# Patient Record
Sex: FEMALE | Race: WHITE | Employment: OTHER | URBAN - METROPOLITAN AREA
[De-identification: names, ages, dates, MRNs, and addresses within clinical notes are randomized per-mention and may not be internally consistent; named-entity substitution may affect disease eponyms.]

---

## 2017-01-03 ENCOUNTER — CARE COORDINATION (OUTPATIENT)
Dept: CARE COORDINATION | Age: 72
End: 2017-01-03

## 2017-01-16 ENCOUNTER — CARE COORDINATOR VISIT (OUTPATIENT)
Dept: CARE COORDINATION | Age: 72
End: 2017-01-16

## 2017-01-16 ENCOUNTER — OFFICE VISIT (OUTPATIENT)
Dept: PRIMARY CARE CLINIC | Age: 72
End: 2017-01-16

## 2017-01-16 VITALS
HEIGHT: 67 IN | SYSTOLIC BLOOD PRESSURE: 124 MMHG | TEMPERATURE: 98.6 F | WEIGHT: 217 LBS | OXYGEN SATURATION: 99 % | DIASTOLIC BLOOD PRESSURE: 58 MMHG | BODY MASS INDEX: 34.06 KG/M2 | HEART RATE: 71 BPM | RESPIRATION RATE: 16 BRPM

## 2017-01-16 DIAGNOSIS — R73.9 HYPERGLYCEMIA: Primary | ICD-10-CM

## 2017-01-16 DIAGNOSIS — I65.29 STENOSIS OF CAROTID ARTERY, UNSPECIFIED LATERALITY: ICD-10-CM

## 2017-01-16 DIAGNOSIS — R73.9 HYPERGLYCEMIA: ICD-10-CM

## 2017-01-16 LAB
ALBUMIN SERPL-MCNC: 3.7 G/DL (ref 3.9–4.9)
ALP BLD-CCNC: 82 U/L (ref 40–130)
ALT SERPL-CCNC: 17 U/L (ref 0–33)
ANION GAP SERPL CALCULATED.3IONS-SCNC: 14 MEQ/L (ref 7–13)
AST SERPL-CCNC: 23 U/L (ref 0–35)
BILIRUB SERPL-MCNC: 0.5 MG/DL (ref 0–1.2)
BUN BLDV-MCNC: 8 MG/DL (ref 8–23)
CALCIUM SERPL-MCNC: 8.7 MG/DL (ref 8.6–10.2)
CHLORIDE BLD-SCNC: 106 MEQ/L (ref 98–107)
CO2: 24 MEQ/L (ref 22–29)
CREAT SERPL-MCNC: 0.69 MG/DL (ref 0.5–0.9)
GFR AFRICAN AMERICAN: >60
GFR NON-AFRICAN AMERICAN: >60
GLOBULIN: 2.3 G/DL (ref 2.3–3.5)
GLUCOSE BLD-MCNC: 189 MG/DL (ref 74–109)
POTASSIUM SERPL-SCNC: 4.5 MEQ/L (ref 3.5–5.1)
SODIUM BLD-SCNC: 144 MEQ/L (ref 132–144)
TOTAL PROTEIN: 6 G/DL (ref 6.4–8.1)

## 2017-01-16 PROCEDURE — 4040F PNEUMOC VAC/ADMIN/RCVD: CPT | Performed by: INTERNAL MEDICINE

## 2017-01-16 PROCEDURE — G8484 FLU IMMUNIZE NO ADMIN: HCPCS | Performed by: INTERNAL MEDICINE

## 2017-01-16 PROCEDURE — G8399 PT W/DXA RESULTS DOCUMENT: HCPCS | Performed by: INTERNAL MEDICINE

## 2017-01-16 PROCEDURE — 1090F PRES/ABSN URINE INCON ASSESS: CPT | Performed by: INTERNAL MEDICINE

## 2017-01-16 PROCEDURE — G8419 CALC BMI OUT NRM PARAM NOF/U: HCPCS | Performed by: INTERNAL MEDICINE

## 2017-01-16 PROCEDURE — 1123F ACP DISCUSS/DSCN MKR DOCD: CPT | Performed by: INTERNAL MEDICINE

## 2017-01-16 PROCEDURE — 3014F SCREEN MAMMO DOC REV: CPT | Performed by: INTERNAL MEDICINE

## 2017-01-16 PROCEDURE — G8598 ASA/ANTIPLAT THER USED: HCPCS | Performed by: INTERNAL MEDICINE

## 2017-01-16 PROCEDURE — 3017F COLORECTAL CA SCREEN DOC REV: CPT | Performed by: INTERNAL MEDICINE

## 2017-01-16 PROCEDURE — 99212 OFFICE O/P EST SF 10 MIN: CPT | Performed by: INTERNAL MEDICINE

## 2017-01-16 PROCEDURE — G8427 DOCREV CUR MEDS BY ELIG CLIN: HCPCS | Performed by: INTERNAL MEDICINE

## 2017-01-16 PROCEDURE — 1036F TOBACCO NON-USER: CPT | Performed by: INTERNAL MEDICINE

## 2017-01-16 RX ORDER — OXYCODONE HYDROCHLORIDE 5 MG/1
5-10 TABLET ORAL
COMMUNITY
Start: 2017-01-07 | End: 2017-01-16

## 2017-01-16 RX ORDER — ATORVASTATIN CALCIUM 40 MG/1
TABLET, FILM COATED ORAL
Refills: 2 | COMMUNITY
Start: 2017-01-07 | End: 2017-01-16

## 2017-01-16 RX ORDER — CHOLECALCIFEROL (VITAMIN D3) 125 MCG
100 CAPSULE ORAL
COMMUNITY
End: 2017-01-16

## 2017-01-16 RX ORDER — ATORVASTATIN CALCIUM 40 MG/1
40 TABLET, FILM COATED ORAL
COMMUNITY
Start: 2017-01-06 | End: 2017-03-28 | Stop reason: SDUPTHER

## 2017-01-16 RX ORDER — METOPROLOL SUCCINATE 100 MG/1
100 TABLET, EXTENDED RELEASE ORAL DAILY
COMMUNITY
End: 2017-08-11 | Stop reason: SDUPTHER

## 2017-01-16 RX ORDER — OXYCODONE HYDROCHLORIDE 5 MG/1
TABLET ORAL
Refills: 0 | COMMUNITY
Start: 2017-01-07 | End: 2017-01-16 | Stop reason: ALTCHOICE

## 2017-01-16 ASSESSMENT — PATIENT HEALTH QUESTIONNAIRE - PHQ9
1. LITTLE INTEREST OR PLEASURE IN DOING THINGS: 0
SUM OF ALL RESPONSES TO PHQ QUESTIONS 1-9: 0
2. FEELING DOWN, DEPRESSED OR HOPELESS: 0
SUM OF ALL RESPONSES TO PHQ9 QUESTIONS 1 & 2: 0

## 2017-01-27 ASSESSMENT — ENCOUNTER SYMPTOMS
FACIAL SWELLING: 0
APNEA: 0
ABDOMINAL DISTENTION: 0
PHOTOPHOBIA: 0
CHOKING: 0
BLOOD IN STOOL: 0

## 2017-01-31 ENCOUNTER — CARE COORDINATION (OUTPATIENT)
Dept: PRIMARY CARE CLINIC | Age: 72
End: 2017-01-31

## 2017-02-28 ENCOUNTER — CARE COORDINATION (OUTPATIENT)
Dept: CARE COORDINATION | Age: 72
End: 2017-02-28

## 2017-03-28 ENCOUNTER — CARE COORDINATION (OUTPATIENT)
Dept: CARE COORDINATION | Age: 72
End: 2017-03-28

## 2017-03-28 RX ORDER — ATORVASTATIN CALCIUM 40 MG/1
40 TABLET, FILM COATED ORAL DAILY
Qty: 90 TABLET | Refills: 1 | Status: SHIPPED | OUTPATIENT
Start: 2017-03-28 | End: 2017-08-11 | Stop reason: SDUPTHER

## 2017-04-26 ENCOUNTER — CARE COORDINATION (OUTPATIENT)
Dept: CARE COORDINATION | Age: 72
End: 2017-04-26

## 2017-05-12 DIAGNOSIS — E11.9 DIABETES TYPE 2, CONTROLLED (HCC): ICD-10-CM

## 2017-05-12 RX ORDER — GLIMEPIRIDE 2 MG/1
TABLET ORAL
Qty: 90 TABLET | Refills: 0 | Status: SHIPPED | OUTPATIENT
Start: 2017-05-12 | End: 2017-08-11 | Stop reason: SDUPTHER

## 2017-05-20 DIAGNOSIS — I42.9 CARDIOMYOPATHY (HCC): ICD-10-CM

## 2017-05-22 RX ORDER — LISINOPRIL 5 MG/1
TABLET ORAL
Qty: 90 TABLET | Refills: 1 | Status: SHIPPED | OUTPATIENT
Start: 2017-05-22 | End: 2017-08-11 | Stop reason: SDUPTHER

## 2017-05-25 RX ORDER — RALOXIFENE HYDROCHLORIDE 60 MG/1
TABLET, FILM COATED ORAL
Qty: 90 TABLET | Refills: 0 | Status: SHIPPED | OUTPATIENT
Start: 2017-05-25 | End: 2017-08-11 | Stop reason: SDUPTHER

## 2017-06-07 ENCOUNTER — CARE COORDINATION (OUTPATIENT)
Dept: PRIMARY CARE CLINIC | Age: 72
End: 2017-06-07

## 2017-06-14 ENCOUNTER — CARE COORDINATION (OUTPATIENT)
Dept: CARE COORDINATION | Age: 72
End: 2017-06-14

## 2017-07-21 ENCOUNTER — TELEPHONE (OUTPATIENT)
Dept: PRIMARY CARE CLINIC | Age: 72
End: 2017-07-21

## 2017-08-05 DIAGNOSIS — E11.9 DIABETES TYPE 2, CONTROLLED (HCC): ICD-10-CM

## 2017-08-06 RX ORDER — GLIMEPIRIDE 2 MG/1
TABLET ORAL
Qty: 90 TABLET | OUTPATIENT
Start: 2017-08-06

## 2017-08-11 ENCOUNTER — OFFICE VISIT (OUTPATIENT)
Dept: PRIMARY CARE CLINIC | Age: 72
End: 2017-08-11

## 2017-08-11 VITALS
HEART RATE: 75 BPM | SYSTOLIC BLOOD PRESSURE: 134 MMHG | DIASTOLIC BLOOD PRESSURE: 86 MMHG | OXYGEN SATURATION: 97 % | HEIGHT: 67 IN | RESPIRATION RATE: 16 BRPM | BODY MASS INDEX: 34.61 KG/M2 | TEMPERATURE: 97 F | WEIGHT: 220.5 LBS

## 2017-08-11 DIAGNOSIS — E78.00 PURE HYPERCHOLESTEROLEMIA: ICD-10-CM

## 2017-08-11 DIAGNOSIS — E11.9 TYPE 2 DIABETES MELLITUS WITHOUT COMPLICATION, WITHOUT LONG-TERM CURRENT USE OF INSULIN (HCC): ICD-10-CM

## 2017-08-11 DIAGNOSIS — M81.8 OTHER OSTEOPOROSIS: ICD-10-CM

## 2017-08-11 DIAGNOSIS — E11.9 TYPE 2 DIABETES MELLITUS WITHOUT COMPLICATION, WITHOUT LONG-TERM CURRENT USE OF INSULIN (HCC): Primary | ICD-10-CM

## 2017-08-11 DIAGNOSIS — I10 ESSENTIAL HYPERTENSION: ICD-10-CM

## 2017-08-11 DIAGNOSIS — F34.1 DYSTHYMIA: ICD-10-CM

## 2017-08-11 LAB
CREATININE URINE: 85 MG/DL
MICROALBUMIN UR-MCNC: <1.2 MG/DL
MICROALBUMIN/CREAT UR-RTO: NORMAL MG/G (ref 0–30)

## 2017-08-11 PROCEDURE — 3014F SCREEN MAMMO DOC REV: CPT | Performed by: INTERNAL MEDICINE

## 2017-08-11 PROCEDURE — 3017F COLORECTAL CA SCREEN DOC REV: CPT | Performed by: INTERNAL MEDICINE

## 2017-08-11 PROCEDURE — 1123F ACP DISCUSS/DSCN MKR DOCD: CPT | Performed by: INTERNAL MEDICINE

## 2017-08-11 PROCEDURE — G8427 DOCREV CUR MEDS BY ELIG CLIN: HCPCS | Performed by: INTERNAL MEDICINE

## 2017-08-11 PROCEDURE — 4005F PHARM THX FOR OP RXD: CPT | Performed by: INTERNAL MEDICINE

## 2017-08-11 PROCEDURE — 1036F TOBACCO NON-USER: CPT | Performed by: INTERNAL MEDICINE

## 2017-08-11 PROCEDURE — 4040F PNEUMOC VAC/ADMIN/RCVD: CPT | Performed by: INTERNAL MEDICINE

## 2017-08-11 PROCEDURE — 99214 OFFICE O/P EST MOD 30 MIN: CPT | Performed by: INTERNAL MEDICINE

## 2017-08-11 PROCEDURE — 3046F HEMOGLOBIN A1C LEVEL >9.0%: CPT | Performed by: INTERNAL MEDICINE

## 2017-08-11 PROCEDURE — G8399 PT W/DXA RESULTS DOCUMENT: HCPCS | Performed by: INTERNAL MEDICINE

## 2017-08-11 PROCEDURE — 1090F PRES/ABSN URINE INCON ASSESS: CPT | Performed by: INTERNAL MEDICINE

## 2017-08-11 PROCEDURE — G8598 ASA/ANTIPLAT THER USED: HCPCS | Performed by: INTERNAL MEDICINE

## 2017-08-11 PROCEDURE — G8417 CALC BMI ABV UP PARAM F/U: HCPCS | Performed by: INTERNAL MEDICINE

## 2017-08-11 RX ORDER — RALOXIFENE HYDROCHLORIDE 60 MG/1
60 TABLET, FILM COATED ORAL DAILY
Qty: 90 TABLET | Refills: 3 | Status: SHIPPED | OUTPATIENT
Start: 2017-08-11 | End: 2018-09-05 | Stop reason: SDUPTHER

## 2017-08-11 RX ORDER — LISINOPRIL 5 MG/1
5 TABLET ORAL DAILY
Qty: 90 TABLET | Refills: 3 | Status: SHIPPED | OUTPATIENT
Start: 2017-08-11 | End: 2018-10-22 | Stop reason: SDUPTHER

## 2017-08-11 RX ORDER — GLIMEPIRIDE 2 MG/1
2 TABLET ORAL
Qty: 90 TABLET | Refills: 3 | Status: SHIPPED | OUTPATIENT
Start: 2017-08-11 | End: 2018-08-05 | Stop reason: SDUPTHER

## 2017-08-11 RX ORDER — RALOXIFENE HYDROCHLORIDE 60 MG/1
TABLET, FILM COATED ORAL
Qty: 90 TABLET | Refills: 0 | Status: CANCELLED | OUTPATIENT
Start: 2017-08-11

## 2017-08-11 RX ORDER — ATORVASTATIN CALCIUM 40 MG/1
40 TABLET, FILM COATED ORAL DAILY
Qty: 90 TABLET | Refills: 3 | Status: SHIPPED | OUTPATIENT
Start: 2017-08-11 | End: 2018-11-16 | Stop reason: SDUPTHER

## 2017-08-11 RX ORDER — METOPROLOL SUCCINATE 100 MG/1
100 TABLET, EXTENDED RELEASE ORAL DAILY
Qty: 90 TABLET | Refills: 3 | Status: SHIPPED | OUTPATIENT
Start: 2017-08-11 | End: 2020-01-01 | Stop reason: SDUPTHER

## 2017-08-11 RX ORDER — SERTRALINE HYDROCHLORIDE 25 MG/1
25 TABLET, FILM COATED ORAL DAILY
Qty: 90 TABLET | Refills: 3 | Status: SHIPPED | OUTPATIENT
Start: 2017-08-11 | End: 2018-07-23 | Stop reason: SDUPTHER

## 2017-08-13 ASSESSMENT — ENCOUNTER SYMPTOMS
PHOTOPHOBIA: 0
BLURRED VISION: 0
FACIAL SWELLING: 0
CHOKING: 0
BLOOD IN STOOL: 0
ABDOMINAL DISTENTION: 0
APNEA: 0

## 2018-02-20 ENCOUNTER — OFFICE VISIT (OUTPATIENT)
Dept: PRIMARY CARE CLINIC | Age: 73
End: 2018-02-20
Payer: MEDICARE

## 2018-02-20 VITALS
WEIGHT: 210.2 LBS | DIASTOLIC BLOOD PRESSURE: 80 MMHG | BODY MASS INDEX: 32.99 KG/M2 | RESPIRATION RATE: 16 BRPM | OXYGEN SATURATION: 96 % | HEART RATE: 68 BPM | SYSTOLIC BLOOD PRESSURE: 138 MMHG | HEIGHT: 67 IN | TEMPERATURE: 97.4 F

## 2018-02-20 DIAGNOSIS — Z11.59 ENCOUNTER FOR HEPATITIS C SCREENING TEST FOR LOW RISK PATIENT: ICD-10-CM

## 2018-02-20 DIAGNOSIS — E78.00 PURE HYPERCHOLESTEROLEMIA: ICD-10-CM

## 2018-02-20 DIAGNOSIS — G47.33 OBSTRUCTIVE SLEEP APNEA SYNDROME: Primary | ICD-10-CM

## 2018-02-20 DIAGNOSIS — E11.9 CONTROLLED TYPE 2 DIABETES MELLITUS WITHOUT COMPLICATION, WITHOUT LONG-TERM CURRENT USE OF INSULIN (HCC): ICD-10-CM

## 2018-02-20 DIAGNOSIS — R73.9 HYPERGLYCEMIA: ICD-10-CM

## 2018-02-20 DIAGNOSIS — E03.8 OTHER SPECIFIED HYPOTHYROIDISM: ICD-10-CM

## 2018-02-20 LAB
CHOLESTEROL, TOTAL: 113 MG/DL (ref 0–199)
HBA1C MFR BLD: 6.9 %
HDLC SERPL-MCNC: 43 MG/DL (ref 40–59)
HEPATITIS C ANTIBODY INTERPRETATION: NORMAL
LDL CHOLESTEROL CALCULATED: 48 MG/DL (ref 0–129)
TRIGL SERPL-MCNC: 111 MG/DL (ref 0–200)
TSH SERPL DL<=0.05 MIU/L-ACNC: 1.21 UIU/ML (ref 0.27–4.2)

## 2018-02-20 PROCEDURE — 83036 HEMOGLOBIN GLYCOSYLATED A1C: CPT | Performed by: INTERNAL MEDICINE

## 2018-02-20 PROCEDURE — 99214 OFFICE O/P EST MOD 30 MIN: CPT | Performed by: INTERNAL MEDICINE

## 2018-02-20 RX ORDER — INFLUENZA A VIRUS A/MICHIGAN/45/2015 X-275 (H1N1) ANTIGEN (FORMALDEHYDE INACTIVATED), INFLUENZA A VIRUS A/SINGAPORE/INFIMH-16-0019/2016 IVR-186 (H3N2) ANTIGEN (FORMALDEHYDE INACTIVATED), AND INFLUENZA B VIRUS B/MARYLAND/15/2016 BX-69A (A B/COLORADO/6/2017-LIKE VIRUS) ANTIGEN (FORMALDEHYDE INACTIVATED) 60; 60; 60 UG/.5ML; UG/.5ML; UG/.5ML
INJECTION, SUSPENSION INTRAMUSCULAR
Refills: 0 | COMMUNITY
Start: 2017-11-30 | End: 2018-02-20

## 2018-02-20 ASSESSMENT — ENCOUNTER SYMPTOMS
SHORTNESS OF BREATH: 0
ABDOMINAL DISTENTION: 0
BLURRED VISION: 0
WHEEZING: 0
FACIAL SWELLING: 0
BLOOD IN STOOL: 0
CHOKING: 0
PHOTOPHOBIA: 0
APNEA: 0

## 2018-02-20 ASSESSMENT — PATIENT HEALTH QUESTIONNAIRE - PHQ9
SUM OF ALL RESPONSES TO PHQ9 QUESTIONS 1 & 2: 0
SUM OF ALL RESPONSES TO PHQ QUESTIONS 1-9: 0
2. FEELING DOWN, DEPRESSED OR HOPELESS: 0
1. LITTLE INTEREST OR PLEASURE IN DOING THINGS: 0

## 2018-02-20 NOTE — PROGRESS NOTES
Alyssa Magaña 81 y.o. female presents today with   Chief Complaint   Patient presents with    Sleep Apnea     patient is requesting a new order for cpap. patient states she intially received cpap from Dr. Lauren Rubio in 2006.  Discuss Labs     patient would like thyroid and A1C labwork done today    Health Maintenance     patient refuses pneumonia vaccine        Diabetes   She presents for her follow-up diabetic visit. She has type 2 diabetes mellitus. Her disease course has been stable. Pertinent negatives for hypoglycemia include no confusion or speech difficulty. Associated symptoms include fatigue. Pertinent negatives for diabetes include no blurred vision and no chest pain. Pertinent negatives for hypoglycemia complications include no blackouts. Symptoms are stable. sleep apnea broke 1 month ago. Sleepy, low energy is back since off the c pap machine. The Yusra Ventura did the c pap test 12 years ago. She would like DM and thyroid check. Past Medical History:   Diagnosis Date    Cardiomyopathy [facility])     mother history.  Hypertension     Type II or unspecified type diabetes mellitus without mention of complication, not stated as uncontrolled      Patient Active Problem List    Diagnosis Date Noted    Bell-rectal abscess 04/17/2013    Hypertension     Type 2 diabetes mellitus without complication, without long-term current use of insulin ([facility] [address] 03/15/2013     Past Surgical History:   Procedure Laterality Date    CHOLECYSTECTOMY      COLONOSCOPY  4/23/2013    HYSTERECTOMY      OTHER SURGICAL HISTORY      removal of anal fistulotomy     History reviewed. No pertinent family history.   Social History     Social History    Marital status:      Spouse name: N/A    Number of children: N/A    Years of education: N/A     Social History Main Topics    Smoking status: Never Smoker    Smokeless tobacco: Former User     Quit date: 1/1/1984    Alcohol use No    Drug use: No    Sexual activity: Yes

## 2018-07-23 DIAGNOSIS — F34.1 DYSTHYMIA: ICD-10-CM

## 2018-07-23 RX ORDER — SERTRALINE HYDROCHLORIDE 25 MG/1
TABLET, FILM COATED ORAL
Qty: 90 TABLET | Refills: 3 | Status: SHIPPED | OUTPATIENT
Start: 2018-07-23 | End: 2018-10-02 | Stop reason: SDUPTHER

## 2018-08-05 DIAGNOSIS — E11.9 TYPE 2 DIABETES MELLITUS WITHOUT COMPLICATION, WITHOUT LONG-TERM CURRENT USE OF INSULIN (HCC): ICD-10-CM

## 2018-08-05 RX ORDER — GLIMEPIRIDE 2 MG/1
TABLET ORAL
Qty: 90 TABLET | Refills: 3 | Status: SHIPPED | OUTPATIENT
Start: 2018-08-05 | End: 2019-07-31 | Stop reason: SDUPTHER

## 2018-09-05 RX ORDER — RALOXIFENE HYDROCHLORIDE 60 MG/1
TABLET, FILM COATED ORAL
Qty: 90 TABLET | Refills: 3 | Status: SHIPPED | OUTPATIENT
Start: 2018-09-05 | End: 2019-09-03 | Stop reason: SDUPTHER

## 2018-09-18 DIAGNOSIS — E11.9 TYPE 2 DIABETES MELLITUS WITHOUT COMPLICATION, WITHOUT LONG-TERM CURRENT USE OF INSULIN (HCC): ICD-10-CM

## 2018-10-02 ENCOUNTER — OFFICE VISIT (OUTPATIENT)
Dept: PRIMARY CARE CLINIC | Age: 73
End: 2018-10-02
Payer: MEDICARE

## 2018-10-02 VITALS
WEIGHT: 208 LBS | DIASTOLIC BLOOD PRESSURE: 70 MMHG | TEMPERATURE: 97.1 F | HEIGHT: 67 IN | HEART RATE: 60 BPM | BODY MASS INDEX: 32.65 KG/M2 | SYSTOLIC BLOOD PRESSURE: 118 MMHG | RESPIRATION RATE: 14 BRPM | OXYGEN SATURATION: 96 %

## 2018-10-02 DIAGNOSIS — E11.9 TYPE 2 DIABETES MELLITUS WITHOUT COMPLICATION, WITHOUT LONG-TERM CURRENT USE OF INSULIN (HCC): ICD-10-CM

## 2018-10-02 DIAGNOSIS — L08.9 FINGER INFECTION: ICD-10-CM

## 2018-10-02 DIAGNOSIS — F34.1 DYSTHYMIA: ICD-10-CM

## 2018-10-02 DIAGNOSIS — E78.00 PURE HYPERCHOLESTEROLEMIA: ICD-10-CM

## 2018-10-02 DIAGNOSIS — E03.9 HYPOTHYROIDISM, UNSPECIFIED TYPE: ICD-10-CM

## 2018-10-02 DIAGNOSIS — M25.571 ACUTE RIGHT ANKLE PAIN: Primary | ICD-10-CM

## 2018-10-02 DIAGNOSIS — S82.64XA CLOSED NONDISPLACED FRACTURE OF LATERAL MALLEOLUS OF RIGHT FIBULA, INITIAL ENCOUNTER: Primary | ICD-10-CM

## 2018-10-02 LAB
ALBUMIN SERPL-MCNC: 3.6 G/DL (ref 3.9–4.9)
ALP BLD-CCNC: 95 U/L (ref 40–130)
ALT SERPL-CCNC: 18 U/L (ref 0–33)
ANION GAP SERPL CALCULATED.3IONS-SCNC: 10 MEQ/L (ref 7–13)
AST SERPL-CCNC: 36 U/L (ref 0–35)
BASOPHILS ABSOLUTE: 0 K/UL (ref 0–0.2)
BASOPHILS RELATIVE PERCENT: 0.5 %
BILIRUB SERPL-MCNC: 1 MG/DL (ref 0–1.2)
BUN BLDV-MCNC: 6 MG/DL (ref 8–23)
CALCIUM SERPL-MCNC: 9 MG/DL (ref 8.6–10.2)
CHLORIDE BLD-SCNC: 105 MEQ/L (ref 98–107)
CHOLESTEROL, TOTAL: 87 MG/DL (ref 0–199)
CO2: 28 MEQ/L (ref 22–29)
CREAT SERPL-MCNC: 0.41 MG/DL (ref 0.5–0.9)
EOSINOPHILS ABSOLUTE: 0.1 K/UL (ref 0–0.7)
EOSINOPHILS RELATIVE PERCENT: 1.5 %
GFR AFRICAN AMERICAN: >60
GFR NON-AFRICAN AMERICAN: >60
GLOBULIN: 2.8 G/DL (ref 2.3–3.5)
GLUCOSE BLD-MCNC: 139 MG/DL (ref 74–109)
HBA1C MFR BLD: 8.2 % (ref 4.8–5.9)
HCT VFR BLD CALC: 37.4 % (ref 37–47)
HDLC SERPL-MCNC: 35 MG/DL (ref 40–59)
HEMOGLOBIN: 12.9 G/DL (ref 12–16)
LDL CHOLESTEROL CALCULATED: 34 MG/DL (ref 0–129)
LYMPHOCYTES ABSOLUTE: 2.1 K/UL (ref 1–4.8)
LYMPHOCYTES RELATIVE PERCENT: 34.4 %
MCH RBC QN AUTO: 33.7 PG (ref 27–31.3)
MCHC RBC AUTO-ENTMCNC: 34.5 % (ref 33–37)
MCV RBC AUTO: 97.9 FL (ref 82–100)
MONOCYTES ABSOLUTE: 0.5 K/UL (ref 0.2–0.8)
MONOCYTES RELATIVE PERCENT: 8 %
NEUTROPHILS ABSOLUTE: 3.3 K/UL (ref 1.4–6.5)
NEUTROPHILS RELATIVE PERCENT: 55.6 %
PDW BLD-RTO: 14.5 % (ref 11.5–14.5)
PLATELET # BLD: 127 K/UL (ref 130–400)
POTASSIUM SERPL-SCNC: 4.4 MEQ/L (ref 3.5–5.1)
RBC # BLD: 3.82 M/UL (ref 4.2–5.4)
SODIUM BLD-SCNC: 143 MEQ/L (ref 132–144)
TOTAL PROTEIN: 6.4 G/DL (ref 6.4–8.1)
TRIGL SERPL-MCNC: 91 MG/DL (ref 0–200)
TSH SERPL DL<=0.05 MIU/L-ACNC: 2.39 UIU/ML (ref 0.27–4.2)
WBC # BLD: 6 K/UL (ref 4.8–10.8)

## 2018-10-02 PROCEDURE — 99214 OFFICE O/P EST MOD 30 MIN: CPT | Performed by: INTERNAL MEDICINE

## 2018-10-02 RX ORDER — SULFAMETHOXAZOLE AND TRIMETHOPRIM 800; 160 MG/1; MG/1
1 TABLET ORAL 2 TIMES DAILY
Qty: 20 TABLET | Refills: 0 | Status: SHIPPED | OUTPATIENT
Start: 2018-10-02 | End: 2018-10-12

## 2018-10-04 DIAGNOSIS — S82.891S CLOSED FRACTURE OF RIGHT ANKLE, SEQUELA: Primary | ICD-10-CM

## 2018-10-07 ASSESSMENT — ENCOUNTER SYMPTOMS
ABDOMINAL DISTENTION: 0
BLOOD IN STOOL: 0
CHOKING: 0
PHOTOPHOBIA: 0
APNEA: 0
FACIAL SWELLING: 0
BLURRED VISION: 0

## 2018-10-22 ENCOUNTER — OFFICE VISIT (OUTPATIENT)
Dept: OBGYN CLINIC | Age: 73
End: 2018-10-22
Payer: MEDICARE

## 2018-10-22 VITALS
BODY MASS INDEX: 33.12 KG/M2 | WEIGHT: 211 LBS | DIASTOLIC BLOOD PRESSURE: 82 MMHG | SYSTOLIC BLOOD PRESSURE: 118 MMHG | HEIGHT: 67 IN

## 2018-10-22 DIAGNOSIS — E11.9 TYPE 2 DIABETES MELLITUS WITHOUT COMPLICATION, WITHOUT LONG-TERM CURRENT USE OF INSULIN (HCC): ICD-10-CM

## 2018-10-22 DIAGNOSIS — Z12.31 SCREENING MAMMOGRAM, ENCOUNTER FOR: ICD-10-CM

## 2018-10-22 DIAGNOSIS — I10 ESSENTIAL HYPERTENSION: ICD-10-CM

## 2018-10-22 DIAGNOSIS — Z00.00 WELL WOMAN EXAM WITHOUT GYNECOLOGICAL EXAM: Primary | ICD-10-CM

## 2018-10-22 PROCEDURE — 99397 PER PM REEVAL EST PAT 65+ YR: CPT | Performed by: OBSTETRICS & GYNECOLOGY

## 2018-10-22 RX ORDER — INFLUENZA A VIRUS A/MICHIGAN/45/2015 X-275 (H1N1) ANTIGEN (FORMALDEHYDE INACTIVATED), INFLUENZA A VIRUS A/SINGAPORE/INFIMH-16-0019/2016 IVR-186 (H3N2) ANTIGEN (FORMALDEHYDE INACTIVATED), AND INFLUENZA B VIRUS B/MARYLAND/15/2016 BX-69A (A B/COLORADO/6/2017-LIKE VIRUS) ANTIGEN (FORMALDEHYDE INACTIVATED) 60; 60; 60 UG/.5ML; UG/.5ML; UG/.5ML
INJECTION, SUSPENSION INTRAMUSCULAR
Refills: 0 | COMMUNITY
Start: 2018-10-19 | End: 2018-11-16

## 2018-10-22 RX ORDER — LISINOPRIL 5 MG/1
TABLET ORAL
Qty: 90 TABLET | Refills: 3 | Status: SHIPPED | OUTPATIENT
Start: 2018-10-22 | End: 2019-12-02 | Stop reason: SDUPTHER

## 2018-10-22 RX ORDER — NYSTATIN 100000 [USP'U]/G
POWDER TOPICAL
Qty: 60 G | Refills: 3 | Status: ON HOLD | OUTPATIENT
Start: 2018-10-22 | End: 2020-01-01

## 2018-10-22 NOTE — PROGRESS NOTES
SUBJECTIVE:   72 y.o. female here for annual exam. Pt  and no complaints today. LPS 2016 NILM HPV Neg. Review of Systems:  General ROS: negative  Psychological ROS: negative  ENT ROS: negative  Endocrine ROS: negative  Respiratory ROS: no cough, shortness of breath, or wheezing  Cardiovascular ROS: no chest pain or dyspnea on exertion  Gastrointestinal ROS: no abdominal pain, change in bowel habits, or black or bloody stools  Genito-Urinary ROS: no dysuria, trouble voiding, or hematuria  Musculoskeletal ROS: negative  Neurological ROS: no TIA or stroke symptoms  Dermatological ROS: negative    OBJECTIVE:   /82   Ht 5' 7\" (1.702 m)   Wt 211 lb (95.7 kg)   BMI 33.05 kg/m²     Physical Exam:  CONSTITUTIONAL: She appears well nourished and developed   NEUROLOGIC: Alert and oriented to time, place and person  NECK: no thyroidmegaly  BREASTS: Bilateral breasts without masses, lesions or nipple discharge  LUNGS: Clear to ascultation bilaterally  CVS: regular rate and rhythm  LYMPHATIC: No palpable lymph nodes  ABDOMEN: benign, soft, nontender, no masses. No liver or splenic organomegaly. No evidence of abdominal or inguinal hernia. No indication for occult blood testing  SKIN: yeast infection under pannus  NEURO: normal tone, no hyperreflexia, 1+DTRs throughout    Pelvic Exam:   EFG: normal external genitalia  URETHRAL MEATUS: normal size, no diverticula   URETHRA: normal appearing without diverticula or lesions  BLADDER:  No masses or tenderness  VAGINA: normal rugae, no discharge, atrophic changes   CERVIX: parous, no lesions, atrophic changes  UTERUS: uterus is normal size, shape, consistency and nontender   ADNEXA: normal adnexa in size, nontender and no masses. PERINEUM: normal appearing without lesions or masses  RECTUM: no hemorrhoids or rectal masses.      ASSESSMENT:   Routine gynecologic exam  Breast cancer screening    PLAN:   Pap with hpv pending  MMG referral sent in   Dexa scan 2014 normal Pt with colonoscopy 2013 normal   Rx nystatin powder to pharmacy   Past medical, social and family history reviewed and updated in pt's chart. Routine health screenings and precautions discussed.

## 2018-10-30 DIAGNOSIS — Z12.31 SCREENING MAMMOGRAM, ENCOUNTER FOR: ICD-10-CM

## 2018-11-16 DIAGNOSIS — E78.00 PURE HYPERCHOLESTEROLEMIA: ICD-10-CM

## 2018-11-17 RX ORDER — ATORVASTATIN CALCIUM 40 MG/1
40 TABLET, FILM COATED ORAL DAILY
Qty: 90 TABLET | Refills: 1 | Status: SHIPPED | OUTPATIENT
Start: 2018-11-17 | End: 2019-05-16 | Stop reason: SDUPTHER

## 2018-12-07 PROBLEM — I65.23 BILATERAL CAROTID ARTERY STENOSIS: Status: ACTIVE | Noted: 2017-01-06

## 2018-12-07 PROBLEM — G47.33 OSA (OBSTRUCTIVE SLEEP APNEA): Status: ACTIVE | Noted: 2018-12-07

## 2019-05-16 DIAGNOSIS — E78.00 PURE HYPERCHOLESTEROLEMIA: ICD-10-CM

## 2019-05-16 RX ORDER — ATORVASTATIN CALCIUM 40 MG/1
TABLET, FILM COATED ORAL
Qty: 90 TABLET | Refills: 1 | Status: SHIPPED | OUTPATIENT
Start: 2019-05-16 | End: 2019-08-30 | Stop reason: SDUPTHER

## 2019-08-30 DIAGNOSIS — E78.00 PURE HYPERCHOLESTEROLEMIA: ICD-10-CM

## 2019-08-30 RX ORDER — ATORVASTATIN CALCIUM 40 MG/1
TABLET, FILM COATED ORAL
Qty: 30 TABLET | Refills: 0 | Status: SHIPPED | OUTPATIENT
Start: 2019-08-30 | End: 2019-10-01 | Stop reason: SDUPTHER

## 2019-09-06 LAB — DIABETIC RETINOPATHY: NEGATIVE

## 2019-10-01 ENCOUNTER — OFFICE VISIT (OUTPATIENT)
Dept: FAMILY MEDICINE CLINIC | Age: 74
End: 2019-10-01
Payer: MEDICARE

## 2019-10-01 VITALS
BODY MASS INDEX: 30.29 KG/M2 | TEMPERATURE: 97.2 F | HEART RATE: 70 BPM | HEIGHT: 67 IN | OXYGEN SATURATION: 99 % | WEIGHT: 193 LBS | SYSTOLIC BLOOD PRESSURE: 128 MMHG | RESPIRATION RATE: 14 BRPM | DIASTOLIC BLOOD PRESSURE: 60 MMHG

## 2019-10-01 DIAGNOSIS — F32.A DEPRESSION, UNSPECIFIED DEPRESSION TYPE: Primary | ICD-10-CM

## 2019-10-01 DIAGNOSIS — E11.9 TYPE 2 DIABETES MELLITUS WITHOUT COMPLICATION, WITHOUT LONG-TERM CURRENT USE OF INSULIN (HCC): ICD-10-CM

## 2019-10-01 DIAGNOSIS — Z13.31 POSITIVE DEPRESSION SCREENING: ICD-10-CM

## 2019-10-01 DIAGNOSIS — E53.8 FOLATE DEFICIENCY: ICD-10-CM

## 2019-10-01 DIAGNOSIS — E53.8 VITAMIN B 12 DEFICIENCY: ICD-10-CM

## 2019-10-01 DIAGNOSIS — Z00.00 HEALTHCARE MAINTENANCE: ICD-10-CM

## 2019-10-01 DIAGNOSIS — I10 ESSENTIAL HYPERTENSION: ICD-10-CM

## 2019-10-01 DIAGNOSIS — E55.9 VITAMIN D DEFICIENCY: ICD-10-CM

## 2019-10-01 DIAGNOSIS — E78.00 PURE HYPERCHOLESTEROLEMIA: ICD-10-CM

## 2019-10-01 DIAGNOSIS — E03.9 HYPOTHYROIDISM, UNSPECIFIED TYPE: ICD-10-CM

## 2019-10-01 DIAGNOSIS — Z23 NEED FOR INFLUENZA VACCINATION: ICD-10-CM

## 2019-10-01 PROCEDURE — G0444 DEPRESSION SCREEN ANNUAL: HCPCS | Performed by: INTERNAL MEDICINE

## 2019-10-01 PROCEDURE — 99214 OFFICE O/P EST MOD 30 MIN: CPT | Performed by: INTERNAL MEDICINE

## 2019-10-01 PROCEDURE — G8431 POS CLIN DEPRES SCRN F/U DOC: HCPCS | Performed by: INTERNAL MEDICINE

## 2019-10-01 PROCEDURE — 90653 IIV ADJUVANT VACCINE IM: CPT | Performed by: INTERNAL MEDICINE

## 2019-10-01 PROCEDURE — G0008 ADMIN INFLUENZA VIRUS VAC: HCPCS | Performed by: INTERNAL MEDICINE

## 2019-10-01 RX ORDER — CITALOPRAM 10 MG/1
10 TABLET ORAL DAILY
Qty: 90 TABLET | Refills: 1 | Status: SHIPPED | OUTPATIENT
Start: 2019-10-01 | End: 2020-03-11

## 2019-10-01 RX ORDER — ATORVASTATIN CALCIUM 40 MG/1
TABLET, FILM COATED ORAL
Qty: 90 TABLET | Refills: 3 | Status: SHIPPED | OUTPATIENT
Start: 2019-10-01 | End: 2020-01-01

## 2019-10-01 ASSESSMENT — PATIENT HEALTH QUESTIONNAIRE - PHQ9
SUM OF ALL RESPONSES TO PHQ QUESTIONS 1-9: 16
3. TROUBLE FALLING OR STAYING ASLEEP: 3
9. THOUGHTS THAT YOU WOULD BE BETTER OFF DEAD, OR OF HURTING YOURSELF: 0
SUM OF ALL RESPONSES TO PHQ9 QUESTIONS 1 & 2: 3
8. MOVING OR SPEAKING SO SLOWLY THAT OTHER PEOPLE COULD HAVE NOTICED. OR THE OPPOSITE, BEING SO FIGETY OR RESTLESS THAT YOU HAVE BEEN MOVING AROUND A LOT MORE THAN USUAL: 2
2. FEELING DOWN, DEPRESSED OR HOPELESS: 2
SUM OF ALL RESPONSES TO PHQ QUESTIONS 1-9: 16
5. POOR APPETITE OR OVEREATING: 3
7. TROUBLE CONCENTRATING ON THINGS, SUCH AS READING THE NEWSPAPER OR WATCHING TELEVISION: 2
1. LITTLE INTEREST OR PLEASURE IN DOING THINGS: 1
4. FEELING TIRED OR HAVING LITTLE ENERGY: 2
6. FEELING BAD ABOUT YOURSELF - OR THAT YOU ARE A FAILURE OR HAVE LET YOURSELF OR YOUR FAMILY DOWN: 1
10. IF YOU CHECKED OFF ANY PROBLEMS, HOW DIFFICULT HAVE THESE PROBLEMS MADE IT FOR YOU TO DO YOUR WORK, TAKE CARE OF THINGS AT HOME, OR GET ALONG WITH OTHER PEOPLE: 1

## 2019-10-02 DIAGNOSIS — Z00.00 HEALTHCARE MAINTENANCE: ICD-10-CM

## 2019-10-02 LAB
CREATININE URINE: 83.7 MG/DL
MICROALBUMIN UR-MCNC: 1.3 MG/DL
MICROALBUMIN/CREAT UR-RTO: 15.5 MG/G (ref 0–30)

## 2019-10-13 ASSESSMENT — ENCOUNTER SYMPTOMS
BLOOD IN STOOL: 0
FACIAL SWELLING: 0
PHOTOPHOBIA: 0
APNEA: 0
BACK PAIN: 1
ABDOMINAL DISTENTION: 0
CHOKING: 0

## 2019-10-21 DIAGNOSIS — F32.A DEPRESSION, UNSPECIFIED DEPRESSION TYPE: ICD-10-CM

## 2019-10-21 DIAGNOSIS — E78.00 PURE HYPERCHOLESTEROLEMIA: ICD-10-CM

## 2019-10-21 DIAGNOSIS — E03.9 HYPOTHYROIDISM, UNSPECIFIED TYPE: ICD-10-CM

## 2019-10-21 DIAGNOSIS — E11.9 TYPE 2 DIABETES MELLITUS WITHOUT COMPLICATION, WITHOUT LONG-TERM CURRENT USE OF INSULIN (HCC): ICD-10-CM

## 2019-10-21 DIAGNOSIS — E55.9 VITAMIN D DEFICIENCY: ICD-10-CM

## 2019-10-21 DIAGNOSIS — E53.8 FOLATE DEFICIENCY: ICD-10-CM

## 2019-10-21 DIAGNOSIS — E53.8 VITAMIN B 12 DEFICIENCY: ICD-10-CM

## 2019-10-21 LAB
ALBUMIN SERPL-MCNC: 3.3 G/DL (ref 3.5–4.6)
ALP BLD-CCNC: 105 U/L (ref 40–130)
ALT SERPL-CCNC: 15 U/L (ref 0–33)
ANION GAP SERPL CALCULATED.3IONS-SCNC: 12 MEQ/L (ref 9–15)
AST SERPL-CCNC: 27 U/L (ref 0–35)
BASOPHILS ABSOLUTE: 0 K/UL (ref 0–0.2)
BASOPHILS RELATIVE PERCENT: 0.5 %
BILIRUB SERPL-MCNC: 1.1 MG/DL (ref 0.2–0.7)
BUN BLDV-MCNC: 7 MG/DL (ref 8–23)
CALCIUM SERPL-MCNC: 8.2 MG/DL (ref 8.5–9.9)
CHLORIDE BLD-SCNC: 106 MEQ/L (ref 95–107)
CHOLESTEROL, TOTAL: 81 MG/DL (ref 0–199)
CO2: 24 MEQ/L (ref 20–31)
CREAT SERPL-MCNC: 0.41 MG/DL (ref 0.5–0.9)
EOSINOPHILS ABSOLUTE: 0.1 K/UL (ref 0–0.7)
EOSINOPHILS RELATIVE PERCENT: 2.1 %
FOLATE: 9.6 NG/ML (ref 7.3–26.1)
GFR AFRICAN AMERICAN: >60
GFR NON-AFRICAN AMERICAN: >60
GLOBULIN: 3.1 G/DL (ref 2.3–3.5)
GLUCOSE BLD-MCNC: 132 MG/DL (ref 70–99)
HBA1C MFR BLD: 7.5 % (ref 4.8–5.9)
HCT VFR BLD CALC: 32.4 % (ref 37–47)
HDLC SERPL-MCNC: 40 MG/DL (ref 40–59)
HEMOGLOBIN: 10.5 G/DL (ref 12–16)
LDL CHOLESTEROL CALCULATED: 25 MG/DL (ref 0–129)
LYMPHOCYTES ABSOLUTE: 1.4 K/UL (ref 1–4.8)
LYMPHOCYTES RELATIVE PERCENT: 30.4 %
MCH RBC QN AUTO: 29.9 PG (ref 27–31.3)
MCHC RBC AUTO-ENTMCNC: 32.4 % (ref 33–37)
MCV RBC AUTO: 92.3 FL (ref 82–100)
MONOCYTES ABSOLUTE: 0.4 K/UL (ref 0.2–0.8)
MONOCYTES RELATIVE PERCENT: 8.1 %
NEUTROPHILS ABSOLUTE: 2.7 K/UL (ref 1.4–6.5)
NEUTROPHILS RELATIVE PERCENT: 58.9 %
PDW BLD-RTO: 15.4 % (ref 11.5–14.5)
PLATELET # BLD: 114 K/UL (ref 130–400)
POTASSIUM SERPL-SCNC: 3.9 MEQ/L (ref 3.4–4.9)
RBC # BLD: 3.51 M/UL (ref 4.2–5.4)
SODIUM BLD-SCNC: 142 MEQ/L (ref 135–144)
TOTAL PROTEIN: 6.4 G/DL (ref 6.3–8)
TRIGL SERPL-MCNC: 80 MG/DL (ref 0–150)
TSH SERPL DL<=0.05 MIU/L-ACNC: 2.58 UIU/ML (ref 0.44–3.86)
VITAMIN B-12: 400 PG/ML (ref 232–1245)
VITAMIN D 25-HYDROXY: 13.3 NG/ML (ref 30–100)
WBC # BLD: 4.6 K/UL (ref 4.8–10.8)

## 2019-11-04 ENCOUNTER — PATIENT MESSAGE (OUTPATIENT)
Dept: FAMILY MEDICINE CLINIC | Age: 74
End: 2019-11-04

## 2019-12-02 ENCOUNTER — TELEPHONE (OUTPATIENT)
Dept: PRIMARY CARE CLINIC | Age: 74
End: 2019-12-02

## 2020-01-01 ENCOUNTER — VIRTUAL VISIT (OUTPATIENT)
Dept: BEHAVIORAL/MENTAL HEALTH CLINIC | Age: 75
End: 2020-01-01
Payer: MEDICARE

## 2020-01-01 ENCOUNTER — TELEPHONE (OUTPATIENT)
Dept: INTERVENTIONAL RADIOLOGY/VASCULAR | Age: 75
End: 2020-01-01

## 2020-01-01 ENCOUNTER — HOSPITAL ENCOUNTER (OUTPATIENT)
Dept: CT IMAGING | Age: 75
Discharge: HOME | End: 2020-09-06
Payer: MEDICARE

## 2020-01-01 ENCOUNTER — OFFICE VISIT (OUTPATIENT)
Dept: CARDIOLOGY CLINIC | Age: 75
End: 2020-01-01
Payer: MEDICARE

## 2020-01-01 ENCOUNTER — TELEPHONE (OUTPATIENT)
Dept: PRIMARY CARE CLINIC | Age: 75
End: 2020-01-01

## 2020-01-01 ENCOUNTER — HOSPITAL ENCOUNTER (INPATIENT)
Age: 75
LOS: 3 days | Discharge: HOME | DRG: 920 | End: 2020-09-19
Attending: EMERGENCY MEDICINE | Admitting: INTERNAL MEDICINE
Payer: MEDICARE

## 2020-01-01 ENCOUNTER — HOSPITAL ENCOUNTER (OUTPATIENT)
Dept: CT IMAGING | Age: 75
Discharge: HOME | End: 2020-08-06
Payer: MEDICARE

## 2020-01-01 ENCOUNTER — TELEPHONE (OUTPATIENT)
Dept: CARDIOLOGY CLINIC | Age: 75
End: 2020-01-01

## 2020-01-01 ENCOUNTER — HOSPITAL ENCOUNTER (OUTPATIENT)
Dept: ULTRASOUND IMAGING | Age: 75
Discharge: HOME | End: 2020-12-19
Payer: MEDICARE

## 2020-01-01 ENCOUNTER — TELEPHONE (OUTPATIENT)
Dept: GASTROENTEROLOGY | Age: 75
End: 2020-01-01

## 2020-01-01 ENCOUNTER — OFFICE VISIT (OUTPATIENT)
Dept: FAMILY MEDICINE CLINIC | Age: 75
End: 2020-01-01
Payer: MEDICARE

## 2020-01-01 ENCOUNTER — CARE COORDINATION (OUTPATIENT)
Dept: CASE MANAGEMENT | Age: 75
End: 2020-01-01

## 2020-01-01 ENCOUNTER — HOSPITAL ENCOUNTER (OUTPATIENT)
Dept: ULTRASOUND IMAGING | Age: 75
Discharge: HOME | End: 2020-12-17
Payer: MEDICARE

## 2020-01-01 ENCOUNTER — CLINICAL DOCUMENTATION (OUTPATIENT)
Dept: CARDIOLOGY CLINIC | Age: 75
End: 2020-01-01

## 2020-01-01 ENCOUNTER — HOSPITAL ENCOUNTER (OUTPATIENT)
Dept: ULTRASOUND IMAGING | Age: 75
Discharge: HOME | End: 2020-07-11
Payer: MEDICARE

## 2020-01-01 ENCOUNTER — APPOINTMENT (OUTPATIENT)
Dept: CT IMAGING | Age: 75
End: 2020-01-01
Payer: MEDICARE

## 2020-01-01 ENCOUNTER — TELEPHONE (OUTPATIENT)
Dept: FAMILY MEDICINE CLINIC | Age: 75
End: 2020-01-01

## 2020-01-01 ENCOUNTER — APPOINTMENT (OUTPATIENT)
Dept: INTERVENTIONAL RADIOLOGY/VASCULAR | Age: 75
DRG: 432 | End: 2020-01-01
Payer: MEDICARE

## 2020-01-01 ENCOUNTER — TELEPHONE (OUTPATIENT)
Dept: BEHAVIORAL/MENTAL HEALTH CLINIC | Age: 75
End: 2020-01-01

## 2020-01-01 ENCOUNTER — OFFICE VISIT (OUTPATIENT)
Dept: PRIMARY CARE CLINIC | Age: 75
End: 2020-01-01
Payer: MEDICARE

## 2020-01-01 ENCOUNTER — OFFICE VISIT (OUTPATIENT)
Dept: SURGERY | Age: 75
End: 2020-01-01
Payer: MEDICARE

## 2020-01-01 ENCOUNTER — HOSPITAL ENCOUNTER (OUTPATIENT)
Dept: ULTRASOUND IMAGING | Age: 75
Discharge: HOME | End: 2020-12-31
Payer: MEDICARE

## 2020-01-01 ENCOUNTER — APPOINTMENT (OUTPATIENT)
Dept: CT IMAGING | Age: 75
DRG: 432 | End: 2020-01-01
Payer: MEDICARE

## 2020-01-01 ENCOUNTER — HOSPITAL ENCOUNTER (OUTPATIENT)
Dept: INTERVENTIONAL RADIOLOGY/VASCULAR | Age: 75
Discharge: HOME | End: 2020-09-27
Payer: MEDICARE

## 2020-01-01 ENCOUNTER — POST-OP TELEPHONE (OUTPATIENT)
Dept: CT IMAGING | Age: 75
End: 2020-01-01

## 2020-01-01 ENCOUNTER — HOSPITAL ENCOUNTER (OUTPATIENT)
Dept: ULTRASOUND IMAGING | Age: 75
Discharge: HOME | End: 2020-11-08
Payer: MEDICARE

## 2020-01-01 ENCOUNTER — HOSPITAL ENCOUNTER (OUTPATIENT)
Dept: ULTRASOUND IMAGING | Age: 75
Discharge: HOME | End: 2020-09-06
Payer: MEDICARE

## 2020-01-01 ENCOUNTER — HOSPITAL ENCOUNTER (OUTPATIENT)
Dept: INTERVENTIONAL RADIOLOGY/VASCULAR | Age: 75
Discharge: HOME | End: 2020-09-06
Payer: MEDICARE

## 2020-01-01 ENCOUNTER — HOSPITAL ENCOUNTER (OUTPATIENT)
Dept: ULTRASOUND IMAGING | Age: 75
Discharge: HOME | DRG: 920 | End: 2020-09-17
Payer: MEDICARE

## 2020-01-01 ENCOUNTER — HOSPITAL ENCOUNTER (OUTPATIENT)
Dept: INTERVENTIONAL RADIOLOGY/VASCULAR | Age: 75
Discharge: HOME | End: 2020-10-28
Payer: MEDICARE

## 2020-01-01 ENCOUNTER — APPOINTMENT (OUTPATIENT)
Dept: ULTRASOUND IMAGING | Age: 75
DRG: 920 | End: 2020-01-01
Payer: MEDICARE

## 2020-01-01 ENCOUNTER — PREP FOR PROCEDURE (OUTPATIENT)
Dept: GENERAL RADIOLOGY | Age: 75
End: 2020-01-01

## 2020-01-01 ENCOUNTER — HOSPITAL ENCOUNTER (OUTPATIENT)
Dept: ULTRASOUND IMAGING | Age: 75
Discharge: HOME | End: 2020-11-21
Payer: MEDICARE

## 2020-01-01 ENCOUNTER — HOSPITAL ENCOUNTER (OUTPATIENT)
Dept: ULTRASOUND IMAGING | Age: 75
Discharge: HOME | End: 2020-12-02
Payer: MEDICARE

## 2020-01-01 ENCOUNTER — HOSPITAL ENCOUNTER (INPATIENT)
Age: 75
LOS: 5 days | Discharge: HOME HEALTH | DRG: 432 | End: 2020-07-31
Attending: INTERNAL MEDICINE | Admitting: INTERNAL MEDICINE
Payer: MEDICARE

## 2020-01-01 ENCOUNTER — OFFICE VISIT (OUTPATIENT)
Dept: GASTROENTEROLOGY | Age: 75
End: 2020-01-01
Payer: MEDICARE

## 2020-01-01 ENCOUNTER — HOSPITAL ENCOUNTER (OUTPATIENT)
Dept: ULTRASOUND IMAGING | Age: 75
Discharge: HOME | End: 2020-10-11
Payer: MEDICARE

## 2020-01-01 ENCOUNTER — NURSE TRIAGE (OUTPATIENT)
Dept: OTHER | Facility: CLINIC | Age: 75
End: 2020-01-01

## 2020-01-01 ENCOUNTER — HOSPITAL ENCOUNTER (OUTPATIENT)
Dept: NON INVASIVE DIAGNOSTICS | Age: 75
Discharge: HOME | End: 2020-06-23
Payer: MEDICARE

## 2020-01-01 ENCOUNTER — HOSPITAL ENCOUNTER (OUTPATIENT)
Dept: CT IMAGING | Age: 75
Discharge: HOME | End: 2020-12-31
Payer: MEDICARE

## 2020-01-01 ENCOUNTER — PREP FOR PROCEDURE (OUTPATIENT)
Dept: INTERVENTIONAL RADIOLOGY/VASCULAR | Age: 75
End: 2020-01-01

## 2020-01-01 ENCOUNTER — APPOINTMENT (OUTPATIENT)
Dept: ULTRASOUND IMAGING | Age: 75
DRG: 432 | End: 2020-01-01
Payer: MEDICARE

## 2020-01-01 ENCOUNTER — TELEPHONE (OUTPATIENT)
Dept: ADMINISTRATIVE | Age: 75
End: 2020-01-01

## 2020-01-01 ENCOUNTER — HOSPITAL ENCOUNTER (OUTPATIENT)
Dept: ULTRASOUND IMAGING | Age: 75
Discharge: HOME | End: 2020-12-10
Payer: MEDICARE

## 2020-01-01 ENCOUNTER — VIRTUAL VISIT (OUTPATIENT)
Dept: GASTROENTEROLOGY | Age: 75
End: 2020-01-01
Payer: MEDICARE

## 2020-01-01 ENCOUNTER — HOSPITAL ENCOUNTER (OUTPATIENT)
Dept: ULTRASOUND IMAGING | Age: 75
Discharge: HOME | End: 2020-10-15
Payer: MEDICARE

## 2020-01-01 ENCOUNTER — HOSPITAL ENCOUNTER (EMERGENCY)
Age: 75
Discharge: HOME | End: 2020-05-19
Payer: MEDICARE

## 2020-01-01 VITALS
RESPIRATION RATE: 16 BRPM | HEART RATE: 92 BPM | DIASTOLIC BLOOD PRESSURE: 60 MMHG | OXYGEN SATURATION: 100 % | SYSTOLIC BLOOD PRESSURE: 123 MMHG

## 2020-01-01 VITALS
DIASTOLIC BLOOD PRESSURE: 67 MMHG | SYSTOLIC BLOOD PRESSURE: 109 MMHG | HEART RATE: 83 BPM | OXYGEN SATURATION: 100 % | RESPIRATION RATE: 16 BRPM

## 2020-01-01 VITALS
HEIGHT: 67 IN | TEMPERATURE: 98.2 F | DIASTOLIC BLOOD PRESSURE: 39 MMHG | WEIGHT: 209.8 LBS | BODY MASS INDEX: 32.93 KG/M2 | OXYGEN SATURATION: 99 % | HEART RATE: 76 BPM | RESPIRATION RATE: 18 BRPM | SYSTOLIC BLOOD PRESSURE: 123 MMHG

## 2020-01-01 VITALS
RESPIRATION RATE: 18 BRPM | SYSTOLIC BLOOD PRESSURE: 114 MMHG | HEART RATE: 87 BPM | DIASTOLIC BLOOD PRESSURE: 56 MMHG | OXYGEN SATURATION: 100 %

## 2020-01-01 VITALS
BODY MASS INDEX: 35.44 KG/M2 | WEIGHT: 200 LBS | TEMPERATURE: 98.4 F | RESPIRATION RATE: 20 BRPM | DIASTOLIC BLOOD PRESSURE: 53 MMHG | HEART RATE: 95 BPM | HEIGHT: 63 IN | SYSTOLIC BLOOD PRESSURE: 121 MMHG | OXYGEN SATURATION: 100 %

## 2020-01-01 VITALS
OXYGEN SATURATION: 94 % | HEART RATE: 80 BPM | SYSTOLIC BLOOD PRESSURE: 133 MMHG | DIASTOLIC BLOOD PRESSURE: 57 MMHG | RESPIRATION RATE: 18 BRPM

## 2020-01-01 VITALS
SYSTOLIC BLOOD PRESSURE: 118 MMHG | DIASTOLIC BLOOD PRESSURE: 57 MMHG | SYSTOLIC BLOOD PRESSURE: 118 MMHG | BODY MASS INDEX: 32.02 KG/M2 | HEIGHT: 67 IN | HEART RATE: 83 BPM | TEMPERATURE: 97.8 F | RESPIRATION RATE: 16 BRPM | DIASTOLIC BLOOD PRESSURE: 60 MMHG | OXYGEN SATURATION: 99 % | WEIGHT: 204 LBS

## 2020-01-01 VITALS
HEART RATE: 89 BPM | SYSTOLIC BLOOD PRESSURE: 128 MMHG | OXYGEN SATURATION: 98 % | DIASTOLIC BLOOD PRESSURE: 64 MMHG | WEIGHT: 206 LBS | RESPIRATION RATE: 18 BRPM | BODY MASS INDEX: 32.26 KG/M2

## 2020-01-01 VITALS
SYSTOLIC BLOOD PRESSURE: 136 MMHG | HEIGHT: 67 IN | OXYGEN SATURATION: 98 % | WEIGHT: 208 LBS | HEART RATE: 88 BPM | BODY MASS INDEX: 32.65 KG/M2 | TEMPERATURE: 98.4 F | RESPIRATION RATE: 14 BRPM | DIASTOLIC BLOOD PRESSURE: 77 MMHG

## 2020-01-01 VITALS
DIASTOLIC BLOOD PRESSURE: 55 MMHG | RESPIRATION RATE: 18 BRPM | OXYGEN SATURATION: 99 % | SYSTOLIC BLOOD PRESSURE: 105 MMHG | HEART RATE: 82 BPM

## 2020-01-01 VITALS
HEART RATE: 78 BPM | OXYGEN SATURATION: 100 % | DIASTOLIC BLOOD PRESSURE: 60 MMHG | SYSTOLIC BLOOD PRESSURE: 127 MMHG | RESPIRATION RATE: 18 BRPM

## 2020-01-01 VITALS
DIASTOLIC BLOOD PRESSURE: 62 MMHG | RESPIRATION RATE: 17 BRPM | SYSTOLIC BLOOD PRESSURE: 160 MMHG | HEART RATE: 92 BPM | HEIGHT: 67 IN | WEIGHT: 195 LBS | TEMPERATURE: 99 F | OXYGEN SATURATION: 97 % | BODY MASS INDEX: 30.61 KG/M2

## 2020-01-01 VITALS
HEART RATE: 83 BPM | BODY MASS INDEX: 32.73 KG/M2 | RESPIRATION RATE: 16 BRPM | SYSTOLIC BLOOD PRESSURE: 118 MMHG | OXYGEN SATURATION: 96 % | DIASTOLIC BLOOD PRESSURE: 68 MMHG | WEIGHT: 209 LBS

## 2020-01-01 VITALS
RESPIRATION RATE: 18 BRPM | HEART RATE: 76 BPM | TEMPERATURE: 97.9 F | OXYGEN SATURATION: 100 % | DIASTOLIC BLOOD PRESSURE: 49 MMHG | SYSTOLIC BLOOD PRESSURE: 109 MMHG

## 2020-01-01 VITALS
TEMPERATURE: 98.3 F | DIASTOLIC BLOOD PRESSURE: 68 MMHG | BODY MASS INDEX: 31.39 KG/M2 | HEIGHT: 67 IN | SYSTOLIC BLOOD PRESSURE: 110 MMHG | WEIGHT: 200 LBS

## 2020-01-01 VITALS
SYSTOLIC BLOOD PRESSURE: 122 MMHG | HEIGHT: 67 IN | WEIGHT: 209 LBS | BODY MASS INDEX: 32.8 KG/M2 | DIASTOLIC BLOOD PRESSURE: 76 MMHG | TEMPERATURE: 98.2 F

## 2020-01-01 VITALS
DIASTOLIC BLOOD PRESSURE: 68 MMHG | HEART RATE: 85 BPM | SYSTOLIC BLOOD PRESSURE: 120 MMHG | WEIGHT: 190 LBS | RESPIRATION RATE: 12 BRPM | OXYGEN SATURATION: 98 % | HEIGHT: 67 IN | TEMPERATURE: 98 F | BODY MASS INDEX: 29.82 KG/M2

## 2020-01-01 VITALS
TEMPERATURE: 97.8 F | HEART RATE: 80 BPM | RESPIRATION RATE: 16 BRPM | SYSTOLIC BLOOD PRESSURE: 104 MMHG | DIASTOLIC BLOOD PRESSURE: 53 MMHG | OXYGEN SATURATION: 100 %

## 2020-01-01 VITALS
OXYGEN SATURATION: 98 % | DIASTOLIC BLOOD PRESSURE: 44 MMHG | WEIGHT: 200 LBS | HEART RATE: 84 BPM | BODY MASS INDEX: 31.39 KG/M2 | RESPIRATION RATE: 18 BRPM | HEIGHT: 67 IN | SYSTOLIC BLOOD PRESSURE: 100 MMHG

## 2020-01-01 VITALS
OXYGEN SATURATION: 97 % | WEIGHT: 204.2 LBS | HEART RATE: 82 BPM | BODY MASS INDEX: 32.05 KG/M2 | RESPIRATION RATE: 14 BRPM | TEMPERATURE: 97.8 F | SYSTOLIC BLOOD PRESSURE: 109 MMHG | DIASTOLIC BLOOD PRESSURE: 58 MMHG | HEIGHT: 67 IN

## 2020-01-01 VITALS
SYSTOLIC BLOOD PRESSURE: 132 MMHG | RESPIRATION RATE: 15 BRPM | HEART RATE: 84 BPM | OXYGEN SATURATION: 96 % | DIASTOLIC BLOOD PRESSURE: 61 MMHG

## 2020-01-01 VITALS
OXYGEN SATURATION: 100 % | RESPIRATION RATE: 18 BRPM | TEMPERATURE: 97.9 F | DIASTOLIC BLOOD PRESSURE: 56 MMHG | HEART RATE: 90 BPM | SYSTOLIC BLOOD PRESSURE: 115 MMHG

## 2020-01-01 VITALS
OXYGEN SATURATION: 100 % | SYSTOLIC BLOOD PRESSURE: 131 MMHG | DIASTOLIC BLOOD PRESSURE: 58 MMHG | RESPIRATION RATE: 16 BRPM | HEART RATE: 89 BPM

## 2020-01-01 VITALS
HEIGHT: 67 IN | RESPIRATION RATE: 18 BRPM | BODY MASS INDEX: 31.23 KG/M2 | OXYGEN SATURATION: 98 % | HEART RATE: 82 BPM | WEIGHT: 199 LBS

## 2020-01-01 VITALS
RESPIRATION RATE: 17 BRPM | HEART RATE: 92 BPM | OXYGEN SATURATION: 100 % | SYSTOLIC BLOOD PRESSURE: 106 MMHG | DIASTOLIC BLOOD PRESSURE: 50 MMHG

## 2020-01-01 VITALS
DIASTOLIC BLOOD PRESSURE: 76 MMHG | BODY MASS INDEX: 32.33 KG/M2 | TEMPERATURE: 97.3 F | HEIGHT: 67 IN | SYSTOLIC BLOOD PRESSURE: 138 MMHG | WEIGHT: 206 LBS

## 2020-01-01 VITALS
OXYGEN SATURATION: 100 % | SYSTOLIC BLOOD PRESSURE: 96 MMHG | HEART RATE: 78 BPM | DIASTOLIC BLOOD PRESSURE: 60 MMHG | RESPIRATION RATE: 16 BRPM

## 2020-01-01 VITALS
WEIGHT: 218 LBS | SYSTOLIC BLOOD PRESSURE: 102 MMHG | DIASTOLIC BLOOD PRESSURE: 64 MMHG | BODY MASS INDEX: 34.21 KG/M2 | HEIGHT: 67 IN | TEMPERATURE: 97.6 F

## 2020-01-01 VITALS
DIASTOLIC BLOOD PRESSURE: 56 MMHG | HEART RATE: 74 BPM | SYSTOLIC BLOOD PRESSURE: 100 MMHG | RESPIRATION RATE: 15 BRPM | HEIGHT: 67 IN | BODY MASS INDEX: 32.96 KG/M2 | WEIGHT: 210 LBS

## 2020-01-01 VITALS
WEIGHT: 210.8 LBS | BODY MASS INDEX: 33.02 KG/M2 | RESPIRATION RATE: 16 BRPM | DIASTOLIC BLOOD PRESSURE: 62 MMHG | SYSTOLIC BLOOD PRESSURE: 128 MMHG | HEART RATE: 84 BPM | OXYGEN SATURATION: 97 %

## 2020-01-01 VITALS
TEMPERATURE: 98.3 F | HEART RATE: 84 BPM | SYSTOLIC BLOOD PRESSURE: 119 MMHG | RESPIRATION RATE: 16 BRPM | OXYGEN SATURATION: 100 % | DIASTOLIC BLOOD PRESSURE: 59 MMHG

## 2020-01-01 DIAGNOSIS — R14.0 ABDOMINAL DISTENSION: ICD-10-CM

## 2020-01-01 DIAGNOSIS — D63.8 ANEMIA IN OTHER CHRONIC DISEASES CLASSIFIED ELSEWHERE: ICD-10-CM

## 2020-01-01 DIAGNOSIS — E87.5 HYPERKALEMIA: ICD-10-CM

## 2020-01-01 DIAGNOSIS — K62.89 RECTAL PAIN: ICD-10-CM

## 2020-01-01 DIAGNOSIS — K74.69 OTHER CIRRHOSIS OF LIVER (HCC): ICD-10-CM

## 2020-01-01 DIAGNOSIS — R18.8 CIRRHOSIS OF LIVER WITH ASCITES, UNSPECIFIED HEPATIC CIRRHOSIS TYPE (HCC): Primary | ICD-10-CM

## 2020-01-01 DIAGNOSIS — N28.89 LEFT RENAL MASS: ICD-10-CM

## 2020-01-01 DIAGNOSIS — K74.60 CIRRHOSIS OF LIVER WITH ASCITES, UNSPECIFIED HEPATIC CIRRHOSIS TYPE (HCC): Primary | ICD-10-CM

## 2020-01-01 DIAGNOSIS — N28.89 KIDNEY MASS: ICD-10-CM

## 2020-01-01 DIAGNOSIS — N17.9 AKI (ACUTE KIDNEY INJURY) (HCC): ICD-10-CM

## 2020-01-01 DIAGNOSIS — R18.8 OTHER ASCITES: ICD-10-CM

## 2020-01-01 DIAGNOSIS — K74.60 CIRRHOSIS OF LIVER WITH ASCITES, UNSPECIFIED HEPATIC CIRRHOSIS TYPE (HCC): ICD-10-CM

## 2020-01-01 DIAGNOSIS — R18.8 CIRRHOSIS OF LIVER WITH ASCITES, UNSPECIFIED HEPATIC CIRRHOSIS TYPE (HCC): ICD-10-CM

## 2020-01-01 LAB
ABO/RH: NORMAL
ABO/RH: NORMAL
ALBUMIN FLUID: 1.1 G/DL
ALBUMIN SERPL-MCNC: 2.7 G/DL (ref 3.5–4.6)
ALBUMIN SERPL-MCNC: 2.9 G/DL (ref 3.5–4.6)
ALBUMIN SERPL-MCNC: 3 G/DL (ref 3.5–4.6)
ALBUMIN SERPL-MCNC: 3.2 G/DL (ref 3.5–4.6)
ALP BLD-CCNC: 70 U/L (ref 40–130)
ALP BLD-CCNC: 72 U/L (ref 40–130)
ALP BLD-CCNC: 73 U/L (ref 40–130)
ALP BLD-CCNC: 75 U/L (ref 40–130)
ALP BLD-CCNC: 78 U/L (ref 40–130)
ALP BLD-CCNC: 87 U/L (ref 40–130)
ALPHA-1 ANTITRYPSIN: 186 MG/DL (ref 90–200)
ALT SERPL-CCNC: 10 U/L (ref 0–33)
ALT SERPL-CCNC: 12 U/L (ref 0–33)
ALT SERPL-CCNC: 15 U/L (ref 0–33)
ALT SERPL-CCNC: 9 U/L (ref 0–33)
AMYLASE FLUID: 23 U/L
ANA IGG, ELISA: NORMAL
ANION GAP SERPL CALCULATED.3IONS-SCNC: 10 MEQ/L (ref 9–15)
ANION GAP SERPL CALCULATED.3IONS-SCNC: 10 MEQ/L (ref 9–15)
ANION GAP SERPL CALCULATED.3IONS-SCNC: 11 MEQ/L (ref 9–15)
ANION GAP SERPL CALCULATED.3IONS-SCNC: 12 MEQ/L (ref 9–15)
ANION GAP SERPL CALCULATED.3IONS-SCNC: 13 MEQ/L (ref 9–15)
ANION GAP SERPL CALCULATED.3IONS-SCNC: 14 MEQ/L (ref 9–15)
ANION GAP SERPL CALCULATED.3IONS-SCNC: 15 MEQ/L (ref 9–15)
ANION GAP SERPL CALCULATED.3IONS-SCNC: 16 MEQ/L (ref 9–15)
ANION GAP SERPL CALCULATED.3IONS-SCNC: 17 MEQ/L (ref 9–15)
ANION GAP SERPL CALCULATED.3IONS-SCNC: 8 MEQ/L (ref 9–15)
ANION GAP SERPL CALCULATED.3IONS-SCNC: 9 MEQ/L (ref 9–15)
ANTIBODY SCREEN: NORMAL
ANTIBODY SCREEN: NORMAL
APPEARANCE FLUID: CLEAR
APTT: 39.6 SEC (ref 24.4–36.8)
AST SERPL-CCNC: 17 U/L (ref 0–35)
AST SERPL-CCNC: 20 U/L (ref 0–35)
AST SERPL-CCNC: 21 U/L (ref 0–35)
AST SERPL-CCNC: 23 U/L (ref 0–35)
AST SERPL-CCNC: 23 U/L (ref 0–35)
AST SERPL-CCNC: 25 U/L (ref 0–35)
BACTERIA: NEGATIVE /HPF
BACTERIA: NEGATIVE /HPF
BASOPHILS ABSOLUTE: 0 K/UL (ref 0–0.2)
BASOPHILS RELATIVE PERCENT: 0.3 %
BASOPHILS RELATIVE PERCENT: 0.4 %
BASOPHILS RELATIVE PERCENT: 0.5 %
BASOPHILS RELATIVE PERCENT: 0.6 %
BILIRUB SERPL-MCNC: 0.7 MG/DL (ref 0.2–0.7)
BILIRUB SERPL-MCNC: 1 MG/DL (ref 0.2–0.7)
BILIRUBIN DIRECT: 0.3 MG/DL (ref 0–0.4)
BILIRUBIN URINE: NEGATIVE
BILIRUBIN URINE: NEGATIVE
BILIRUBIN, INDIRECT: 0.4 MG/DL (ref 0–0.6)
BLOOD BANK DISPENSE STATUS: NORMAL
BLOOD BANK PRODUCT CODE: NORMAL
BLOOD, URINE: NEGATIVE
BLOOD, URINE: NEGATIVE
BODY FLUID CULTURE, STERILE: NORMAL
BPU ID: NORMAL
BUN BLDV-MCNC: 14 MG/DL (ref 8–23)
BUN BLDV-MCNC: 16 MG/DL (ref 8–23)
BUN BLDV-MCNC: 16 MG/DL (ref 8–23)
BUN BLDV-MCNC: 17 MG/DL (ref 8–23)
BUN BLDV-MCNC: 17 MG/DL (ref 8–23)
BUN BLDV-MCNC: 19 MG/DL (ref 8–23)
BUN BLDV-MCNC: 21 MG/DL (ref 8–23)
BUN BLDV-MCNC: 22 MG/DL (ref 8–23)
BUN BLDV-MCNC: 25 MG/DL (ref 8–23)
BUN BLDV-MCNC: 29 MG/DL (ref 8–23)
BUN BLDV-MCNC: 31 MG/DL (ref 8–23)
BUN BLDV-MCNC: 32 MG/DL (ref 8–23)
BUN BLDV-MCNC: 32 MG/DL (ref 8–23)
BUN BLDV-MCNC: 33 MG/DL (ref 8–23)
BUN BLDV-MCNC: 34 MG/DL (ref 8–23)
CALCIUM SERPL-MCNC: 7.6 MG/DL (ref 8.5–9.9)
CALCIUM SERPL-MCNC: 7.7 MG/DL (ref 8.5–9.9)
CALCIUM SERPL-MCNC: 7.9 MG/DL (ref 8.5–9.9)
CALCIUM SERPL-MCNC: 8 MG/DL (ref 8.5–9.9)
CALCIUM SERPL-MCNC: 8 MG/DL (ref 8.5–9.9)
CALCIUM SERPL-MCNC: 8.1 MG/DL (ref 8.5–9.9)
CALCIUM SERPL-MCNC: 8.2 MG/DL (ref 8.5–9.9)
CALCIUM SERPL-MCNC: 8.2 MG/DL (ref 8.5–9.9)
CALCIUM SERPL-MCNC: 8.3 MG/DL (ref 8.5–9.9)
CALCIUM SERPL-MCNC: 8.4 MG/DL (ref 8.5–9.9)
CALCIUM SERPL-MCNC: 8.5 MG/DL (ref 8.5–9.9)
CALCIUM SERPL-MCNC: 8.6 MG/DL (ref 8.5–9.9)
CALCIUM SERPL-MCNC: 8.6 MG/DL (ref 8.5–9.9)
CEA: 2.2 NG/ML (ref 0–5.5)
CELL COUNT FLUID TYPE: NORMAL
CHLORIDE BLD-SCNC: 100 MEQ/L (ref 95–107)
CHLORIDE BLD-SCNC: 101 MEQ/L (ref 95–107)
CHLORIDE BLD-SCNC: 102 MEQ/L (ref 95–107)
CHLORIDE BLD-SCNC: 105 MEQ/L (ref 95–107)
CHLORIDE BLD-SCNC: 106 MEQ/L (ref 95–107)
CHLORIDE BLD-SCNC: 107 MEQ/L (ref 95–107)
CHLORIDE BLD-SCNC: 109 MEQ/L (ref 95–107)
CHLORIDE BLD-SCNC: 109 MEQ/L (ref 95–107)
CHLORIDE BLD-SCNC: 112 MEQ/L (ref 95–107)
CHLORIDE BLD-SCNC: 113 MEQ/L (ref 95–107)
CHLORIDE BLD-SCNC: 98 MEQ/L (ref 95–107)
CHLORIDE BLD-SCNC: 99 MEQ/L (ref 95–107)
CLARITY: ABNORMAL
CLARITY: CLEAR
CLOT EVALUATION: NORMAL
CO2: 15 MEQ/L (ref 20–31)
CO2: 16 MEQ/L (ref 20–31)
CO2: 16 MEQ/L (ref 20–31)
CO2: 17 MEQ/L (ref 20–31)
CO2: 18 MEQ/L (ref 20–31)
CO2: 18 MEQ/L (ref 20–31)
CO2: 19 MEQ/L (ref 20–31)
CO2: 20 MEQ/L (ref 20–31)
CO2: 22 MEQ/L (ref 20–31)
CO2: 24 MEQ/L (ref 20–31)
CO2: 24 MEQ/L (ref 20–31)
COLOR FLUID: NORMAL
COLOR: ABNORMAL
COLOR: YELLOW
CREAT SERPL-MCNC: 0.76 MG/DL (ref 0.5–0.9)
CREAT SERPL-MCNC: 0.84 MG/DL (ref 0.5–0.9)
CREAT SERPL-MCNC: 0.86 MG/DL (ref 0.5–0.9)
CREAT SERPL-MCNC: 0.91 MG/DL (ref 0.5–0.9)
CREAT SERPL-MCNC: 0.95 MG/DL (ref 0.5–0.9)
CREAT SERPL-MCNC: 0.99 MG/DL (ref 0.5–0.9)
CREAT SERPL-MCNC: 1.02 MG/DL (ref 0.5–0.9)
CREAT SERPL-MCNC: 1.1 MG/DL (ref 0.5–0.9)
CREAT SERPL-MCNC: 1.33 MG/DL (ref 0.5–0.9)
CREAT SERPL-MCNC: 1.61 MG/DL (ref 0.5–0.9)
CREAT SERPL-MCNC: 1.65 MG/DL (ref 0.5–0.9)
CREAT SERPL-MCNC: 2.49 MG/DL (ref 0.5–0.9)
CREAT SERPL-MCNC: 2.8 MG/DL (ref 0.5–0.9)
CREAT SERPL-MCNC: 3.28 MG/DL (ref 0.5–0.9)
CREAT SERPL-MCNC: 3.49 MG/DL (ref 0.5–0.9)
CREAT SERPL-MCNC: 3.61 MG/DL (ref 0.5–0.9)
CREAT SERPL-MCNC: 3.64 MG/DL (ref 0.5–0.9)
CREATININE URINE: 162.3 MG/DL
DESCRIPTION BLOOD BANK: NORMAL
EKG ATRIAL RATE: 75 BPM
EKG P AXIS: 26 DEGREES
EKG P-R INTERVAL: 134 MS
EKG Q-T INTERVAL: 414 MS
EKG QRS DURATION: 82 MS
EKG QTC CALCULATION (BAZETT): 462 MS
EKG R AXIS: 52 DEGREES
EKG T AXIS: 86 DEGREES
EKG VENTRICULAR RATE: 75 BPM
EOSINOPHILS ABSOLUTE: 0 K/UL (ref 0–0.7)
EOSINOPHILS ABSOLUTE: 0 K/UL (ref 0–0.7)
EOSINOPHILS ABSOLUTE: 0.1 K/UL (ref 0–0.7)
EOSINOPHILS RELATIVE PERCENT: 0.6 %
EOSINOPHILS RELATIVE PERCENT: 0.6 %
EOSINOPHILS RELATIVE PERCENT: 0.7 %
EOSINOPHILS RELATIVE PERCENT: 0.9 %
EOSINOPHILS RELATIVE PERCENT: 1 %
EOSINOPHILS RELATIVE PERCENT: 1.1 %
EOSINOPHILS RELATIVE PERCENT: 1.1 %
EOSINOPHILS RELATIVE PERCENT: 1.2 %
EPITHELIAL CELLS, UA: ABNORMAL /HPF (ref 0–5)
EPITHELIAL CELLS, UA: ABNORMAL /HPF (ref 0–5)
F-ACTIN AB IGA: 10.9 UNITS (ref 0–24.9)
FERRITIN: 27.5 NG/ML (ref 13–150)
FERRITIN: 43.8 NG/ML (ref 13–150)
FLUID TYPE: NORMAL
GFR AFRICAN AMERICAN: 14.8
GFR AFRICAN AMERICAN: 15
GFR AFRICAN AMERICAN: 15.6
GFR AFRICAN AMERICAN: 16.7
GFR AFRICAN AMERICAN: 20.1
GFR AFRICAN AMERICAN: 23
GFR AFRICAN AMERICAN: 36.9
GFR AFRICAN AMERICAN: 38
GFR AFRICAN AMERICAN: 47.3
GFR AFRICAN AMERICAN: 58.9
GFR AFRICAN AMERICAN: >60
GFR NON-AFRICAN AMERICAN: 12.2
GFR NON-AFRICAN AMERICAN: 12.4
GFR NON-AFRICAN AMERICAN: 12.9
GFR NON-AFRICAN AMERICAN: 13.8
GFR NON-AFRICAN AMERICAN: 16.6
GFR NON-AFRICAN AMERICAN: 19
GFR NON-AFRICAN AMERICAN: 30.5
GFR NON-AFRICAN AMERICAN: 31.4
GFR NON-AFRICAN AMERICAN: 39.1
GFR NON-AFRICAN AMERICAN: 48.7
GFR NON-AFRICAN AMERICAN: 53.2
GFR NON-AFRICAN AMERICAN: 55
GFR NON-AFRICAN AMERICAN: 57.7
GFR NON-AFRICAN AMERICAN: >60
GLOBULIN: 2.1 G/DL (ref 2.3–3.5)
GLOBULIN: 2.7 G/DL (ref 2.3–3.5)
GLOBULIN: 3 G/DL (ref 2.3–3.5)
GLOBULIN: 3 G/DL (ref 2.3–3.5)
GLOBULIN: 3.1 G/DL (ref 2.3–3.5)
GLUCOSE BLD-MCNC: 102 MG/DL (ref 60–115)
GLUCOSE BLD-MCNC: 105 MG/DL (ref 70–99)
GLUCOSE BLD-MCNC: 109 MG/DL (ref 70–99)
GLUCOSE BLD-MCNC: 111 MG/DL (ref 60–115)
GLUCOSE BLD-MCNC: 112 MG/DL (ref 70–99)
GLUCOSE BLD-MCNC: 114 MG/DL (ref 60–115)
GLUCOSE BLD-MCNC: 118 MG/DL (ref 60–115)
GLUCOSE BLD-MCNC: 118 MG/DL (ref 70–99)
GLUCOSE BLD-MCNC: 126 MG/DL (ref 70–99)
GLUCOSE BLD-MCNC: 127 MG/DL (ref 60–115)
GLUCOSE BLD-MCNC: 132 MG/DL (ref 60–115)
GLUCOSE BLD-MCNC: 132 MG/DL (ref 70–99)
GLUCOSE BLD-MCNC: 133 MG/DL (ref 60–115)
GLUCOSE BLD-MCNC: 134 MG/DL (ref 60–115)
GLUCOSE BLD-MCNC: 138 MG/DL (ref 60–115)
GLUCOSE BLD-MCNC: 138 MG/DL (ref 70–99)
GLUCOSE BLD-MCNC: 139 MG/DL (ref 70–99)
GLUCOSE BLD-MCNC: 145 MG/DL (ref 60–115)
GLUCOSE BLD-MCNC: 152 MG/DL (ref 60–115)
GLUCOSE BLD-MCNC: 154 MG/DL (ref 60–115)
GLUCOSE BLD-MCNC: 155 MG/DL (ref 70–99)
GLUCOSE BLD-MCNC: 156 MG/DL (ref 60–115)
GLUCOSE BLD-MCNC: 158 MG/DL (ref 60–115)
GLUCOSE BLD-MCNC: 161 MG/DL (ref 60–115)
GLUCOSE BLD-MCNC: 168 MG/DL (ref 60–115)
GLUCOSE BLD-MCNC: 170 MG/DL (ref 60–115)
GLUCOSE BLD-MCNC: 172 MG/DL (ref 60–115)
GLUCOSE BLD-MCNC: 186 MG/DL (ref 60–115)
GLUCOSE BLD-MCNC: 188 MG/DL (ref 60–115)
GLUCOSE BLD-MCNC: 189 MG/DL (ref 60–115)
GLUCOSE BLD-MCNC: 191 MG/DL (ref 60–115)
GLUCOSE BLD-MCNC: 194 MG/DL (ref 70–99)
GLUCOSE BLD-MCNC: 222 MG/DL (ref 70–99)
GLUCOSE BLD-MCNC: 227 MG/DL (ref 60–115)
GLUCOSE BLD-MCNC: 45 MG/DL (ref 70–99)
GLUCOSE BLD-MCNC: 48 MG/DL (ref 70–99)
GLUCOSE BLD-MCNC: 50 MG/DL (ref 70–99)
GLUCOSE BLD-MCNC: 54 MG/DL (ref 70–99)
GLUCOSE BLD-MCNC: 62 MG/DL (ref 60–115)
GLUCOSE BLD-MCNC: 73 MG/DL (ref 60–115)
GLUCOSE BLD-MCNC: 74 MG/DL (ref 70–99)
GLUCOSE BLD-MCNC: 84 MG/DL (ref 60–115)
GLUCOSE BLD-MCNC: 88 MG/DL (ref 60–115)
GLUCOSE BLD-MCNC: 90 MG/DL (ref 70–99)
GLUCOSE BLD-MCNC: 93 MG/DL (ref 60–115)
GLUCOSE BLD-MCNC: 99 MG/DL (ref 60–115)
GLUCOSE URINE: NEGATIVE MG/DL
GLUCOSE URINE: NEGATIVE MG/DL
GLUCOSE, FLUID: 94 MG/DL
GRAM STAIN RESULT: NORMAL
HAV IGM SER IA-ACNC: NORMAL
HBA1C MFR BLD: 5.2 % (ref 4.8–5.9)
HCT VFR BLD CALC: 22.2 % (ref 37–47)
HCT VFR BLD CALC: 22.5 % (ref 37–47)
HCT VFR BLD CALC: 23.3 % (ref 37–47)
HCT VFR BLD CALC: 25.9 % (ref 37–47)
HCT VFR BLD CALC: 26.2 % (ref 37–47)
HCT VFR BLD CALC: 26.3 % (ref 37–47)
HCT VFR BLD CALC: 26.3 % (ref 37–47)
HCT VFR BLD CALC: 27.1 % (ref 37–47)
HCT VFR BLD CALC: 30.7 % (ref 37–47)
HCT VFR BLD CALC: 31.1 % (ref 37–47)
HEMOGLOBIN: 10 G/DL (ref 12–16)
HEMOGLOBIN: 6.8 G/DL (ref 12–16)
HEMOGLOBIN: 6.9 G/DL (ref 12–16)
HEMOGLOBIN: 7.3 G/DL (ref 12–16)
HEMOGLOBIN: 8 G/DL (ref 12–16)
HEMOGLOBIN: 8.1 G/DL (ref 12–16)
HEMOGLOBIN: 8.3 G/DL (ref 12–16)
HEMOGLOBIN: 8.4 G/DL (ref 12–16)
HEMOGLOBIN: 8.6 G/DL (ref 12–16)
HEMOGLOBIN: 9.7 G/DL (ref 12–16)
HEPATITIS B CORE IGM ANTIBODY: NORMAL
HEPATITIS B SURFACE ANTIGEN INTERPRETATION: NORMAL
HEPATITIS C ANTIBODY INTERPRETATION: NORMAL
HEPATITIS INTERPRETATION:: NORMAL
HIV 1,2 COMBO ANTIGEN/ANTIBODY: NEGATIVE
HYALINE CASTS: ABNORMAL /HPF (ref 0–5)
IGA: 210 MG/DL (ref 68–408)
IGG: 995 MG/DL (ref 768–1632)
IGM: 43 MG/DL (ref 35–263)
INR BLD: 1.3
INR BLD: 1.4
INR BLD: 1.5
INR BLD: 1.5
INR BLD: 1.6
IRON SATURATION: 27 % (ref 11–46)
IRON: 71 UG/DL (ref 37–145)
KETONES, URINE: ABNORMAL MG/DL
KETONES, URINE: NEGATIVE MG/DL
LACTIC ACID: 2.1 MMOL/L (ref 0.5–2.2)
LD, FLUID: 62 U/L
LEUKOCYTE ESTERASE, URINE: ABNORMAL
LEUKOCYTE ESTERASE, URINE: ABNORMAL
LIPASE: 96 U/L (ref 12–95)
LIVER-KIDNEY MICROSOME-1 AB IGG: 2.7 U (ref 0–24.9)
LV EF: 85 %
LVEF MODALITY: NORMAL
LYMPHOCYTES ABSOLUTE: 1 K/UL (ref 1–4.8)
LYMPHOCYTES ABSOLUTE: 1 K/UL (ref 1–4.8)
LYMPHOCYTES ABSOLUTE: 1.1 K/UL (ref 1–4.8)
LYMPHOCYTES ABSOLUTE: 1.1 K/UL (ref 1–4.8)
LYMPHOCYTES ABSOLUTE: 1.2 K/UL (ref 1–4.8)
LYMPHOCYTES ABSOLUTE: 1.3 K/UL (ref 1–4.8)
LYMPHOCYTES ABSOLUTE: 1.4 K/UL (ref 1–4.8)
LYMPHOCYTES ABSOLUTE: 1.9 K/UL (ref 1–4.8)
LYMPHOCYTES RELATIVE PERCENT: 15 %
LYMPHOCYTES RELATIVE PERCENT: 16.8 %
LYMPHOCYTES RELATIVE PERCENT: 17.5 %
LYMPHOCYTES RELATIVE PERCENT: 18.1 %
LYMPHOCYTES RELATIVE PERCENT: 19.5 %
LYMPHOCYTES RELATIVE PERCENT: 20.3 %
LYMPHOCYTES RELATIVE PERCENT: 21.3 %
LYMPHOCYTES RELATIVE PERCENT: 23.3 %
LYMPHOCYTES, BODY FLUID: 70 %
MAGNESIUM: 1.8 MG/DL (ref 1.7–2.4)
MCH RBC QN AUTO: 27.5 PG (ref 27–31.3)
MCH RBC QN AUTO: 27.7 PG (ref 27–31.3)
MCH RBC QN AUTO: 27.8 PG (ref 27–31.3)
MCH RBC QN AUTO: 27.9 PG (ref 27–31.3)
MCH RBC QN AUTO: 28.2 PG (ref 27–31.3)
MCH RBC QN AUTO: 28.3 PG (ref 27–31.3)
MCH RBC QN AUTO: 28.4 PG (ref 27–31.3)
MCH RBC QN AUTO: 28.4 PG (ref 27–31.3)
MCH RBC QN AUTO: 28.5 PG (ref 27–31.3)
MCHC RBC AUTO-ENTMCNC: 30.7 % (ref 33–37)
MCHC RBC AUTO-ENTMCNC: 30.8 % (ref 33–37)
MCHC RBC AUTO-ENTMCNC: 30.8 % (ref 33–37)
MCHC RBC AUTO-ENTMCNC: 31.5 % (ref 33–37)
MCHC RBC AUTO-ENTMCNC: 31.5 % (ref 33–37)
MCHC RBC AUTO-ENTMCNC: 31.7 % (ref 33–37)
MCHC RBC AUTO-ENTMCNC: 31.7 % (ref 33–37)
MCHC RBC AUTO-ENTMCNC: 32 % (ref 33–37)
MCHC RBC AUTO-ENTMCNC: 32.2 % (ref 33–37)
MCV RBC AUTO: 88 FL (ref 82–100)
MCV RBC AUTO: 88.4 FL (ref 82–100)
MCV RBC AUTO: 88.6 FL (ref 82–100)
MCV RBC AUTO: 89 FL (ref 82–100)
MCV RBC AUTO: 89.3 FL (ref 82–100)
MCV RBC AUTO: 89.5 FL (ref 82–100)
MCV RBC AUTO: 89.9 FL (ref 82–100)
MCV RBC AUTO: 90.3 FL (ref 82–100)
MCV RBC AUTO: 90.7 FL (ref 82–100)
MITOCHONDRIAL M2 AB, IGG: 6.3 UNITS (ref 0–24.9)
MONOCYTE, FLUID: 22 %
MONOCYTES ABSOLUTE: 0.6 K/UL (ref 0.2–0.8)
MONOCYTES ABSOLUTE: 0.7 K/UL (ref 0.2–0.8)
MONOCYTES ABSOLUTE: 0.7 K/UL (ref 0.2–0.8)
MONOCYTES ABSOLUTE: 0.8 K/UL (ref 0.2–0.8)
MONOCYTES ABSOLUTE: 0.9 K/UL (ref 0.2–0.8)
MONOCYTES RELATIVE PERCENT: 11.2 %
MONOCYTES RELATIVE PERCENT: 11.2 %
MONOCYTES RELATIVE PERCENT: 12.1 %
MONOCYTES RELATIVE PERCENT: 12.6 %
MONOCYTES RELATIVE PERCENT: 13.1 %
MONOCYTES RELATIVE PERCENT: 16 %
MONOCYTES RELATIVE PERCENT: 7.8 %
MONOCYTES RELATIVE PERCENT: 9.2 %
NEUTROPHIL, FLUID: 8 %
NEUTROPHILS ABSOLUTE: 2.8 K/UL (ref 1.4–6.5)
NEUTROPHILS ABSOLUTE: 3.6 K/UL (ref 1.4–6.5)
NEUTROPHILS ABSOLUTE: 3.7 K/UL (ref 1.4–6.5)
NEUTROPHILS ABSOLUTE: 4.1 K/UL (ref 1.4–6.5)
NEUTROPHILS ABSOLUTE: 4.8 K/UL (ref 1.4–6.5)
NEUTROPHILS ABSOLUTE: 5 K/UL (ref 1.4–6.5)
NEUTROPHILS ABSOLUTE: 5.4 K/UL (ref 1.4–6.5)
NEUTROPHILS ABSOLUTE: 6.7 K/UL (ref 1.4–6.5)
NEUTROPHILS RELATIVE PERCENT: 61.3 %
NEUTROPHILS RELATIVE PERCENT: 62.4 %
NEUTROPHILS RELATIVE PERCENT: 65.8 %
NEUTROPHILS RELATIVE PERCENT: 69.3 %
NEUTROPHILS RELATIVE PERCENT: 69.3 %
NEUTROPHILS RELATIVE PERCENT: 69.7 %
NEUTROPHILS RELATIVE PERCENT: 70.3 %
NEUTROPHILS RELATIVE PERCENT: 75.9 %
NITRITE, URINE: NEGATIVE
NITRITE, URINE: NEGATIVE
NUCLEATED CELLS FLUID: 73 /CUMM
NUMBER OF CELLS COUNTED FLUID: 100
PDW BLD-RTO: 17.3 % (ref 11.5–14.5)
PDW BLD-RTO: 17.7 % (ref 11.5–14.5)
PDW BLD-RTO: 17.8 % (ref 11.5–14.5)
PDW BLD-RTO: 18.3 % (ref 11.5–14.5)
PDW BLD-RTO: 18.4 % (ref 11.5–14.5)
PDW BLD-RTO: 18.6 % (ref 11.5–14.5)
PDW BLD-RTO: 18.7 % (ref 11.5–14.5)
PERFORMED ON: ABNORMAL
PERFORMED ON: NORMAL
PH UA: 5 (ref 5–9)
PH UA: 5 (ref 5–9)
PLATELET # BLD: 109 K/UL (ref 130–400)
PLATELET # BLD: 124 K/UL (ref 130–400)
PLATELET # BLD: 141 K/UL (ref 130–400)
PLATELET # BLD: 151 K/UL (ref 130–400)
PLATELET # BLD: 151 K/UL (ref 130–400)
PLATELET # BLD: 155 K/UL (ref 130–400)
PLATELET # BLD: 164 K/UL (ref 130–400)
PLATELET # BLD: 172 K/UL (ref 130–400)
PLATELET # BLD: 214 K/UL (ref 130–400)
POTASSIUM REFLEX MAGNESIUM: 4.4 MEQ/L (ref 3.4–4.9)
POTASSIUM REFLEX MAGNESIUM: 4.8 MEQ/L (ref 3.4–4.9)
POTASSIUM REFLEX MAGNESIUM: 5 MEQ/L (ref 3.4–4.9)
POTASSIUM REFLEX MAGNESIUM: 5.1 MEQ/L (ref 3.4–4.9)
POTASSIUM SERPL-SCNC: 3.8 MEQ/L (ref 3.4–4.9)
POTASSIUM SERPL-SCNC: 4.2 MEQ/L (ref 3.4–4.9)
POTASSIUM SERPL-SCNC: 4.7 MEQ/L (ref 3.4–4.9)
POTASSIUM SERPL-SCNC: 4.7 MEQ/L (ref 3.4–4.9)
POTASSIUM SERPL-SCNC: 4.8 MEQ/L (ref 3.4–4.9)
POTASSIUM SERPL-SCNC: 4.9 MEQ/L (ref 3.4–4.9)
POTASSIUM SERPL-SCNC: 5.1 MEQ/L (ref 3.4–4.9)
POTASSIUM SERPL-SCNC: 5.2 MEQ/L (ref 3.4–4.9)
POTASSIUM SERPL-SCNC: 5.7 MEQ/L (ref 3.4–4.9)
POTASSIUM SERPL-SCNC: 5.7 MEQ/L (ref 3.4–4.9)
POTASSIUM SERPL-SCNC: 6.2 MEQ/L (ref 3.4–4.9)
PROTEIN FLUID: 1.8 G/DL
PROTEIN UA: ABNORMAL MG/DL
PROTEIN UA: NEGATIVE MG/DL
PROTHROMBIN TIME: 16.3 SEC (ref 12.3–14.9)
PROTHROMBIN TIME: 17.1 SEC (ref 12.3–14.9)
PROTHROMBIN TIME: 17.8 SEC (ref 12.3–14.9)
PROTHROMBIN TIME: 17.8 SEC (ref 12.3–14.9)
PROTHROMBIN TIME: 18.7 SEC (ref 12.3–14.9)
RBC # BLD: 2.48 M/UL (ref 4.2–5.4)
RBC # BLD: 2.48 M/UL (ref 4.2–5.4)
RBC # BLD: 2.65 M/UL (ref 4.2–5.4)
RBC # BLD: 2.88 M/UL (ref 4.2–5.4)
RBC # BLD: 2.95 M/UL (ref 4.2–5.4)
RBC # BLD: 2.98 M/UL (ref 4.2–5.4)
RBC # BLD: 3.03 M/UL (ref 4.2–5.4)
RBC # BLD: 3.4 M/UL (ref 4.2–5.4)
RBC # BLD: 3.51 M/UL (ref 4.2–5.4)
RBC FLUID: 342 /CUMM
RBC UA: ABNORMAL /HPF (ref 0–2)
RBC UA: ABNORMAL /HPF (ref 0–2)
SODIUM BLD-SCNC: 130 MEQ/L (ref 135–144)
SODIUM BLD-SCNC: 130 MEQ/L (ref 135–144)
SODIUM BLD-SCNC: 131 MEQ/L (ref 135–144)
SODIUM BLD-SCNC: 131 MEQ/L (ref 135–144)
SODIUM BLD-SCNC: 132 MEQ/L (ref 135–144)
SODIUM BLD-SCNC: 132 MEQ/L (ref 135–144)
SODIUM BLD-SCNC: 134 MEQ/L (ref 135–144)
SODIUM BLD-SCNC: 135 MEQ/L (ref 135–144)
SODIUM BLD-SCNC: 138 MEQ/L (ref 135–144)
SODIUM BLD-SCNC: 139 MEQ/L (ref 135–144)
SODIUM BLD-SCNC: 142 MEQ/L (ref 135–144)
SODIUM URINE: 27 MEQ/L
SPECIFIC GRAVITY UA: 1.01 (ref 1–1.03)
SPECIFIC GRAVITY UA: 1.02 (ref 1–1.03)
TOTAL IRON BINDING CAPACITY: 261 UG/DL (ref 178–450)
TOTAL PROTEIN: 4.7 G/DL (ref 6.3–8)
TOTAL PROTEIN: 5.1 G/DL (ref 6.3–8)
TOTAL PROTEIN: 5.7 G/DL (ref 6.3–8)
TOTAL PROTEIN: 5.9 G/DL (ref 6.3–8)
TOTAL PROTEIN: 6 G/DL (ref 6.3–8)
TOTAL PROTEIN: 6.3 G/DL (ref 6.3–8)
TROPONIN: <0.01 NG/ML (ref 0–0.01)
TROPONIN: <0.01 NG/ML (ref 0–0.01)
TSH REFLEX: 2.64 UIU/ML (ref 0.44–3.86)
URINE CULTURE, ROUTINE: NORMAL
URINE REFLEX TO CULTURE: YES
UROBILINOGEN, URINE: 0.2 E.U./DL
UROBILINOGEN, URINE: 0.2 E.U./DL
WBC # BLD: 4.6 K/UL (ref 4.8–10.8)
WBC # BLD: 5.4 K/UL (ref 4.8–10.8)
WBC # BLD: 5.9 K/UL (ref 4.8–10.8)
WBC # BLD: 6 K/UL (ref 4.8–10.8)
WBC # BLD: 6.5 K/UL (ref 4.8–10.8)
WBC # BLD: 6.9 K/UL (ref 4.8–10.8)
WBC # BLD: 7.1 K/UL (ref 4.8–10.8)
WBC # BLD: 7.2 K/UL (ref 4.8–10.8)
WBC # BLD: 9.5 K/UL (ref 4.8–10.8)
WBC UA: ABNORMAL /HPF (ref 0–5)
WBC UA: ABNORMAL /HPF (ref 0–5)

## 2020-01-01 PROCEDURE — 6360000002 HC RX W HCPCS: Performed by: RADIOLOGY

## 2020-01-01 PROCEDURE — 2580000003 HC RX 258: Performed by: INTERNAL MEDICINE

## 2020-01-01 PROCEDURE — 99285 EMERGENCY DEPT VISIT HI MDM: CPT

## 2020-01-01 PROCEDURE — 49083 ABD PARACENTESIS W/IMAGING: CPT | Performed by: RADIOLOGY

## 2020-01-01 PROCEDURE — 2709999900 US GUIDED PARACENTESIS

## 2020-01-01 PROCEDURE — 86850 RBC ANTIBODY SCREEN: CPT

## 2020-01-01 PROCEDURE — 6370000000 HC RX 637 (ALT 250 FOR IP): Performed by: INTERNAL MEDICINE

## 2020-01-01 PROCEDURE — 85025 COMPLETE CBC W/AUTO DIFF WBC: CPT

## 2020-01-01 PROCEDURE — 2500000003 HC RX 250 WO HCPCS: Performed by: RADIOLOGY

## 2020-01-01 PROCEDURE — P9047 ALBUMIN (HUMAN), 25%, 50ML: HCPCS | Performed by: RADIOLOGY

## 2020-01-01 PROCEDURE — 49083 ABD PARACENTESIS W/IMAGING: CPT

## 2020-01-01 PROCEDURE — 80048 BASIC METABOLIC PNL TOTAL CA: CPT

## 2020-01-01 PROCEDURE — 36415 COLL VENOUS BLD VENIPUNCTURE: CPT

## 2020-01-01 PROCEDURE — 85610 PROTHROMBIN TIME: CPT

## 2020-01-01 PROCEDURE — 3051F HG A1C>EQUAL 7.0%<8.0%: CPT | Performed by: INTERNAL MEDICINE

## 2020-01-01 PROCEDURE — 93000 ELECTROCARDIOGRAM COMPLETE: CPT | Performed by: INTERNAL MEDICINE

## 2020-01-01 PROCEDURE — APPNB60 APP NON BILLABLE TIME 46-60 MINS: Performed by: NURSE PRACTITIONER

## 2020-01-01 PROCEDURE — 76775 US EXAM ABDO BACK WALL LIM: CPT

## 2020-01-01 PROCEDURE — 93976 VASCULAR STUDY: CPT

## 2020-01-01 PROCEDURE — 99282 EMERGENCY DEPT VISIT SF MDM: CPT

## 2020-01-01 PROCEDURE — 6370000000 HC RX 637 (ALT 250 FOR IP): Performed by: NURSE PRACTITIONER

## 2020-01-01 PROCEDURE — 81001 URINALYSIS AUTO W/SCOPE: CPT

## 2020-01-01 PROCEDURE — 2709999900 IR US GUIDED PARACENTESIS

## 2020-01-01 PROCEDURE — 90832 PSYTX W PT 30 MINUTES: CPT | Performed by: PSYCHOLOGIST

## 2020-01-01 PROCEDURE — 82945 GLUCOSE OTHER FLUID: CPT

## 2020-01-01 PROCEDURE — 89051 BODY FLUID CELL COUNT: CPT

## 2020-01-01 PROCEDURE — 1111F DSCHRG MED/CURRENT MED MERGE: CPT | Performed by: INTERNAL MEDICINE

## 2020-01-01 PROCEDURE — 99214 OFFICE O/P EST MOD 30 MIN: CPT | Performed by: INTERNAL MEDICINE

## 2020-01-01 PROCEDURE — 77012 CT SCAN FOR NEEDLE BIOPSY: CPT | Performed by: RADIOLOGY

## 2020-01-01 PROCEDURE — 85014 HEMATOCRIT: CPT

## 2020-01-01 PROCEDURE — 6360000002 HC RX W HCPCS: Performed by: STUDENT IN AN ORGANIZED HEALTH CARE EDUCATION/TRAINING PROGRAM

## 2020-01-01 PROCEDURE — 2000000000 HC ICU R&B

## 2020-01-01 PROCEDURE — 80053 COMPREHEN METABOLIC PANEL: CPT

## 2020-01-01 PROCEDURE — 99222 1ST HOSP IP/OBS MODERATE 55: CPT | Performed by: RADIOLOGY

## 2020-01-01 PROCEDURE — 80074 ACUTE HEPATITIS PANEL: CPT

## 2020-01-01 PROCEDURE — 99213 OFFICE O/P EST LOW 20 MIN: CPT | Performed by: INTERNAL MEDICINE

## 2020-01-01 PROCEDURE — 99223 1ST HOSP IP/OBS HIGH 75: CPT | Performed by: INTERNAL MEDICINE

## 2020-01-01 PROCEDURE — C1729 CATH, DRAINAGE: HCPCS

## 2020-01-01 PROCEDURE — 83540 ASSAY OF IRON: CPT

## 2020-01-01 PROCEDURE — 87389 HIV-1 AG W/HIV-1&-2 AB AG IA: CPT

## 2020-01-01 PROCEDURE — 50200 RENAL BIOPSY PERQ: CPT | Performed by: RADIOLOGY

## 2020-01-01 PROCEDURE — 1210000000 HC MED SURG R&B

## 2020-01-01 PROCEDURE — 87205 SMEAR GRAM STAIN: CPT

## 2020-01-01 PROCEDURE — 99213 OFFICE O/P EST LOW 20 MIN: CPT | Performed by: SURGERY

## 2020-01-01 PROCEDURE — 83735 ASSAY OF MAGNESIUM: CPT

## 2020-01-01 PROCEDURE — 82784 ASSAY IGA/IGD/IGG/IGM EACH: CPT

## 2020-01-01 PROCEDURE — 99232 SBSQ HOSP IP/OBS MODERATE 35: CPT | Performed by: RADIOLOGY

## 2020-01-01 PROCEDURE — 99222 1ST HOSP IP/OBS MODERATE 55: CPT | Performed by: SURGERY

## 2020-01-01 PROCEDURE — G8510 SCR DEP NEG, NO PLAN REQD: HCPCS | Performed by: NURSE PRACTITIONER

## 2020-01-01 PROCEDURE — 77012 CT SCAN FOR NEEDLE BIOPSY: CPT

## 2020-01-01 PROCEDURE — 6360000002 HC RX W HCPCS: Performed by: INTERNAL MEDICINE

## 2020-01-01 PROCEDURE — 83605 ASSAY OF LACTIC ACID: CPT

## 2020-01-01 PROCEDURE — 2060000000 HC ICU INTERMEDIATE R&B

## 2020-01-01 PROCEDURE — 88341 IMHCHEM/IMCYTCHM EA ADD ANTB: CPT

## 2020-01-01 PROCEDURE — 0W9G3ZZ DRAINAGE OF PERITONEAL CAVITY, PERCUTANEOUS APPROACH: ICD-10-PCS | Performed by: RADIOLOGY

## 2020-01-01 PROCEDURE — 84484 ASSAY OF TROPONIN QUANT: CPT

## 2020-01-01 PROCEDURE — 2500000003 HC RX 250 WO HCPCS: Performed by: UROLOGY

## 2020-01-01 PROCEDURE — 99231 SBSQ HOSP IP/OBS SF/LOW 25: CPT | Performed by: SURGERY

## 2020-01-01 PROCEDURE — 88305 TISSUE EXAM BY PATHOLOGIST: CPT

## 2020-01-01 PROCEDURE — 90791 PSYCH DIAGNOSTIC EVALUATION: CPT | Performed by: PSYCHOLOGIST

## 2020-01-01 PROCEDURE — P9016 RBC LEUKOCYTES REDUCED: HCPCS

## 2020-01-01 PROCEDURE — 76775 US EXAM ABDO BACK WALL LIM: CPT | Performed by: RADIOLOGY

## 2020-01-01 PROCEDURE — 99232 SBSQ HOSP IP/OBS MODERATE 35: CPT | Performed by: NURSE PRACTITIONER

## 2020-01-01 PROCEDURE — 99222 1ST HOSP IP/OBS MODERATE 55: CPT | Performed by: INTERNAL MEDICINE

## 2020-01-01 PROCEDURE — 2500000003 HC RX 250 WO HCPCS: Performed by: PERSONAL EMERGENCY RESPONSE ATTENDANT

## 2020-01-01 PROCEDURE — 0W9G3ZZ DRAINAGE OF PERITONEAL CAVITY, PERCUTANEOUS APPROACH: ICD-10-PCS | Performed by: INTERNAL MEDICINE

## 2020-01-01 PROCEDURE — 86038 ANTINUCLEAR ANTIBODIES: CPT

## 2020-01-01 PROCEDURE — 84443 ASSAY THYROID STIM HORMONE: CPT

## 2020-01-01 PROCEDURE — 2500000003 HC RX 250 WO HCPCS: Performed by: INTERNAL MEDICINE

## 2020-01-01 PROCEDURE — 99495 TRANSJ CARE MGMT MOD F2F 14D: CPT | Performed by: INTERNAL MEDICINE

## 2020-01-01 PROCEDURE — 86923 COMPATIBILITY TEST ELECTRIC: CPT

## 2020-01-01 PROCEDURE — 2580000003 HC RX 258: Performed by: NURSE PRACTITIONER

## 2020-01-01 PROCEDURE — 85730 THROMBOPLASTIN TIME PARTIAL: CPT

## 2020-01-01 PROCEDURE — 86901 BLOOD TYPING SEROLOGIC RH(D): CPT

## 2020-01-01 PROCEDURE — 93971 EXTREMITY STUDY: CPT

## 2020-01-01 PROCEDURE — 99214 OFFICE O/P EST MOD 30 MIN: CPT | Performed by: SURGERY

## 2020-01-01 PROCEDURE — 2500000003 HC RX 250 WO HCPCS: Performed by: STUDENT IN AN ORGANIZED HEALTH CARE EDUCATION/TRAINING PROGRAM

## 2020-01-01 PROCEDURE — 36430 TRANSFUSION BLD/BLD COMPNT: CPT

## 2020-01-01 PROCEDURE — 2580000003 HC RX 258: Performed by: EMERGENCY MEDICINE

## 2020-01-01 PROCEDURE — 99233 SBSQ HOSP IP/OBS HIGH 50: CPT | Performed by: INTERNAL MEDICINE

## 2020-01-01 PROCEDURE — 93010 ELECTROCARDIOGRAM REPORT: CPT | Performed by: INTERNAL MEDICINE

## 2020-01-01 PROCEDURE — 86900 BLOOD TYPING SEROLOGIC ABO: CPT

## 2020-01-01 PROCEDURE — 74176 CT ABD & PELVIS W/O CONTRAST: CPT

## 2020-01-01 PROCEDURE — 83615 LACTATE (LD) (LDH) ENZYME: CPT

## 2020-01-01 PROCEDURE — 82378 CARCINOEMBRYONIC ANTIGEN: CPT

## 2020-01-01 PROCEDURE — P9047 ALBUMIN (HUMAN), 25%, 50ML: HCPCS | Performed by: INTERNAL MEDICINE

## 2020-01-01 PROCEDURE — 82103 ALPHA-1-ANTITRYPSIN TOTAL: CPT

## 2020-01-01 PROCEDURE — 6360000002 HC RX W HCPCS: Performed by: EMERGENCY MEDICINE

## 2020-01-01 PROCEDURE — 6360000002 HC RX W HCPCS: Performed by: NURSE PRACTITIONER

## 2020-01-01 PROCEDURE — 2580000003 HC RX 258: Performed by: STUDENT IN AN ORGANIZED HEALTH CARE EDUCATION/TRAINING PROGRAM

## 2020-01-01 PROCEDURE — 88112 CYTOPATH CELL ENHANCE TECH: CPT

## 2020-01-01 PROCEDURE — 82728 ASSAY OF FERRITIN: CPT

## 2020-01-01 PROCEDURE — 99203 OFFICE O/P NEW LOW 30 MIN: CPT | Performed by: SPECIALIST

## 2020-01-01 PROCEDURE — 96374 THER/PROPH/DIAG INJ IV PUSH: CPT

## 2020-01-01 PROCEDURE — 93306 TTE W/DOPPLER COMPLETE: CPT

## 2020-01-01 PROCEDURE — 83036 HEMOGLOBIN GLYCOSYLATED A1C: CPT

## 2020-01-01 PROCEDURE — 93005 ELECTROCARDIOGRAM TRACING: CPT | Performed by: EMERGENCY MEDICINE

## 2020-01-01 PROCEDURE — 76700 US EXAM ABDOM COMPLETE: CPT

## 2020-01-01 PROCEDURE — 71250 CT THORAX DX C-: CPT

## 2020-01-01 PROCEDURE — 99204 OFFICE O/P NEW MOD 45 MIN: CPT | Performed by: INTERNAL MEDICINE

## 2020-01-01 PROCEDURE — 99214 OFFICE O/P EST MOD 30 MIN: CPT | Performed by: NURSE PRACTITIONER

## 2020-01-01 PROCEDURE — 50200 RENAL BIOPSY PERQ: CPT

## 2020-01-01 PROCEDURE — 96361 HYDRATE IV INFUSION ADD-ON: CPT

## 2020-01-01 PROCEDURE — 85018 HEMOGLOBIN: CPT

## 2020-01-01 PROCEDURE — 82042 OTHER SOURCE ALBUMIN QUAN EA: CPT

## 2020-01-01 PROCEDURE — 2500000003 HC RX 250 WO HCPCS: Performed by: NURSE PRACTITIONER

## 2020-01-01 PROCEDURE — 87070 CULTURE OTHR SPECIMN AEROBIC: CPT

## 2020-01-01 PROCEDURE — 87086 URINE CULTURE/COLONY COUNT: CPT

## 2020-01-01 PROCEDURE — 83516 IMMUNOASSAY NONANTIBODY: CPT

## 2020-01-01 PROCEDURE — 82570 ASSAY OF URINE CREATININE: CPT

## 2020-01-01 PROCEDURE — 99213 OFFICE O/P EST LOW 20 MIN: CPT | Performed by: NURSE PRACTITIONER

## 2020-01-01 PROCEDURE — 83690 ASSAY OF LIPASE: CPT

## 2020-01-01 PROCEDURE — 86376 MICROSOMAL ANTIBODY EACH: CPT

## 2020-01-01 PROCEDURE — 83550 IRON BINDING TEST: CPT

## 2020-01-01 PROCEDURE — 88342 IMHCHEM/IMCYTCHM 1ST ANTB: CPT

## 2020-01-01 PROCEDURE — 82150 ASSAY OF AMYLASE: CPT

## 2020-01-01 PROCEDURE — 99221 1ST HOSP IP/OBS SF/LOW 40: CPT | Performed by: INTERNAL MEDICINE

## 2020-01-01 PROCEDURE — 84157 ASSAY OF PROTEIN OTHER: CPT

## 2020-01-01 PROCEDURE — 84300 ASSAY OF URINE SODIUM: CPT

## 2020-01-01 PROCEDURE — 80076 HEPATIC FUNCTION PANEL: CPT

## 2020-01-01 RX ORDER — METOPROLOL TARTRATE 50 MG/1
50 TABLET, FILM COATED ORAL 2 TIMES DAILY
Status: DISCONTINUED | OUTPATIENT
Start: 2020-01-01 | End: 2020-01-01 | Stop reason: HOSPADM

## 2020-01-01 RX ORDER — ALBUMIN (HUMAN) 12.5 G/50ML
25 SOLUTION INTRAVENOUS
Status: ACTIVE | OUTPATIENT
Start: 2020-01-01 | End: 2020-01-01

## 2020-01-01 RX ORDER — ALBUMIN (HUMAN) 12.5 G/50ML
50 SOLUTION INTRAVENOUS ONCE
Status: COMPLETED | OUTPATIENT
Start: 2020-01-01 | End: 2020-01-01

## 2020-01-01 RX ORDER — SODIUM CHLORIDE 0.9 % (FLUSH) 0.9 %
10 SYRINGE (ML) INJECTION EVERY 12 HOURS SCHEDULED
Status: DISCONTINUED | OUTPATIENT
Start: 2020-01-01 | End: 2020-01-01 | Stop reason: HOSPADM

## 2020-01-01 RX ORDER — SODIUM POLYSTYRENE SULFONATE 15 G/60ML
15 SUSPENSION ORAL; RECTAL ONCE
Status: COMPLETED | OUTPATIENT
Start: 2020-01-01 | End: 2020-01-01

## 2020-01-01 RX ORDER — ALBUMIN (HUMAN) 12.5 G/50ML
50 SOLUTION INTRAVENOUS ONCE
Status: DISCONTINUED | OUTPATIENT
Start: 2020-01-01 | End: 2020-01-01 | Stop reason: HOSPADM

## 2020-01-01 RX ORDER — ATORVASTATIN CALCIUM 40 MG/1
40 TABLET, FILM COATED ORAL DAILY
Status: DISCONTINUED | OUTPATIENT
Start: 2020-01-01 | End: 2020-01-01 | Stop reason: HOSPADM

## 2020-01-01 RX ORDER — 0.9 % SODIUM CHLORIDE 0.9 %
250 INTRAVENOUS SOLUTION INTRAVENOUS
Status: CANCELLED | OUTPATIENT
Start: 2020-01-01

## 2020-01-01 RX ORDER — LIDOCAINE HYDROCHLORIDE 20 MG/ML
INJECTION, SOLUTION INFILTRATION; PERINEURAL
Status: COMPLETED | OUTPATIENT
Start: 2020-01-01 | End: 2020-01-01

## 2020-01-01 RX ORDER — METOPROLOL TARTRATE 50 MG/1
50 TABLET, FILM COATED ORAL 2 TIMES DAILY
Qty: 180 TABLET | Refills: 3 | Status: ON HOLD | OUTPATIENT
Start: 2020-01-01 | End: 2020-01-01 | Stop reason: HOSPADM

## 2020-01-01 RX ORDER — ALBUMIN (HUMAN) 12.5 G/50ML
SOLUTION INTRAVENOUS CONTINUOUS PRN
Status: COMPLETED | OUTPATIENT
Start: 2020-01-01 | End: 2020-01-01

## 2020-01-01 RX ORDER — 0.9 % SODIUM CHLORIDE 0.9 %
500 INTRAVENOUS SOLUTION INTRAVENOUS ONCE
Status: COMPLETED | OUTPATIENT
Start: 2020-01-01 | End: 2020-01-01

## 2020-01-01 RX ORDER — METOPROLOL TARTRATE 50 MG/1
50 TABLET, FILM COATED ORAL 2 TIMES DAILY
Qty: 60 TABLET | Refills: 5 | Status: SHIPPED | OUTPATIENT
Start: 2020-01-01 | End: 2021-01-01 | Stop reason: SDUPTHER

## 2020-01-01 RX ORDER — SODIUM CHLORIDE 9 MG/ML
INJECTION, SOLUTION INTRAVENOUS
Status: DISPENSED
Start: 2020-01-01 | End: 2020-01-01

## 2020-01-01 RX ORDER — DEXTROSE MONOHYDRATE 25 G/50ML
12.5 INJECTION, SOLUTION INTRAVENOUS PRN
Status: DISCONTINUED | OUTPATIENT
Start: 2020-01-01 | End: 2020-01-01 | Stop reason: HOSPADM

## 2020-01-01 RX ORDER — SPIRONOLACTONE 50 MG/1
50 TABLET, FILM COATED ORAL DAILY
Qty: 30 TABLET | Refills: 3 | Status: ON HOLD | OUTPATIENT
Start: 2020-01-01 | End: 2020-01-01 | Stop reason: HOSPADM

## 2020-01-01 RX ORDER — MIDAZOLAM HYDROCHLORIDE 1 MG/ML
0.5 INJECTION INTRAMUSCULAR; INTRAVENOUS
Status: CANCELLED | OUTPATIENT
Start: 2020-01-01

## 2020-01-01 RX ORDER — ACETAMINOPHEN 650 MG/1
650 SUPPOSITORY RECTAL EVERY 6 HOURS PRN
Status: DISCONTINUED | OUTPATIENT
Start: 2020-01-01 | End: 2020-01-01 | Stop reason: HOSPADM

## 2020-01-01 RX ORDER — ACETAMINOPHEN 325 MG/1
650 TABLET ORAL
Status: COMPLETED | OUTPATIENT
Start: 2020-01-01 | End: 2020-01-01

## 2020-01-01 RX ORDER — SODIUM CHLORIDE 9 MG/ML
INJECTION, SOLUTION INTRAVENOUS CONTINUOUS
Status: DISPENSED | OUTPATIENT
Start: 2020-01-01 | End: 2020-01-01

## 2020-01-01 RX ORDER — ERGOCALCIFEROL 1.25 MG/1
50000 CAPSULE ORAL WEEKLY
Qty: 12 CAPSULE | Refills: 2 | Status: SHIPPED | OUTPATIENT
Start: 2020-01-01 | End: 2020-01-01

## 2020-01-01 RX ORDER — CITALOPRAM 10 MG/1
TABLET ORAL
COMMUNITY
Start: 2020-01-01 | End: 2020-01-01

## 2020-01-01 RX ORDER — DIAZEPAM 5 MG/1
5 TABLET ORAL ONCE
Qty: 1 TABLET | Refills: 0 | Status: SHIPPED | OUTPATIENT
Start: 2020-01-01 | End: 2020-01-01

## 2020-01-01 RX ORDER — IBUPROFEN 200 MG
TABLET ORAL ONCE
Status: CANCELLED | OUTPATIENT
Start: 2020-01-01 | End: 2020-01-01

## 2020-01-01 RX ORDER — 0.9 % SODIUM CHLORIDE 0.9 %
250 INTRAVENOUS SOLUTION INTRAVENOUS
Status: DISCONTINUED | OUTPATIENT
Start: 2020-01-01 | End: 2020-01-01 | Stop reason: HOSPADM

## 2020-01-01 RX ORDER — RALOXIFENE HYDROCHLORIDE 60 MG/1
60 TABLET, FILM COATED ORAL DAILY
Status: DISCONTINUED | OUTPATIENT
Start: 2020-01-01 | End: 2020-01-01 | Stop reason: RX

## 2020-01-01 RX ORDER — 0.9 % SODIUM CHLORIDE 0.9 %
1000 INTRAVENOUS SOLUTION INTRAVENOUS ONCE
Status: COMPLETED | OUTPATIENT
Start: 2020-01-01 | End: 2020-01-01

## 2020-01-01 RX ORDER — DEXTROSE MONOHYDRATE 50 MG/ML
100 INJECTION, SOLUTION INTRAVENOUS PRN
Status: DISCONTINUED | OUTPATIENT
Start: 2020-01-01 | End: 2020-01-01 | Stop reason: HOSPADM

## 2020-01-01 RX ORDER — ACETAMINOPHEN 325 MG/1
650 TABLET ORAL ONCE
Status: COMPLETED | OUTPATIENT
Start: 2020-01-01 | End: 2020-01-01

## 2020-01-01 RX ORDER — ATORVASTATIN CALCIUM 40 MG/1
TABLET, FILM COATED ORAL
Qty: 90 TABLET | Refills: 3 | Status: ON HOLD | OUTPATIENT
Start: 2020-01-01 | End: 2021-01-01 | Stop reason: HOSPADM

## 2020-01-01 RX ORDER — FUROSEMIDE 10 MG/ML
40 INJECTION INTRAMUSCULAR; INTRAVENOUS ONCE
Status: COMPLETED | OUTPATIENT
Start: 2020-01-01 | End: 2020-01-01

## 2020-01-01 RX ORDER — CITALOPRAM 10 MG/1
10 TABLET ORAL DAILY
Status: DISCONTINUED | OUTPATIENT
Start: 2020-01-01 | End: 2020-01-01 | Stop reason: HOSPADM

## 2020-01-01 RX ORDER — NICOTINE POLACRILEX 4 MG
15 LOZENGE BUCCAL PRN
Status: DISCONTINUED | OUTPATIENT
Start: 2020-01-01 | End: 2020-01-01 | Stop reason: HOSPADM

## 2020-01-01 RX ORDER — METOPROLOL TARTRATE 50 MG/1
50 TABLET, FILM COATED ORAL 2 TIMES DAILY
Status: DISCONTINUED | OUTPATIENT
Start: 2020-01-01 | End: 2020-01-01

## 2020-01-01 RX ORDER — FENTANYL CITRATE 50 UG/ML
50 INJECTION, SOLUTION INTRAMUSCULAR; INTRAVENOUS
Status: CANCELLED | OUTPATIENT
Start: 2020-01-01

## 2020-01-01 RX ORDER — POLYETHYLENE GLYCOL 3350 17 G/17G
17 POWDER, FOR SOLUTION ORAL DAILY PRN
Status: DISCONTINUED | OUTPATIENT
Start: 2020-01-01 | End: 2020-01-01 | Stop reason: HOSPADM

## 2020-01-01 RX ORDER — BUPROPION HYDROCHLORIDE 150 MG/1
150 TABLET, EXTENDED RELEASE ORAL DAILY
COMMUNITY
Start: 2020-01-01 | End: 2021-01-01

## 2020-01-01 RX ORDER — BUPROPION HYDROCHLORIDE 150 MG/1
150 TABLET, EXTENDED RELEASE ORAL 2 TIMES DAILY
Status: DISCONTINUED | OUTPATIENT
Start: 2020-01-01 | End: 2020-01-01

## 2020-01-01 RX ORDER — FENTANYL CITRATE 50 UG/ML
50 INJECTION, SOLUTION INTRAMUSCULAR; INTRAVENOUS
Status: DISCONTINUED | OUTPATIENT
Start: 2020-01-01 | End: 2020-01-01 | Stop reason: HOSPADM

## 2020-01-01 RX ORDER — FUROSEMIDE 40 MG/1
40 TABLET ORAL DAILY
Qty: 60 TABLET | Refills: 3 | Status: SHIPPED | OUTPATIENT
Start: 2020-01-01 | End: 2020-01-01

## 2020-01-01 RX ORDER — ASPIRIN 81 MG/1
81 TABLET ORAL 2 TIMES DAILY
Status: DISCONTINUED | OUTPATIENT
Start: 2020-01-01 | End: 2020-01-01 | Stop reason: HOSPADM

## 2020-01-01 RX ORDER — ASPIRIN 81 MG/1
81 TABLET ORAL 2 TIMES DAILY
Qty: 30 TABLET | Refills: 3 | Status: SHIPPED | OUTPATIENT
Start: 2020-01-01 | End: 2020-01-01

## 2020-01-01 RX ORDER — LISINOPRIL 5 MG/1
5 TABLET ORAL NIGHTLY
Status: DISCONTINUED | OUTPATIENT
Start: 2020-01-01 | End: 2020-01-01 | Stop reason: HOSPADM

## 2020-01-01 RX ORDER — METOPROLOL TARTRATE 5 MG/5ML
5 INJECTION INTRAVENOUS
Status: DISCONTINUED | OUTPATIENT
Start: 2020-01-01 | End: 2020-01-01 | Stop reason: HOSPADM

## 2020-01-01 RX ORDER — ACETAMINOPHEN 325 MG/1
650 TABLET ORAL EVERY 6 HOURS PRN
Status: DISCONTINUED | OUTPATIENT
Start: 2020-01-01 | End: 2020-01-01 | Stop reason: HOSPADM

## 2020-01-01 RX ORDER — ONDANSETRON 2 MG/ML
4 INJECTION INTRAMUSCULAR; INTRAVENOUS ONCE
Status: COMPLETED | OUTPATIENT
Start: 2020-01-01 | End: 2020-01-01

## 2020-01-01 RX ORDER — FUROSEMIDE 20 MG/1
20 TABLET ORAL DAILY
Status: DISCONTINUED | OUTPATIENT
Start: 2020-01-01 | End: 2020-01-01

## 2020-01-01 RX ORDER — 0.9 % SODIUM CHLORIDE 0.9 %
20 INTRAVENOUS SOLUTION INTRAVENOUS ONCE
Status: COMPLETED | OUTPATIENT
Start: 2020-01-01 | End: 2020-01-01

## 2020-01-01 RX ORDER — 0.9 % SODIUM CHLORIDE 0.9 %
500 INTRAVENOUS SOLUTION INTRAVENOUS ONCE
Status: DISCONTINUED | OUTPATIENT
Start: 2020-01-01 | End: 2020-01-01

## 2020-01-01 RX ORDER — SPIRONOLACTONE 25 MG/1
50 TABLET ORAL DAILY
Status: DISCONTINUED | OUTPATIENT
Start: 2020-01-01 | End: 2020-01-01 | Stop reason: HOSPADM

## 2020-01-01 RX ORDER — AMOXICILLIN AND CLAVULANATE POTASSIUM 875; 125 MG/1; MG/1
TABLET, FILM COATED ORAL
COMMUNITY
Start: 2020-01-01 | End: 2020-01-01 | Stop reason: ALTCHOICE

## 2020-01-01 RX ORDER — FUROSEMIDE 10 MG/ML
40 INJECTION INTRAMUSCULAR; INTRAVENOUS DAILY
Status: DISCONTINUED | OUTPATIENT
Start: 2020-01-01 | End: 2020-01-01

## 2020-01-01 RX ORDER — LIDOCAINE HYDROCHLORIDE 20 MG/ML
10 INJECTION, SOLUTION INFILTRATION; PERINEURAL
Status: CANCELLED | OUTPATIENT
Start: 2020-01-01

## 2020-01-01 RX ORDER — BUPROPION HYDROCHLORIDE 150 MG/1
150 TABLET, EXTENDED RELEASE ORAL 2 TIMES DAILY
Qty: 180 TABLET | Refills: 1 | Status: ON HOLD | OUTPATIENT
Start: 2020-01-01 | End: 2020-01-01

## 2020-01-01 RX ORDER — METOPROLOL TARTRATE 75 MG/1
75 TABLET, FILM COATED ORAL 2 TIMES DAILY
Qty: 60 TABLET | Refills: 3 | Status: SHIPPED | OUTPATIENT
Start: 2020-01-01 | End: 2020-01-01

## 2020-01-01 RX ORDER — AMOXICILLIN AND CLAVULANATE POTASSIUM 875; 125 MG/1; MG/1
1 TABLET, FILM COATED ORAL 2 TIMES DAILY
Qty: 28 TABLET | Refills: 1 | Status: SHIPPED | OUTPATIENT
Start: 2020-01-01 | End: 2020-01-01

## 2020-01-01 RX ORDER — SODIUM CHLORIDE 9 MG/ML
INJECTION, SOLUTION INTRAVENOUS CONTINUOUS
Status: DISCONTINUED | OUTPATIENT
Start: 2020-01-01 | End: 2020-01-01 | Stop reason: HOSPADM

## 2020-01-01 RX ORDER — SPIRONOLACTONE 25 MG/1
25 TABLET ORAL DAILY
Status: DISCONTINUED | OUTPATIENT
Start: 2020-01-01 | End: 2020-01-01

## 2020-01-01 RX ORDER — PROMETHAZINE HYDROCHLORIDE 12.5 MG/1
12.5 TABLET ORAL EVERY 6 HOURS PRN
Status: DISCONTINUED | OUTPATIENT
Start: 2020-01-01 | End: 2020-01-01 | Stop reason: HOSPADM

## 2020-01-01 RX ORDER — LISINOPRIL 5 MG/1
5 TABLET ORAL NIGHTLY
Qty: 90 TABLET | Refills: 3 | Status: ON HOLD
Start: 2020-01-01 | End: 2020-01-01 | Stop reason: HOSPADM

## 2020-01-01 RX ORDER — RALOXIFENE HYDROCHLORIDE 60 MG/1
60 TABLET, FILM COATED ORAL DAILY
Status: DISCONTINUED | OUTPATIENT
Start: 2020-01-01 | End: 2020-01-01

## 2020-01-01 RX ORDER — FUROSEMIDE 40 MG/1
40 TABLET ORAL DAILY
Qty: 60 TABLET | Refills: 3 | Status: ON HOLD | OUTPATIENT
Start: 2020-01-01 | End: 2020-01-01 | Stop reason: HOSPADM

## 2020-01-01 RX ORDER — SODIUM CHLORIDE 0.9 % (FLUSH) 0.9 %
10 SYRINGE (ML) INJECTION PRN
Status: DISCONTINUED | OUTPATIENT
Start: 2020-01-01 | End: 2020-01-01 | Stop reason: HOSPADM

## 2020-01-01 RX ORDER — LISINOPRIL 5 MG/1
TABLET ORAL
COMMUNITY
Start: 2020-01-01 | End: 2020-01-01

## 2020-01-01 RX ORDER — METOPROLOL SUCCINATE 100 MG/1
100 TABLET, EXTENDED RELEASE ORAL DAILY
Status: DISCONTINUED | OUTPATIENT
Start: 2020-01-01 | End: 2020-01-01

## 2020-01-01 RX ORDER — MIDAZOLAM HYDROCHLORIDE 1 MG/ML
0.5 INJECTION INTRAMUSCULAR; INTRAVENOUS
Status: DISCONTINUED | OUTPATIENT
Start: 2020-01-01 | End: 2020-01-01 | Stop reason: HOSPADM

## 2020-01-01 RX ORDER — BLOOD PRESSURE TEST KIT
KIT MISCELLANEOUS
Qty: 1 KIT | Refills: 0 | Status: SHIPPED | OUTPATIENT
Start: 2020-01-01

## 2020-01-01 RX ORDER — METOPROLOL TARTRATE 50 MG/1
50 TABLET, FILM COATED ORAL 2 TIMES DAILY
Qty: 60 TABLET | Refills: 3 | Status: SHIPPED | OUTPATIENT
Start: 2020-01-01 | End: 2020-01-01 | Stop reason: SDUPTHER

## 2020-01-01 RX ORDER — RALOXIFENE HYDROCHLORIDE 60 MG/1
TABLET, FILM COATED ORAL
Qty: 90 TABLET | Refills: 3 | Status: SHIPPED | OUTPATIENT
Start: 2020-01-01

## 2020-01-01 RX ORDER — BACITRACIN, NEOMYCIN, POLYMYXIN B 400; 3.5; 5 [USP'U]/G; MG/G; [USP'U]/G
OINTMENT TOPICAL ONCE
Status: COMPLETED | OUTPATIENT
Start: 2020-01-01 | End: 2020-01-01

## 2020-01-01 RX ORDER — ALBUMIN (HUMAN) 12.5 G/50ML
25 SOLUTION INTRAVENOUS ONCE
Status: COMPLETED | OUTPATIENT
Start: 2020-01-01 | End: 2020-01-01

## 2020-01-01 RX ORDER — ONDANSETRON 2 MG/ML
4 INJECTION INTRAMUSCULAR; INTRAVENOUS EVERY 6 HOURS PRN
Status: DISCONTINUED | OUTPATIENT
Start: 2020-01-01 | End: 2020-01-01 | Stop reason: HOSPADM

## 2020-01-01 RX ORDER — LIDOCAINE HYDROCHLORIDE 20 MG/ML
10 INJECTION, SOLUTION INFILTRATION; PERINEURAL
Status: DISCONTINUED | OUTPATIENT
Start: 2020-01-01 | End: 2020-01-01 | Stop reason: HOSPADM

## 2020-01-01 RX ORDER — METOPROLOL SUCCINATE 100 MG/1
100 TABLET, EXTENDED RELEASE ORAL DAILY
Qty: 90 TABLET | Refills: 1 | Status: ON HOLD | OUTPATIENT
Start: 2020-01-01 | End: 2020-01-01 | Stop reason: HOSPADM

## 2020-01-01 RX ADMIN — SODIUM CHLORIDE: 9 INJECTION, SOLUTION INTRAVENOUS at 20:38

## 2020-01-01 RX ADMIN — ALBUMIN (HUMAN) 50 G: 0.25 INJECTION, SOLUTION INTRAVENOUS at 13:05

## 2020-01-01 RX ADMIN — FUROSEMIDE 40 MG: 10 INJECTION, SOLUTION INTRAMUSCULAR; INTRAVENOUS at 09:17

## 2020-01-01 RX ADMIN — Medication 10 ML: at 10:34

## 2020-01-01 RX ADMIN — ALBUMIN (HUMAN) 50 G: 0.25 INJECTION, SOLUTION INTRAVENOUS at 13:47

## 2020-01-01 RX ADMIN — LIDOCAINE HYDROCHLORIDE 5 ML: 20 INJECTION, SOLUTION INFILTRATION; PERINEURAL at 13:34

## 2020-01-01 RX ADMIN — ALBUMIN (HUMAN) 50 G: 5 SOLUTION INTRAVENOUS at 12:41

## 2020-01-01 RX ADMIN — LIDOCAINE HYDROCHLORIDE 8 ML: 20 INJECTION, SOLUTION INFILTRATION; PERINEURAL at 12:20

## 2020-01-01 RX ADMIN — ONDANSETRON 4 MG: 2 INJECTION INTRAMUSCULAR; INTRAVENOUS at 16:58

## 2020-01-01 RX ADMIN — CITALOPRAM HYDROBROMIDE 10 MG: 10 TABLET ORAL at 09:17

## 2020-01-01 RX ADMIN — ALBUMIN (HUMAN) 50 G: 0.25 INJECTION, SOLUTION INTRAVENOUS at 15:22

## 2020-01-01 RX ADMIN — SODIUM BICARBONATE: 84 INJECTION, SOLUTION INTRAVENOUS at 13:47

## 2020-01-01 RX ADMIN — FUROSEMIDE 20 MG: 10 INJECTION, SOLUTION INTRAMUSCULAR; INTRAVENOUS at 15:52

## 2020-01-01 RX ADMIN — Medication 10 ML: at 22:12

## 2020-01-01 RX ADMIN — SODIUM CHLORIDE 20 ML: 9 INJECTION, SOLUTION INTRAVENOUS at 14:40

## 2020-01-01 RX ADMIN — ALBUMIN (HUMAN) 50 G: 0.25 INJECTION, SOLUTION INTRAVENOUS at 11:10

## 2020-01-01 RX ADMIN — MIDAZOLAM HYDROCHLORIDE 0.5 MG: 1 INJECTION, SOLUTION INTRAMUSCULAR; INTRAVENOUS at 09:33

## 2020-01-01 RX ADMIN — LIDOCAINE HYDROCHLORIDE 7 ML: 20 INJECTION, SOLUTION INFILTRATION; PERINEURAL at 13:49

## 2020-01-01 RX ADMIN — ENOXAPARIN SODIUM 30 MG: 30 INJECTION SUBCUTANEOUS at 09:28

## 2020-01-01 RX ADMIN — Medication 10 ML: at 09:17

## 2020-01-01 RX ADMIN — FENTANYL CITRATE 50 MCG: 50 INJECTION, SOLUTION INTRAMUSCULAR; INTRAVENOUS at 09:33

## 2020-01-01 RX ADMIN — FUROSEMIDE 20 MG: 20 TABLET ORAL at 10:32

## 2020-01-01 RX ADMIN — METOPROLOL TARTRATE 50 MG: 50 TABLET, FILM COATED ORAL at 09:21

## 2020-01-01 RX ADMIN — ATORVASTATIN CALCIUM 40 MG: 40 TABLET, FILM COATED ORAL at 09:17

## 2020-01-01 RX ADMIN — ATORVASTATIN CALCIUM 40 MG: 40 TABLET, FILM COATED ORAL at 21:18

## 2020-01-01 RX ADMIN — ALBUMIN (HUMAN) 25 G: 0.25 INJECTION, SOLUTION INTRAVENOUS at 19:48

## 2020-01-01 RX ADMIN — Medication 10 ML: at 09:29

## 2020-01-01 RX ADMIN — SPIRONOLACTONE 50 MG: 25 TABLET ORAL at 12:40

## 2020-01-01 RX ADMIN — ATORVASTATIN CALCIUM 40 MG: 40 TABLET, FILM COATED ORAL at 10:15

## 2020-01-01 RX ADMIN — NOREPINEPHRINE BITARTRATE 2 MCG/MIN: 1 INJECTION INTRAVENOUS at 21:32

## 2020-01-01 RX ADMIN — LIDOCAINE HYDROCHLORIDE 5 ML: 20 INJECTION, SOLUTION INFILTRATION; PERINEURAL at 12:28

## 2020-01-01 RX ADMIN — FUROSEMIDE 40 MG: 10 INJECTION, SOLUTION INTRAMUSCULAR; INTRAVENOUS at 12:40

## 2020-01-01 RX ADMIN — ALBUMIN (HUMAN) 50 G: 12.5 SOLUTION INTRAVENOUS at 14:26

## 2020-01-01 RX ADMIN — LIDOCAINE HYDROCHLORIDE 8 ML: 20 INJECTION, SOLUTION INFILTRATION; PERINEURAL at 12:12

## 2020-01-01 RX ADMIN — BARIUM SULFATE 450 ML: 20 SUSPENSION ORAL at 11:31

## 2020-01-01 RX ADMIN — ASPIRIN 81 MG: 81 TABLET, COATED ORAL at 10:36

## 2020-01-01 RX ADMIN — ASPIRIN 81 MG: 81 TABLET, COATED ORAL at 09:21

## 2020-01-01 RX ADMIN — METOPROLOL TARTRATE 5 MG: 5 INJECTION, SOLUTION INTRAVENOUS at 22:02

## 2020-01-01 RX ADMIN — ALBUMIN (HUMAN) 25 G: 0.25 INJECTION, SOLUTION INTRAVENOUS at 13:45

## 2020-01-01 RX ADMIN — ALBUMIN (HUMAN) 50 G: 0.25 INJECTION, SOLUTION INTRAVENOUS at 14:45

## 2020-01-01 RX ADMIN — ATORVASTATIN CALCIUM 40 MG: 40 TABLET, FILM COATED ORAL at 10:33

## 2020-01-01 RX ADMIN — LIDOCAINE HYDROCHLORIDE 6 ML: 20 INJECTION, SOLUTION INFILTRATION; PERINEURAL at 12:04

## 2020-01-01 RX ADMIN — ALBUMIN (HUMAN) 50 G: 12.5 SOLUTION INTRAVENOUS at 14:28

## 2020-01-01 RX ADMIN — SODIUM CHLORIDE 1000 ML: 9 INJECTION, SOLUTION INTRAVENOUS at 18:07

## 2020-01-01 RX ADMIN — ALBUMIN (HUMAN) 50 G: 5 SOLUTION INTRAVENOUS at 12:19

## 2020-01-01 RX ADMIN — SODIUM CHLORIDE: 9 INJECTION, SOLUTION INTRAVENOUS at 03:54

## 2020-01-01 RX ADMIN — SODIUM CHLORIDE 1000 ML: 9 INJECTION, SOLUTION INTRAVENOUS at 16:58

## 2020-01-01 RX ADMIN — ATORVASTATIN CALCIUM 40 MG: 40 TABLET, FILM COATED ORAL at 09:21

## 2020-01-01 RX ADMIN — ENOXAPARIN SODIUM 40 MG: 40 INJECTION SUBCUTANEOUS at 08:10

## 2020-01-01 RX ADMIN — INSULIN LISPRO 1 UNITS: 100 INJECTION, SOLUTION INTRAVENOUS; SUBCUTANEOUS at 16:52

## 2020-01-01 RX ADMIN — ALBUMIN (HUMAN) 50 G: 0.25 INJECTION, SOLUTION INTRAVENOUS at 13:46

## 2020-01-01 RX ADMIN — METOPROLOL TARTRATE 50 MG: 50 TABLET, FILM COATED ORAL at 23:04

## 2020-01-01 RX ADMIN — BACITRACIN, NEOMYCIN, POLYMYXIN B 1 G: 400; 3.5; 5 OINTMENT TOPICAL at 09:44

## 2020-01-01 RX ADMIN — LIDOCAINE HYDROCHLORIDE 4 ML: 20 INJECTION, SOLUTION INFILTRATION; PERINEURAL at 14:07

## 2020-01-01 RX ADMIN — SODIUM CHLORIDE: 9 INJECTION, SOLUTION INTRAVENOUS at 22:50

## 2020-01-01 RX ADMIN — Medication 10 ML: at 13:24

## 2020-01-01 RX ADMIN — Medication 10 ML: at 20:40

## 2020-01-01 RX ADMIN — LIDOCAINE HYDROCHLORIDE 8 ML: 20 INJECTION, SOLUTION INFILTRATION; PERINEURAL at 11:40

## 2020-01-01 RX ADMIN — SODIUM CHLORIDE: 9 INJECTION, SOLUTION INTRAVENOUS at 12:49

## 2020-01-01 RX ADMIN — DEXTROSE MONOHYDRATE 12.5 G: 25 INJECTION, SOLUTION INTRAVENOUS at 06:19

## 2020-01-01 RX ADMIN — ACETAMINOPHEN 650 MG: 325 TABLET ORAL at 16:15

## 2020-01-01 RX ADMIN — SODIUM CHLORIDE 250 ML: 9 INJECTION, SOLUTION INTRAVENOUS at 09:30

## 2020-01-01 RX ADMIN — CITALOPRAM HYDROBROMIDE 10 MG: 10 TABLET ORAL at 10:14

## 2020-01-01 RX ADMIN — LIDOCAINE HYDROCHLORIDE 3 ML: 20 INJECTION, SOLUTION INFILTRATION; PERINEURAL at 09:38

## 2020-01-01 RX ADMIN — ALBUMIN (HUMAN) 50 G: 0.25 INJECTION, SOLUTION INTRAVENOUS at 12:17

## 2020-01-01 RX ADMIN — Medication 10 ML: at 09:22

## 2020-01-01 RX ADMIN — SODIUM CHLORIDE: 9 INJECTION, SOLUTION INTRAVENOUS at 04:43

## 2020-01-01 RX ADMIN — METOPROLOL TARTRATE 75 MG: 50 TABLET, FILM COATED ORAL at 08:08

## 2020-01-01 RX ADMIN — ENOXAPARIN SODIUM 40 MG: 40 INJECTION SUBCUTANEOUS at 09:31

## 2020-01-01 RX ADMIN — METOPROLOL TARTRATE 50 MG: 50 TABLET, FILM COATED ORAL at 20:54

## 2020-01-01 RX ADMIN — CITALOPRAM HYDROBROMIDE 10 MG: 10 TABLET ORAL at 09:31

## 2020-01-01 RX ADMIN — ALBUMIN (HUMAN) 25 G: 5 SOLUTION INTRAVENOUS at 12:50

## 2020-01-01 RX ADMIN — Medication 10 ML: at 20:59

## 2020-01-01 RX ADMIN — ACETAMINOPHEN 650 MG: 325 TABLET, FILM COATED ORAL at 01:36

## 2020-01-01 RX ADMIN — FUROSEMIDE 40 MG: 10 INJECTION, SOLUTION INTRAMUSCULAR; INTRAVENOUS at 22:12

## 2020-01-01 RX ADMIN — ATORVASTATIN CALCIUM 40 MG: 40 TABLET, FILM COATED ORAL at 20:54

## 2020-01-01 RX ADMIN — ASPIRIN 81 MG: 81 TABLET, COATED ORAL at 21:36

## 2020-01-01 RX ADMIN — ALBUMIN (HUMAN) 50 G: 0.25 INJECTION, SOLUTION INTRAVENOUS at 15:03

## 2020-01-01 RX ADMIN — ALBUMIN (HUMAN) 50 G: 0.25 INJECTION, SOLUTION INTRAVENOUS at 12:51

## 2020-01-01 RX ADMIN — ENOXAPARIN SODIUM 40 MG: 40 INJECTION SUBCUTANEOUS at 08:33

## 2020-01-01 RX ADMIN — ASPIRIN 81 MG: 81 TABLET, COATED ORAL at 20:08

## 2020-01-01 RX ADMIN — LIDOCAINE HYDROCHLORIDE 5 ML: 20 INJECTION, SOLUTION INFILTRATION; PERINEURAL at 11:52

## 2020-01-01 RX ADMIN — CITALOPRAM HYDROBROMIDE 10 MG: 10 TABLET ORAL at 09:21

## 2020-01-01 RX ADMIN — LIDOCAINE HYDROCHLORIDE 5 ML: 20 INJECTION, SOLUTION INFILTRATION; PERINEURAL at 14:24

## 2020-01-01 RX ADMIN — ENOXAPARIN SODIUM 30 MG: 30 INJECTION SUBCUTANEOUS at 09:17

## 2020-01-01 RX ADMIN — LIDOCAINE HYDROCHLORIDE 5 ML: 20 INJECTION, SOLUTION INFILTRATION; PERINEURAL at 09:59

## 2020-01-01 RX ADMIN — CITALOPRAM HYDROBROMIDE 10 MG: 10 TABLET ORAL at 08:09

## 2020-01-01 RX ADMIN — METOPROLOL TARTRATE 50 MG: 50 TABLET, FILM COATED ORAL at 20:08

## 2020-01-01 RX ADMIN — METOPROLOL TARTRATE 75 MG: 50 TABLET, FILM COATED ORAL at 21:19

## 2020-01-01 RX ADMIN — DEXTROSE MONOHYDRATE 100 ML/HR: 50 INJECTION, SOLUTION INTRAVENOUS at 06:19

## 2020-01-01 RX ADMIN — ENOXAPARIN SODIUM 30 MG: 30 INJECTION SUBCUTANEOUS at 09:22

## 2020-01-01 RX ADMIN — SODIUM CHLORIDE 500 ML: 9 INJECTION, SOLUTION INTRAVENOUS at 13:24

## 2020-01-01 RX ADMIN — CITALOPRAM HYDROBROMIDE 10 MG: 10 TABLET ORAL at 08:34

## 2020-01-01 RX ADMIN — LIDOCAINE HYDROCHLORIDE 6 ML: 20 INJECTION, SOLUTION INFILTRATION; PERINEURAL at 12:37

## 2020-01-01 RX ADMIN — PROMETHAZINE HYDROCHLORIDE 12.5 MG: 12.5 TABLET ORAL at 17:24

## 2020-01-01 RX ADMIN — FAMOTIDINE 20 MG: 10 INJECTION INTRAVENOUS at 19:21

## 2020-01-01 RX ADMIN — SODIUM CHLORIDE 1000 ML: 9 INJECTION, SOLUTION INTRAVENOUS at 19:48

## 2020-01-01 RX ADMIN — SPIRONOLACTONE 50 MG: 25 TABLET ORAL at 09:18

## 2020-01-01 RX ADMIN — SODIUM POLYSTYRENE SULFONATE 15 G: 15 SUSPENSION ORAL; RECTAL at 15:10

## 2020-01-01 SDOH — ECONOMIC STABILITY: FOOD INSECURITY: WITHIN THE PAST 12 MONTHS, YOU WORRIED THAT YOUR FOOD WOULD RUN OUT BEFORE YOU GOT MONEY TO BUY MORE.: NEVER TRUE

## 2020-01-01 SDOH — ECONOMIC STABILITY: FOOD INSECURITY: WITHIN THE PAST 12 MONTHS, THE FOOD YOU BOUGHT JUST DIDN'T LAST AND YOU DIDN'T HAVE MONEY TO GET MORE.: NEVER TRUE

## 2020-01-01 SDOH — ECONOMIC STABILITY: INCOME INSECURITY: HOW HARD IS IT FOR YOU TO PAY FOR THE VERY BASICS LIKE FOOD, HOUSING, MEDICAL CARE, AND HEATING?: NOT HARD AT ALL

## 2020-01-01 ASSESSMENT — ENCOUNTER SYMPTOMS
BLOOD IN STOOL: 0
NAUSEA: 0
ANAL BLEEDING: 0
VOMITING: 0
ABDOMINAL DISTENTION: 0
EYES NEGATIVE: 1
ABDOMINAL PAIN: 1
CHOKING: 0
EYE DISCHARGE: 0
VOMITING: 0
RESPIRATORY NEGATIVE: 1
VOMITING: 0
EYES NEGATIVE: 1
GASTROINTESTINAL NEGATIVE: 1
EYES NEGATIVE: 1
EYES NEGATIVE: 1
ANAL BLEEDING: 0
CHOKING: 0
RECTAL PAIN: 1
GASTROINTESTINAL NEGATIVE: 1
EYES NEGATIVE: 1
NAUSEA: 0
NAUSEA: 0
RHINORRHEA: 0
CONSTIPATION: 0
COUGH: 0
CHEST TIGHTNESS: 0
STRIDOR: 0
COUGH: 0
ABDOMINAL PAIN: 0
RESPIRATORY NEGATIVE: 1
WHEEZING: 0
COLOR CHANGE: 0
BACK PAIN: 1
STRIDOR: 0
GASTROINTESTINAL NEGATIVE: 1
BLOOD IN STOOL: 0
COUGH: 0
SHORTNESS OF BREATH: 0
COLOR CHANGE: 0
CHEST TIGHTNESS: 0
PHOTOPHOBIA: 0
ALLERGIC/IMMUNOLOGIC NEGATIVE: 1
RECTAL PAIN: 1
ABDOMINAL DISTENTION: 0
EYE PAIN: 0
RESPIRATORY NEGATIVE: 1
CONSTIPATION: 0
NAUSEA: 0
CHEST TIGHTNESS: 0
APNEA: 0
APNEA: 0
EYE REDNESS: 0
APNEA: 0
WHEEZING: 0
ABDOMINAL PAIN: 1
SORE THROAT: 0
VOMITING: 0
BLOOD IN STOOL: 0
EYES NEGATIVE: 1
ALLERGIC/IMMUNOLOGIC NEGATIVE: 1
RHINORRHEA: 0
ABDOMINAL PAIN: 0
PHOTOPHOBIA: 0
PHOTOPHOBIA: 0
BLOOD IN STOOL: 0
ABDOMINAL DISTENTION: 0
DIARRHEA: 0
EYE DISCHARGE: 0
BLOOD IN STOOL: 0
BLOOD IN STOOL: 0
EYE PAIN: 0
SHORTNESS OF BREATH: 0
NAUSEA: 0
VOICE CHANGE: 0
DIARRHEA: 0
CHEST TIGHTNESS: 0
ABDOMINAL PAIN: 0
COLOR CHANGE: 0
BLOOD IN STOOL: 0
RESPIRATORY NEGATIVE: 1
ABDOMINAL DISTENTION: 1
NAUSEA: 0
CHEST TIGHTNESS: 0
SHORTNESS OF BREATH: 0
RESPIRATORY NEGATIVE: 1
CHEST TIGHTNESS: 0
CHEST TIGHTNESS: 0
BLOOD IN STOOL: 0
SHORTNESS OF BREATH: 0
ABDOMINAL PAIN: 1
ABDOMINAL DISTENTION: 1
COLOR CHANGE: 0
ABDOMINAL DISTENTION: 1
NAUSEA: 0
SHORTNESS OF BREATH: 0
CHEST TIGHTNESS: 0
VOMITING: 0
RHINORRHEA: 0
DIARRHEA: 1
ABDOMINAL DISTENTION: 1
ABDOMINAL DISTENTION: 0
WHEEZING: 0
NAUSEA: 0
ABDOMINAL PAIN: 0
COUGH: 0
CHEST TIGHTNESS: 0
WHEEZING: 0
GASTROINTESTINAL NEGATIVE: 1
ABDOMINAL PAIN: 0
WHEEZING: 0
ALLERGIC/IMMUNOLOGIC NEGATIVE: 1
NAUSEA: 0
STRIDOR: 0
BLOOD IN STOOL: 0
COUGH: 0
ALLERGIC/IMMUNOLOGIC NEGATIVE: 1
ALLERGIC/IMMUNOLOGIC NEGATIVE: 1
NAUSEA: 0
PHOTOPHOBIA: 0
GASTROINTESTINAL NEGATIVE: 1
ABDOMINAL DISTENTION: 0
WHEEZING: 0
EYES NEGATIVE: 1
EYE PAIN: 0
BACK PAIN: 0
SHORTNESS OF BREATH: 0
STRIDOR: 0
BLOOD IN STOOL: 0
ABDOMINAL PAIN: 0
CHEST TIGHTNESS: 0
VOMITING: 0
RECTAL PAIN: 0
SHORTNESS OF BREATH: 0
RHINORRHEA: 0
STRIDOR: 0
NAUSEA: 0
SORE THROAT: 0
DIARRHEA: 0
NAUSEA: 0
RHINORRHEA: 0
FACIAL SWELLING: 0
ABDOMINAL PAIN: 0
VOMITING: 0
BLOOD IN STOOL: 0
ALLERGIC/IMMUNOLOGIC NEGATIVE: 1
CHEST TIGHTNESS: 0
BACK PAIN: 0
CHEST TIGHTNESS: 0
SHORTNESS OF BREATH: 0
EYE REDNESS: 1
DIARRHEA: 0
FACIAL SWELLING: 0
NAUSEA: 0
SHORTNESS OF BREATH: 0
RECTAL PAIN: 1
PHOTOPHOBIA: 0
ABDOMINAL DISTENTION: 0
WHEEZING: 0
BLOOD IN STOOL: 0
SHORTNESS OF BREATH: 0
BLOOD IN STOOL: 0
WHEEZING: 0
COLOR CHANGE: 0
BLOOD IN STOOL: 0
SHORTNESS OF BREATH: 0
WHEEZING: 0
ABDOMINAL DISTENTION: 1
NAUSEA: 1
RHINORRHEA: 0
TROUBLE SWALLOWING: 0
COLOR CHANGE: 0
SHORTNESS OF BREATH: 0
NAUSEA: 0
EYES NEGATIVE: 1
NAUSEA: 0
SHORTNESS OF BREATH: 0
RECTAL PAIN: 1
SHORTNESS OF BREATH: 0
STRIDOR: 0
COUGH: 0
GASTROINTESTINAL NEGATIVE: 1
SORE THROAT: 0
SHORTNESS OF BREATH: 0
COLOR CHANGE: 0
TROUBLE SWALLOWING: 0
SHORTNESS OF BREATH: 0
ABDOMINAL PAIN: 0
DIARRHEA: 0
COLOR CHANGE: 0
CONSTIPATION: 0
RESPIRATORY NEGATIVE: 1
RESPIRATORY NEGATIVE: 1
COUGH: 0
BLOOD IN STOOL: 0
SHORTNESS OF BREATH: 1
COUGH: 0
BLOOD IN STOOL: 0
FACIAL SWELLING: 0
NAUSEA: 0
DIARRHEA: 0
BLOOD IN STOOL: 0
CHOKING: 0
ABDOMINAL DISTENTION: 0
RECTAL PAIN: 0
CONSTIPATION: 0
RESPIRATORY NEGATIVE: 1
ABDOMINAL DISTENTION: 1
RECTAL PAIN: 0
CONSTIPATION: 0
NAUSEA: 0
RHINORRHEA: 0

## 2020-01-01 ASSESSMENT — PATIENT HEALTH QUESTIONNAIRE - PHQ9
1. LITTLE INTEREST OR PLEASURE IN DOING THINGS: 2
1. LITTLE INTEREST OR PLEASURE IN DOING THINGS: 0
4. FEELING TIRED OR HAVING LITTLE ENERGY: 3
SUM OF ALL RESPONSES TO PHQ QUESTIONS 1-9: 0
2. FEELING DOWN, DEPRESSED OR HOPELESS: 0
7. TROUBLE CONCENTRATING ON THINGS, SUCH AS READING THE NEWSPAPER OR WATCHING TELEVISION: 1
3. TROUBLE FALLING OR STAYING ASLEEP: 3
2. FEELING DOWN, DEPRESSED OR HOPELESS: 0
8. MOVING OR SPEAKING SO SLOWLY THAT OTHER PEOPLE COULD HAVE NOTICED. OR THE OPPOSITE, BEING SO FIGETY OR RESTLESS THAT YOU HAVE BEEN MOVING AROUND A LOT MORE THAN USUAL: 0
SUM OF ALL RESPONSES TO PHQ9 QUESTIONS 1 & 2: 2
9. THOUGHTS THAT YOU WOULD BE BETTER OFF DEAD, OR OF HURTING YOURSELF: 0
SUM OF ALL RESPONSES TO PHQ9 QUESTIONS 1 & 2: 0
SUM OF ALL RESPONSES TO PHQ QUESTIONS 1-9: 0
10. IF YOU CHECKED OFF ANY PROBLEMS, HOW DIFFICULT HAVE THESE PROBLEMS MADE IT FOR YOU TO DO YOUR WORK, TAKE CARE OF THINGS AT HOME, OR GET ALONG WITH OTHER PEOPLE: 1
5. POOR APPETITE OR OVEREATING: 3
SUM OF ALL RESPONSES TO PHQ QUESTIONS 1-9: 12
SUM OF ALL RESPONSES TO PHQ QUESTIONS 1-9: 12
6. FEELING BAD ABOUT YOURSELF - OR THAT YOU ARE A FAILURE OR HAVE LET YOURSELF OR YOUR FAMILY DOWN: 0

## 2020-01-01 ASSESSMENT — PAIN SCALES - GENERAL
PAINLEVEL_OUTOF10: 0
PAINLEVEL_OUTOF10: 5
PAINLEVEL_OUTOF10: 0
PAINLEVEL_OUTOF10: 5
PAINLEVEL_OUTOF10: 0
PAINLEVEL_OUTOF10: 4
PAINLEVEL_OUTOF10: 0

## 2020-01-01 ASSESSMENT — PAIN - FUNCTIONAL ASSESSMENT
PAIN_FUNCTIONAL_ASSESSMENT: 0-10

## 2020-01-01 ASSESSMENT — PAIN DESCRIPTION - DESCRIPTORS
DESCRIPTORS: ACHING
DESCRIPTORS: PRESSURE

## 2020-01-01 ASSESSMENT — PAIN DESCRIPTION - LOCATION
LOCATION: ABDOMEN
LOCATION: HEAD

## 2020-01-01 ASSESSMENT — PAIN DESCRIPTION - PAIN TYPE: TYPE: ACUTE PAIN

## 2020-01-03 DIAGNOSIS — D50.9 IRON DEFICIENCY ANEMIA, UNSPECIFIED IRON DEFICIENCY ANEMIA TYPE: ICD-10-CM

## 2020-01-03 DIAGNOSIS — E11.9 TYPE 2 DIABETES MELLITUS WITHOUT COMPLICATION, WITHOUT LONG-TERM CURRENT USE OF INSULIN (HCC): ICD-10-CM

## 2020-01-03 DIAGNOSIS — E53.8 VITAMIN B 12 DEFICIENCY: ICD-10-CM

## 2020-01-03 DIAGNOSIS — E53.8 FOLATE DEFICIENCY: ICD-10-CM

## 2020-01-03 DIAGNOSIS — E78.00 PURE HYPERCHOLESTEROLEMIA: ICD-10-CM

## 2020-01-03 LAB
BASOPHILS ABSOLUTE: 0 K/UL (ref 0–0.2)
BASOPHILS RELATIVE PERCENT: 0.5 %
CHOLESTEROL, TOTAL: 89 MG/DL (ref 0–199)
EOSINOPHILS ABSOLUTE: 0.1 K/UL (ref 0–0.7)
EOSINOPHILS RELATIVE PERCENT: 2.2 %
FOLATE: 9 NG/ML (ref 7.3–26.1)
HBA1C MFR BLD: 7.1 % (ref 4.8–5.9)
HCT VFR BLD CALC: 31.8 % (ref 37–47)
HDLC SERPL-MCNC: 42 MG/DL (ref 40–59)
HEMOGLOBIN: 10.3 G/DL (ref 12–16)
IRON SATURATION: 18 % (ref 11–46)
IRON: 74 UG/DL (ref 37–145)
LDL CHOLESTEROL CALCULATED: 30 MG/DL (ref 0–129)
LYMPHOCYTES ABSOLUTE: 1.3 K/UL (ref 1–4.8)
LYMPHOCYTES RELATIVE PERCENT: 27.9 %
MCH RBC QN AUTO: 29.7 PG (ref 27–31.3)
MCHC RBC AUTO-ENTMCNC: 32.3 % (ref 33–37)
MCV RBC AUTO: 91.9 FL (ref 82–100)
MONOCYTES ABSOLUTE: 0.3 K/UL (ref 0.2–0.8)
MONOCYTES RELATIVE PERCENT: 7.2 %
NEUTROPHILS ABSOLUTE: 2.9 K/UL (ref 1.4–6.5)
NEUTROPHILS RELATIVE PERCENT: 62.2 %
PDW BLD-RTO: 17.2 % (ref 11.5–14.5)
PLATELET # BLD: 114 K/UL (ref 130–400)
RBC # BLD: 3.46 M/UL (ref 4.2–5.4)
RETICULOCYTE ABSOLUTE COUNT: 0.05 M/CUMM (ref 0.02–0.11)
RETICULOCYTE COUNT PCT: 1.5 % (ref 0.6–2.2)
TOTAL IRON BINDING CAPACITY: 409 UG/DL (ref 178–450)
TRIGL SERPL-MCNC: 86 MG/DL (ref 0–150)
VITAMIN B-12: 423 PG/ML (ref 232–1245)
WBC # BLD: 4.6 K/UL (ref 4.8–10.8)

## 2020-01-03 RX ORDER — ERGOCALCIFEROL 1.25 MG/1
50000 CAPSULE ORAL WEEKLY
Qty: 4 CAPSULE | Refills: 2 | Status: SHIPPED | OUTPATIENT
Start: 2020-01-03 | End: 2020-01-01

## 2020-05-20 NOTE — ED PROVIDER NOTES
Negative for arthralgias, back pain and myalgias. Skin: Negative for color change, rash and wound. Neurological: Negative for dizziness, syncope, weakness, light-headedness and headaches. Psychiatric/Behavioral: Negative for behavioral problems. All other systems reviewed and are negative. Except as noted above the remainder of the review of systems was reviewed and negative. PAST MEDICAL HISTORY     Past Medical History:   Diagnosis Date    Cardiomyopathy [facility])     mother history.     Hypertension     Sleep apnea, obstructive     Type II or unspecified type diabetes mellitus without mention of complication, not stated as uncontrolled      Past Surgical History:   Procedure Laterality Date    CHOLECYSTECTOMY      COLONOSCOPY  4/23/2013    HYSTERECTOMY      OTHER SURGICAL HISTORY      removal of anal fistulotomy     Social History     Socioeconomic History    Marital status:      Spouse name: None    Number of children: None    Years of education: None    Highest education level: None   Occupational History    None   Social Needs    Financial resource strain: None    Food insecurity     Worry: None     Inability: None    Transportation needs     Medical: None     Non-medical: None   Tobacco Use    Smoking status: Never Smoker    Smokeless tobacco: Former User   Substance and Sexual Activity    Alcohol use: No    Drug use: No    Sexual activity: Yes   Lifestyle    Physical activity     Days per week: None     Minutes per session: None    Stress: None   Relationships    Social connections     Talks on phone: None     Gets together: None     Attends Pentecostalism service: None     Active member of club or organization: None     Attends meetings of clubs or organizations: None     Relationship status: None    Intimate partner violence     Fear of current or ex partner: None     Emotionally abused: None     Physically abused: None     Forced sexual activity: None   Other Topics Concern    None   Social History Narrative    None       SCREENINGS             PHYSICAL EXAM    (up to 7 for level 4, 8 or more for level 5)     ED Triage Vitals   BP Temp Temp Source Pulse Resp SpO2 Height Weight   05/19/20 2042 05/19/20 2040 05/19/20 2040 05/19/20 2040 05/19/20 2040 05/19/20 2040 05/19/20 2040 05/19/20 2040   (!) 160/62 99 °F (37.2 °C) Temporal 92 17 97 % 5' 7\" (1.702 m) 195 lb (88.5 kg)       Physical Exam  Vitals signs and nursing note reviewed. Constitutional:       General: She is not in acute distress. Appearance: She is well-developed. She is not diaphoretic. HENT:      Head: Normocephalic and atraumatic. Nose: Nose normal.   Eyes:      Conjunctiva/sclera: Conjunctivae normal.      Pupils: Pupils are equal, round, and reactive to light. Comments: 1) subconjunctival hemorrhage noted   Neck:      Musculoskeletal: Normal range of motion and neck supple. Cardiovascular:      Rate and Rhythm: Normal rate and regular rhythm. Heart sounds: Normal heart sounds. Pulmonary:      Effort: Pulmonary effort is normal. No respiratory distress. Breath sounds: Normal breath sounds. No wheezing. Abdominal:      General: Bowel sounds are normal.      Palpations: Abdomen is soft. Tenderness: There is no abdominal tenderness. Skin:     General: Skin is warm and dry. Capillary Refill: Capillary refill takes less than 2 seconds. Findings: No rash. Neurological:      Mental Status: She is alert and oriented to person, place, and time. Cranial Nerves: No cranial nerve deficit.    Psychiatric:         Behavior: Behavior normal.         RESULTS     EKG: All EKG's are interpreted by the Emergency Department Physician who either signs or Co-signsthis chart in the absence of a cardiologist.        RADIOLOGY:   Non-plain filmimages such as CT, Ultrasound and MRI are read by the radiologist. Plain radiographic images are visualized and preliminarily interpreted by the emergency physician with the below findings:        Interpretation per the Radiologist below, if available at the time ofthis note:    No orders to display         ED BEDSIDE ULTRASOUND:   Performed by ED Physician - none    LABS:  Labs Reviewed - No data to display    All other labs were within normal range or not returned as of this dictation. EMERGENCY DEPARTMENT COURSE and DIFFERENTIAL DIAGNOSIS/MDM:   Vitals:    Vitals:    05/19/20 2040 05/19/20 2042   BP:  (!) 160/62   Pulse: 92    Resp: 17    Temp: 99 °F (37.2 °C)    TempSrc: Temporal    SpO2: 97%    Weight: 195 lb (88.5 kg)    Height: 5' 7\" (1.702 m)             MDM   Patient is a 69year old female presenting for a chief complaint of left eye redness with no other associated complains. She is hemodynamically stable and non toxic appearing. Her redness is consistent with subconjunctival hemorrhage. I also had the patient evaluated by attending physician Dr. Juan Valdez who agrees with the diagnosis. Patient instructed to see her optometrist tomorrow. Instructed to return back if she worsens or any vision changes occurs. She is discharged in a stable condition with stable vital signs. CRITICAL CARE TIME       CONSULTS:  None    PROCEDURES:  Unless otherwise noted below, none     Procedures    FINAL IMPRESSION      1.  Subconjunctival hemorrhage of left eye          DISPOSITION/PLAN   DISPOSITION Decision To Discharge 05/19/2020 08:58:31 PM      PATIENT REFERRED TO:  Gerson Briggs MD  [address] [place] [address]  [phone]    Schedule an appointment as soon as possible for a visit in 1 day      [facility]  [address] [facility] [address]  Schedule an appointment as soon as possible for a visit in 1 day        DISCHARGE MEDICATIONS:  New Prescriptions    No medications on file          (Please notethat portions of this note were completed with a voice recognition program.  Efforts were made to edit the dictations but occasionally words are mis-transcribed.)    Leyda Scott, TANNER - CNP (electronically signed)  Attending Emergency Physician          [facility], TANNER - CNP  05/19/20 5210

## 2020-05-22 NOTE — TELEPHONE ENCOUNTER
Reason for Disposition   Patient wants to be seen    Answer Assessment - Initial Assessment Questions  1. MECHANISM: \"How did the injury happen? \"      fall  2. ONSET: \"When did the injury happen? \" (Minutes or hours ago)       5/21/20 @ 1700  3. APPEARANCE of INJURY: \"What does the injury look like? \"       Swollen  4. SEVERITY: \"Can you use the hand normally? \" \"Can you bend your fingers into a ball and then fully open them? \"      Not able to use hand normally  5. SIZE: For cuts, bruises, or swelling, ask: \"How large is it? \" (e.g., inches or centimeters;  entire hand or wrist)       Slight puffiness  6. PAIN: \"Is there pain? \" If so, ask: \"How bad is the pain? \"  (Scale 1-10; or mild, moderate, severe)      2-3/10  7. TETANUS: For any breaks in the skin, ask: \"When was the last tetanus booster? \"      no  8. OTHER SYMPTOMS: \"Do you have any other symptoms? \"       no  9. PREGNANCY: \"Is there any chance you are pregnant? \" \"When was your last menstrual period? \"      no    Protocols used: HAND AND WRIST INJURY-ADULT-[place]    Patient called [facility]) to schedule appointment, with red flag complaint, transferred to RN access for triage. Pt calls to report symptoms of left wrist injury after a fall yesterday. Pt states the wrist is \"slighly puffy\" and rates the pain with movement as a 2-3/10. Pt reports it hurts to hold the phone with the left hand. Denies bruising or any open areas. Patient informed of disposition. Care advice as documented. Instructed patient to call back with worsening symptoms. Soft transfer to pre-service [place] to schedule appointment as recommended. Please do not respond to the triage nurse through this encounter. Any subsequent communication should be directly with the patient.

## 2020-06-08 NOTE — PROGRESS NOTES
Behavioral Health Consultation  Elsie Maria PsyD. Psychologist  6/8/20  11:32 AM EDT      Time spent with Patient: 30 minutes  This is patient's first  [facility] appointment. Reason for Consult:  Depression and insomnia  Referring Provider: Gal Greer MD  [address] [place] [address]  [address] [facility], [address] [facility]    Pt provided informed consent for the behavioral health program. Discussed with patient model of service to include the limits of confidentiality (i.e. abuse reporting, suicide intervention, etc.) and short-term intervention focused approach. Pt indicated understanding. Feedback given to PCP. TELEHEALTH EVALUATION -- Audio and/or Visual (During WWNBB-53 public health emergency)    Due to COVID 19 outbreak, patient's office visit was converted to a virtual visit. Patient was contacted and agreed to proceed with a virtual visit via nubelo. Patient reports that they are located at home. The risks and benefits of converting to a virtual visit were discussed in light of the current infectious disease epidemic. Patient also understood that insurance coverage and co-pays are up to their individual insurance plans. Patient provides verbal consent for this visit to be billed to insurance. Pursuant to the emergency declaration under the [facility][note 3][facility], 1135 waiver authority and the [employer] General Act, this Virtual  Visit was conducted, with patient's consent, to reduce the patient's risk of exposure to COVID-19 and provide continuity of care for an established patient. Services were provided through a audio and video synchronous discussion virtually to substitute for in-person clinic visit.     Provider Location: Apryl Bhatt:  Pt reports that for the past year she has had no appetite, has had trouble sleeping, and has no motivation/interest.  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 Pt reports that she has a hard time doing some of the things she used to like to do. For example, she is having trouble doing needlepoint due to vision problems. Pt reports that she likes to garden but had a cast on her arm today and stated that she had fallen last week and broke her wrist.  Pt does still attend [employer] orth[employer] services online. Pt reprots that she doesn't have many friends, she has an aunt who is 80 that she is close to    Home/FH: Pt reports that lives with her . She described her relationship with her  as Laurann Rakers" but states that he tends to keep his problems from her and instead tells their children. 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 She states that it was an adjustment to him being in the home more over the past ~12 years ago when he retired. She reports that they argue \"sometimes\" but does not feel like this is that often. Pt reports that they have 3 children who she has good a relationship with. Pt denies any history of MH treatment. She reports that Dr. Opal Valladares prescribed her zoloft for the past year, which she felt was helping at first but seems to be less effective. She reports that he switched her to Wellbutrin last week but states she hasn't gotten this yet from the pharmacy. Symptoms:    Symptoms of depression include: anhedonia, insomnia, hypersomnia, psychomotor retardation, fatigue, feelings of worthlessness, and difficulty concentrating. Pt does not report problems with depressed mood. She does describe mild frustration/irritatibility with her  and states that she feels isolated and alone. She reports that her symptoms are more related to her energy level and lack of caring. She states that she has stopped taking care of her hygiene needs and just does not seem to care anymore about anything. Pt reports that she naps everyday ~2pm for ~2-3 hours.   She estimates  metoprolol succinate (TOPROL XL) 100 MG extended release tablet Take 1 tablet by mouth daily 90 tablet 1    metFORMIN (GLUCOPHAGE) 1000 MG tablet TAKE 1 TABLET TWICE A DAY WITH MEALS 180 tablet 3    vitamin D (ERGOCALCIFEROL) 1.25 MG (42978 UT) CAPS capsule Take 1 capsule by mouth once a week 4 capsule 2    lisinopril (PRINIVIL;ZESTRIL) 5 MG tablet TAKE 1 TABLET DAILY 90 tablet 3    atorvastatin (LIPITOR) 40 MG tablet TAKE 1 TABLET DAILY 90 tablet 3    raloxifene (EVISTA) 60 MG tablet TAKE 1 TABLET DAILY 90 tablet 3    sertraline (ZOLOFT) 50 MG tablet TAKE 1 TABLET DAILY 90 tablet 0    nystatin (MYCOSTATIN) 375733 UNIT/GM powder Apply 3 times daily. 60 g 3    glucose blood VI test strips (ACCU-CHEK MELANIE) strip Test 1-2 times a day. Dx: 250.00. Accu-check melanie strips 180 strip 3    Lancet Device MISC Test 1-2 times a day. Dx: 250.00. Lancets 180 each 3    aspirin 81 MG EC tablet Take 81 mg by mouth 2 times daily. No current facility-administered medications for this visit.         Social History:   Social History     Socioeconomic History    Marital status:      Spouse name: Not on file    Number of children: Not on file    Years of education: Not on file    Highest education level: Not on file   Occupational History    Not on file   Social Needs    Financial resource strain: Not hard at all   [place] insecurity     Worry: Never true     Inability: Never true   [employer] needs     Medical: Not on file     Non-medical: Not on file   Tobacco Use    Smoking status: Never Smoker    Smokeless tobacco: Former User   Substance and Sexual Activity    Alcohol use: No    Drug use: No    Sexual activity: Yes   Lifestyle    Physical activity     Days per week: Not on file     Minutes per session: Not on file    Stress: Not on file   Relationships    Social connections     Talks on phone: Not on file     Gets together: Not on file     Attends Pentecostal service: Not on file

## 2020-06-09 PROBLEM — I42.9 CARDIOMYOPATHY (HCC): Status: ACTIVE | Noted: 2020-01-01

## 2020-06-09 NOTE — PROGRESS NOTES
placed in this encounter. Assessment/Plan:    1. Essential hypertension   stable   - EKG 12 Lead    2. HOCM (hypertrophic obstructive cardiomyopathy) (HCC)   LVOT Gradient was 4 m/s prior Echo   - ECHO Complete 2D W Doppler W Color; Future    3. Bilateral carotid artery stenosis  Stable. Will follow       Counseling:  Heart Healthy Lifestyle, Low Salt Diet, Take Precautions to Prevent Falls and Walk Daily    Return for AFTER TESTS.     Electronically signed by Nette Mcnair MD on 6/9/2020 at 1:25 PM

## 2020-07-10 NOTE — PROGRESS NOTES
Subjective:      Patient ID: Phil Tubbs is a 69 y.o. female. HPI   Phil Tubbs is a 69 y.o. female who is referred to me by herself for a perirectal abscess. It has been noticed for 1 month. It has not been getting larger. She has pain rated as a 5. It has not been draining. Culture has not been done. She has had a previous abscess in this location. She is on no antibiotics. Review of Systems   Constitutional: Negative for activity change, appetite change and unexpected weight change. HENT: Negative for congestion, nosebleeds, rhinorrhea and sneezing. Eyes: Negative for visual disturbance. Respiratory: Negative for chest tightness and shortness of breath. Cardiovascular: Negative for chest pain and leg swelling. Gastrointestinal: Positive for rectal pain. Negative for abdominal distention, abdominal pain, blood in stool and nausea. Endocrine: Negative. Genitourinary: Negative for difficulty urinating. Musculoskeletal: Negative. Skin: Negative for color change. Allergic/Immunologic: Negative. Neurological: Negative for seizures, light-headedness, numbness and headaches. Hematological: Does not bruise/bleed easily. Psychiatric/Behavioral: Negative for sleep disturbance. Objective:   Physical Exam  Constitutional:       General: She is not in acute distress. Appearance: Normal appearance. HENT:      Mouth/Throat:      Mouth: Mucous membranes are moist.      Pharynx: Oropharynx is clear. Eyes:      Pupils: Pupils are equal, round, and reactive to light. Neck:      Comments: Neck is supple with out masses, no thyromegaly, trachea midline  Abdominal:      Palpations: There is no hepatomegaly or splenomegaly. Tenderness: There is no abdominal tenderness. Hernia: No hernia is present. Genitourinary:     Rectum: No mass, tenderness or external hemorrhoid. Normal anal tone.    Musculoskeletal:      Comments: Normal gait   Skin:     Findings: No bruising,

## 2020-07-10 NOTE — PROGRESS NOTES
OFFICE VISIT        Patient: Nick Owens  YOB: 1945  MRN: [id number]    Chief Complaint: Carotid SOB   Chief Complaint   Patient presents with    Results     ECHO, US    Hypertension    Cardiomyopathy       CV Data:  1/2018 [facility] PRISCILA   5/2020 DUANE 60-79% ccf  1/25/2006 Cath normal ccf  6/2020 ECHO EF 85% Severe LVOT Gr    Subjective/HPI has been told she needs cardiac transplant years ago. Stamina is decreased. 7/10/2020 feels tired low energy no cp no sob no dizzy. Nonsmoker  No ETOH  Retired-     EKG: SR    Past Medical History:   Diagnosis Date    Cardiomyopathy [facility])     mother history.  Hypertension     Sleep apnea, obstructive     Type II or unspecified type diabetes mellitus without mention of complication, not stated as uncontrolled        Past Surgical History:   Procedure Laterality Date    APPENDECTOMY      CHOLECYSTECTOMY      COLONOSCOPY  4/23/2013    HYSTERECTOMY      OTHER SURGICAL HISTORY      removal of anal fistulotomy       History reviewed. No pertinent family history.     Social History     Socioeconomic History    Marital status:      Spouse name: None    Number of children: None    Years of education: None    Highest education level: None   Occupational History    None   Social Needs    Financial resource strain: Not hard at all   [employer] insecurity     Worry: Never true     Inability: Never true   [employer] needs     Medical: None     Non-medical: None   Tobacco Use    Smoking status: Never Smoker    Smokeless tobacco: Former User   Substance and Sexual Activity    Alcohol use: No    Drug use: No    Sexual activity: Yes   Lifestyle    Physical activity     Days per week: None     Minutes per session: None    Stress: None   Relationships    Social connections     Talks on phone: None     Gets together: None     Attends Moravian service: None     Active member of club or organization: None     Attends meetings of clubs or organizations: None     Relationship status: None    Intimate partner violence     Fear of current or ex partner: None     Emotionally abused: None     Physically abused: None     Forced sexual activity: None   Other Topics Concern    None   Social History Narrative    None       Allergies   Allergen Reactions    Codeine        Current Outpatient Medications   Medication Sig Dispense Refill    citalopram (CELEXA) 10 MG tablet       amoxicillin-clavulanate (AUGMENTIN) 875-125 MG per tablet Take 1 tablet by mouth 2 times daily for 14 days 28 tablet 1    buPROPion (WELLBUTRIN SR) 150 MG extended release tablet Take 1 tablet by mouth 2 times daily 180 tablet 1    glimepiride (AMARYL) 2 MG tablet TAKE 1 TABLET EVERY MORNING BEFORE BREAKFAST 90 tablet 1    metoprolol succinate (TOPROL XL) 100 MG extended release tablet Take 1 tablet by mouth daily 90 tablet 1    metFORMIN (GLUCOPHAGE) 1000 MG tablet TAKE 1 TABLET TWICE A DAY WITH MEALS 180 tablet 3    lisinopril (PRINIVIL;ZESTRIL) 5 MG tablet TAKE 1 TABLET DAILY 90 tablet 3    atorvastatin (LIPITOR) 40 MG tablet TAKE 1 TABLET DAILY 90 tablet 3    raloxifene (EVISTA) 60 MG tablet TAKE 1 TABLET DAILY 90 tablet 3    sertraline (ZOLOFT) 50 MG tablet TAKE 1 TABLET DAILY 90 tablet 0    nystatin (MYCOSTATIN) 340441 UNIT/GM powder Apply 3 times daily. 60 g 3    glucose blood VI test strips (ACCU-CHEK MELANIE) strip Test 1-2 times a day. Dx: 250.00. Accu-check melanie strips 180 strip 3    Lancet Device MISC Test 1-2 times a day. Dx: 250.00. Lancets 180 each 3    aspirin 81 MG EC tablet Take 81 mg by mouth 2 times daily. No current facility-administered medications for this visit. Review of Systems:   Review of Systems   Constitutional: Positive for fatigue. Negative for diaphoresis. HENT: Negative. Eyes: Negative. Respiratory: Negative. Negative for cough, chest tightness, shortness of breath, wheezing and stridor.     Cardiovascular: Lab Results   Component Value Date    TRIG 86 01/03/2020    TRIG 80 10/21/2019    TRIG 91 10/02/2018     Lab Results   Component Value Date    HDL 42 01/03/2020    HDL 40 10/21/2019    HDL 35 (L) 10/02/2018     Lab Results   Component Value Date    LDLCALC 30 01/03/2020    LDLCALC 25 10/21/2019    LDLCALC 34 10/02/2018     No results found for: LABVLDL, VLDL  Lab Results   Component Value Date    CHOLHDLRATIO 4.1 07/20/2011     CMP:    Lab Results   Component Value Date     10/21/2019    K 3.9 10/21/2019     10/21/2019    CO2 24 10/21/2019    BUN 7 10/21/2019    CREATININE 0.41 10/21/2019    GFRAA >60.0 10/21/2019    LABGLOM >60.0 10/21/2019    GLUCOSE 132 10/21/2019    GLUCOSE 150 10/21/2011    PROT 6.4 10/21/2019    LABALBU 3.3 10/21/2019    LABALBU 4.1 10/21/2011    CALCIUM 8.2 10/21/2019    BILITOT 1.1 10/21/2019    ALKPHOS 105 10/21/2019    AST 27 10/21/2019    ALT 15 10/21/2019     BMP:    Lab Results   Component Value Date     10/21/2019    K 3.9 10/21/2019     10/21/2019    CO2 24 10/21/2019    BUN 7 10/21/2019    LABALBU 3.3 10/21/2019    LABALBU 4.1 10/21/2011    CREATININE 0.41 10/21/2019    CALCIUM 8.2 10/21/2019    GFRAA >60.0 10/21/2019    LABGLOM >60.0 10/21/2019    GLUCOSE 132 10/21/2019    GLUCOSE 150 10/21/2011     Magnesium:  No results found for: MG  TSH:  Lab Results   Component Value Date    TSH 2.580 10/21/2019             Patient Active Problem List   Diagnosis    Type 2 diabetes mellitus without complication, without long-term current use of insulin ([facility]    Hypertension    Bell-rectal abscess    Bilateral carotid artery stenosis    HOCM (hypertrophic obstructive cardiomyopathy) ([facility] [address] [facility]    HARDEEP (obstructive sleep apnea)    Pseudophakia of both eyes    Regular astigmatism    Cardiomyopathy ([facility] [address] [facility]       There are no discontinued medications.     Modified Medications    No medications on file       No orders of the defined types were placed in this encounter. Assessment/Plan:    1. Essential hypertension   stable   - EKG 12 Lead    2. HOCM (hypertrophic obstructive cardiomyopathy) (HCC)   LVOT Gradient was 4 m/s prior Echo   Still with Gr but pt is doing well. No palp no cp no sob. - will refer to [facility]. 3. Bilateral carotid artery stenosis  Stable. Will follow       Counseling:  Heart Healthy Lifestyle, Low Salt Diet, Take Precautions to Prevent Falls and Walk Daily    Return in about 1 month (around 8/10/2020).     Electronically signed by Easton Veras MD on 7/10/2020 at 2:27 PM

## 2020-07-12 PROBLEM — K62.89 RECTAL PAIN: Status: ACTIVE | Noted: 2020-01-01

## 2020-07-21 NOTE — PROGRESS NOTES
Behavioral Health Consultation  Saurabh Alvarado PsyD. Psychologist  7/21/20  12:58 PM EDT      Time spent with Patient: 30 minutes  This is patient's third  [facility] appointment. Reason for Consult:  depression  Referring Provider: Domitila Oliver MD  [address] [place] [address]  [address] [facility], [address] [facility]    Feedback given to PCP. TELEHEALTH EVALUATION -- Audio and/or Visual (During EDKBS-85 public health emergency)    Due to COVID 19 outbreak, patient's office visit was converted to a virtual visit. Patient was contacted and agreed to proceed with a virtual visit via [employer]. Patient reports that they are located at home. The risks and benefits of converting to a virtual visit were discussed in light of the current infectious disease epidemic. Patient also understood that insurance coverage and co-pays are up to their individual insurance plans. Patient provides verbal consent for this visit to be billed to insurance. Pursuant to the emergency declaration under the [facility] waiver authority and the [employer] Act, this Virtual  Visit was conducted, with patient's consent, to reduce the patient's risk of exposure to COVID-19 and provide continuity of care for an established patient. Services were provided through a audio and video synchronous discussion virtually to substitute for in-person clinic visit. Provider Location: Tanika Guaman:  Pt reports that she has been feeling good but is still feeling fatigued. She feels she has made good progress in activity scheduling. She reports that she has caught up on paying bills and has been making sure she does one thing every day. Pt discussed the anxiety she has been experiencing. She reports that she has been feeling anxious and worried at night and this is leading her to have trouble sleeping.   She states that it will often take her hours to fall asleep and lisinopril (PRINIVIL;ZESTRIL) 5 MG tablet TAKE 1 TABLET DAILY 90 tablet 3    atorvastatin (LIPITOR) 40 MG tablet TAKE 1 TABLET DAILY 90 tablet 3    raloxifene (EVISTA) 60 MG tablet TAKE 1 TABLET DAILY 90 tablet 3    sertraline (ZOLOFT) 50 MG tablet TAKE 1 TABLET DAILY 90 tablet 0    nystatin (MYCOSTATIN) 022592 UNIT/GM powder Apply 3 times daily. 60 g 3    glucose blood VI test strips (ACCU-CHEK MELANIE) strip Test 1-2 times a day. Dx: 250.00. Accu-check melanie strips 180 strip 3    Lancet Device MISC Test 1-2 times a day. Dx: 250.00. Lancets 180 each 3    aspirin 81 MG EC tablet Take 81 mg by mouth 2 times daily. No current facility-administered medications for this visit.         Social History:   Social History     Socioeconomic History    Marital status:      Spouse name: Not on file    Number of children: Not on file    Years of education: Not on file    Highest education level: Not on file   Occupational History    Not on file   Social Needs    Financial resource strain: Not hard at all   [employer] insecurity     Worry: Never true     Inability: Never true   [employer] needs     Medical: Not on file     Non-medical: Not on file   Tobacco Use    Smoking status: Never Smoker    Smokeless tobacco: Former User   Substance and Sexual Activity    Alcohol use: No    Drug use: No    Sexual activity: Yes   Lifestyle    Physical activity     Days per week: Not on file     Minutes per session: Not on file    Stress: Not on file   Relationships    Social connections     Talks on phone: Not on file     Gets together: Not on file     Attends Nondenominational service: Not on file     Active member of club or organization: Not on file     Attends meetings of clubs or organizations: Not on file     Relationship status: Not on file    Intimate partner violence     Fear of current or ex partner: Not on file     Emotionally abused: Not on file     Physically abused: Not on file     Forced sexual activity: Not on file   Other Topics Concern    Not on file   Social History Narrative    Not on file       Family History:   No family history on file. TOBACCO:   reports that she has never smoked. She quit smokeless tobacco use about 36 years ago. ETOH:   reports no history of alcohol use. A:  Pt reports improvement in mood and motivation level although is still reporting low energy. She discussed problems with anxiety today that indicate a change in diagnosis to unspecified anxiety and depression. Updated goals today to start targeting anxiety symptoms. Pt would likely benefit from [facility] services to increase coping skills and provide symptom control and relief. PHQ Scores 6/8/2020 5/22/2020 10/1/2019 2/20/2018 1/16/2017 9/8/2015 6/24/2014   PHQ2 Score 2 0 3 0 0 0 2   PHQ9 Score 12 0 16 0 0 0 2     Interpretation of Total Score Depression Severity: 1-4 = Minimal depression, 5-9 = Mild depression, 10-14 = Moderate depression, 15-19 = Moderately severe depression, 20-27 = Severe depression      Diagnosis:  Unspecified anxiety and depression    Plan:  Pt interventions:  Aspen-setting to identify pt's primary goals for [facility] visit / overall health, Supportive techniques, Emphasized self-care as important for managing overall health, Provided Psychoeducation re: anxiety and the fight/flight response and Reviewed Sleep Hygiene tips including: stimulus control. Pt Behavioral Change Plan:  1. Pt to continue utilizing activity scheduling. 2. Recommended that if she is not able to sleep after ~20 minutes to get out of bed and do something relaxing or boring until she feels tired and then get back into bed. 3. F/U in 2 weeks. Please note this report has been partially produced using speech recognition software and may cause contain errors related to that system including grammar, punctuation and spelling as well as words and phrases that may seem inappropriate.  If there are questions or concerns please feel free to contact me to clarify.

## 2020-07-24 NOTE — PROGRESS NOTES
Subjective:      Patient ID: Marjorie Horta is a 69 y.o. female. HPI   Marjorie Horta is a 69 y.o. female who is referred to me by herself for a perirectal abscess. It has been noticed for 1 month. It has not been getting larger. She has pain rated as a 5. It has not been draining. Culture has not been done. She has had a previous abscess in this location 7 years ago. She she was placed on Augmentin several weeks ago and she feels like she is getting better in the perirectal area. She denies any drainage. She is also complaining of abdominal distention and abdominal pain. She denies rectal bleeding. She is gained 10 pounds since her last visit      Review of Systems   Constitutional: Positive for unexpected weight change (weight gain). Negative for activity change and appetite change. HENT: Negative for congestion, nosebleeds, rhinorrhea and sneezing. Eyes: Negative for visual disturbance. Respiratory: Negative for chest tightness and shortness of breath. Cardiovascular: Negative for chest pain and leg swelling. Gastrointestinal: Positive for abdominal distention, abdominal pain and rectal pain. Negative for blood in stool and nausea. Endocrine: Negative. Genitourinary: Negative for difficulty urinating. Musculoskeletal: Negative. Skin: Negative for color change. Allergic/Immunologic: Negative. Neurological: Negative for seizures, light-headedness, numbness and headaches. Hematological: Does not bruise/bleed easily. Psychiatric/Behavioral: Negative for sleep disturbance. Objective:   Physical Exam  Constitutional:       General: She is not in acute distress. Appearance: Normal appearance. HENT:      Mouth/Throat:      Mouth: Mucous membranes are moist.      Pharynx: Oropharynx is clear. Eyes:      Pupils: Pupils are equal, round, and reactive to light.    Neck:      Comments: Neck is supple with out masses, no thyromegaly, trachea midline  Abdominal:      General: There is distension. Palpations: There is no hepatomegaly or splenomegaly. Tenderness: There is generalized abdominal tenderness. Hernia: No hernia is present. Genitourinary:     Rectum: No mass, tenderness or external hemorrhoid. Normal anal tone. Musculoskeletal:      Comments: Normal gait   Skin:     Findings: No bruising, lesion or rash. Neurological:      Mental Status: She is alert and oriented to person, place, and time.    Psychiatric:         Mood and Affect: Mood normal.         Judgment: Judgment normal.       /64   Temp 97.6 °F (36.4 °C) (Temporal)   Ht 5' 7\" (1.702 m)   Wt 218 lb (98.9 kg)   BMI 34.14 kg/m²   Assessment:      Perirectal pain improving  Abdominal distention and pain      Plan:      Return to see me after the CAT scan  CT scan   CMP  Valium 5 mg 1 tablet prior to CAT scan        Antoinette Joe MD

## 2020-07-26 PROBLEM — R14.0 ABDOMINAL DISTENSION: Status: ACTIVE | Noted: 2020-01-01

## 2020-07-26 PROBLEM — N17.9 AKI (ACUTE KIDNEY INJURY) (HCC): Status: ACTIVE | Noted: 2020-01-01

## 2020-07-26 NOTE — H&P
Hospital Medicine  History and Physical    Patient:  Cait Vegas  MRN: [id number]    CHIEF COMPLAINT:    Chief Complaint   Patient presents with    Abdominal Pain      and swelling. decreasedurination       History Obtained From:  Patient, EMR  Primary Care Physician: Doroteo Choudhary MD    HISTORY OF PRESENT ILLNESS:   The patient is a 69 y.o. female who presents with weight gain, shortness of breath, nausea. Duration of symptoms: Days. Timing: Gradually worsening. Aggravated with exertion, decreased with rest.  Not associated with fever. Associated with decreased urine output over the last couple days. Past Medical History:      Diagnosis Date    Cardiomyopathy [facility])     mother history.  Hypertension     Sleep apnea, obstructive     Type II or unspecified type diabetes mellitus without mention of complication, not stated as uncontrolled        Past Surgical History:      Procedure Laterality Date    APPENDECTOMY      CHOLECYSTECTOMY      COLONOSCOPY  4/23/2013    HYSTERECTOMY      OTHER SURGICAL HISTORY      removal of anal fistulotomy       Medications Prior to Admission:    Prior to Admission medications    Medication Sig Start Date End Date Taking?  Authorizing Provider   citalopram (CELEXA) 10 MG tablet  6/9/20   Historical Provider, MD   buPROPion [facility] SR) 150 MG extended release tablet Take 1 tablet by mouth 2 times daily 6/4/20   Doroteo Choudhary MD   glimepiride (AMARYL) 2 MG tablet TAKE 1 TABLET EVERY MORNING BEFORE BREAKFAST 4/27/20   Doroteo Choudhary MD   metoprolol succinate (TOPROL XL) 100 MG extended release tablet Take 1 tablet by mouth daily 4/10/20   Doroteo Choudhary MD   metFORMIN (GLUCOPHAGE) 1000 MG tablet TAKE 1 TABLET TWICE A DAY WITH MEALS 3/27/20   TANNER Ramirez - CNP   lisinopril (PRINIVIL;ZESTRIL) 5 MG tablet TAKE 1 TABLET DAILY 12/2/19   Doroteo Choudhary MD   atorvastatin (LIPITOR) 40 MG tablet TAKE 1 TABLET DAILY 10/1/19   Doroteo Choudhary MD   raloxifene (EVISTA) 60 MG tablet TAKE 1 TABLET DAILY 9/3/19   Brissa Milaln MD   sertraline (ZOLOFT) 50 MG tablet TAKE 1 TABLET DAILY 8/22/19   Brissa Millan MD   nystatin (MYCOSTATIN) 179365 UNIT/GM powder Apply 3 times daily. 10/22/18   Luis Bryan MD   glucose blood VI test strips (ACCU-CHEK MELANIE) strip Test 1-2 times a day. Dx: 250.00. Accu-check melanie strips 11/28/12   Brissa Millan MD   Lancet Device MISC Test 1-2 times a day. Dx: 250.00. Lancets 10/10/12   Brissa Millan MD   aspirin 81 MG EC tablet Take 81 mg by mouth 2 times daily. Historical Provider, MD       Allergies:  Codeine    Social History:   TOBACCO:   reports that she has never smoked. She quit smokeless tobacco use about 36 years ago. ETOH:   reports no history of alcohol use. Family History:   History reviewed. No pertinent family history. REVIEW OF SYSTEMS:  Ten systems reviewed and negative except as stated in the HPI    Physical Exam:    Vitals: BP (!) 119/57   Pulse 78   Temp 98.1 °F (36.7 °C) (Oral)   Resp 18   Ht 5' 3\" (1.6 m)   Wt 200 lb (90.7 kg)   SpO2 98%   BMI 35.43 kg/m²   General appearance: alert, appears stated age and cooperative  Skin: Skin color, texture, turgor normal. No rashes or lesions  HEENT: Head: Normocephalic, no lesions, without obvious abnormality. . No dental issues   Neck: no jugular venous distention,   Lungs: few scattered crackles  Heart: regular rate and rhythm, S1, S2 normal, no murmur, click, rub or gallop  Abdomen: soft, non-tender; bowel sounds normal; no masses,  no organomegaly  Extremities: extremities normal, atraumatic, no cyanosis or edema  Neurologic: Mental status: Alert, oriented, thought content appropriate.        Recent Labs     07/26/20  1715   WBC 7.2   HGB 10.0*        Recent Labs     07/24/20  1525 07/26/20  1715   * 135   K 5.1* 4.8   CL 99 101   CO2 19* 17*   BUN 25* 31*   CREATININE 2.80* 3.61*   GLUCOSE 109* 105*   AST 21 23   ALT 9 10   BILITOT 1.0* 0.7 ALKPHOS 73 87     Troponin T: No results for input(s): TROPONINI in the last 72 hours. ABGs: No results found for: PHART, PO2ART, AER9YJN  INR:   Recent Labs     07/26/20  1715   INR 1.3     URINALYSIS:No results for input(s): NITRITE, COLORU, PHUR, LABCAST, WBCUA, RBCUA, MUCUS, TRICHOMONAS, YEAST, BACTERIA, CLARITYU, SPECGRAV, LEUKOCYTESUR, UROBILINOGEN, BILIRUBINUR, BLOODU, GLUCOSEU, AMORPHOUS in the last 72 hours. Invalid input(s): Ezra Galloway  -----------------------------------------------------------------   No results found. Assessment and Plan   1. Acute kidney injury: Exact etiology unknown. Renal ultrasound, urine lites. No evidence by history to suggest prerenal etiology. Patient states she has had decreased oral intake but still manages to have at least 4 to 5 glasses of water a day. Given symptoms of dyspnea on exertion, concerning for fluid retention. Challenge kidneys with Lasix x1. Night doctor updated and signed out to monitor urine output. If no urine output, may need to reassess for prerenal status and consider IV fluids. Patient's daughter states she has had increased leg edema, but there was no peripheral edema during my examination. 2. Diabetes: Given FALLON with creatinine of 3.6, will need to hold metformin. Insulin sliding scale. Monitor glucose accordion. 3. Hypertension: , monitor blood pressure. Hold acei given fallon. 4. DVT ppx  5. Disposition: Dependent on hospital course. Will discharge once medically stable. SW on board for discharge planning.      Susie Lopes MD

## 2020-07-26 NOTE — ED NOTES
Patient not ready for ct scan.  After iv access has been established the patient can then go to the after mention test.       Carolina Sosa  07/26/20 [id number]

## 2020-07-26 NOTE — ED PROVIDER NOTES
Devan Phelan  eMERGENCY dEPARTMENT eNCOUnter      Pt Name: Rachel Loza  MRN: [id number]  [place] 1945  Date of evaluation: 7/26/2020  Provider: Radha Mascorro Dr       Chief Complaint   Patient presents with    Abdominal Pain      and swelling. decreasedurination         HISTORY OF PRESENT ILLNESS   (Location/Symptom, Timing/Onset,Context/Setting, Quality, Duration, Modifying Factors, Severity)  Note limiting factors. Rachel Loza is a 69 y.o. female who presents to the emergency department with a complaint of progressive abdominal swelling and decreased urinary output which patient states been ongoing for approximate last 6 months. She states she was seen by general surgery  and Yamel Saxena within the last couple of days, and a CT scan of the abdomen pelvis was ordered, but not completed. She states she is due to have this tomorrow. She states the distention is gotten worse, and she is having increasing difficulty with breathing due to the pressure on her abdomen and lungs. Patient denies any pain, 0 out of 10. No nausea vomiting. She states decreased urine output, and diarrhea stools. Past medical history significant for cardiomyopathy, hypertension, sleep apnea, type 2 diabetes. HPI    NursingNotes were reviewed. REVIEW OF SYSTEMS    (2-9 systems for level 4, 10 or more for level 5)     Review of Systems   Constitutional: Negative for activity change, appetite change, chills, diaphoresis and fever. HENT: Negative for congestion, ear discharge, ear pain, nosebleeds, rhinorrhea and sore throat. Eyes: Negative for discharge. Respiratory: Negative for shortness of breath. Cardiovascular: Negative for chest pain, palpitations and leg swelling. Gastrointestinal: Positive for abdominal distention and diarrhea. Negative for abdominal pain, constipation, nausea and vomiting. Genitourinary: Negative for difficulty urinating, dysuria and flank pain. Musculoskeletal: Negative for arthralgias. Skin: Negative for color change, pallor, rash and wound. Neurological: Negative for dizziness, tremors, syncope, weakness, numbness and headaches. Psychiatric/Behavioral: Negative for agitation and confusion. Except as noted above the remainder of the review of systems was reviewed and negative. PAST MEDICAL HISTORY     Past Medical History:   Diagnosis Date    Arthritis     Cardiomyopathy [facility])     mother history.     Chronic kidney disease     Hypertension     Liver disease     Other disorders of kidney and ureter in diseases classified elsewhere     Pneumonia     Psychiatric problem     Sleep apnea, obstructive     Type II or unspecified type diabetes mellitus without mention of complication, not stated as uncontrolled          SURGICALHISTORY       Past Surgical History:   Procedure Laterality Date    APPENDECTOMY      CHOLECYSTECTOMY      COLONOSCOPY  4/23/2013    FOOT FRACTURE SURGERY Bilateral     FRACTURE SURGERY      HYSTERECTOMY      OTHER SURGICAL HISTORY      removal of anal fistulotomy         CURRENT MEDICATIONS       Current Discharge Medication List      CONTINUE these medications which have NOT CHANGED    Details   citalopram (CELEXA) 10 MG tablet       glimepiride (AMARYL) 2 MG tablet TAKE 1 TABLET EVERY MORNING BEFORE BREAKFAST  Qty: 90 tablet, Refills: 1    Associated Diagnoses: Type 2 diabetes mellitus without complication, without long-term current use of insulin ([facility])      metoprolol succinate (TOPROL XL) 100 MG extended release tablet Take 1 tablet by mouth daily  Qty: 90 tablet, Refills: 1    Associated Diagnoses: Essential hypertension      metFORMIN (GLUCOPHAGE) 1000 MG tablet TAKE 1 TABLET TWICE A DAY WITH MEALS  Qty: 180 tablet, Refills: 3    Associated Diagnoses: Type 2 diabetes mellitus without complication, without long-term current use of insulin ([facility])      lisinopril (PRINIVIL;ZESTRIL) 5 MG tablet TAKE 1 TABLET DAILY  Qty: 90 tablet, Refills: 3    Associated Diagnoses: Type 2 diabetes mellitus without complication, without long-term current use of insulin ([facility] [address] Essential hypertension      atorvastatin (LIPITOR) 40 MG tablet TAKE 1 TABLET DAILY  Qty: 90 tablet, Refills: 3    Associated Diagnoses: Pure hypercholesterolemia      raloxifene (EVISTA) 60 MG tablet TAKE 1 TABLET DAILY  Qty: 90 tablet, Refills: 3      glucose blood VI test strips (ACCU-CHEK MELANIE) strip Test 1-2 times a day. Dx: 250.00. Accu-check melanie strips  Qty: 180 strip, Refills: 3      Lancet Device MISC Test 1-2 times a day. Dx: 250.00. Lancets  Qty: 180 each, Refills: 3      aspirin 81 MG EC tablet Take 81 mg by mouth 2 times daily.                ALLERGIES     Codeine    FAMILY HISTORY       Family History   Problem Relation Age of Onset    Arrhythmia Mother     Diabetes Mother     High Blood Pressure Mother     Arrhythmia Father     Breast Cancer Sister     Diabetes Brother     High Blood Pressure Brother           SOCIAL HISTORY       Social History     Socioeconomic History    Marital status:      Spouse name: None    Number of children: None    Years of education: None    Highest education level: None   Occupational History    None   Social Needs    Financial resource strain: Not hard at all   [email or web address] insecurity     Worry: Never true     Inability: Never true   [employer] needs     Medical: None     Non-medical: None   Tobacco Use    Smoking status: Never Smoker    Smokeless tobacco: Former User   Substance and Sexual Activity    Alcohol use: No    Drug use: No    Sexual activity: Yes   Lifestyle    Physical activity     Days per week: None     Minutes per session: None    Stress: None   Relationships    Social connections     Talks on phone: None     Gets together: None     Attends Buddhist service: None     Active member of club or organization: None     Attends meetings of clubs or organizations: None Relationship status: None    Intimate partner violence     Fear of current or ex partner: None     Emotionally abused: None     Physically abused: None     Forced sexual activity: None   Other Topics Concern    None   Social History Narrative    None       SCREENINGS    [place] Coma Scale  Eye Opening: Spontaneous  Best Verbal Response: Oriented  Best Motor Response: Obeys commands  [place] Coma Scale Score: 15 @FLOW([id number])@      PHYSICAL EXAM    (up to 7 for level 4, 8 or more for level 5)     ED Triage Vitals [07/26/20 1648]   BP Temp Temp Source Pulse Resp SpO2 Height Weight   (!) 130/48 98.1 °F (36.7 °C) Oral 80 18 97 % 5' 3\" (1.6 m) 200 lb (90.7 kg)       Physical Exam  Vitals signs and nursing note reviewed. Constitutional:       General: She is not in acute distress. Appearance: She is well-developed. She is not ill-appearing, toxic-appearing or diaphoretic. HENT:      Head: Normocephalic. Right Ear: Tympanic membrane normal.      Left Ear: Tympanic membrane normal.      Nose: No congestion. Mouth/Throat:      Mouth: Mucous membranes are moist.      Pharynx: No oropharyngeal exudate or posterior oropharyngeal erythema. Eyes:      Extraocular Movements: Extraocular movements intact. Conjunctiva/sclera: Conjunctivae normal.      Pupils: Pupils are equal, round, and reactive to light. Neck:      Musculoskeletal: Normal range of motion and neck supple. No neck rigidity. Vascular: No JVD. Trachea: No tracheal deviation. Cardiovascular:      Rate and Rhythm: Normal rate. Pulses: Normal pulses. Heart sounds: Normal heart sounds. No murmur. No friction rub. No gallop. Pulmonary:      Effort: Pulmonary effort is normal. No tachypnea, accessory muscle usage, respiratory distress or retractions. Breath sounds: Normal breath sounds. No stridor. No wheezing, rhonchi or rales.       Comments: Lung sounds are clear in all fields, no wheezes rales or rhonchi RETROPERITONEAL LIMITED    (Results Pending)         ED BEDSIDE ULTRASOUND:   Performed by ED Physician - none    LABS:  Labs Reviewed   COMPREHENSIVE METABOLIC PANEL - Abnormal; Notable for the following components:       Result Value    CO2 17 (*)     Anion Gap 17 (*)     Glucose 105 (*)     BUN 31 (*)     CREATININE 3.61 (*)     GFR Non- 12.4 (*)     GFR  15.0 (*)     Alb 3.2 (*)     All other components within normal limits   CBC WITH AUTO DIFFERENTIAL - Abnormal; Notable for the following components:    RBC 3.51 (*)     Hemoglobin 10.0 (*)     Hematocrit 31.1 (*)     MCHC 32.2 (*)     RDW 17.8 (*)     All other components within normal limits   LIPASE - Abnormal; Notable for the following components:    Lipase 96 (*)     All other components within normal limits   PROTIME-INR - Abnormal; Notable for the following components:    Protime 16.3 (*)     All other components within normal limits   APTT - Abnormal; Notable for the following components:    aPTT 39.6 (*)     All other components within normal limits   SODIUM, URINE, RANDOM   CREATININE, RANDOM URINE   URINE RT REFLEX TO CULTURE   BASIC METABOLIC PANEL W/ REFLEX TO MG FOR LOW K   CBC WITH AUTO DIFFERENTIAL   POCT CREATININE - URINE   TYPE AND SCREEN       All other labs were within normal range or not returned as of this dictation. EMERGENCY DEPARTMENT COURSE and DIFFERENTIAL DIAGNOSIS/MDM:   Vitals:    Vitals:    07/26/20 1833 07/26/20 2125 07/27/20 0205 07/27/20 0418   BP: (!) 119/57 (!) 133/52 (!) 117/37 (!) 118/49   Pulse: 78 107 79 78   Resp: 18 18 18 18   Temp:  98.2 °F (36.8 °C) 97.7 °F (36.5 °C) 98.6 °F (37 °C)   TempSrc:  Oral Oral Oral   SpO2: 98% 100% 96% 100%   Weight:       Height:                MDM     CT of abdomen reveals large volume of ascites, cirrhotic liver. Heterogeneous 4.4 cm mass in the inferior pole of the left kidney. Renal cell carcinoma cannot be excluded.   Lab reveals CO2 17, BUN 31, creatinine 3.61, anion gap 17, lipase 96. CRITICAL CARE TIME   Total Critical Care time was 0 minutes, excluding separately reportableprocedures. There was a high probability of clinicallysignificant/life threatening deterioration in the patient's condition which required my urgent intervention. CONSULTS:  IP CONSULT TO NEPHROLOGY    PROCEDURES:  Unless otherwise noted below, none     Procedures    FINAL IMPRESSION      1. Renal mass, left    2. Acute renal failure, unspecified acute renal failure type ([facility] [address]    3. Cirrhosis of liver with ascites, unspecified hepatic cirrhosis type [facility])          DISPOSITION/PLAN   DISPOSITION        PATIENT REFERRED TO:  Brissa Millan MD  [address]  [address] [place] [address], [address] [place] [id number] [phone]            DISCHARGE MEDICATIONS:  Current Discharge Medication List             (Please note that portions of this note were completed with a voice recognition program.  Efforts were made to edit the dictations but occasionally words are mis-transcribed. )    VIDYA Antoine (electronically signed)  Attending Emergency Physician         Honey Estebanma  41/57/26 [id number]

## 2020-07-26 NOTE — ED TRIAGE NOTES
Pt states that her abdomen has been  swelling and is feeling a lot of pressure. She has  Also had a decrease in urination.   She does have a ct schedule for tomorrow but her family wanted her to be seen tonight

## 2020-07-27 NOTE — FLOWSHEET NOTE
4227: Handoff complete. This RN assumed care of the pt. Pt is currently off the floor to ultrasound. 0740: Pt returned from ultrasound. No stated needs. 0165: Pt took her pills whole with water. Pt is A&Ox4. PERRLA. Lungs are clear bilaterally. S1, S2 noted. Pulses palpable. Abdomen is distended and taut. Bowel sounds are active X4. Pt states she does not have pain but she does feel pressure throughout her abdomen. Pt reports no SOB, no loss of appetite, or nausea. Pt is passing gas and stool with no difficulty. Pt states that she saw Dr. Matilde Thomas last week and had a follow up scheduled for today, wants to see him while she is here. Perfect serve sent to Dr. Landy Turcios to request a consult. Also asked about home medications as pt's metformin has not been reordered.    1206: IR called and stated to hold lovenox in the AM for paracentesis  1330: urine sample sent to the lab

## 2020-07-27 NOTE — PROGRESS NOTES
Department of Internal Medicine  Progress Note      SUBJECTIVE: Patient is afebrile and hemodynamically stable. Complains of abdominal pain for past 6 weeks. Has decreased appetite. And weight loss.     ROS:  All 12 systems reviewed and negative except mentioned in HPI and Assessment and plan    MEDICATIONS:  Current Facility-Administered Medications   Medication Dose Route Frequency Provider Last Rate Last Dose    aspirin EC tablet 81 mg  81 mg Oral BID Kadie Ochoa MD   81 mg at 07/27/20 5501    atorvastatin (LIPITOR) tablet 40 mg  40 mg Oral Daily Kadie Ochoa MD   40 mg at 07/27/20 2648    sodium chloride flush 0.9 % injection 10 mL  10 mL Intravenous 2 times per day Kadie Ochoa MD   10 mL at 07/27/20 3443    sodium chloride flush 0.9 % injection 10 mL  10 mL Intravenous PRN Kadie Ochoa MD        acetaminophen (TYLENOL) tablet 650 mg  650 mg Oral Q6H PRN Kadie Ochoa MD        Or    acetaminophen (TYLENOL) suppository 650 mg  650 mg Rectal Q6H PRN Kadie Ochoa MD        polyethylene glycol (GLYCOLAX) packet 17 g  17 g Oral Daily PRN Kadie Ochoa MD        promethazine (PHENERGAN) tablet 12.5 mg  12.5 mg Oral Q6H PRN Kadie Ochoa MD        Or    ondansetron (ZOFRAN) injection 4 mg  4 mg Intravenous Q6H PRN Kadie Ochoa MD        enoxaparin (LOVENOX) injection 30 mg  30 mg Subcutaneous Daily Kadie Ochoa MD   30 mg at 07/27/20 2348    metoprolol tartrate (LOPRESSOR) tablet 50 mg  50 mg Oral BID TANNER Mena CNP   50 mg at 07/27/20 7139    citalopram (CELEXA) tablet 10 mg  10 mg Oral Daily TANNER Mena CNP   10 mg at 07/27/20 0921         OBJECTIVE:  Vital Signs:  Vitals:    07/27/20 0738   BP: (!) 152/49   Pulse: 86   Resp: 16   Temp: 97.9 °F (36.6 °C)   SpO2: 100%       General Exam (except as mentioned above):  CONSTITUTIONAL: Awake, alert, no apparent distress  EYES:  PERRL, conjunctiva normal  ENT:  Normocephalic, atraumatic  NECK:  Supple  BACK:  Symmetric  LUNGS:  CTAB except bilateral basilar crackles. CARDIOVASCULAR:  S1S2 present  ABDOMEN:  soft, distended, non-tender  MUSCULOSKELETAL:  There is no redness, warmth, or swelling of the joints. NEUROLOGIC:  Alert, awake, oriented x 3. No FND  EXTREMITIES: Warm and well perfused. LABS  Recent Labs     07/26/20  1715 07/27/20  0517   WBC 7.2 6.0   RBC 3.51* 2.95*   HGB 10.0* 8.3*   HCT 31.1* 26.3*   MCV 88.6 89.0   MCH 28.5 28.2   MCHC 32.2* 31.7*   RDW 17.8* 17.7*    124*       Recent Labs     07/24/20  1525 07/26/20  1715 07/27/20  0517   * 135 130*   K 5.1* 4.8 4.4   CL 99 101 100   CO2 19* 17* 17*   BUN 25* 31* 32*   CREATININE 2.80* 3.61* 3.64*   GLUCOSE 109* 105* 50*   CALCIUM 8.6 8.5 7.9*       No results for input(s): MG in the last 72 hours. ASSESSMENT AND PLAN    Active Hospital Problems    Diagnosis Date Noted    FALLON (acute kidney injury) ([facility] [address] [N17.9] 07/26/2020     Liver cirrhosis: In absence of alcohol use. Probably from hepatic steatosis. Presenting with ascites. IR consulted. Give albumin for more than 5 L of fluid drained. Acute kidney injury: Mostly from hepatorenal syndrome. Start on diuretics after paracentesis. Left renal mass: Will need to see surgery as outpatient at tertiary center for discussed plan of treatment. Will consult oncology to establish outpatient care. Diabetes mellitus: On metformin prior to admission. Continue sliding scale insulin for now. DVT prophylaxis: SCD.     35 minutes total care time, >1/2 in unit/floor time and care coordination   Phillip Bernal MD  Pager : [phone]

## 2020-07-27 NOTE — PROGRESS NOTES
PHARMACY NOTE  Henrique Jc was ordered Evista. Per the [address], this medication is non-formulary and not stocked by pharmacy. The medication can be reordered at discharge.

## 2020-07-27 NOTE — CONSULTS
Hematology/Oncology Consult  Encounter Date: 2020 11:55 AM    Ms. Laura Fang is a 69 y.o. female  : 1945  MRN: [id number]  Acct Number: [de-identified]  Requesting Provider: Dr Jennifer Robb   Reason for request: kidney mass     CONSULTANT: Eloisa Burgess    HPI: Lindsay Painter was admitted for abdominal distention. She noticed abdominal distention in the last 6 weeks. Ct scan showed ascites and left kidney mass measuring 4.6 x 5.4 x 4 cm. No retroperitoneal lymphadenopathy. Denies drinking alcohol. Former smoker. Patient Active Problem List   Diagnosis    Type 2 diabetes mellitus without complication, without long-term current use of insulin ([address]    Hypertension    Bell-rectal abscess    Bilateral carotid artery stenosis    HOCM (hypertrophic obstructive cardiomyopathy) ([address]    HARDEEP (obstructive sleep apnea)    Pseudophakia of both eyes    Regular astigmatism    Cardiomyopathy ([address]    Rectal pain    Abdominal distension    FALLON (acute kidney injury) ([address]     Past Medical History:   Diagnosis Date    Arthritis     Cardiomyopathy [facility])     mother history.     Chronic kidney disease     Hypertension     Liver disease     Other disorders of kidney and ureter in diseases classified elsewhere     Pneumonia     Psychiatric problem     Sleep apnea, obstructive     Type II or unspecified type diabetes mellitus without mention of complication, not stated as uncontrolled      @PSH@  Family History   Problem Relation Age of Onset    Arrhythmia Mother     Diabetes Mother     High Blood Pressure Mother     Arrhythmia Father     Breast Cancer Sister     Diabetes Brother     High Blood Pressure Brother      Social History     Socioeconomic History    Marital status:      Spouse name: Not on file    Number of children: Not on file    Years of education: Not on file    Highest education level: Not on file   Occupational History    Not on file   Social Needs    Financial resource strain: Not hard at all    Food insecurity     Worry: Never true     Inability: Never true    Transportation needs     Medical: Not on file     Non-medical: Not on file   Tobacco Use    Smoking status: Never Smoker    Smokeless tobacco: Former User   Substance and Sexual Activity    Alcohol use: No    Drug use: No    Sexual activity: Yes   Lifestyle    Physical activity     Days per week: Not on file     Minutes per session: Not on file    Stress: Not on file   Relationships    Social connections     Talks on phone: Not on file     Gets together: Not on file     Attends Pentecostalism service: Not on file     Active member of club or organization: Not on file     Attends meetings of clubs or organizations: Not on file     Relationship status: Not on file    Intimate partner violence     Fear of current or ex partner: Not on file     Emotionally abused: Not on file     Physically abused: Not on file     Forced sexual activity: Not on file   Other Topics Concern    Not on file   Social History Narrative    Not on file            Current Facility-Administered Medications   Medication Dose Route Frequency Provider Last Rate Last Dose    insulin lispro (HUMALOG) injection vial 0-6 Units  0-6 Units Subcutaneous TID WC Ana M Rojas MD        insulin lispro (HUMALOG) injection vial 0-3 Units  0-3 Units Subcutaneous Nightly Ana M Rojas MD        glucose (GLUTOSE) 40 % oral gel 15 g  15 g Oral PRN Ana M Rojas MD        dextrose 50 % IV solution  12.5 g Intravenous PRN Ana M Rojas MD        glucagon (rDNA) injection 1 mg  1 mg Intramuscular PRN Ana M Rojas MD        dextrose 5 % solution  100 mL/hr Intravenous PRN Ana M Rojas MD        [START ON 7/28/2020] spironolactone (ALDACTONE) tablet 25 mg  25 mg Oral Daily Ana M Rojas MD        [START ON 7/28/2020] furosemide (LASIX) tablet 20 mg  20 mg Oral Daily Ana M Parker MD        aspirin EC tablet 81 mg  81 mg Oral BID Leah Dangelo MD   81 mg at 07/27/20 4449    atorvastatin (LIPITOR) tablet 40 mg  40 mg Oral Daily Bernice Vicente MD   40 mg at 07/27/20 6116    sodium chloride flush 0.9 % injection 10 mL  10 mL Intravenous 2 times per day Bernice Vicente MD   10 mL at 07/27/20 0919    sodium chloride flush 0.9 % injection 10 mL  10 mL Intravenous PRN Bernice Vicente MD        acetaminophen (TYLENOL) tablet 650 mg  650 mg Oral Q6H PRN Bernice Vicente MD        Or    acetaminophen (TYLENOL) suppository 650 mg  650 mg Rectal Q6H PRN Bernice Vicente MD        polyethylene glycol (GLYCOLAX) packet 17 g  17 g Oral Daily PRN Bernice Vicente MD        promethazine (PHENERGAN) tablet 12.5 mg  12.5 mg Oral Q6H PRN Bernice Vicente MD        Or    ondansetron (ZOFRAN) injection 4 mg  4 mg Intravenous Q6H PRN Bernice Vicente MD        enoxaparin (LOVENOX) injection 30 mg  30 mg Subcutaneous Daily Bernice Vicente MD   30 mg at 07/27/20 8769    metoprolol tartrate (LOPRESSOR) tablet 50 mg  50 mg Oral BID TANNER Gonzalez - CNP   50 mg at 07/27/20 7747    citalopram (CELEXA) tablet 10 mg  10 mg Oral Daily TANNER Gonzalez CNP   10 mg at 07/27/20 6412     [unfilled]  Allergies   Allergen Reactions    Codeine         Review of Systems  All other systems are normal other than ones mentioned in HPI. PHYSICAL EXAMINATION:   VITAL SIGNS: BP (!) 152/49   Pulse 86   Temp 97.9 °F (36.6 °C) (Oral)   Resp 16   Ht 5' 3\" (1.6 m)   Wt 200 lb (90.7 kg)   SpO2 100%   BMI 35.43 kg/m²         GENERAL: In no acute distress, well- nourished, well- developed,alert and oriented to person place and time. SKIN: Warm and dry, withoutjaundice, ecchymoses, or petechiae. HEENT: Normocephalic, sclera anicteric, oral mucosa moist without lesion or exudate in the visible oral cavity or oropharynx, tongue mid-line with good mobility and no deviation with extension.   NODES: No palpable adenopathy in the neck Levels I-V, bilateral   Supraclavicular fossae, axillary chains, or inguinal regions. LUNGS: Good inspiratory effort, no accessory muscle use, clear bilaterally, no focal wheeze, rales or rhonchi. CARDIAC: Regular rate and rhythm, without murmurs, rubs or gallops. ABDOMINAL: Normal bowel soundspresent, soft, non-tender, no mass or  organomegaly.   [facility] [place]    LAB RESULTS:  Recent Results (from the past 24 hour(s))   Comprehensive Metabolic Panel    Collection Time: 07/26/20  5:15 PM   Result Value Ref Range    Sodium 135 135 - 144 mEq/L    Potassium 4.8 3.4 - 4.9 mEq/L    Chloride 101 95 - 107 mEq/L    CO2 17 (L) 20 - 31 mEq/L    Anion Gap 17 (H) 9 - 15 mEq/L    Glucose 105 (H) 70 - 99 mg/dL    BUN 31 (H) 8 - 23 mg/dL    CREATININE 3.61 (H) 0.50 - 0.90 mg/dL    GFR Non-African American 12.4 (L) >60    GFR  15.0 (L) >60    Calcium 8.5 8.5 - 9.9 mg/dL    Total Protein 6.3 6.3 - 8.0 g/dL    Alb 3.2 (L) 3.5 - 4.6 g/dL    Total Bilirubin 0.7 0.2 - 0.7 mg/dL    Alkaline Phosphatase 87 40 - 130 U/L    ALT 10 0 - 33 U/L    AST 23 0 - 35 U/L    Globulin 3.1 2.3 - 3.5 g/dL   CBC Auto Differential    Collection Time: 07/26/20  5:15 PM   Result Value Ref Range    WBC 7.2 4.8 - 10.8 K/uL    RBC 3.51 (L) 4.20 - 5.40 M/uL    Hemoglobin 10.0 (L) 12.0 - 16.0 g/dL    Hematocrit 31.1 (L) 37.0 - 47.0 %    MCV 88.6 82.0 - 100.0 fL    MCH 28.5 27.0 - 31.3 pg    MCHC 32.2 (L) 33.0 - 37.0 %    RDW 17.8 (H) 11.5 - 14.5 %    Platelets 171 225 - 372 K/uL    Neutrophils % 69.7 %    Lymphocytes % 17.5 %    Monocytes % 11.2 %    Eosinophils % 1.1 %    Basophils % 0.5 %    Neutrophils Absolute 5.0 1.4 - 6.5 K/uL    Lymphocytes Absolute 1.3 1.0 - 4.8 K/uL    Monocytes Absolute 0.8 0.2 - 0.8 K/uL    Eosinophils Absolute 0.1 0.0 - 0.7 K/uL    Basophils Absolute 0.0 0.0 - 0.2 K/uL   Lipase    Collection Time: 07/26/20  5:15 PM   Result Value Ref Range    Lipase 96 (H) 12 - 95 U/L   Protime-INR    Collection Time: 07/26/20  5:15 PM   Result Value Ref Range    Protime 16.3 (H) 12.3 - 14.9 sec INR 1.3    APTT    Collection Time: 07/26/20  5:15 PM   Result Value Ref Range    aPTT 39.6 (H) 24.4 - 36.8 sec   TYPE AND SCREEN    Collection Time: 07/26/20  5:15 PM   Result Value Ref Range    ABO/Rh B POS     Antibody Screen NEG    SODIUM, URINE, RANDOM    Collection Time: 07/26/20  7:30 PM   Result Value Ref Range    Sodium, Ur 27 Not Established mEq/L   Creatinine, Random Urine    Collection Time: 07/26/20  7:30 PM   Result Value Ref Range    Creatinine, Ur 162.3 Not Established mg/dL   Basic Metabolic Panel w/ Reflex to MG    Collection Time: 07/27/20  5:17 AM   Result Value Ref Range    Sodium 130 (L) 135 - 144 mEq/L    Potassium reflex Magnesium 4.4 3.4 - 4.9 mEq/L    Chloride 100 95 - 107 mEq/L    CO2 17 (L) 20 - 31 mEq/L    Anion Gap 13 9 - 15 mEq/L    Glucose 50 (L) 70 - 99 mg/dL    BUN 32 (H) 8 - 23 mg/dL    CREATININE 3.64 (H) 0.50 - 0.90 mg/dL    GFR Non-African American 12.2 (L) >60    GFR  14.8 (L) >60    Calcium 7.9 (L) 8.5 - 9.9 mg/dL   CBC auto differential    Collection Time: 07/27/20  5:17 AM   Result Value Ref Range    WBC 6.0 4.8 - 10.8 K/uL    RBC 2.95 (L) 4.20 - 5.40 M/uL    Hemoglobin 8.3 (L) 12.0 - 16.0 g/dL    Hematocrit 26.3 (L) 37.0 - 47.0 %    MCV 89.0 82.0 - 100.0 fL    MCH 28.2 27.0 - 31.3 pg    MCHC 31.7 (L) 33.0 - 37.0 %    RDW 17.7 (H) 11.5 - 14.5 %    Platelets 531 (L) 769 - 400 K/uL    Neutrophils % 69.3 %    Lymphocytes % 16.8 %    Monocytes % 12.6 %    Eosinophils % 0.9 %    Basophils % 0.4 %    Neutrophils Absolute 4.1 1.4 - 6.5 K/uL    Lymphocytes Absolute 1.0 1.0 - 4.8 K/uL    Monocytes Absolute 0.8 0.2 - 0.8 K/uL    Eosinophils Absolute 0.1 0.0 - 0.7 K/uL    Basophils Absolute 0.0 0.0 - 0.2 K/uL   POCT Glucose    Collection Time: 07/27/20 11:10 AM   Result Value Ref Range    POC Glucose 73 60 - 115 mg/dl    Performed on ACCU-CHEK      Recent Labs     07/27/20  0517   GLUCOSE 50*        Pathology:     RADIOLOGY RESULTS:  Ct Abdomen Pelvis Wo Contrast Cirrhosis. Large volume ascites. Cholecystectomy. Appendectomy. Hysterectomy. Other findings discussed. All CT scans at this facility use dose modulation, iterative reconstruction, and/or weight based dosing when appropriate to reduce radiation dose to as low as reasonably achievable. Us Dup Lower Extremity Right Elizabeth    Result Date: 7/9/2020  US DUP LOWER EXTREMITY RIGHT ELIZABETH CLINICAL HISTORY: I82.4Y1 Deep vein thrombosis (DVT) of proximal vein of right lower extremity, unspecified chronicity ([address] ICD10 COMPARISONS: FINDINGS: Grayscale as well as Duplex color and Spectra; Doppler ultrasound of the deep venous system of the right lower extremity from the inguinal ligament to the popliteal fossa was performed. There are no findings of deep venous thrombus in the visualized vessels of the right lower extremity. NO FINDINGS OF DEEP VENOUS THROMBUS IN  THE VISUALIZED VESSELS OF THE RIGHT LOWER EXTREMITY. Us Retroperitoneal Limited    Result Date: 7/27/2020  EXAMINATION: Ultrasound the kidneys CLINICAL HISTORY: Abnormal CT FINDINGS: Multiple scans the kidneys performed. There is a hypoechoic relatively homogeneous solid mass projecting off the inferior left kidney measuring 4.5 x 5.0 x 4.8 cm in diameter. Renal cell tumor would have to be a primary consideration. No other renal mass. No obstructive uropathy. No definite renal calculi. A large amount of ascites is present. The contour of the liver is somewhat irregular and this is likely related to chronic hepatic disease. No definite peritoneal mass or retroperitoneal mass or adenopathy. Right kidney is 11.2 x 6.5 x 5.3 cm. Left kidney is 12.7 x 6.3 x 5.2 cm. 5 CM SOLID MASS PROJECTING OFF THE INFERIOR LEFT KIDNEY. RENAL CELL CARCINOMA IS THE LEADING DIAGNOSIS. ASCITES AND HEPATOCELLULAR DISEASE FELT TO BE INCIDENTAL FINDINGS. Hussein Alvarado ASSESSMENT AND PLAN  1. Left kidney mass, consider malignancy. Checking CEA.      Electronically signed by Letitia Anderson Renan Babb MD on 7/27/2020 at 11:55 AM

## 2020-07-27 NOTE — FLOWSHEET NOTE
Admission complete. Patient denies cp, sob, n/v. Patient A&Ox4, VSS. LS clear, even and unlabored. BS active in all 4 quadrants. Patient stomach is distended but soft. Patient states she feels sensation of pressure in abdomen. Patient has no further needs at this time . Bed alarm on. Call light within reach. Will continue to monitor.

## 2020-07-27 NOTE — CONSULTS
[facility]  Consult [facility]           Reason for Consult: FALLON  Requesting Physician:  Dr. Baylee Wiggins    Chief Complaint:  SOB, weight gain, nausea  History Obtained From:  patient, electronic medical record    History of Present Ilness:    69y.o. year old female with history s/f T2DM, HTN, HARDEEP, HOCM who presented for SOB, weight gain, nausea and abdominal fullness. Also noted to have decreased UOP over the past couple of days. Nephrology consulted for FALLON. Has nml renal function at baseline w/ Scr 0.4-0.5. However on 07/22 Scr already 1.22. Now up to 3.61. Not hypotensive, no contrast given. On lisinopril as outpatient. Not on diuretics or NSAIDs. CT does show cirrhosis w/ ascites and LT renal mass for which hem-onc was consulted. In addition has anemia/thrombocytopenia. No obstruction on renal U/S. Rachel 27, UCr 162. Got lasix 40 mg yesterday. Past Medical History:        Diagnosis Date    Arthritis     Cardiomyopathy [facility])     mother history.  Chronic kidney disease     Hypertension     Liver disease     Other disorders of kidney and ureter in diseases classified elsewhere     Pneumonia     Psychiatric problem     Sleep apnea, obstructive     Type II or unspecified type diabetes mellitus without mention of complication, not stated as uncontrolled        Past Surgical History:        Procedure Laterality Date    APPENDECTOMY      CHOLECYSTECTOMY      COLONOSCOPY  4/23/2013    FOOT FRACTURE SURGERY Bilateral     FRACTURE SURGERY      HYSTERECTOMY      OTHER SURGICAL HISTORY      removal of anal fistulotomy       Home Medications:    No current facility-administered medications on file prior to encounter.       Current Outpatient Medications on File Prior to Encounter   Medication Sig Dispense Refill    citalopram (CELEXA) 10 MG tablet       glimepiride (AMARYL) 2 MG tablet TAKE 1 TABLET EVERY MORNING BEFORE BREAKFAST 90 tablet 1    metoprolol succinate (TOPROL XL) 100 MG extended release tablet Take 1 tablet by mouth daily (Patient taking differently: Take 100 mg by mouth daily Patient takes BID) 90 tablet 1    metFORMIN (GLUCOPHAGE) 1000 MG tablet TAKE 1 TABLET TWICE A DAY WITH MEALS 180 tablet 3    lisinopril (PRINIVIL;ZESTRIL) 5 MG tablet TAKE 1 TABLET DAILY 90 tablet 3    atorvastatin (LIPITOR) 40 MG tablet TAKE 1 TABLET DAILY 90 tablet 3    raloxifene (EVISTA) 60 MG tablet TAKE 1 TABLET DAILY 90 tablet 3    glucose blood VI test strips (ACCU-CHEK MELANIE) strip Test 1-2 times a day. Dx: 250.00. Accu-check melanie strips 180 strip 3    Lancet Device MISC Test 1-2 times a day. Dx: 250.00. Lancets 180 each 3    aspirin 81 MG EC tablet Take 81 mg by mouth 2 times daily.            Allergies:  Codeine    Social History:    Social History     Socioeconomic History    Marital status:      Spouse name: Not on file    Number of children: Not on file    Years of education: Not on file    Highest education level: Not on file   Occupational History    Not on file   Social Needs    Financial resource strain: Not hard at all   [employer] insecurity     Worry: Never true     Inability: Never true   [employer] needs     Medical: Not on file     Non-medical: Not on file   Tobacco Use    Smoking status: Never Smoker    Smokeless tobacco: Former User   Substance and Sexual Activity    Alcohol use: No    Drug use: No    Sexual activity: Yes   Lifestyle    Physical activity     Days per week: Not on file     Minutes per session: Not on file    Stress: Not on file   Relationships    Social connections     Talks on phone: Not on file     Gets together: Not on file     Attends Yazidism service: Not on file     Active member of club or organization: Not on file     Attends meetings of clubs or organizations: Not on file     Relationship status: Not on file    Intimate partner violence     Fear of current or ex partner: Not on file     Emotionally abused: Not on file     Physically abused: Not 07/27/2020    HCT 26.3 07/27/2020    MCV 89.0 07/27/2020    MCH 28.2 07/27/2020    MCHC 31.7 07/27/2020    RDW 17.7 07/27/2020     07/27/2020    MPV 10.0 09/08/2015     BMP:    Lab Results   Component Value Date     07/27/2020    K 4.4 07/27/2020     07/27/2020    CO2 17 07/27/2020    BUN 32 07/27/2020    LABALBU 3.2 07/26/2020    LABALBU 4.1 10/21/2011    CREATININE 3.64 07/27/2020    CALCIUM 7.9 07/27/2020    GFRAA 14.8 07/27/2020    LABGLOM 12.2 07/27/2020    GLUCOSE 50 07/27/2020    GLUCOSE 97 07/22/2020     Ct Abdomen Pelvis Wo Contrast Additional Contrast? None    Result Date: 7/27/2020  CT of the Abdomen and Pelvis without intravenous contrast medium History:  Abdominal distention for 6 weeks. Technical Factors: CT imaging of the abdomen and pelvis were obtained and formatted as 5 mm contiguous axial images from the domes of the diaphragm to the symphysis pubis. Sagittal and coronal reconstructions were also obtained. Oral contrast medium:  None. Intravenous contrast medium:  None. Comparison:  None. Findings: Lungs:  Lung bases are clear. Liver:  Small in size. Mildly nodular contour. Bile Ducts:  Normal in caliber. Gallbladder:  Gallbladder surgically absent. Pancreas:  Normal without masses, cysts, ductal dilatation or calcification. Spleen:  Normal in size without masses or calcifications. No splenules. Kidneys:  4.6 x 5.4 x 4.9 cm heterogenous exophytic mass, lower pole left kidney (series 2, image 49, series 601, image 27). No calculi. No hydronephrosis. Kidneys normal in size bilaterally. Adrenals:  Normal. Small bowel:  Normal in caliber. Appendix:  Appendix surgically absent. Colon:  Normal in caliber. Peritoneum:  No free air. Large amount of free fluid identified perihepatic, extending caudally within mesentery, paracolic gutters bilaterally, and into the pelvis. Vessels: Aorta normal in course and caliber.  Lymph nodes:  Retroperitoneal:  No enlarged retroperitoneal lymph nodes. Mesenteric:  No enlarged mesenteric lymph nodes. Pelvic: No enlarged pelvic lymph nodes. Ureters: Normal in course and caliber. No calcifications. Bladder: No wall thickening. Reproductive organs: Uterus surgically absent. Abdominal Wall: Diffuse edematous change, anterior abdominal wall, as well as dependent portion, pelvic wall. 14 mm fat-containing periumbilical anterior abdominal wall defect. Bones:  No bone lesions. This space narrowing L4-5. No post operative changes. 4.6 x 5.4 x 4.9 cm lower pole left renal mass. Malignancy diagnosis of exclusion. Cirrhosis. Large volume ascites. Cholecystectomy. Appendectomy. Hysterectomy. Other findings discussed. All CT scans at this facility use dose modulation, iterative reconstruction, and/or weight based dosing when appropriate to reduce radiation dose to as low as reasonably achievable. Us Dup Lower Extremity Right Elizabeth    Result Date: 7/9/2020  US DUP LOWER EXTREMITY RIGHT ELIZABETH CLINICAL HISTORY: I82.4Y1 Deep vein thrombosis (DVT) of proximal vein of right lower extremity, unspecified chronicity ([facility] [address] ICD10 COMPARISONS: FINDINGS: Grayscale as well as Duplex color and Spectra; Doppler ultrasound of the deep venous system of the right lower extremity from the inguinal ligament to the popliteal fossa was performed. There are no findings of deep venous thrombus in the visualized vessels of the right lower extremity. NO FINDINGS OF DEEP VENOUS THROMBUS IN  THE VISUALIZED VESSELS OF THE RIGHT LOWER EXTREMITY. Us Retroperitoneal Limited    Result Date: 7/27/2020  EXAMINATION: Ultrasound the kidneys CLINICAL HISTORY: Abnormal CT FINDINGS: Multiple scans the kidneys performed. There is a hypoechoic relatively homogeneous solid mass projecting off the inferior left kidney measuring 4.5 x 5.0 x 4.8 cm in diameter. Renal cell tumor would have to be a primary consideration. No other renal mass. No obstructive uropathy. No definite renal calculi.  A large amount of ascites is present. The contour of the liver is somewhat irregular and this is likely related to chronic hepatic disease. No definite peritoneal mass or retroperitoneal mass or adenopathy. Right kidney is 11.2 x 6.5 x 5.3 cm. Left kidney is 12.7 x 6.3 x 5.2 cm. 5 CM SOLID MASS PROJECTING OFF THE INFERIOR LEFT KIDNEY. RENAL CELL CARCINOMA IS THE LEADING DIAGNOSIS. ASCITES AND HEPATOCELLULAR DISEASE FELT TO BE INCIDENTAL FINDINGS. Assessment:  69y.o. year old female with history s/f T2DM, HTN, HARDEEP, HOCM who presented for SOB, weight gain, nausea and abdominal fullness. Found to have FALLON    1. FALLON: etiology unclear, ? Prerenal in setting of lisinopril use vs. HRS vs. Increased abdominal pressure vs. GN related to solid tumor mass, nml renal function at baseline, Scr 0.4-0.5  2. Metabolic acidosis: 2/2 renal failure  3. Cirrhosis c/b ascites  4. LT renal mass: hem/onc onboard  5. Hypoalbuminemia  6. Anemia/thrombocytopenia    Plan:  - will give bicarb gtt 500 ml over 5 hours today and recheck BMP later today  - getting paracentesis tomorrow  - obtaining UA to assess for proteinuria/hematuria (if + will obtain serologies)  - checking CK     Thank you for the consultation. Will continue to follow  Please do not hesitate to call with questions.     Maryellen Ahn MD

## 2020-07-27 NOTE — PLAN OF CARE
Problem: Falls - Risk of:  Goal: Will remain free from falls  Description: Will remain free from falls  7/27/2020 0724 by Taisha Vann RN  Outcome: Ongoing  7/27/2020 0231 by Leroy Hall RN  Outcome: Met This Shift  Goal: Absence of physical injury  Description: Absence of physical injury  7/27/2020 0724 by Taisha Vann RN  Outcome: Ongoing  7/27/2020 0231 by Leroy Hall RN  Outcome: Met This Shift

## 2020-07-28 NOTE — PROGRESS NOTES
2008  Pt states no pain currently, but still has pressure in abdomen. Nervous for paracentesis in AM, but ready to feel better. POC glucose 62, Insulin withheld. 0010 Sodium Bicarb infusion completed. Pt disconnected from IV pump. 0630 Lab called with critical result - blood glucose of 45. Dextrose 50% given and D5W was started. Pt states she is feeling ok, alert and oriented. Skin cool and a bit clammy. Dr Moris Talamantes notified via [employer]    0645 Repeat POC glucose 111.  notified, no further action needed. Consent for u/s guided paracentesis signed and placed in chart.

## 2020-07-28 NOTE — CONSULTS
Pt Name: Chasidy Paul  MRN: [id number]  YOB: 1945  Date of evaluation: 7/28/2020  Primary Care Physician: Brissa Millan MD  Patient evaluated at the request of  Dr. Camille Brunson  Reason for evaluation: Rectal pain and abdominal distention    IMPRESSIONS:  1. Liver small and nodular consistent with cirrhosis  2. Ascites  3. Left renal mass  4. Rectal pain, no definite abscess seen  5. Pancytopenia  6. Acute kidney injury    PLANS  1. Augmentin twice daily for perirectal pain,? Abscess  2. Paracentesis  3. Biopsy liver mass  4. I will follow with you  5. I have updated Dr. Tristen Cervantes about the admission and her condition. SUBJECTIVE:  History of Chief Complaint:    Shine Cavanaugh is a 69 y. o.female who presents with a chief complaint of abdominal distention and difficulty breathing to the emergency room 2 days ago. I had seen her in the office last week for rectal pain and noted the abdominal distention and ordered a CAT scan. The CAT scan was done Leonard night and the emergency room and it showed cirrhosis of the liver with ascites and a left renal mass. She is scheduled to have a paracentesis done today. She denies any fevers chills or sweats. She denies any abdominal pain, nausea or vomiting. Her white blood cell count is 4600, hemoglobin 8.4 and platelet count 353,599. She has no history of alcoholism, Tylenol abuse or hepatitis. She does state that there is a history of multiple liver issues in her family and one family member needed a liver transplant. Past Medical History   has a past medical history of Arthritis, Cardiomyopathy ([address] Chronic kidney disease, Hypertension, Liver disease, Other disorders of kidney and ureter in diseases classified elsewhere, Pneumonia, Psychiatric problem, Sleep apnea, obstructive, and Type II or unspecified type diabetes mellitus without mention of complication, not stated as uncontrolled.   Past Surgical History   has a past surgical history that includes Hysterectomy; Cholecystectomy; other surgical history; Colonoscopy (4/23/2013); Appendectomy; fracture surgery; and Foot fracture surgery (Bilateral). Medications  Prior to Admission medications    Medication Sig Start Date End Date Taking? Authorizing Provider   citalopram (CELEXA) 10 MG tablet  6/9/20  Yes Historical Provider, MD   glimepiride (AMARYL) 2 MG tablet TAKE 1 TABLET EVERY MORNING BEFORE BREAKFAST 4/27/20  Yes Festus Vargas MD   metoprolol succinate (TOPROL XL) 100 MG extended release tablet Take 1 tablet by mouth daily  Patient taking differently: Take 100 mg by mouth daily Patient takes BID 4/10/20  Yes Festus Vargas MD   metFORMIN (GLUCOPHAGE) 1000 MG tablet TAKE 1 TABLET TWICE A DAY WITH MEALS 3/27/20  Yes TANNER Porras CNP   lisinopril (PRINIVIL;ZESTRIL) 5 MG tablet TAKE 1 TABLET DAILY 12/2/19  Yes Festus Vargas MD   atorvastatin (LIPITOR) 40 MG tablet TAKE 1 TABLET DAILY 10/1/19  Yes Festus Vargas MD   raloxifene (EVISTA) 60 MG tablet TAKE 1 TABLET DAILY 9/3/19  Yes Festus Vargas MD   glucose blood VI test strips (ACCU-CHEK SASHA) strip Test 1-2 times a day. Dx: 250.00. Accu-check sasha strips 11/28/12  Yes Festus Vargas MD   Lancet Device MISC Test 1-2 times a day. Dx: 250.00. Lancets 10/10/12  Yes Festus Vargas MD   aspirin 81 MG EC tablet Take 81 mg by mouth 2 times daily.      Yes Historical Provider, MD    Scheduled Meds:   insulin lispro  0-6 Units Subcutaneous TID WC    insulin lispro  0-3 Units Subcutaneous Nightly    spironolactone  25 mg Oral Daily    furosemide  20 mg Oral Daily    aspirin  81 mg Oral BID    atorvastatin  40 mg Oral Daily    sodium chloride flush  10 mL Intravenous 2 times per day    enoxaparin  30 mg Subcutaneous Daily    metoprolol tartrate  50 mg Oral BID    citalopram  10 mg Oral Daily     Continuous Infusions:   dextrose 100 mL/hr (07/28/20 0619)     PRN Meds:.glucose, dextrose, glucagon (rDNA), dextrose, sodium chloride flush, acetaminophen **OR** acetaminophen, polyethylene glycol, promethazine **OR** ondansetron  Allergies  is allergic to codeine. Family History  family history includes Arrhythmia in her father and mother; Breast Cancer in her sister; Diabetes in her brother and mother; High Blood Pressure in her brother and mother. Social History   reports that she has never smoked. She quit smokeless tobacco use about 36 years ago. She reports that she does not drink alcohol or use drugs. Review of Systems:  14 systems were reviewed and negative other than [place]    OBJECTIVE  CURRENT VITALS:  height is 5' 3\" (1.6 m) and weight is 200 lb (90.7 kg). Her oral temperature is 97.2 °F (36.2 °C). Her blood pressure is 130/50 (abnormal) and her pulse is 73. Her respiration is 16 and oxygen saturation is 100%. Temperature Range (24h):Temp: 97.2 °F (36.2 °C) Temp  Av.7 °F (36.5 °C)  Min: 97.2 °F (36.2 °C)  Max: 98.1 °F (36.7 °C)  BP Range (01I): Systolic (15CCR), FQN:501 , Min:127 , PKV:461     Diastolic (14EMC), JHE:21, Min:50, Max:59    Pulse Range (24h): Pulse  Av.2  Min: 73  Max: 156  Respiration Range (24h): Resp  Av  Min: 16  Max: 16  Current Pulse Ox (24h):  SpO2: 100 %  Pulse Ox Range (24h):  SpO2  Av %  Min: 94 %  Max: 100 %  Oxygen Amount and Delivery: O2 Flow Rate (L/min): 0 L/min  CONSTITUTIONAL: Alert and oriented times 3, no acute distress and cooperative to examination with proper mood and affect. SKIN: Skin color, texture, turgor normal. No rashes or lesions. , no bruising  LYMPH: no cervical nodes, no inguinal nodes  HEENT: Head is normocephalic, atraumatic. PERRLA, Mucous membranes are moist.   NECK: Supple, symmetrical, trachea midline, no adenopathy, thyroid symmetric, not enlarged and no tenderness,. CHEST/LUNGS: chest symmetric with normal A/P diameter, normal respiratory rate and rhythm, lungs clear to auscultation without wheezes, rales or rhonchi. Decreased breath sounds in both bases.    CARDIOVASCULAR: Heart sounds are normal.  Regular rate and rhythm without murmur  ABDOMEN: distended. Normal bowel sounds. No bruits. Firm, distended, no masses or organomegaly. no evidence of hernia. Tenderness: absent. RECTAL: Several tender external hemorrhoids and right perirectal tenderness but no induration or abscess seen, no drainage seen  NEUROLOGIC: There are no focalizing motor or sensory deficits. CN II-XII are grossly intact. [place] EXTREMITIES: no cyanosis, no clubbing and no edema. PYSCH:  Mood, affect and judgement are normal, alert and oriented x 3  LABS:  Recent Labs     07/26/20  1715 07/27/20  0517 07/27/20  1335 07/27/20  1932 07/28/20  0517   WBC 7.2 6.0  --   --  4.6*   HGB 10.0* 8.3*  --   --  8.4*   HCT 31.1* 26.3*  --   --  26.3*    124*  --   --  109*    130*  --  130* 132*   K 4.8 4.4  --  4.9 4.7    100  --  98 100   CO2 17* 17*  --  17* 20   BUN 31* 32*  --  33* 32*   CREATININE 3.61* 3.64*  --  3.49* 3.28*   CALCIUM 8.5 7.9*  --  8.5 8.0*   INR 1.3  --   --   --   --    AST 23  --   --   --   --    ALT 10  --   --   --   --    BILITOT 0.7  --   --   --   --    LIPASE 96*  --   --   --   --    NITRU  --   --  Negative  --   --    COLORU  --   --  Yellow  --   --    BACTERIA  --   --  Negative  --   --        RADIOLOGY:  I have personally reviewed the following films:  CT scan abd/pelvis: Left kidney mass, ascites, small umbilical hernia, liver cirrhosis  Thank you for the interesting evaluation. Further recommendations to follow.     Electronically signed by Pricila Nix MD on 7/28/20 at 8:39 AM EDT

## 2020-07-28 NOTE — SEDATION DOCUMENTATION
NO SEDATION    1348 Dr. Micki Rae  obtained images prior to start of procedure using U/S. P  VSS. 1348  Procedure explained, consent was signed earlier signed. 1347 Timeout completed. Using U/S, Dr. Micki Rae marked, prepped left lower abdomen with Chloraprep and draped with sterile drape and towels. 1349  Site numbed with lidocaine. Oac1utmh Centesis 5F catheter placed using Ultrasound guidance. Fluid appears clear yellow. Catheter tubing connected to [employer] machine to remove fluid. 1443  Pt tolerated well. Total 9015  ml removed. Catheter removed, pressure held and bandaid applied. Verbal and tactile reassurance given throughout. 1444 Specimens sent to lab. Report called to nurse on floor.

## 2020-07-28 NOTE — PROGRESS NOTES
Department of Internal Medicine  Progress Note      SUBJECTIVE: Patient is status post paracentesis. Afebrile and hemodynamically stable. Had 9 L fluid removed today. No acute events overnight.        ROS:  All 12 systems reviewed and negative except mentioned in HPI and Assessment and plan    MEDICATIONS:  Current Facility-Administered Medications   Medication Dose Route Frequency Provider Last Rate Last Dose    albumin human 25 % IV solution 50 g  50 g Intravenous Once Robby Charles APRN - CNP        albumin human 25 % IV solution 50 g  50 g Intravenous Once Ana M Peres MD        insulin lispro (HUMALOG) injection vial 0-6 Units  0-6 Units Subcutaneous TID WC Ana M Peres MD        insulin lispro (HUMALOG) injection vial 0-3 Units  0-3 Units Subcutaneous Nightly Ana M Parker MD        glucose (GLUTOSE) 40 % oral gel 15 g  15 g Oral PRN Ana M Parker MD        dextrose 50 % IV solution  12.5 g Intravenous PRN Ana M Parker MD   12.5 g at 07/28/20 0619    glucagon (rDNA) injection 1 mg  1 mg Intramuscular PRN Ana M Parker MD        dextrose 5 % solution  100 mL/hr Intravenous PRN Ana M Peres  mL/hr at 07/28/20 0619 100 mL/hr at 07/28/20 8346    spironolactone (ALDACTONE) tablet 25 mg  25 mg Oral Daily Ana M Parker MD        furosemide (LASIX) tablet 20 mg  20 mg Oral Daily Ana M Parker MD        aspirin EC tablet 81 mg  81 mg Oral BID Kristi Mckinnon MD   81 mg at 07/27/20 2008    atorvastatin (LIPITOR) tablet 40 mg  40 mg Oral Daily Kristi Mckinnon MD   40 mg at 07/27/20 9610    sodium chloride flush 0.9 % injection 10 mL  10 mL Intravenous 2 times per day Kristi Mckinnon MD   10 mL at 07/27/20 7776    sodium chloride flush 0.9 % injection 10 mL  10 mL Intravenous PRN Kristi Mckinnon MD        acetaminophen (TYLENOL) tablet 650 mg  650 mg Oral Q6H PRN Kristi Mckinnon MD        Or    acetaminophen (TYLENOL) suppository 650 mg  650 mg Rectal Q6H PRN MD Chas Stevensa polyethylene glycol (GLYCOLAX) packet 17 g  17 g Oral Daily PRN Geovany Padgett MD        promethazine (PHENERGAN) tablet 12.5 mg  12.5 mg Oral Q6H PRN Geovany Padgett MD        Or    ondansetron [facility]) injection 4 mg  4 mg Intravenous Q6H PRN Geovany Padgett MD        enoxaparin (LOVENOX) injection 30 mg  30 mg Subcutaneous Daily Geovany Padgett MD   Stopped at 07/28/20 0900    metoprolol tartrate (LOPRESSOR) tablet 50 mg  50 mg Oral BID TANNER Looney CNP   50 mg at 07/27/20 2008    citalopram (CELEXA) tablet 10 mg  10 mg Oral Daily TANNER Looney CNP   10 mg at 07/27/20 6955         OBJECTIVE:  Vital Signs:  Vitals:    07/28/20 1444   BP: (!) 124/58   Pulse: 80   Resp: 16   Temp:    SpO2: 99%       Focal exam:      General Exam (except as mentioned above):  CONSTITUTIONAL: Awake, alert, no apparent distress  EYES:  PERRL, conjunctiva normal  ENT:  Normocephalic, atraumatic  NECK:  Supple  BACK:  Symmetric  LUNGS:  CTAB except bilateral basilar crackles. CARDIOVASCULAR:  S1S2 present  ABDOMEN:  soft, non-distended, non-tender  MUSCULOSKELETAL:  There is no redness, warmth, or swelling of the joints. NEUROLOGIC:  Alert, awake, oriented x 3. No FND  EXTREMITIES: Warm and well perfused. LABS  Recent Labs     07/26/20  1715 07/27/20 0517 07/28/20 0517   WBC 7.2 6.0 4.6*   RBC 3.51* 2.95* 2.98*   HGB 10.0* 8.3* 8.4*   HCT 31.1* 26.3* 26.3*   MCV 88.6 89.0 88.4   MCH 28.5 28.2 28.3   MCHC 32.2* 31.7* 32.0*   RDW 17.8* 17.7* 17.3*    124* 109*       Recent Labs     07/27/20 0517 07/27/20  1932 07/28/20 0517   * 130* 132*   K 4.4 4.9 4.7    98 100   CO2 17* 17* 20   BUN 32* 33* 32*   CREATININE 3.64* 3.49* 3.28*   GLUCOSE 50* 54* 45*   CALCIUM 7.9* 8.5 8.0*       No results for input(s): MG in the last 72 hours.         ASSESSMENT AND PLAN    Active Hospital Problems    Diagnosis Date Noted    FALLON (acute kidney [facility]) ([facility] [N17.9] 07/26/2020     Liver cirrhosis with ascitis: In absence of alcohol use. Probably from hepatic steatosis. Presenting with ascites. Status post 9 L of fluid removal  Patient received 50 g of albumin x2    Acute kidney injury: Mostly from hepatorenal syndrome. started on aldactone and Lasix     Left renal mass: OP renal biopsy and patient os on Aspirin.     Diabetes mellitus: On metformin prior to admission.   Continue sliding scale insulin for now.     DVT prophylaxis: resume lovenox      35 minutes total care time, >1/2 in unit/floor time and care coordination   Ayad Tang MD  Pager : [phone]

## 2020-07-28 NOTE — PROGRESS NOTES
Nephrology Progress Note       Assessment:  69y.o. year old female with history s/f T2DM, HTN, HARDEEP, HOCM who presented for SOB, weight gain, nausea and abdominal fullness. Found to have FALLON     1. FALLON: etiology unclear, ? Prerenal in setting of lisinopril use vs. HRS vs. Increased abdominal pressure, nml renal function at baseline, Scr 0.4-0.5. UA bland making GN unlikely  2. Metabolic acidosis: 2/2 renal failure, improving  3. Cirrhosis c/b ascites  4. LT renal mass: hem/onc onboard  5. Hypoalbuminemia  6. Anemia/thrombocytopenia  7. Hyponatremia: improving   8.  Hypoglycemia     Plan:  - paracentesis today  - due to worsening LE edema no need for isotonic fluids  - at risk for worsening Na due pt being on D5W, would stop this and encourage PO once paracentesis is done or can use D50 injections  - will likely resume diuretics tomorrow    Patient Active Problem List:     Type 2 diabetes mellitus without complication, without long-term current use of insulin (HCC)     Hypertension     Bell-rectal abscess     Bilateral carotid artery stenosis     HOCM (hypertrophic obstructive cardiomyopathy) (HCC)     HARDEEP (obstructive sleep apnea)     Pseudophakia of both eyes     Regular astigmatism     Cardiomyopathy (HCC)     Rectal pain     Abdominal distension     FALLON (acute kidney injury) ([facility]      Subjective:  Admit Date: 7/26/2020    Interval History: renal function slightly better, getting paracentesis at 2 pm today     Medications:  Scheduled Meds:   insulin lispro  0-6 Units Subcutaneous TID WC    insulin lispro  0-3 Units Subcutaneous Nightly    spironolactone  25 mg Oral Daily    furosemide  20 mg Oral Daily    aspirin  81 mg Oral BID    atorvastatin  40 mg Oral Daily    sodium chloride flush  10 mL Intravenous 2 times per day    enoxaparin  30 mg Subcutaneous Daily    metoprolol tartrate  50 mg Oral BID    citalopram  10 mg Oral Daily     Continuous Infusions:   dextrose 100 mL/hr (07/28/20 2500)       CBC: Recent Labs     07/27/20  0517 07/28/20  0517   WBC 6.0 4.6*   HGB 8.3* 8.4*   * 109*     CMP:    Recent Labs     07/27/20  0517 07/27/20  1932 07/28/20  0517   * 130* 132*   K 4.4 4.9 4.7    98 100   CO2 17* 17* 20   BUN 32* 33* 32*   CREATININE 3.64* 3.49* 3.28*   GLUCOSE 50* 54* 45*   CALCIUM 7.9* 8.5 8.0*   LABGLOM 12.2* 12.9* 13.8*     Troponin: No results for input(s): TROPONINI in the last 72 hours. BNP: No results for input(s): BNP in the last 72 hours. INR:   Recent Labs     07/26/20  1715   INR 1.3     Lipids:   Recent Labs     07/26/20  1715   LIPASE 96*     Liver:   Recent Labs     07/26/20  1715   AST 23   ALT 10   ALKPHOS 87   PROT 6.3   LABALBU 3.2*   BILITOT 0.7     Iron:  No results for input(s): IRONS, FERRITIN in the last 72 hours. Invalid input(s): LABIRONS  Urinalysis: No results for input(s): UA in the last 72 hours.     Objective:  Vitals: BP (!) 130/50   Pulse 73   Temp 97.2 °F (36.2 °C) (Oral)   Resp 16   Ht 5' 3\" (1.6 m)   Wt 200 lb (90.7 kg)   SpO2 100%   BMI 35.43 kg/m²    Wt Readings from Last 3 Encounters:   07/26/20 200 lb (90.7 kg)   07/24/20 218 lb (98.9 kg)   07/10/20 209 lb (94.8 kg)      24HR INTAKE/OUTPUT:      Intake/Output Summary (Last 24 hours) at 7/28/2020 1327  Last data filed at 7/27/2020 1752  Gross per 24 hour   Intake 797 ml   Output 125 ml   Net 672 ml     General: alert, in no apparent distress  HEENT: normocephalic, atraumatic, anicteric  Lungs: non-labored respirations, clear to auscultation bilaterally  Heart: regular rate and rhythm, no murmurs or rubs  Abdomen: soft, non-tender, non-distended  MSK: no joint swelling or tenderness  Ext: no cyanosis, 2+ BLE peripheral edema      Electronically signed by Deborah Arias MD

## 2020-07-28 NOTE — PROGRESS NOTES
Hematology/Oncology   Progress Note        CHIEF COMPLAINT/HPI:  Follow up of kidney mass. Paracentesis done. CEA is normal.     REVIEW OF SYSTEMS:    Unremarkable except for symptoms mentioned in HPI.     Current Inpatient Medications:    Current Facility-Administered Medications   Medication Dose Route Frequency Provider Last Rate Last Dose    insulin lispro (HUMALOG) injection vial 0-6 Units  0-6 Units Subcutaneous TID WC Ana M Lares MD        insulin lispro (HUMALOG) injection vial 0-3 Units  0-3 Units Subcutaneous Nightly Ana M Parker MD        glucose (GLUTOSE) 40 % oral gel 15 g  15 g Oral PRN Ana M Parker MD        dextrose 50 % IV solution  12.5 g Intravenous PRN Ana M Lares MD   12.5 g at 07/28/20 0619    glucagon (rDNA) injection 1 mg  1 mg Intramuscular PRN Ana M Lares MD        dextrose 5 % solution  100 mL/hr Intravenous PRN Ana M Lraes  mL/hr at 07/28/20 0619 100 mL/hr at 07/28/20 8328    spironolactone (ALDACTONE) tablet 25 mg  25 mg Oral Daily Ana M Parker MD        furosemide (LASIX) tablet 20 mg  20 mg Oral Daily Ana M Lares MD        aspirin EC tablet 81 mg  81 mg Oral BID Rosmery Rosas MD   81 mg at 07/27/20 2008    atorvastatin (LIPITOR) tablet 40 mg  40 mg Oral Daily Rosmery Rosas MD   40 mg at 07/27/20 2855    sodium chloride flush 0.9 % injection 10 mL  10 mL Intravenous 2 times per day Rosmery Rosas MD   10 mL at 07/27/20 5975    sodium chloride flush 0.9 % injection 10 mL  10 mL Intravenous PRN Rosmery Rosas MD        acetaminophen (TYLENOL) tablet 650 mg  650 mg Oral Q6H PRN Rosmery Rosas MD        Or    acetaminophen (TYLENOL) suppository 650 mg  650 mg Rectal Q6H PRN Rosmery Rosas MD        polyethylene glycol (GLYCOLAX) packet 17 g  17 g Oral Daily PRN Rosmery Rosas MD        promethazine (PHENERGAN) tablet 12.5 mg  12.5 mg Oral Q6H PRN Rosmery Rosas MD        Or    ondansetron (ZOFRAN) injection 4 mg  4 mg Intravenous Q6H PRN Maggy A MD Daren        enoxaparin (LOVENOX) injection 30 mg  30 mg Subcutaneous Daily Linda Patterson MD   Stopped at 07/28/20 0900    metoprolol tartrate (LOPRESSOR) tablet 50 mg  50 mg Oral BID TANNER Peng - CNP   50 mg at 07/27/20 2008    citalopram (CELEXA) tablet 10 mg  10 mg Oral Daily Faisal Kimbrough TANNER Vaca - CNP   10 mg at 07/27/20 0173       PHYSICAL EXAM:    EYES:  Lids and lashes normal, pupils equal, round and reactive to light, extra ocular muscles intact, sclera clear, conjunctiva normal    ENT:  Normocephalic, without obvious abnormality, atraumatic, sinuses nontender on palpation, external ears without lesions, oral pharynx with moist mucus membranes, tonsils without erythema or exudates, gums normal and good dentition. NECK:  Supple, symmetrical, trachea midline, no adenopathy, thyroid symmetric, not enlarged and no tenderness, skin normal    CHEST:    LUNGS:  No increased work of breathing, good air exchange, clear to auscultation bilaterally, no crackles or wheezing    CARDIOVASCULAR:  Normal apical impulse, regular rate and rhythm, normal S1 and S2, no S3 or S4, and no murmur noted    ABDOMEN:  No scars, normal bowel sounds, soft, non-distended, non-tender, no masses palpated, no hepatosplenomegally    MUSCULOSKELETAL:  There is no redness, warmth, or swelling of the joints. Full range of motion noted. Motor strength is 5 out of 5 all extremities bilaterally.   Tone is normal.  EXTREMITIES:    NEURO:    DATA:      PT/INR:  No results found for: PTINR  PTT:    Lab Results   Component Value Date    APTT 39.6 07/26/2020     CMP:    Lab Results   Component Value Date     07/28/2020    K 4.7 07/28/2020    K 4.4 07/27/2020     07/28/2020    CO2 20 07/28/2020    BUN 32 07/28/2020    PROT 6.3 07/26/2020     Magnesium:  No results found for: MG  Phosphorus:  No components found for: PO4  Calcium:  No components found for: CA  CBC:    Lab Results   Component Value Date WBC 4.6 07/28/2020    RBC 2.98 07/28/2020    HGB 8.4 07/28/2020    HCT 26.3 07/28/2020    MCV 88.4 07/28/2020    RDW 17.3 07/28/2020     07/28/2020     DIFF:    Lab Results   Component Value Date    MCV 88.4 07/28/2020    RDW 17.3 07/28/2020      LDH:  No results found for: LDH  Uric Acid:  No components found for: URIC    EKG Reviewed  Appropriate Radiology Reviewed      Pathology: Reviewed where indicated      ASSESSMENT:  Active Problems:    FALLON (acute kidney injury) ([address]  Resolved Problems:    * No resolved hospital problems.  *    Patient Active Problem List   Diagnosis    Type 2 diabetes mellitus without complication, without long-term current use of insulin ([address]    Hypertension    Bell-rectal abscess    Bilateral carotid artery stenosis    HOCM (hypertrophic obstructive cardiomyopathy) ([address]    HARDEEP (obstructive sleep apnea)    Pseudophakia of both eyes    Regular astigmatism    Cardiomyopathy ([address]    Rectal pain    Abdominal distension    FALLON (acute kidney injury) ([address]       PLAN:  Await paracentesis cytology/          Electronically signed by Norma Zepeda MD on 7/28/20 at 12:32 PM EDT

## 2020-07-29 NOTE — FLOWSHEET NOTE
Pt A & O x4. Lungs clear. Bowel sounds present. Pt stated she has urinated about 4 times 1/2 cup each time.

## 2020-07-29 NOTE — PROGRESS NOTES
Pt states she feels so much better post paracentesis. No pressure or pain in abdomen. Lungs clear, A&Ox4, no edema. 0030 Albumin infusion completed    0640  Glucose 48 on morning labs. Orange juice given. Pt feeling well. 0730  Repeat POC glucose 102.

## 2020-07-29 NOTE — PROGRESS NOTES
Pt Name: [id number] [facility] Record Number: [id number]  Date of Birth 1945   Admit date 7/26/2020  4:54 PM  Today's Date: 7/29/2020     ASSESSMENT  1. Hospital day # 3  2. Acute kidney injury, improving  3. Ascites, testing pending after paracentesis  4. Rectal discomfort, no evidence of abscess    PLAN  1. I will monitor with you    SUBJECTIVE  Chief complaint: Feeling better  Afebrile, vital signs are stable. She denies any nausea or vomiting, has passed flatus and had a bowel movement. She is tolerating a DIET GENERAL; Carb Control: 4 carb choices (60 gms)/meal. Her pain is well controlled on current medications. She has been ambulating in the halls. My understanding is that 9 L were taken off of her yesterday for paracentesis and testing is sent. has a past medical history of Arthritis, Cardiomyopathy ([address] Chronic kidney disease, Hypertension, Liver disease, Other disorders of kidney and ureter in diseases classified elsewhere, Pneumonia, Psychiatric problem, Sleep apnea, obstructive, and Type II or unspecified type diabetes mellitus without mention of complication, not stated as uncontrolled. CURRENT MEDS  Scheduled Meds:   albumin human  50 g Intravenous Once    insulin lispro  0-6 Units Subcutaneous TID WC    insulin lispro  0-3 Units Subcutaneous Nightly    spironolactone  25 mg Oral Daily    furosemide  20 mg Oral Daily    aspirin  81 mg Oral BID    atorvastatin  40 mg Oral Daily    sodium chloride flush  10 mL Intravenous 2 times per day    enoxaparin  30 mg Subcutaneous Daily    metoprolol tartrate  50 mg Oral BID    citalopram  10 mg Oral Daily     Continuous Infusions:   dextrose 100 mL/hr (07/28/20 0619)     PRN Meds:.glucose, dextrose, glucagon (rDNA), dextrose, sodium chloride flush, acetaminophen **OR** acetaminophen, polyethylene glycol, promethazine **OR** ondansetron    OBJECTIVE  CURRENT VITALS:  height is 5' 3\" (1.6 m) and weight is 200 lb (90.7 kg).  Her oral temperature is 98 °F (36.7 °C). Her blood pressure is 136/46 (abnormal) and her pulse is 61. Her respiration is 16 and oxygen saturation is 100%. Temperature Range (24h):Temp: 98 °F (36.7 °C) Temp  Av °F (36.7 °C)  Min: 98 °F (36.7 °C)  Max: 98 °F (36.7 °C)  BP Range (68U): Systolic (18ZPZ), FWE:615 , Min:124 , UUI:979     Diastolic (01ENN), PDY:46, Min:46, Max:63    Pulse Range (24h): Pulse  Av  Min: 61  Max: 89  Respiration Range (24h): Resp  Av  Min: 16  Max: 16    GENERAL: alert, no distress  LUNGS: clear to ausculation, without wheezes, rales or rhonci  HEART: normal rate and regular rhythm  ABDOMEN: soft, non-tender, non-distended, bowel sounds present in all 4 quadrants and no guarding or peritoneal signs  EXTERMITY: no cyanosis, clubbing or edema  No intake/output data recorded. LABS  Recent Labs     20  17120  0517 20  1932 20  0517 20  05   WBC 7.2 6.0  --  4.6*  --    HGB 10.0* 8.3*  --  8.4*  --    HCT 31.1* 26.3*  --  26.3*  --     124*  --  109*  --     130* 130* 132* 135   K 4.8 4.4 4.9 4.7 4.7    100 98 100 102   CO2 17* 17* 17* 20 20   BUN 31* 32* 33* 32* 29*   CREATININE 3.61* 3.64* 3.49* 3.28* 2.49*   CALCIUM 8.5 7.9* 8.5 8.0* 8.2*      Recent Labs     20   INR 1.3     Recent Labs     20   AST 23   ALT 10   BILITOT 0.7   LIPASE 96*       RADIOLOGY  Ct Abdomen Pelvis Wo Contrast Additional Contrast? None    Result Date: 2020  CT of the Abdomen and Pelvis without intravenous contrast medium History:  Abdominal distention for 6 weeks. Technical Factors: CT imaging of the abdomen and pelvis were obtained and formatted as 5 mm contiguous axial images from the domes of the diaphragm to the symphysis pubis. Sagittal and coronal reconstructions were also obtained. Oral contrast medium:  None. Intravenous contrast medium:  None. Comparison:  None. Findings: Lungs:  Lung bases are clear.  Liver:  Small in size. Mildly nodular contour. Bile Ducts:  Normal in caliber. Gallbladder:  Gallbladder surgically absent. Pancreas:  Normal without masses, cysts, ductal dilatation or calcification. Spleen:  Normal in size without masses or calcifications. No splenules. Kidneys:  4.6 x 5.4 x 4.9 cm heterogenous exophytic mass, lower pole left kidney (series 2, image 49, series 601, image 27). No calculi. No hydronephrosis. Kidneys normal in size bilaterally. Adrenals:  Normal. Small bowel:  Normal in caliber. Appendix:  Appendix surgically absent. Colon:  Normal in caliber. Peritoneum:  No free air. Large amount of free fluid identified perihepatic, extending caudally within mesentery, paracolic gutters bilaterally, and into the pelvis. Vessels: Aorta normal in course and caliber. Lymph nodes:  Retroperitoneal:  No enlarged retroperitoneal lymph nodes. Mesenteric:  No enlarged mesenteric lymph nodes. Pelvic: No enlarged pelvic lymph nodes. Ureters: Normal in course and caliber. No calcifications. Bladder: No wall thickening. Reproductive organs: Uterus surgically absent. Abdominal Wall: Diffuse edematous change, anterior abdominal wall, as well as dependent portion, pelvic wall. 14 mm fat-containing periumbilical anterior abdominal wall defect. Bones:  No bone lesions. This space narrowing L4-5. No post operative changes. 4.6 x 5.4 x 4.9 cm lower pole left renal mass. Malignancy diagnosis of exclusion. Cirrhosis. Large volume ascites. Cholecystectomy. Appendectomy. Hysterectomy. Other findings discussed. All CT scans at this facility use dose modulation, iterative reconstruction, and/or weight based dosing when appropriate to reduce radiation dose to as low as reasonably achievable.      Us Dup Lower Extremity Right Elizabeth    Result Date: 7/9/2020  US DUP LOWER EXTREMITY RIGHT ELIZABETH CLINICAL HISTORY: I82.4Y1 Deep vein thrombosis (DVT) of proximal vein of right lower extremity, unspecified chronicity ([facility] ICD10 COMPARISONS: FINDINGS: Grayscale as well as Duplex color and Spectra; Doppler ultrasound of the deep venous system of the right lower extremity from the inguinal ligament to the popliteal fossa was performed. There are no findings of deep venous thrombus in the visualized vessels of the right lower extremity. NO FINDINGS OF DEEP VENOUS THROMBUS IN  THE VISUALIZED VESSELS OF THE RIGHT LOWER EXTREMITY. Us Retroperitoneal Limited    Result Date: 7/27/2020  EXAMINATION: Ultrasound the kidneys CLINICAL HISTORY: Abnormal CT FINDINGS: Multiple scans the kidneys performed. There is a hypoechoic relatively homogeneous solid mass projecting off the inferior left kidney measuring 4.5 x 5.0 x 4.8 cm in diameter. Renal cell tumor would have to be a primary consideration. No other renal mass. No obstructive uropathy. No definite renal calculi. A large amount of ascites is present. The contour of the liver is somewhat irregular and this is likely related to chronic hepatic disease. No definite peritoneal mass or retroperitoneal mass or adenopathy. Right kidney is 11.2 x 6.5 x 5.3 cm. Left kidney is 12.7 x 6.3 x 5.2 cm. 5 CM SOLID MASS PROJECTING OFF THE INFERIOR LEFT KIDNEY. RENAL CELL CARCINOMA IS THE LEADING DIAGNOSIS. ASCITES AND HEPATOCELLULAR DISEASE FELT TO BE INCIDENTAL FINDINGS.     Electronically signed by Froilan Daugherty MD on 7/29/2020 at 9:48 AM

## 2020-07-29 NOTE — PROGRESS NOTES
Hematology/Oncology   Progress Note        CHIEF COMPLAINT/HPI:  Follow up of left kidney mass. CEA is normal. Refer to dr Ludy Sheldon for evaluation for diagnosis and treatment. Creatinine is improving. REVIEW OF SYSTEMS:    Unremarkable except for symptoms mentioned in HPI.     Current Inpatient Medications:    Current Facility-Administered Medications   Medication Dose Route Frequency Provider Last Rate Last Dose    albumin human 25 % IV solution 50 g  50 g Intravenous Once Robby Charles, APRN - CNP        insulin lispro (HUMALOG) injection vial 0-6 Units  0-6 Units Subcutaneous TID  Ana M Cardenas MD        insulin lispro (HUMALOG) injection vial 0-3 Units  0-3 Units Subcutaneous Nightly Ana M Parker MD        glucose (GLUTOSE) 40 % oral gel 15 g  15 g Oral PRN Ana M Parker MD        dextrose 50 % IV solution  12.5 g Intravenous PRN Ana M Cardenas MD   12.5 g at 07/28/20 0619    glucagon (rDNA) injection 1 mg  1 mg Intramuscular PRN Ana M Cardenas MD        dextrose 5 % solution  100 mL/hr Intravenous PRN Ana M Cardenas  mL/hr at 07/28/20 0619 100 mL/hr at 07/28/20 5678    spironolactone (ALDACTONE) tablet 25 mg  25 mg Oral Daily Ana M Cardenas MD        furosemide (LASIX) tablet 20 mg  20 mg Oral Daily Ana M Cardenas MD   20 mg at 07/29/20 1032    aspirin EC tablet 81 mg  81 mg Oral BID Radha Montalvo MD   81 mg at 07/29/20 1036    atorvastatin (LIPITOR) tablet 40 mg  40 mg Oral Daily Radha Montalvo MD   40 mg at 07/29/20 1033    sodium chloride flush 0.9 % injection 10 mL  10 mL Intravenous 2 times per day Radha Mnotalvo MD   10 mL at 07/29/20 1034    sodium chloride flush 0.9 % injection 10 mL  10 mL Intravenous PRN Radha Montalvo MD        acetaminophen (TYLENOL) tablet 650 mg  650 mg Oral Q6H PRN Radha Montalvo MD        Or    acetaminophen (TYLENOL) suppository 650 mg  650 mg Rectal Q6H PRN Radha Montalvo MD        polyethylene glycol (GLYCOLAX) packet 17 g  17 g Oral Daily PRN 07/29/2020    PROT 6.3 07/26/2020     Magnesium:  No results found for: MG  Phosphorus:  No components found for: PO4  Calcium:  No components found for: CA  CBC:    Lab Results   Component Value Date    WBC 4.6 07/28/2020    RBC 2.98 07/28/2020    HGB 8.4 07/28/2020    HCT 26.3 07/28/2020    MCV 88.4 07/28/2020    RDW 17.3 07/28/2020     07/28/2020     DIFF:    Lab Results   Component Value Date    MCV 88.4 07/28/2020    RDW 17.3 07/28/2020      LDH:  No results found for: LDH  Uric Acid:  No components found for: URIC    EKG Reviewed  Appropriate Radiology Reviewed      Pathology: Reviewed where indicated      ASSESSMENT:  Active Problems:    FALLON (acute kidney injury) ([address]  Resolved Problems:    * No resolved hospital problems. *    Patient Active Problem List   Diagnosis    Type 2 diabetes mellitus without complication, without long-term current use of insulin ([address]    Hypertension    Bell-rectal abscess    Bilateral carotid artery stenosis    HOCM (hypertrophic obstructive cardiomyopathy) ([address]    HARDEEP (obstructive sleep apnea)    Pseudophakia of both eyes    Regular astigmatism    Cardiomyopathy ([address]    Rectal pain    Abdominal distension    FALLON (acute kidney injury) ([address]       PLAN:  Consult dr Angelito Oliva.           Electronically signed by Stephanie Alvarez MD on 7/29/20 at 11:00 AM EDT

## 2020-07-29 NOTE — CONSULTS
CC: Abdominal distention     HPI:  Patient is a pleasant 69year old woman who presented to the hospital with complaint of increased abdominal distention and decreased urine output. This has been going on for 6 months. This has also been causing shortness of breath. She currently has a diagnosis of liver cirrhosis of uncertain etiology. Interventional radiology was consulted for a diagnostic and therapeutic paracentesis. Family History   Problem Relation Age of Onset    Arrhythmia Mother     Diabetes Mother     High Blood Pressure Mother     Arrhythmia Father     Breast Cancer Sister     Diabetes Brother     High Blood Pressure Brother        Past Surgical History:   Procedure Laterality Date    APPENDECTOMY      CHOLECYSTECTOMY      COLONOSCOPY  4/23/2013    FOOT FRACTURE SURGERY Bilateral     FRACTURE SURGERY      HYSTERECTOMY      OTHER SURGICAL HISTORY      removal of anal fistulotomy        Past Medical History:   Diagnosis Date    Arthritis     Cardiomyopathy [facility])     mother history.     Chronic kidney disease     Hypertension     Liver disease     Other disorders of kidney and ureter in diseases classified elsewhere     Pneumonia     Psychiatric problem     Sleep apnea, obstructive     Type II or unspecified type diabetes mellitus without mention of complication, not stated as uncontrolled        Social History     Socioeconomic History    Marital status:      Spouse name: None    Number of children: None    Years of education: None    Highest education level: None   Occupational History    None   Social Needs    Financial resource strain: Not hard at all   Cristino-Sukhdeep insecurity     Worry: Never true     Inability: Never true   [employer] needs     Medical: None     Non-medical: None   Tobacco Use    Smoking status: Never Smoker    Smokeless tobacco: Former User   Substance and Sexual Activity    Alcohol use: No    Drug use: No    Sexual activity: Yes Lifestyle    Physical activity     Days per week: None     Minutes per session: None    Stress: None   Relationships    Social connections     Talks on phone: None     Gets together: None     Attends Bahai service: None     Active member of club or organization: None     Attends meetings of clubs or organizations: None     Relationship status: None    Intimate partner violence     Fear of current or ex partner: None     Emotionally abused: None     Physically abused: None     Forced sexual activity: None   Other Topics Concern    None   Social History Narrative    None       Allergies   Allergen Reactions    Codeine        No current facility-administered medications on file prior to encounter. Current Outpatient Medications on File Prior to Encounter   Medication Sig Dispense Refill    citalopram (CELEXA) 10 MG tablet       glimepiride (AMARYL) 2 MG tablet TAKE 1 TABLET EVERY MORNING BEFORE BREAKFAST 90 tablet 1    metoprolol succinate (TOPROL XL) 100 MG extended release tablet Take 1 tablet by mouth daily (Patient taking differently: Take 100 mg by mouth daily Patient takes BID) 90 tablet 1    metFORMIN (GLUCOPHAGE) 1000 MG tablet TAKE 1 TABLET TWICE A DAY WITH MEALS 180 tablet 3    lisinopril (PRINIVIL;ZESTRIL) 5 MG tablet TAKE 1 TABLET DAILY 90 tablet 3    atorvastatin (LIPITOR) 40 MG tablet TAKE 1 TABLET DAILY 90 tablet 3    raloxifene (EVISTA) 60 MG tablet TAKE 1 TABLET DAILY 90 tablet 3    glucose blood VI test strips (ACCU-CHEK MELANIE) strip Test 1-2 times a day. Dx: 250.00. Accu-check melanie strips 180 strip 3    Lancet Device MISC Test 1-2 times a day. Dx: 250.00. Lancets 180 each 3    aspirin 81 MG EC tablet Take 81 mg by mouth 2 times daily. Review of Systems   Constitutional: Negative. Negative for chills, diaphoresis and fever. HENT: Negative. Negative for congestion, ear pain, hearing loss and tinnitus. Eyes: Negative. Negative for photophobia. Respiratory: Positive for shortness of breath. Negative for cough and wheezing. Cardiovascular: Negative. Negative for chest pain, palpitations and leg swelling. Gastrointestinal: Positive for abdominal distention. Negative for abdominal pain, constipation, diarrhea, nausea and vomiting. Genitourinary: Positive for decreased urine volume. Negative for dysuria, flank pain, frequency, hematuria and urgency. Musculoskeletal: Negative. Negative for back pain and neck pain. Skin: Negative. Negative for rash. Allergic/Immunologic: Negative for environmental allergies. Neurological: Negative. Negative for dizziness, tremors, weakness and headaches. Hematological: Does not bruise/bleed easily. Psychiatric/Behavioral: Negative. Negative for hallucinations and suicidal ideas. The patient is not nervous/anxious. OBJECTIVE:  /63   Pulse 86   Temp 98.3 °F (36.8 °C) (Oral)   Resp 16   Ht 5' 3\" (1.6 m)   Wt 200 lb (90.7 kg)   SpO2 100%   BMI 35.43 kg/m²     Physical Exam  Constitutional:       General: She is not in acute distress. Appearance: She is well-developed. She is not diaphoretic. HENT:      Head: Normocephalic and atraumatic. Nose: Nose normal.      Mouth/Throat:      Pharynx: No oropharyngeal exudate. Eyes:      General: No scleral icterus. Right eye: No discharge. Left eye: No discharge. Conjunctiva/sclera: Conjunctivae normal.   Neck:      Musculoskeletal: Neck supple. Thyroid: No thyromegaly. Vascular: No JVD. Trachea: No tracheal deviation. Cardiovascular:      Rate and Rhythm: Normal rate and regular rhythm. Heart sounds: Normal heart sounds. No murmur. No friction rub. No gallop. Pulmonary:      Effort: No respiratory distress. Breath sounds: No stridor. No wheezing or rales. Chest:      Chest wall: No tenderness. Abdominal:      General: Bowel sounds are normal. There is distension.       Palpations: There is no mass. Tenderness: There is no abdominal tenderness. There is no guarding or rebound. Hernia: No hernia is present. Musculoskeletal:         General: No tenderness or deformity. Skin:     General: Skin is dry. Coloration: Skin is not pale. Findings: No erythema or rash. Neurological:      Mental Status: She is alert and oriented to person, place, and time. Cranial Nerves: No cranial nerve deficit. Psychiatric:         Behavior: Behavior normal.         Thought Content: Thought content normal.         Judgment: Judgment normal.       CT abdomen images reviewed: There is extensive ascites fluid. Liver is nodular, consistent with cirrhosis. There is a right renal mass, this is concerning for RCC. ASSESSMENT ANDPLAN:    Assessment: Ascites and cirrhosis causing abdominal distention and discomfort. Plan: Diagnostic and therapeutic US guided paracentesis.      Clive Aragon MD, Deana Hall

## 2020-07-30 NOTE — PROGRESS NOTES
Department of Internal Medicine  Progress Note      SUBJECTIVE: Patient is status post paracentesis. Afebrile and hemodynamically stable. Had 9 L fluid removed  No acute events overnight.        ROS:  All 12 systems reviewed and negative except mentioned in HPI and Assessment and plan    MEDICATIONS:  Current Facility-Administered Medications   Medication Dose Route Frequency Provider Last Rate Last Dose    furosemide (LASIX) injection 40 mg  40 mg Intravenous Daily Babs Appiah MD   40 mg at 07/30/20 1240    spironolactone (ALDACTONE) tablet 50 mg  50 mg Oral Daily Ana M Lee MD   50 mg at 07/30/20 1240    albumin human 25 % IV solution 50 g  50 g Intravenous Once Robby Charles APRN - CNP        insulin lispro (HUMALOG) injection vial 0-6 Units  0-6 Units Subcutaneous TID WC Ana M Lee MD        insulin lispro (HUMALOG) injection vial 0-3 Units  0-3 Units Subcutaneous Nightly Ana M Parker MD        glucose (GLUTOSE) 40 % oral gel 15 g  15 g Oral PRN Ana M Parker MD        dextrose 50 % IV solution  12.5 g Intravenous PRN Ana M Lee MD   12.5 g at 07/28/20 0619    glucagon (rDNA) injection 1 mg  1 mg Intramuscular PRN Ana M Lee MD        dextrose 5 % solution  100 mL/hr Intravenous PRN Ana M Lee  mL/hr at 07/28/20 0619 100 mL/hr at 07/28/20 0619    [Held by provider] aspirin EC tablet 81 mg  81 mg Oral BID Rahul Dunham MD   81 mg at 07/29/20 1036    atorvastatin (LIPITOR) tablet 40 mg  40 mg Oral Daily Rahul Dunham MD   40 mg at 07/30/20 1015    sodium chloride flush 0.9 % injection 10 mL  10 mL Intravenous 2 times per day Rahul Dunham MD   10 mL at 07/30/20 0929    sodium chloride flush 0.9 % injection 10 mL  10 mL Intravenous PRN Rahul Dunham MD        acetaminophen (TYLENOL) tablet 650 mg  650 mg Oral Q6H PRN Rahul Dunham MD        Or    acetaminophen (TYLENOL) suppository 650 mg  650 mg Rectal Q6H PRN Rahul Dunham MD        polyethylene glycol (GLYCOLAX) packet 17 g  17 g Oral Daily PRN Rahul Dunham MD        promethazine (PHENERGAN) tablet 12.5 mg  12.5 mg Oral Q6H PRN Rahul Dunham MD   12.5 mg at 07/30/20 1724    Or    ondansetron (ZOFRAN) injection 4 mg  4 mg Intravenous Q6H PRN Rahul Dunham MD        enoxaparin (LOVENOX) injection 30 mg  30 mg Subcutaneous Daily Rahul Dunham MD   30 mg at 07/30/20 3829    metoprolol tartrate (LOPRESSOR) tablet 50 mg  50 mg Oral BID Aren ShannanTANNER bruce CNP   50 mg at 07/27/20 2008    citalopram (CELEXA) tablet 10 mg  10 mg Oral Daily TANNER Sen CNP   10 mg at 07/30/20 1014         OBJECTIVE:  Vital Signs:  Vitals:    07/30/20 1648   BP: 109/82   Pulse: 78   Resp:    Temp:    SpO2: 100%       Focal exam:      General Exam (except as mentioned above):  CONSTITUTIONAL: Awake, alert, no apparent distress  EYES:  PERRL, conjunctiva normal  ENT:  Normocephalic, atraumatic  NECK:  Supple  BACK:  Symmetric  LUNGS:  CTAB except bilateral basilar crackles. CARDIOVASCULAR:  S1S2 present  ABDOMEN:  soft, non-distended, non-tender  MUSCULOSKELETAL:  There is no redness, warmth, or swelling of the joints. NEUROLOGIC:  Alert, awake, oriented x 3. No FND  EXTREMITIES: Warm and well perfused. LABS  Recent Labs     07/28/20  0517 07/30/20  1021   WBC 4.6* 5.9   RBC 2.98* 3.03*   HGB 8.4* 8.6*   HCT 26.3* 27.1*   MCV 88.4 89.5   MCH 28.3 28.4   MCHC 32.0* 31.7*   RDW 17.3* 18.3*   * 141       Recent Labs     07/28/20  0517 07/29/20  0526 07/30/20  1021   * 135 139   K 4.7 4.7 5.1*    102 102   CO2 20 20 24   BUN 32* 29* 33*   CREATININE 3.28* 2.49* 1.65*   GLUCOSE 45* 48* 194*   CALCIUM 8.0* 8.2* 8.2*       No results for input(s): MG in the last 72 hours.         ASSESSMENT AND PLAN    Active Hospital Problems    Diagnosis Date Noted    FALLON (acute kidney injury) ([facility] [N17.9] 07/26/2020     Liver cirrhosis with ascitis: In absence of alcohol use.  Probably from

## 2020-07-30 NOTE — PROGRESS NOTES
Pt Name: [id number] [facility] Record Number: [id number]  Date of Birth 1945   Admit date 7/26/2020  4:54 PM  Today's Date: 7/30/2020     ASSESSMENT  1. Hospital day # 4  2. Acute kidney injury, improving  3. Ascites, testing pending after paracentesis  4. Rectal discomfort improving, no evidence of abscess    PLAN  1. She can be discharged anytime from my standpoint and I will follow her in 2 weeks in the office    SUBJECTIVE  Chief complaint: None  Afebrile, vital signs are stable. She denies any nausea or vomiting, has passed flatus and had a bowel movement. She is tolerating a DIET GENERAL; Carb Control: 4 carb choices (60 gms)/meal. Her pain is well controlled on current medications. She has been ambulating in the halls. Kidney biopsy to be done as an outpatient. has a past medical history of Arthritis, Cardiomyopathy ([address] Chronic kidney disease, Hypertension, Liver disease, Other disorders of kidney and ureter in diseases classified elsewhere, Pneumonia, Psychiatric problem, Sleep apnea, obstructive, and Type II or unspecified type diabetes mellitus without mention of complication, not stated as uncontrolled.     CURRENT MEDS  Scheduled Meds:   furosemide  40 mg Intravenous Daily    spironolactone  50 mg Oral Daily    albumin human  50 g Intravenous Once    insulin lispro  0-6 Units Subcutaneous TID WC    insulin lispro  0-3 Units Subcutaneous Nightly    [Held by provider] aspirin  81 mg Oral BID    atorvastatin  40 mg Oral Daily    sodium chloride flush  10 mL Intravenous 2 times per day    enoxaparin  30 mg Subcutaneous Daily    metoprolol tartrate  50 mg Oral BID    citalopram  10 mg Oral Daily     Continuous Infusions:   dextrose 100 mL/hr (07/28/20 0619)     PRN Meds:.glucose, dextrose, glucagon (rDNA), dextrose, sodium chloride flush, acetaminophen **OR** acetaminophen, polyethylene glycol, promethazine **OR** ondansetron    OBJECTIVE  CURRENT VITALS:  height is 5' 3\" (1.6 m) and weight is 200 lb (90.7 kg). Her oral temperature is 98.2 °F (36.8 °C). Her blood pressure is 106/43 (abnormal) and her pulse is 113. Her respiration is 16 and oxygen saturation is 100%. Temperature Range (24h):Temp: 98.2 °F (36.8 °C) Temp  Av.5 °F (36.9 °C)  Min: 98.2 °F (36.8 °C)  Max: 98.8 °F (37.1 °C)  BP Range (45O): Systolic (66QEH), JJP:656 , Min:106 , JNF:766     Diastolic (74UUS), IWB:22, Min:43, Max:46    Pulse Range (24h): Pulse  Av.3  Min: 94  Max: 113  Respiration Range (24h): Resp  Av.7  Min: 16  Max: 18    GENERAL: alert, no distress  LUNGS: clear to ausculation, without wheezes, rales or rhonci  HEART: normal rate and regular rhythm  ABDOMEN: soft, non-tender, non-distended, bowel sounds present in all 4 quadrants and no guarding or peritoneal signs  EXTERMITY: no cyanosis, clubbing or edema      In: 730 [P.O.:720; I.V.:10]  Out: -   Date 20 0000 - 20 2359   Shift 1829-2574 4125-7753 2294-8353 24 Hour Total   INTAKE   P.O.(mL/kg/hr) 480(0.7) 240  720   I. V.(mL/kg)  10(0.1)  10(0.1)   Shift Total(mL/kg) 480(5.3) 250(2.8)  730(8)   OUTPUT   Shift Total(mL/kg)       Weight (kg) 90.7 90.7 90.7 90.7       LABS  Recent Labs     20  0517 20  0526 20  1021   WBC 4.6*  --  5.9   HGB 8.4*  --  8.6*   HCT 26.3*  --  27.1*   *  --  141   * 135 139   K 4.7 4.7 5.1*    102 102   CO2 20 20 24   BUN 32* 29* 33*   CREATININE 3.28* 2.49* 1.65*   CALCIUM 8.0* 8.2* 8.2*      No results for input(s): PTT, INR in the last 72 hours. Invalid input(s): PT  No results for input(s): AST, ALT, BILITOT, BILIDIR, AMYLASE, LIPASE, LDH, LACTA in the last 72 hours. RADIOLOGY  Ct Abdomen Pelvis Wo Contrast Additional Contrast? None    Result Date: 2020  CT of the Abdomen and Pelvis without intravenous contrast medium History:  Abdominal distention for 6 weeks.  Technical Factors: CT imaging of the abdomen and pelvis were obtained and formatted as 5 mm contiguous axial images from the domes of the diaphragm to the symphysis pubis. Sagittal and coronal reconstructions were also obtained. Oral contrast medium:  None. Intravenous contrast medium:  None. Comparison:  None. Findings: Lungs:  Lung bases are clear. Liver:  Small in size. Mildly nodular contour. Bile Ducts:  Normal in caliber. Gallbladder:  Gallbladder surgically absent. Pancreas:  Normal without masses, cysts, ductal dilatation or calcification. Spleen:  Normal in size without masses or calcifications. No splenules. Kidneys:  4.6 x 5.4 x 4.9 cm heterogenous exophytic mass, lower pole left kidney (series 2, image 49, series 601, image 27). No calculi. No hydronephrosis. Kidneys normal in size bilaterally. Adrenals:  Normal. Small bowel:  Normal in caliber. Appendix:  Appendix surgically absent. Colon:  Normal in caliber. Peritoneum:  No free air. Large amount of free fluid identified perihepatic, extending caudally within mesentery, paracolic gutters bilaterally, and into the pelvis. Vessels: Aorta normal in course and caliber. Lymph nodes:  Retroperitoneal:  No enlarged retroperitoneal lymph nodes. Mesenteric:  No enlarged mesenteric lymph nodes. Pelvic: No enlarged pelvic lymph nodes. Ureters: Normal in course and caliber. No calcifications. Bladder: No wall thickening. Reproductive organs: Uterus surgically absent. Abdominal Wall: Diffuse edematous change, anterior abdominal wall, as well as dependent portion, pelvic wall. 14 mm fat-containing periumbilical anterior abdominal wall defect. Bones:  No bone lesions. This space narrowing L4-5. No post operative changes. 4.6 x 5.4 x 4.9 cm lower pole left renal mass. Malignancy diagnosis of exclusion. Cirrhosis. Large volume ascites. Cholecystectomy. Appendectomy. Hysterectomy. Other findings discussed.  All CT scans at this facility use dose modulation, iterative reconstruction, and/or weight based dosing when appropriate to reduce radiation dose to as low as reasonably achievable. Ir Us Guided Paracentesis    1. Status post technically successful ultrasound-guided paracentesis. Leonel Morales is a Female of 69 years age, referred for Ultrasound Guided Paracentesis. PROCEDURE: Survey of the abdomen showed large amount of ascites fluid. After obtaining informed consent, the patient was positioned supine on the sonography table. Using ultrasound, the skin over the left hemiabdomen was locally anesthetized with 1% lidocaine. Following that, a Yueh needle was advanced into the fluid pocket using ultrasound visualization. 9015cc, of clear yellow fluid were aspirated and sent for cytology, and pathology. The needle was removed, and hemostasis was obtained with pressure. A Band-Aid was placed. Post procedure images did not demonstrate hemorrhage at the target site. The patient tolerated the procedure well. The patient left the department in good condition. A radiology nurse was in presence monitoring vital signs, assisting throughout the procedure. Us Dup Lower Extremity Right Elizabeth    Result Date: 7/9/2020  US DUP LOWER EXTREMITY RIGHT ELIZABETH CLINICAL HISTORY: I82.4Y1 Deep vein thrombosis (DVT) of proximal vein of right lower extremity, unspecified chronicity ([address] ICD10 COMPARISONS: FINDINGS: Grayscale as well as Duplex color and Spectra; Doppler ultrasound of the deep venous system of the right lower extremity from the inguinal ligament to the popliteal fossa was performed. There are no findings of deep venous thrombus in the visualized vessels of the right lower extremity. NO FINDINGS OF DEEP VENOUS THROMBUS IN  THE VISUALIZED VESSELS OF THE RIGHT LOWER EXTREMITY. Us Retroperitoneal Limited    Result Date: 7/27/2020  EXAMINATION: Ultrasound the kidneys CLINICAL HISTORY: Abnormal CT FINDINGS: Multiple scans the kidneys performed.  There is a hypoechoic relatively homogeneous solid mass projecting off the inferior left kidney measuring 4.5 x 5.0 x 4.8 cm in diameter. Renal cell tumor would have to be a primary consideration. No other renal mass. No obstructive uropathy. No definite renal calculi. A large amount of ascites is present. The contour of the liver is somewhat irregular and this is likely related to chronic hepatic disease. No definite peritoneal mass or retroperitoneal mass or adenopathy. Right kidney is 11.2 x 6.5 x 5.3 cm. Left kidney is 12.7 x 6.3 x 5.2 cm. 5 CM SOLID MASS PROJECTING OFF THE INFERIOR LEFT KIDNEY. RENAL CELL CARCINOMA IS THE LEADING DIAGNOSIS. ASCITES AND HEPATOCELLULAR DISEASE FELT TO BE INCIDENTAL FINDINGS.     Electronically signed by Tere Cruz MD on 7/30/2020 at 11:56 AM

## 2020-07-30 NOTE — FLOWSHEET NOTE
4447: Handoff complete. This RN assumed care of the pt. Pt resting in bed with no stated needs. 0930: Student RN to pass PO medications. Pt assessment completed at this time. Pt is A&OX4. PERRLA. Lungs are clear bilaterally. No c/o SOB or dyspnea. Abdomen is distended and taut. Pt states she does not have pain. Pt does have BLE edema. 1225: Perfect serve sent to Dr. Corine Camacho to clarify the lasix and aldactone orders    1237: Dr. oCrine Camacho stated to give both the lasix and aldactone    1519: pt cleared for d/c. D/C instructions/education given. Pt voiced understanding    [phone]: at 1510 before d/c instructions were given pt was given a dose of kaexelate for a K+ of 5.1. at 1645 pt put the call light in the bathroom on and stated she felt really weak and light headed. Pt has diarrhea as well. BP was 109/82 HR 78 and glucose was 181. I asked pt not to leave just yet I wanted to make sure she was stable. Pt is now in the bed moaning with c/o extreme nausea. Phenergan given. Pt states she still wants to go home. Perfect serve sent to Dr. Corine Camacho who stated it was still OK for the pt to go home    1826: Dr. Corine Camacho called to check on the pt. Ordered orthostatics. Lying BP 88/45 , Sitting HR 65 /65, standing BP 73/59 .  Reported via perfect serve    1831: D/C cancelled

## 2020-07-30 NOTE — PLAN OF CARE
Problem: Fluid Volume - Imbalance:  Goal: Absence of imbalanced fluid volume signs and symptoms  Description: Absence of imbalanced fluid volume signs and symptoms  Outcome: Ongoing

## 2020-07-30 NOTE — FLOWSHEET NOTE
Shift assessment complete. Patient denies cp, sob, n/v. Patient A&Ox4, VSS. LS clear, even and unlabored. BS active in all 4 quadrants. Abdomen is distended but soft. Patient denies any c/o pain at this time. Patient is resting in bed. Call light within reach. Will continue to monitor.

## 2020-07-30 NOTE — PLAN OF CARE
Problem: Falls - Risk of:  Goal: Will remain free from falls  Description: Will remain free from falls  7/30/2020 0751 by Beryl Lopez RN  Outcome: Completed  7/30/2020 0751 by Beryl Lopez RN  Outcome: Ongoing  Goal: Absence of physical injury  Description: Absence of physical injury  7/30/2020 0751 by Beryl Lopez RN  Outcome: Completed  7/30/2020 0751 by Beryl Lopez RN  Outcome: Ongoing     Problem: Fluid Volume - Imbalance:  Goal: Absence of imbalanced fluid volume signs and symptoms  Description: Absence of imbalanced fluid volume signs and symptoms  7/30/2020 1455 by Beryl Lopez RN  Outcome: Completed  7/30/2020 0751 by Beryl Lopez RN  Outcome: Ongoing

## 2020-07-30 NOTE — PROGRESS NOTES
Department of Internal Medicine  Progress Note      SUBJECTIVE: Afebrile and hemodynamically stable. Had a brief episode of dizziness today after having a bowel movement after receiving Kayexalate. No acute events overnight.        ROS:  All 12 systems reviewed and negative except mentioned in HPI and Assessment and plan    MEDICATIONS:  Current Facility-Administered Medications   Medication Dose Route Frequency Provider Last Rate Last Dose    furosemide (LASIX) injection 40 mg  40 mg Intravenous Daily Fabian Sotomayor MD   40 mg at 07/30/20 1240    spironolactone (ALDACTONE) tablet 50 mg  50 mg Oral Daily Ana M Peres MD   50 mg at 07/30/20 1240    albumin human 25 % IV solution 50 g  50 g Intravenous Once TANNER Morales - CNP        insulin lispro (HUMALOG) injection vial 0-6 Units  0-6 Units Subcutaneous TID WC Ana M Peres MD        insulin lispro (HUMALOG) injection vial 0-3 Units  0-3 Units Subcutaneous Nightly Ana M Parker MD        glucose (GLUTOSE) 40 % oral gel 15 g  15 g Oral PRN Ana M Parker MD        dextrose 50 % IV solution  12.5 g Intravenous PRN Ana M Peres MD   12.5 g at 07/28/20 0619    glucagon (rDNA) injection 1 mg  1 mg Intramuscular PRN Ana M Parker MD        dextrose 5 % solution  100 mL/hr Intravenous PRN Ana M Peres  mL/hr at 07/28/20 0619 100 mL/hr at 07/28/20 0619    [Held by provider] aspirin EC tablet 81 mg  81 mg Oral BID Kristi Mckinnon MD   81 mg at 07/29/20 1036    atorvastatin (LIPITOR) tablet 40 mg  40 mg Oral Daily Kristi Mckinnon MD   40 mg at 07/30/20 1015    sodium chloride flush 0.9 % injection 10 mL  10 mL Intravenous 2 times per day Kristi Mckinnon MD   10 mL at 07/30/20 0929    sodium chloride flush 0.9 % injection 10 mL  10 mL Intravenous PRN Kristi Mckinnon MD        acetaminophen (TYLENOL) tablet 650 mg  650 mg Oral Q6H PRN Kristi Mckinnon MD        Or    acetaminophen (TYLENOL) suppository 650 mg  650 mg Rectal Q6H PRN Maggy A MD Daren        polyethylene glycol (GLYCOLAX) packet 17 g  17 g Oral Daily PRN Radha Montalvo MD        promethazine (PHENERGAN) tablet 12.5 mg  12.5 mg Oral Q6H PRN Radha Montalvo MD   12.5 mg at 07/30/20 1724    Or    ondansetron (ZOFRAN) injection 4 mg  4 mg Intravenous Q6H PRN Radha Montalvo MD        enoxaparin (LOVENOX) injection 30 mg  30 mg Subcutaneous Daily Radha Montalvo MD   30 mg at 07/30/20 2306    metoprolol tartrate (LOPRESSOR) tablet 50 mg  50 mg Oral BID TANNER Owusu CNP   50 mg at 07/27/20 2008    citalopram (CELEXA) tablet 10 mg  10 mg Oral Daily TANNER Owusu CNP   10 mg at 07/30/20 1014         OBJECTIVE:  Vital Signs:  Vitals:    07/30/20 1648   BP: 109/82   Pulse: 78   Resp:    Temp:    SpO2: 100%       Focal exam:      General Exam (except as mentioned above):  CONSTITUTIONAL: Awake, alert, no apparent distress  EYES:  PERRL, conjunctiva normal  ENT:  Normocephalic, atraumatic  NECK:  Supple  BACK:  Symmetric  LUNGS:  CTAB except bilateral basilar crackles. CARDIOVASCULAR:  S1S2 present  ABDOMEN:  soft, non-distended, non-tender  MUSCULOSKELETAL:  There is no redness, warmth, or swelling of the joints. NEUROLOGIC:  Alert, awake, oriented x 3. No FND  EXTREMITIES: Warm and well perfused. LABS  Recent Labs     07/28/20  0517 07/30/20  1021   WBC 4.6* 5.9   RBC 2.98* 3.03*   HGB 8.4* 8.6*   HCT 26.3* 27.1*   MCV 88.4 89.5   MCH 28.3 28.4   MCHC 32.0* 31.7*   RDW 17.3* 18.3*   * 141       Recent Labs     07/28/20  0517 07/29/20  0526 07/30/20  1021   * 135 139   K 4.7 4.7 5.1*    102 102   CO2 20 20 24   BUN 32* 29* 33*   CREATININE 3.28* 2.49* 1.65*   GLUCOSE 45* 48* 194*   CALCIUM 8.0* 8.2* 8.2*       No results for input(s): MG in the last 72 hours.         ASSESSMENT AND PLAN    Active Hospital Problems    Diagnosis Date Noted    [facility] [N17.9] 07/26/2020     Hyperkalemia: Received 1 dose of Kayexalate. Liver cirrhosis with ascitis: In absence of alcohol use.  Probably from hepatic steatosis.  Presenting with ascites.  Status post 9 L of fluid removal  Patient received 50 g of albumin x2     Acute kidney injury: Mostly from hepatorenal syndrome.  started on aldactone and Lasix. Renal function improving.     Left renal mass: OP renal biopsy and patient os on Aspirin.     Diabetes mellitus: On metformin prior to admission.  Continue sliding scale insulin for now.     DVT prophylaxis: Lovenox    Discharge today      35 minutes total care time, >1/2 in unit/floor time and care coordination   Evern Goodell, MD  Pager : [phone]

## 2020-07-30 NOTE — CONSULTS
CC: Left renal mass    HPI:  Patient is a pleasant 72-year-old woman who we had seen previously for ascites and performed a paracentesis. Ascites has resolved since yesterday. Interventional radiology has now been consulted for the finding of a left renal solid mass. Patient initially presented to the hospital with complaint of abdominal distention and decreased urine output. CT scan was performed which demonstrated ascites and a left solid renal mass. Dr. Doni Mcbride has consulted interventional radiology for biopsy of the left renal mass for diagnosis. Patient has been taking aspirin daily. Family History   Problem Relation Age of Onset    Arrhythmia Mother     Diabetes Mother     High Blood Pressure Mother     Arrhythmia Father     Breast Cancer Sister     Diabetes Brother     High Blood Pressure Brother        Past Surgical History:   Procedure Laterality Date    APPENDECTOMY      CHOLECYSTECTOMY      COLONOSCOPY  4/23/2013    FOOT FRACTURE SURGERY Bilateral     FRACTURE SURGERY      HYSTERECTOMY      OTHER SURGICAL HISTORY      removal of anal fistulotomy        Past Medical History:   Diagnosis Date    Arthritis     Cardiomyopathy [facility])     mother history.     Chronic kidney disease     Hypertension     Liver disease     Other disorders of kidney and ureter in diseases classified elsewhere     Pneumonia     Psychiatric problem     Sleep apnea, obstructive     Type II or unspecified type diabetes mellitus without mention of complication, not stated as uncontrolled        Social History     Socioeconomic History    Marital status:      Spouse name: None    Number of children: None    Years of education: None    Highest education level: None   Occupational History    None   Social Needs    Financial resource strain: Not hard at all   [place] insecurity     Worry: Never true     Inability: Never true   [employer] needs     Medical: None     Non-medical: None   Tobacco Use    Smoking status: Never Smoker    Smokeless tobacco: Former User   Substance and Sexual Activity    Alcohol use: No    Drug use: No    Sexual activity: Yes   Lifestyle    Physical activity     Days per week: None     Minutes per session: None    Stress: None   Relationships    Social connections     Talks on phone: None     Gets together: None     Attends [employer]ism service: None     Active member of club or organization: None     Attends meetings of clubs or organizations: None     Relationship status: None    Intimate partner violence     Fear of current or ex partner: None     Emotionally abused: None     Physically abused: None     Forced sexual activity: None   Other Topics Concern    None   Social History Narrative    None       Allergies   Allergen Reactions    Codeine        No current facility-administered medications on file prior to encounter. Current Outpatient Medications on File Prior to Encounter   Medication Sig Dispense Refill    citalopram (CELEXA) 10 MG tablet       glimepiride (AMARYL) 2 MG tablet TAKE 1 TABLET EVERY MORNING BEFORE BREAKFAST 90 tablet 1    metoprolol succinate (TOPROL XL) 100 MG extended release tablet Take 1 tablet by mouth daily (Patient taking differently: Take 100 mg by mouth daily Patient takes BID) 90 tablet 1    metFORMIN (GLUCOPHAGE) 1000 MG tablet TAKE 1 TABLET TWICE A DAY WITH MEALS 180 tablet 3    lisinopril (PRINIVIL;ZESTRIL) 5 MG tablet TAKE 1 TABLET DAILY 90 tablet 3    atorvastatin (LIPITOR) 40 MG tablet TAKE 1 TABLET DAILY 90 tablet 3    raloxifene (EVISTA) 60 MG tablet TAKE 1 TABLET DAILY 90 tablet 3    glucose blood VI test strips (ACCU-CHEK MELANIE) strip Test 1-2 times a day. Dx: 250.00. Accu-check melanie strips 180 strip 3    Lancet Device MISC Test 1-2 times a day. Dx: 250.00. Lancets 180 each 3    aspirin 81 MG EC tablet Take 81 mg by mouth 2 times daily. Review of Systems   Constitutional: Negative.   Negative for chills, diaphoresis and fever. HENT: Negative. Negative for congestion, ear pain, hearing loss and tinnitus. Eyes: Negative. Negative for photophobia. Respiratory: Negative. Negative for cough, shortness of breath and wheezing. Cardiovascular: Negative. Negative for chest pain, palpitations and leg swelling. Gastrointestinal: Negative. Negative for abdominal pain, constipation, diarrhea, nausea and vomiting. Abdominal distention resolved since paracentesis yesterday    Genitourinary: Positive for decreased urine volume. Negative for dysuria, flank pain, frequency, hematuria and urgency. Musculoskeletal: Negative. Negative for back pain and neck pain. Skin: Negative. Negative for rash. Allergic/Immunologic: Negative for environmental allergies. Neurological: Negative. Negative for dizziness, tremors, weakness and headaches. Hematological: Does not bruise/bleed easily. Psychiatric/Behavioral: Negative. Negative for hallucinations and suicidal ideas. The patient is not nervous/anxious. OBJECTIVE:  BP (!) 121/45   Pulse 94   Temp 98.3 °F (36.8 °C) (Oral)   Resp 16   Ht 5' 3\" (1.6 m)   Wt 200 lb (90.7 kg)   SpO2 100%   BMI 35.43 kg/m²     Physical Exam  Constitutional:       General: She is not in acute distress. Appearance: She is well-developed. She is not diaphoretic. HENT:      Head: Normocephalic and atraumatic. Nose: Nose normal.      Mouth/Throat:      Pharynx: No oropharyngeal exudate. Eyes:      General: No scleral icterus. Right eye: No discharge. Left eye: No discharge. Conjunctiva/sclera: Conjunctivae normal.   Neck:      Musculoskeletal: Neck supple. Thyroid: No thyromegaly. Vascular: No JVD. Trachea: No tracheal deviation. Cardiovascular:      Rate and Rhythm: Normal rate and regular rhythm. Heart sounds: Normal heart sounds. No murmur. No friction rub. No gallop.     Pulmonary:      Effort: No respiratory distress. Breath sounds: No stridor. No wheezing or rales. Chest:      Chest wall: No tenderness. Abdominal:      General: Bowel sounds are normal. There is no distension. Palpations: Abdomen is soft. There is no mass. Tenderness: There is no abdominal tenderness. There is no guarding or rebound. Hernia: No hernia is present. Musculoskeletal:         General: No tenderness or deformity. Skin:     General: Skin is dry. Coloration: Skin is not pale. Findings: No erythema or rash. Neurological:      Mental Status: She is alert and oriented to person, place, and time. Cranial Nerves: No cranial nerve deficit. Psychiatric:         Behavior: Behavior normal.         Thought Content: Thought content normal.         Judgment: Judgment normal.         CT images of the abdomen were reviewed which demonstrate a solid left inferior pole exophytic renal mass measuring approximately 5 cm in greatest dimension. ASSESSMENT ANDPLAN:    ASSESSMENT: Patient with incidentally discovered solid left renal mass    PLAN: We'll plan for biopsy of the left renal mass, likely as an outpatient, since patient has been taking aspirin daily, and saphenous became performed is 5 days after stopping aspirin to decrease risk of hemorrhage.       Josie Kim MD, Oliverio Milner

## 2020-07-31 NOTE — PROGRESS NOTES
Nephrology Progress Note       Assessment:  69y.o. year old female with history s/f T2DM, HTN, HARDEEP, HOCM who presented for SOB, weight gain, nausea and abdominal fullness. Found to have FALLON     1. FALLON: most likely 2/2 HRS + volume depletion in setting of lisinopril use (initially improved w/ fluids), nml renal function at baseline, Scr 0.4-0.5. UA bland making GN unlikely  2. Metabolic acidosis: 2/2 renal failure, improving  3. Cirrhosis c/b ascites, s/p paracentesis 07/29  4. LT renal mass: hem/onc onboard, getting renal biopsy as outpatient, pt was on ASA  5. Hypoalbuminemia  6. Anemia/thrombocytopenia  7.  Hyponatremia: corrected    Plan:  - continue lasix 40 PO and aldactone as outpatient  - pt should see hepatology as outpatient ([facility])   - f/u w/ me in 3-4 weeks  - will f/u renal biopsy    Patient Active Problem List:     Type 2 diabetes mellitus without complication, without long-term current use of insulin (HCC)     Hypertension     Bell-rectal abscess     Bilateral carotid artery stenosis     HOCM (hypertrophic obstructive cardiomyopathy) (HCC)     HARDEEP (obstructive sleep apnea)     Pseudophakia of both eyes     Regular astigmatism     Cardiomyopathy ([address]     Rectal pain     Abdominal distension     FALLON (acute kidney injury) ([address]      Subjective:  Admit Date: 7/26/2020    Interval History: renal function improving, getting lasix IV and aldactone, had dizziness following kayexalate yesterday, feels better today, denies recurrence     Medications:  Scheduled Meds:   furosemide  40 mg Intravenous Daily    spironolactone  50 mg Oral Daily    albumin human  50 g Intravenous Once    insulin lispro  0-6 Units Subcutaneous TID     insulin lispro  0-3 Units Subcutaneous Nightly    [Held by provider] aspirin  81 mg Oral BID    atorvastatin  40 mg Oral Daily    sodium chloride flush  10 mL Intravenous 2 times per day    enoxaparin  30 mg Subcutaneous Daily    metoprolol tartrate  50 mg Oral BID    citalopram  10 mg Oral Daily     Continuous Infusions:   dextrose 100 mL/hr (07/28/20 0619)       CBC:   Recent Labs     07/30/20  1021   WBC 5.9   HGB 8.6*        CMP:    Recent Labs     07/29/20  0526 07/30/20  1021 07/31/20  0541    139 142   K 4.7 5.1* 3.8    102 107   CO2 20 24 24   BUN 29* 33* 33*   CREATININE 2.49* 1.65* 1.33*   GLUCOSE 48* 194* 155*   CALCIUM 8.2* 8.2* 8.3*   LABGLOM 19.0* 30.5* 39.1*     Troponin: No results for input(s): TROPONINI in the last 72 hours. BNP: No results for input(s): BNP in the last 72 hours. INR:   No results for input(s): INR in the last 72 hours. Lipids:   No results for input(s): CHOL, LDLDIRECT, TRIG, HDL, AMYLASE, LIPASE in the last 72 hours. Liver:   No results for input(s): AST, ALT, ALKPHOS, PROT, LABALBU, BILITOT in the last 72 hours. Invalid input(s): BILDIR  Iron:    Recent Labs     07/29/20  0526   FERRITIN 43.8     Urinalysis: No results for input(s): UA in the last 72 hours.     Objective:  Vitals: BP (!) 121/53   Pulse 95   Temp 98.4 °F (36.9 °C) (Oral)   Resp 20   Ht 5' 3\" (1.6 m)   Wt 200 lb (90.7 kg)   SpO2 100%   BMI 35.43 kg/m²    Wt Readings from Last 3 Encounters:   07/26/20 200 lb (90.7 kg)   07/24/20 218 lb (98.9 kg)   07/10/20 209 lb (94.8 kg)      24HR INTAKE/OUTPUT:      Intake/Output Summary (Last 24 hours) at 7/31/2020 1057  Last data filed at 7/31/2020 1025  Gross per 24 hour   Intake 740 ml   Output --   Net 740 ml     General: alert, in no apparent distress  HEENT: normocephalic, atraumatic, anicteric  Lungs: non-labored respirations, clear to auscultation bilaterally  Heart: regular rate and rhythm, no murmurs or rubs  Abdomen: soft, non-tender, non-distended  MSK: no joint swelling or tenderness  Ext: no cyanosis, 2+ BLE peripheral edema      Electronically signed by Steve Goel MD

## 2020-07-31 NOTE — DISCHARGE SUMMARY
Physician Discharge Summary     Patient ID:  Nadya Ortiz  [id number]  7 y.o.  1945    Admit date: 7/26/2020    Discharge date and time: 7/31/2020    Admitting Physician: Florencia Arnold MD     Discharge Physician: Nickie Carroll MD    Admission Diagnoses: FALLON (acute kidney injury) [facility]) [N17.9]    Discharge Diagnoses: Active Hospital Problems    Diagnosis Date Noted    FALLON (acute kidney injury) ([facility] [N17.9] 07/26/2020       Admission Condition: fair    Discharged Condition: good    Hospital Course: Patient was admitted with worsening of abdominal pain for past 10 days prior to admission. CT scan of the abdomen showed massive ascites and left-sided renal mass. She underwent paracentesis with 9 L of fluid removal.  Patient also had developed FALLON on presentation secondary to hepatorenal syndrome which improved post paracentesis and diuresis thereafter. Patient was scheduled for outpatient renal biopsy on 4 August and was discharged with advice to not take aspirin until the biopsy is done.   Patient was discharged in stable condition    Inpatient meds:  sodium chloride, 500 mL, Intravenous, Once    spironolactone, 50 mg, Oral, Daily    albumin human, 50 g, Intravenous, Once    insulin lispro, 0-6 Units, Subcutaneous, TID WC    insulin lispro, 0-3 Units, Subcutaneous, Nightly    [Held by provider] aspirin, 81 mg, Oral, BID    atorvastatin, 40 mg, Oral, Daily    sodium chloride flush, 10 mL, Intravenous, 2 times per day    enoxaparin, 30 mg, Subcutaneous, Daily    metoprolol tartrate, 50 mg, Oral, BID    citalopram, 10 mg, Oral, Daily    Labs:  LABS  Recent Labs     07/30/20  1021   WBC 5.9   RBC 3.03*   HGB 8.6*   HCT 27.1*   MCV 89.5   MCH 28.4   MCHC 31.7*   RDW 18.3*          Recent Labs     07/29/20  0526 07/30/20  1021 07/31/20  0541    139 142   K 4.7 5.1* 3.8    102 107   CO2 20 24 24   BUN 29* 33* 33*   CREATININE 2.49* 1.65* 1.33*   GLUCOSE 48* 194* 155*   CALCIUM 8.2* 8.2* 8.3*       No results for input(s): MG in the last 72 hours. Imaging: Ct Abdomen Pelvis Wo Contrast Additional Contrast? None    Result Date: 7/27/2020  CT of the Abdomen and Pelvis without intravenous contrast medium History:  Abdominal distention for 6 weeks. Technical Factors: CT imaging of the abdomen and pelvis were obtained and formatted as 5 mm contiguous axial images from the domes of the diaphragm to the symphysis pubis. Sagittal and coronal reconstructions were also obtained. Oral contrast medium:  None. Intravenous contrast medium:  None. Comparison:  None. Findings: Lungs:  Lung bases are clear. Liver:  Small in size. Mildly nodular contour. Bile Ducts:  Normal in caliber. Gallbladder:  Gallbladder surgically absent. Pancreas:  Normal without masses, cysts, ductal dilatation or calcification. Spleen:  Normal in size without masses or calcifications. No splenules. Kidneys:  4.6 x 5.4 x 4.9 cm heterogenous exophytic mass, lower pole left kidney (series 2, image 49, series 601, image 27). No calculi. No hydronephrosis. Kidneys normal in size bilaterally. Adrenals:  Normal. Small bowel:  Normal in caliber. Appendix:  Appendix surgically absent. Colon:  Normal in caliber. Peritoneum:  No free air. Large amount of free fluid identified perihepatic, extending caudally within mesentery, paracolic gutters bilaterally, and into the pelvis. Vessels: Aorta normal in course and caliber. Lymph nodes:  Retroperitoneal:  No enlarged retroperitoneal lymph nodes. Mesenteric:  No enlarged mesenteric lymph nodes. Pelvic: No enlarged pelvic lymph nodes. Ureters: Normal in course and caliber. No calcifications. Bladder: No wall thickening. Reproductive organs: Uterus surgically absent. Abdominal Wall: Diffuse edematous change, anterior abdominal wall, as well as dependent portion, pelvic wall. 14 mm fat-containing periumbilical anterior abdominal wall defect. Bones:  No bone lesions. This space narrowing L4-5.  No 6/27/2020 to 7/27/2020. Discharge medications     Medication List      START taking these medications    Blood Pressure Kit  Check your blood pressure daily     furosemide 40 MG tablet  Commonly known as:  Lasix  Take 1 tablet by mouth daily     metoprolol tartrate 50 MG tablet  Commonly known as:  LOPRESSOR  Take 1 tablet by mouth 2 times daily     spironolactone 50 MG tablet  Commonly known as:  ALDACTONE  Take 1 tablet by mouth daily        CHANGE how you take these medications    aspirin 81 MG EC tablet  Take 1 tablet by mouth 2 times daily HOld aspirin for planned renal biopsy next week  What changed:  additional instructions        CONTINUE taking these medications    atorvastatin 40 MG tablet  Commonly known as:  LIPITOR  TAKE 1 TABLET DAILY     blood glucose test strips strip  Commonly known as:  Accu-Chek Rosanne  Test 1-2 times a day. Dx: 250.00. Accu-check rosanne strips     citalopram 10 MG tablet  Commonly known as:  CELEXA     glimepiride 2 MG tablet  Commonly known as:  AMARYL  TAKE 1 TABLET EVERY MORNING BEFORE BREAKFAST     Lancet Device Misc  Test 1-2 times a day. Dx: 250.00. Lancets     lisinopril 5 MG tablet  Commonly known as:  PRINIVIL;ZESTRIL  TAKE 1 TABLET DAILY     metFORMIN 1000 MG tablet  Commonly known as:  GLUCOPHAGE  TAKE 1 TABLET TWICE A DAY WITH MEALS     raloxifene 60 MG tablet  Commonly known as:  EVISTA  TAKE 1 TABLET DAILY        STOP taking these medications    buPROPion 150 MG extended release tablet  Commonly known as: Wellbutrin SR     metoprolol succinate 100 MG extended release tablet  Commonly known as:   Toprol XL     sertraline 50 MG tablet  Commonly known as:  ZOLOFT           Where to Get Your Medications      These medications were sent to [address] [place] [address]    Phone:  [phone]   · Blood Pressure Kit  · furosemide 40 MG tablet  · metoprolol tartrate 50 MG tablet  · spironolactone 50 MG tablet     You can get these medications from any pharmacy    Bring a paper prescription for each of these medications  · aspirin 81 MG EC tablet           Patient Instructions:   Current Discharge Medication List      START taking these medications    Details   Blood Pressure KIT Check your blood pressure daily  Qty: 1 kit, Refills: 0      metoprolol tartrate (LOPRESSOR) 50 MG tablet Take 1 tablet by mouth 2 times daily  Qty: 60 tablet, Refills: 3      furosemide (LASIX) 40 MG tablet Take 1 tablet by mouth daily  Qty: 60 tablet, Refills: 3      spironolactone (ALDACTONE) 50 MG tablet Take 1 tablet by mouth daily  Qty: 30 tablet, Refills: 3         CONTINUE these medications which have CHANGED    Details   aspirin 81 MG EC tablet Take 1 tablet by mouth 2 times daily HOld aspirin for planned renal biopsy next week  Qty: 30 tablet, Refills: 3         CONTINUE these medications which have NOT CHANGED    Details   citalopram (CELEXA) 10 MG tablet       glimepiride (AMARYL) 2 MG tablet TAKE 1 TABLET EVERY MORNING BEFORE BREAKFAST  Qty: 90 tablet, Refills: 1    Associated Diagnoses: Type 2 diabetes mellitus without complication, without long-term current use of insulin ([facility])      metFORMIN (GLUCOPHAGE) 1000 MG tablet TAKE 1 TABLET TWICE A DAY WITH MEALS  Qty: 180 tablet, Refills: 3    Associated Diagnoses: Type 2 diabetes mellitus without complication, without long-term current use of insulin ([facility])      lisinopril (PRINIVIL;ZESTRIL) 5 MG tablet TAKE 1 TABLET DAILY  Qty: 90 tablet, Refills: 3    Associated Diagnoses: Type 2 diabetes mellitus without complication, without long-term current use of insulin ([facility]  Essential hypertension      atorvastatin (LIPITOR) 40 MG tablet TAKE 1 TABLET DAILY  Qty: 90 tablet, Refills: 3    Associated Diagnoses: Pure hypercholesterolemia      raloxifene (EVISTA) 60 MG tablet TAKE 1 TABLET DAILY  Qty: 90 tablet, Refills: 3      glucose blood VI test strips (ACCU-CHEK MELANIE) strip Test 1-2 times a day. Dx: 250.00. Accu-check melanie strips  Qty: 180 strip, Refills: 3      Lancet Device MISC Test 1-2 times a day. Dx: 250.00.  Lancets  Qty: 180 each, Refills: 3         STOP taking these medications       buPROPion (WELLBUTRIN SR) 150 MG extended release tablet Comments:   Reason for Stopping:         metoprolol succinate (TOPROL XL) 100 MG extended release tablet Comments:   Reason for Stopping:         sertraline (ZOLOFT) 50 MG tablet Comments:   Reason for Stopping:         nystatin (MYCOSTATIN) 355344 UNIT/GM powder Comments:   Reason for Stopping:             Activity: activity as tolerated  Diet: regular diet and diabetic diet ( if indicated)    I spent more than 30 minutes talking to the patient, family and the nurse and preparing the discharge      Signed:  Jana Bhakta MD  Pager : [phone]  7/31/2020  2:57 PM

## 2020-07-31 NOTE — PROGRESS NOTES
Vascular and Interventional Radiology   [facility] Swan. Darrion Atwood M.D. [facility]      Chief Complaint:   Chief Complaint   Patient presents with    Abdominal Pain      and swelling. decreasedurination        Subjective: May Seip is a 69 y.o. female with an exophytic inferior pole solid left renal mass. She has stopped taking aspirin and will be scheduled for biopsy next week. She denies any complaints or discomfort. Objective:   BP 96/64   Pulse 73   Temp 98.2 °F (36.8 °C) (Oral)   Resp 16   Ht 5' 3\" (1.6 m)   Wt 200 lb (90.7 kg)   SpO2 (!) 77%   BMI 35.43 kg/m²     Physical:   alert and  not in acute distress    Normocephalic, without obvious abnormality, atraumatic    Neck supple. No adenopathy. Thyroid symmetric, normal size, and without nodularity    normal rate, regular rhythm, systolic murmur noted     chest clear, no wheezing, rales, normal symmetric air entry        Lab:  Recent Labs     07/30/20  1021   WBC 5.9   RBC 3.03*   HGB 8.6*   HCT 27.1*   MCV 89.5   MCH 28.4   MCHC 31.7*   RDW 18.3*          No results for input(s): INR, PROTIME in the last 72 hours. Recent Labs     07/28/20  0517 07/29/20  0526 07/30/20  1021   CREATININE 3.28* 2.49* 1.65*       Assessment: May Seip is a 69 y.o. female with a left renal mass, stopped aspirin    Plan: CT guided biopsy next week as outpatient    Heather Swan.  Darrion Atwood M.D. [facility]  7/30/2020,  11:29 PM

## 2020-07-31 NOTE — FLOWSHEET NOTE
0740: Handoff complete. This RN assumed care of the pt. Pt resting in bed with eyes closed. 0920: Assessment completed. Pt states her nausea has resolved but she still feels very weak and tired. Pt is still having frequent bowel movements as well. Abdomen is no longer taught and she states she no longer feels pressure in her abdomen. Pt is A&OX4. PERRLA. Lungs are clear bilaterally. S1, S2 noted. Pulses palpable. Edema noted in bilateral feet.

## 2020-07-31 NOTE — CARE COORDINATION
Spoke with pt again and pt chose  [facility] to come out for SN monitoring BP and med inst.  Pt. wont be discharged until later today. Pt. Worried about her cost OOP relayed to [facility] KJ GIBSONof this and referral.   Informed pt they will call her and set up appt. No other questions or concerns at this time.  Electronically signed by Lisa Seaman RN on 7/31/2020 at 2:23 PM

## 2020-07-31 NOTE — PROGRESS NOTES
Department of Internal Medicine  Progress Note      SUBJECTIVE: Patient had episode of dizziness after receiving Kayexalate and having bowel movement yesterday. Blood pressure was low overnight responded to the IV fluids. This morning patient denies any dizziness. Is ambulate tree in the room with help of walker. Denies any chest pain or shortness of breath or nausea. Orthostatics are positive. No acute events overnight.        ROS:  All 12 systems reviewed and negative except mentioned in HPI and Assessment and plan    MEDICATIONS:  Current Facility-Administered Medications   Medication Dose Route Frequency Provider Last Rate Last Dose    0.9 % sodium chloride bolus  500 mL Intravenous Once Ana M Moran  mL/hr at 07/31/20 1324 500 mL at 07/31/20 1324    spironolactone (ALDACTONE) tablet 50 mg  50 mg Oral Daily Ana M Moran MD   50 mg at 07/31/20 0918    albumin human 25 % IV solution 50 g  50 g Intravenous Once Robby Charles APRN - CNP        insulin lispro (HUMALOG) injection vial 0-6 Units  0-6 Units Subcutaneous TID WC Ana M Moran MD        insulin lispro (HUMALOG) injection vial 0-3 Units  0-3 Units Subcutaneous Nightly Ana M Moran MD   1 Units at 07/30/20 2311    glucose (GLUTOSE) 40 % oral gel 15 g  15 g Oral PRN Ana M Parker MD        dextrose 50 % IV solution  12.5 g Intravenous PRN Ana M Parker MD   12.5 g at 07/28/20 0619    glucagon (rDNA) injection 1 mg  1 mg Intramuscular PRN Ana M Parker MD        dextrose 5 % solution  100 mL/hr Intravenous PRN Ana M Moran  mL/hr at 07/28/20 0619 100 mL/hr at 07/28/20 0619    [Held by provider] aspirin EC tablet 81 mg  81 mg Oral BID Chago Boo MD   81 mg at 07/29/20 1036    atorvastatin (LIPITOR) tablet 40 mg  40 mg Oral Daily Chago Boo MD   40 mg at 07/31/20 0917    sodium chloride flush 0.9 % injection 10 mL  10 mL Intravenous 2 times per day Chago Boo MD   10 mL at 07/31/20 0917    sodium chloride flush 0.9 % injection 10 mL  10 mL Intravenous PRN Lester Bender MD   10 mL at 07/31/20 1324    acetaminophen (TYLENOL) tablet 650 mg  650 mg Oral Q6H PRN Lester Bender MD        Or    acetaminophen (TYLENOL) suppository 650 mg  650 mg Rectal Q6H PRN Lester Bender MD        polyethylene glycol (GLYCOLAX) packet 17 g  17 g Oral Daily PRN Lester Bender MD        promethazine (PHENERGAN) tablet 12.5 mg  12.5 mg Oral Q6H PRN Lester Bender MD   12.5 mg at 07/30/20 1724    Or    ondansetron (ZOFRAN) injection 4 mg  4 mg Intravenous Q6H PRN Lester Bender MD        enoxaparin (LOVENOX) injection 30 mg  30 mg Subcutaneous Daily Lester Bender MD   30 mg at 07/31/20 0953    metoprolol tartrate (LOPRESSOR) tablet 50 mg  50 mg Oral BID TANNER Lozano CNP   50 mg at 07/27/20 2008    citalopram (CELEXA) tablet 10 mg  10 mg Oral Daily TANNER Lozano CNP   10 mg at 07/31/20 3400         OBJECTIVE:  Vital Signs:  Vitals:    07/31/20 0840   BP: (!) 121/53   Pulse: 95   Resp: 20   Temp: 98.4 °F (36.9 °C)   SpO2: 100%       Focal exam:      General Exam (except as mentioned above):  CONSTITUTIONAL: Awake, alert, no apparent distress  EYES:  PERRL, conjunctiva normal  ENT:  Normocephalic, atraumatic  NECK:  Supple  BACK:  Symmetric  LUNGS:  CTAB except bilateral basilar crackles. CARDIOVASCULAR:  S1S2 present  ABDOMEN:  soft, non-distended, non-tender  MUSCULOSKELETAL:  There is no redness, warmth, or swelling of the joints. NEUROLOGIC:  Alert, awake, oriented x 3. No FND  EXTREMITIES: Warm and well perfused.      LABS  Recent Labs     07/30/20  1021   WBC 5.9   RBC 3.03*   HGB 8.6*   HCT 27.1*   MCV 89.5   MCH 28.4   MCHC 31.7*   RDW 18.3*          Recent Labs     07/29/20  0526 07/30/20  1021 07/31/20  0541    139 142   K 4.7 5.1* 3.8    102 107   CO2 20 24 24   BUN 29* 33* 33*   CREATININE 2.49* 1.65* 1.33*   GLUCOSE 48* 194* 155*   CALCIUM 8.2* 8.2* 8.3*

## 2020-07-31 NOTE — PROGRESS NOTES
Pt Name: [id number] [facility] Record Number: [id number]  Date of Birth 1945   Admit date 7/26/2020  4:54 PM  Today's Date: 7/31/2020     ASSESSMENT  1. Hospital day # 5  2.  Acute kidney injury, improving  3.  Ascites, testing pending after paracentesis  4.  Rectal discomfort improving, no evidence of abscess  5. Orthostatic hypotension after Kayexalate    PLAN  1. I will follow as needed    SUBJECTIVE  Chief complaint: None  Afebrile, vital signs are stable. She denies any nausea or vomiting, has passed flatus and had a bowel movement. She is tolerating a DIET GENERAL; Carb Control: 4 carb choices (60 gms)/meal. Her pain is well controlled on current medications. She has been ambulating in the halls. She is going to have interventional radiology biopsy her kidney mass next week.      has a past medical history of Arthritis, Cardiomyopathy ([address] Chronic kidney disease, Hypertension, Liver disease, Other disorders of kidney and ureter in diseases classified elsewhere, Pneumonia, Psychiatric problem, Sleep apnea, obstructive, and Type II or unspecified type diabetes mellitus without mention of complication, not stated as uncontrolled.     CURRENT MEDS  Scheduled Meds:   furosemide  40 mg Intravenous Daily    spironolactone  50 mg Oral Daily    albumin human  50 g Intravenous Once    insulin lispro  0-6 Units Subcutaneous TID WC    insulin lispro  0-3 Units Subcutaneous Nightly    [Held by provider] aspirin  81 mg Oral BID    atorvastatin  40 mg Oral Daily    sodium chloride flush  10 mL Intravenous 2 times per day    enoxaparin  30 mg Subcutaneous Daily    metoprolol tartrate  50 mg Oral BID    citalopram  10 mg Oral Daily     Continuous Infusions:   dextrose 100 mL/hr (07/28/20 0619)     PRN Meds:.glucose, dextrose, glucagon (rDNA), dextrose, sodium chloride flush, acetaminophen **OR** acetaminophen, polyethylene glycol, promethazine **OR** ondansetron    OBJECTIVE  CURRENT VITALS:  height is 5' 3\" (1.6 m) and weight is 200 lb (90.7 kg). Her oral temperature is 98.4 °F (36.9 °C). Her blood pressure is 121/53 (abnormal) and her pulse is 95. Her respiration is 20 and oxygen saturation is 100%. Temperature Range (24h):Temp: 98.4 °F (36.9 °C) Temp  Av.5 °F (36.9 °C)  Min: 98.4 °F (36.9 °C)  Max: 98.6 °F (37 °C)  BP Range (66Q): Systolic (76EDZ), NCU:681 , Min:73 , ZJZ:698     Diastolic (97UAA), DSO:10, Min:52, Max:82    Pulse Range (24h): Pulse  Av.8  Min: 73  Max: 120  Respiration Range (24h): Resp  Av  Min: 20  Max: 20    GENERAL: alert, no distress  LUNGS: clear to ausculation, without wheezes, rales or rhonci  HEART: normal rate and regular rhythm  ABDOMEN: soft, non-tender, non-distended, bowel sounds present in all 4 quadrants and no guarding or peritoneal signs  EXTERMITY: no cyanosis, clubbing or edema    In: 240 [P.O.:240]  Out: -       LABS  Recent Labs     20  0526 20  1021 20  0541   WBC  --  5.9  --    HGB  --  8.6*  --    HCT  --  27.1*  --    PLT  --  141  --     139 142   K 4.7 5.1* 3.8    102 107   CO2 20 24 24   BUN 29* 33* 33*   CREATININE 2.49* 1.65* 1.33*   CALCIUM 8.2* 8.2* 8.3*      No results for input(s): PTT, INR in the last 72 hours. Invalid input(s): PT  No results for input(s): AST, ALT, BILITOT, BILIDIR, AMYLASE, LIPASE, LDH, LACTA in the last 72 hours. RADIOLOGY  Ct Abdomen Pelvis Wo Contrast Additional Contrast? None    Result Date: 2020  CT of the Abdomen and Pelvis without intravenous contrast medium History:  Abdominal distention for 6 weeks. Technical Factors: CT imaging of the abdomen and pelvis were obtained and formatted as 5 mm contiguous axial images from the domes of the diaphragm to the symphysis pubis. Sagittal and coronal reconstructions were also obtained. Oral contrast medium:  None. Intravenous contrast medium:  None. Comparison:  None. Findings: Lungs:  Lung bases are clear. Liver:  Small in size.  Mildly nodular contour. Bile Ducts:  Normal in caliber. Gallbladder:  Gallbladder surgically absent. Pancreas:  Normal without masses, cysts, ductal dilatation or calcification. Spleen:  Normal in size without masses or calcifications. No splenules. Kidneys:  4.6 x 5.4 x 4.9 cm heterogenous exophytic mass, lower pole left kidney (series 2, image 49, series 601, image 27). No calculi. No hydronephrosis. Kidneys normal in size bilaterally. Adrenals:  Normal. Small bowel:  Normal in caliber. Appendix:  Appendix surgically absent. Colon:  Normal in caliber. Peritoneum:  No free air. Large amount of free fluid identified perihepatic, extending caudally within mesentery, paracolic gutters bilaterally, and into the pelvis. Vessels: Aorta normal in course and caliber. Lymph nodes:  Retroperitoneal:  No enlarged retroperitoneal lymph nodes. Mesenteric:  No enlarged mesenteric lymph nodes. Pelvic: No enlarged pelvic lymph nodes. Ureters: Normal in course and caliber. No calcifications. Bladder: No wall thickening. Reproductive organs: Uterus surgically absent. Abdominal Wall: Diffuse edematous change, anterior abdominal wall, as well as dependent portion, pelvic wall. 14 mm fat-containing periumbilical anterior abdominal wall defect. Bones:  No bone lesions. This space narrowing L4-5. No post operative changes. 4.6 x 5.4 x 4.9 cm lower pole left renal mass. Malignancy diagnosis of exclusion. Cirrhosis. Large volume ascites. Cholecystectomy. Appendectomy. Hysterectomy. Other findings discussed. All CT scans at this facility use dose modulation, iterative reconstruction, and/or weight based dosing when appropriate to reduce radiation dose to as low as reasonably achievable. Ir Us Guided Paracentesis    1. Status post technically successful ultrasound-guided paracentesis. Leonel Morales is a Female of 69 years age, referred for Ultrasound Guided Paracentesis.   PROCEDURE: Survey of the abdomen showed large amount of ascites fluid. After obtaining informed consent, the patient was positioned supine on the sonography table. Using ultrasound, the skin over the left hemiabdomen was locally anesthetized with 1% lidocaine. Following that, a Yueh needle was advanced into the fluid pocket using ultrasound visualization. 9015cc, of clear yellow fluid were aspirated and sent for cytology, and pathology. The needle was removed, and hemostasis was obtained with pressure. A Band-Aid was placed. Post procedure images did not demonstrate hemorrhage at the target site. The patient tolerated the procedure well. The patient left the department in good condition. A radiology nurse was in presence monitoring vital signs, assisting throughout the procedure. Us Dup Lower Extremity Right Elizabeth    Result Date: 7/9/2020  US DUP LOWER EXTREMITY RIGHT ELIZABETH CLINICAL HISTORY: I82.4Y1 Deep vein thrombosis (DVT) of proximal vein of right lower extremity, unspecified chronicity ([facility] [address] ICD10 COMPARISONS: FINDINGS: Grayscale as well as Duplex color and Spectra; Doppler ultrasound of the deep venous system of the right lower extremity from the inguinal ligament to the popliteal fossa was performed. There are no findings of deep venous thrombus in the visualized vessels of the right lower extremity. NO FINDINGS OF DEEP VENOUS THROMBUS IN  THE VISUALIZED VESSELS OF THE RIGHT LOWER EXTREMITY. Us Retroperitoneal Limited    Result Date: 7/27/2020  EXAMINATION: Ultrasound the kidneys CLINICAL HISTORY: Abnormal CT FINDINGS: Multiple scans the kidneys performed. There is a hypoechoic relatively homogeneous solid mass projecting off the inferior left kidney measuring 4.5 x 5.0 x 4.8 cm in diameter. Renal cell tumor would have to be a primary consideration. No other renal mass. No obstructive uropathy. No definite renal calculi. A large amount of ascites is present.  The contour of the liver is somewhat irregular and this is likely related to chronic

## 2020-07-31 NOTE — PROGRESS NOTES
Hematology/Oncology   Progress Note        CHIEF COMPLAINT/HPI:  Follow up of left kidney mass. Has seen Dr Tex Marrufo and is scheduled for kidney biopsy next week due to asa use. Encourage her to walk when she can at home to decrease chance of getting DVT. REVIEW OF SYSTEMS:    Unremarkable except for symptoms mentioned in HPI.     Current Inpatient Medications:    Current Facility-Administered Medications   Medication Dose Route Frequency Provider Last Rate Last Dose    furosemide (LASIX) injection 40 mg  40 mg Intravenous Daily Destiny Mccoy MD   40 mg at 07/31/20 0917    spironolactone (ALDACTONE) tablet 50 mg  50 mg Oral Daily Ana M Lobato MD   50 mg at 07/31/20 0918    albumin human 25 % IV solution 50 g  50 g Intravenous Once Robby Charles APRN - CNP        insulin lispro (HUMALOG) injection vial 0-6 Units  0-6 Units Subcutaneous TID WC Ana M Lobato MD        insulin lispro (HUMALOG) injection vial 0-3 Units  0-3 Units Subcutaneous Nightly Ana M Lobato MD   1 Units at 07/30/20 2311    glucose (GLUTOSE) 40 % oral gel 15 g  15 g Oral PRN Ana M Parker MD        dextrose 50 % IV solution  12.5 g Intravenous PRN Ana M Parker MD   12.5 g at 07/28/20 0619    glucagon (rDNA) injection 1 mg  1 mg Intramuscular PRN Ana M Parker MD        dextrose 5 % solution  100 mL/hr Intravenous PRN Ana M Lobato  mL/hr at 07/28/20 0619 100 mL/hr at 07/28/20 0619    [Held by provider] aspirin EC tablet 81 mg  81 mg Oral BID Rubi Bryan MD   81 mg at 07/29/20 1036    atorvastatin (LIPITOR) tablet 40 mg  40 mg Oral Daily Rubi Bryan MD   40 mg at 07/31/20 0148    sodium chloride flush 0.9 % injection 10 mL  10 mL Intravenous 2 times per day Rubi Bryan MD   10 mL at 07/31/20 0917    sodium chloride flush 0.9 % injection 10 mL  10 mL Intravenous PRN Rubi Bryan MD        acetaminophen (TYLENOL) tablet 650 mg  650 mg Oral Q6H PRN Rubi Bryan MD        Or    acetaminophen (TYLENOL) suppository 650 mg  650 mg Rectal Q6H PRN Rubi Bryan MD        polyethylene glycol (GLYCOLAX) packet 17 g  17 g Oral Daily PRN Rubi Bryan MD        promethazine (PHENERGAN) tablet 12.5 mg  12.5 mg Oral Q6H PRN Rubi Bryan MD   12.5 mg at 07/30/20 1724    Or    ondansetron (ZOFRAN) injection 4 mg  4 mg Intravenous Q6H PRN Rubi Bryan MD        enoxaparin (LOVENOX) injection 30 mg  30 mg Subcutaneous Daily Rubi Bryan MD   30 mg at 07/31/20 3243    metoprolol tartrate (LOPRESSOR) tablet 50 mg  50 mg Oral BID TANNER Gonzalez CNP   50 mg at 07/27/20 2008    citalopram (CELEXA) tablet 10 mg  10 mg Oral Daily TANNER Raman CNP   10 mg at 07/31/20 6521       PHYSICAL EXAM:    EYES:  Lids and lashes normal, pupils equal, round and reactive to light, extra ocular muscles intact, sclera clear, conjunctiva normal    ENT:  Normocephalic, without obvious abnormality, atraumatic, sinuses nontender on palpation, external ears without lesions, oral pharynx with moist mucus membranes, tonsils without erythema or exudates, gums normal and good dentition. NECK:  Supple, symmetrical, trachea midline, no adenopathy, thyroid symmetric, not enlarged and no tenderness, skin normal    CHEST:    LUNGS:  No increased work of breathing, good air exchange, clear to auscultation bilaterally, no crackles or wheezing    CARDIOVASCULAR:  Normal apical impulse, regular rate and rhythm, normal S1 and S2, no S3 or S4, and no murmur noted    ABDOMEN:  No scars, normal bowel sounds, soft, non-distended, non-tender, no masses palpated, no hepatosplenomegally    MUSCULOSKELETAL:  There is no redness, warmth, or swelling of the joints. Full range of motion noted. Motor strength is 5 out of 5 all extremities bilaterally.   Tone is normal.  EXTREMITIES:    NEURO:    DATA:      PT/INR:  No results found for: PTINR  PTT:    Lab Results   Component Value Date    APTT 39.6 07/26/2020     CMP: Lab Results   Component Value Date     07/31/2020    K 3.8 07/31/2020    K 4.4 07/27/2020     07/31/2020    CO2 24 07/31/2020    BUN 33 07/31/2020    PROT 6.3 07/26/2020     Magnesium:  No results found for: MG  Phosphorus:  No components found for: PO4  Calcium:  No components found for: CA  CBC:    Lab Results   Component Value Date    WBC 5.9 07/30/2020    RBC 3.03 07/30/2020    HGB 8.6 07/30/2020    HCT 27.1 07/30/2020    MCV 89.5 07/30/2020    RDW 18.3 07/30/2020     07/30/2020     DIFF:    Lab Results   Component Value Date    MCV 89.5 07/30/2020    RDW 18.3 07/30/2020      LDH:  No results found for: LDH  Uric Acid:  No components found for: URIC    EKG Reviewed  Appropriate Radiology Reviewed      Pathology: Reviewed where indicated      ASSESSMENT:  Active Problems:    FALLON (acute kidney injury) ([address]  Resolved Problems:    * No resolved hospital problems. *    Patient Active Problem List   Diagnosis    Type 2 diabetes mellitus without complication, without long-term current use of insulin ([address]    Hypertension    Bell-rectal abscess    Bilateral carotid artery stenosis    HOCM (hypertrophic obstructive cardiomyopathy) ([address]    HARDEEP (obstructive sleep apnea)    Pseudophakia of both eyes    Regular astigmatism    Cardiomyopathy ([address]    Rectal pain    Abdominal distension    FALLON (acute kidney injury) ([address]       PLAN:  [id number] Vashti Amato to discharge and follow up in our office in 1 month.            Electronically signed by Wiley Youngblood MD on 7/31/20 at 10:34 AM EDT

## 2020-08-01 NOTE — CARE COORDINATION
[facility] Transitions Initial Follow Up Call    Call within 2 business days of discharge: Yes    Patient: Henrique Jc Patient : 1945   MRN: <[id number]>  Reason for Admission: Renal Mass  Discharge Date: 20 RARS: Readmission Risk Score: 19      Last Discharge [facility]       Complaint Diagnosis Description Type Department Provider    20 Abdominal Pain Renal mass, left . .. ED to Hosp-Admission (Discharged) (ADMITTED) Tracy Piedra MD; Jake Thompson,... Called for initial transitions call. Spouse answered and preferred I called back d/t patient napping. Will notify CTN.  MICHELLE Taveras RN  Care Transition Nurse [phone]         Spoke with: Diann Tam    [facility]    Non-face-to-face services provided:      Care Transitions 24 Hour Call    Do you have all of your prescriptions and are they filled?:  Yes  Are you an active caregiver in your home?:  No  Care Transitions Interventions         Follow Up  Future Appointments   Date Time Provider Carlota Rodriguez   2020  9:00 AM [place] CT ROOM 1 MLOZ CT [facility] Fac RAD   2020 10:30 AM Erin Bennett  [address]   2020  1:30 PM TANNER Lang - [facility]   2020 11:00 AM Aura Kennedy PSYD [facility]   2020  2:45 PM Ashlee Orellana  [facility]   2020  1:15 PM Alexsandra Crawford  [address] [facility]       Ezio Sepulveda, [address] [place] [address]

## 2020-08-03 NOTE — CARE COORDINATION
[facility] Transitions Initial Follow Up Call    Call within 2 business days of discharge: Yes    Patient: Reid Red Patient : 1945   MRN: <[id number]>  Reason for Admission: renal mass  Discharge Date: 20 RARS: Readmission Risk Score: 19      Last Discharge [facility]       Complaint Diagnosis Description Type Department Provider    20 Abdominal Pain Renal mass, left . .. ED to Hosp-Admission (Discharged) (ADMITTED) Therese Bertrand MD; Constance Myles,... Attempted to reach pt for follow up call. Man states pt is napping right now.          Care Transitions 24 Hour Call    Do you have all of your prescriptions and are they filled?:  Yes  Are you an active caregiver in your home?:  No  Care Transitions Interventions         Follow Up  Future Appointments   Date Time Provider Carlota Rodriguez   2020  9:00 AM [place] CT ROOM 1 MLOZ CT [facility] Fac RAD   2020 11:00 AM Gale Palacio [facility] [address]   2020  2:45 PM Jennifer Hills  [facility]   2020  1:15 PM Leah Walker MD [address] [address]

## 2020-08-03 NOTE — TELEPHONE ENCOUNTER
Ask her to reconsider at least for 2 weeks and if not need to tell them to go away.  They can just come in to check on her and not have to do anything

## 2020-08-04 NOTE — PROGRESS NOTES
Pt in CT holding for 3hr recovery. VSS. Pt denies pain at this time. Fluids provided. Family at bedside and updated. Call light in reach, pt resting supine on cart.

## 2020-08-04 NOTE — PRE SEDATION
Sedation Pre-Procedure Note    Patient Name: Dana Juarez   YOB: 1945  Room/Bed: Room/bed info not found  Medical Record Number: [id number]  Date: 8/4/2020   Time: 9:18 AM       Indication:  Left kidney mass biopsy    Consent: I have discussed with the patient and/or the patient representative the indication, alternatives, and the possible risks and/or complications of the planned procedure and the anesthesia methods. The patient and/or patient representative appear to understand and agree to proceed. Vital Signs:   Vitals:    08/04/20 0820   BP: (!) 142/58   Pulse: 90   Resp: 20   SpO2: 99%       Past Medical History:   has a past medical history of Arthritis, Cardiomyopathy ([facility] [address] Chronic kidney disease, Hypertension, Liver disease, Other disorders of kidney and ureter in diseases classified elsewhere, Pneumonia, Psychiatric problem, Sleep apnea, obstructive, and Type II or unspecified type diabetes mellitus without mention of complication, not stated as uncontrolled. Past Surgical History:   has a past surgical history that includes Hysterectomy; Cholecystectomy; other surgical history; Colonoscopy (4/23/2013); Appendectomy; fracture surgery; Foot fracture surgery (Bilateral); and Paracentesis (Left, 07/27/2020). Medications:   Scheduled Meds:   Continuous Infusions:   PRN Meds:   Home Meds:   Prior to Admission medications    Medication Sig Start Date End Date Taking?  Authorizing Provider   verapamil (CALAN SR) 120 MG extended release tablet Take 60 mg by mouth daily 7/22/20  Yes Historical Provider, MD   Blood Pressure KIT Check your blood pressure daily 7/31/20  Yes Ana M De La Garza MD   aspirin 81 MG EC tablet Take 1 tablet by mouth 2 times daily HOld aspirin for planned renal biopsy next week 7/30/20  Yes Ana M De La Garza MD   metoprolol tartrate (LOPRESSOR) 50 MG tablet Take 1 tablet by mouth 2 times daily 7/30/20  Yes Ana M Parker MD   furosemide (LASIX) 40 MG tablet Take 1 tablet by mouth daily 7/30/20  Yes Ana M Crawford MD   spironolactone (ALDACTONE) 50 MG tablet Take 1 tablet by mouth daily 7/31/20  Yes Ana M Crawford MD   citalopram (CELEXA) 10 MG tablet  6/9/20  Yes Myrtle Reis MD   glimepiride (AMARYL) 2 MG tablet TAKE 1 TABLET EVERY MORNING BEFORE BREAKFAST 4/27/20  Yes Macrina Samuels MD   metFORMIN (GLUCOPHAGE) 1000 MG tablet TAKE 1 TABLET TWICE A DAY WITH MEALS 3/27/20  Yes Kory [id number] TANNER Holder CNP   lisinopril (PRINIVIL;ZESTRIL) 5 MG tablet TAKE 1 TABLET DAILY 12/2/19  Yes Macrina Samuels MD   atorvastatin (LIPITOR) 40 MG tablet TAKE 1 TABLET DAILY 10/1/19  Yes Macrina Samuels MD   raloxifene (EVISTA) 60 MG tablet TAKE 1 TABLET DAILY 9/3/19  Yes Macrina Samuels MD   glucose blood VI test strips (ACCU-CHEK MELANIE) strip Test 1-2 times a day. Dx: 250.00. Accu-check melanie strips 11/28/12  Yes Macrina Samuels MD   Lancet Device MISC Test 1-2 times a day. Dx: 250.00. Lancets 10/10/12  Yes Macrina Samuels MD     Coumadin Use Last 7 Days:  no  Antiplatelet drug therapy use last 7 days: no  Other anticoagulant use last 7 days: no  Additional Medication Information:  none      Pre-Sedation Documentation and Exam:   I have personally completed a history, physical exam & review of systems for this patient (see notes).     Mallampati Airway Assessment:  Mallampati Class II - (soft palate, fauces & uvula are visible)    Prior History of Anesthesia Complications:   none    ASA Classification:  Class 2 - A normal healthy patient with mild systemic disease    Sedation/ Anesthesia Plan:   intravenous sedation    Medications Planned:   midazolam (Versed) intravenously and fentanyl intravenously    Patient is an appropriate candidate for plan of sedation: yes    Electronically signed by Jorge Dennis MD on 8/4/2020 at 9:18 AM

## 2020-08-04 NOTE — H&P
Note updated from hospital encounter. No new events. Here for left kidney mass biopsy. Hua Judge MD    Physician    Specialty:  Interventional Radiology    Consults    Signed    Date of Service:  7/29/2020  5:00 PM          Related encounter: ED to Hosp-Admission (Discharged) from 7/26/2020 in [address] Orders    Inpatient consult to IR [[id number]] ordered by Ildefonso Burgos MD at 07/29/20 1100            Signed         Expand All Collapse All      CC: Left renal mass     HPI:  Patient is a pleasant 72-year-old woman who we had seen previously for ascites and performed a paracentesis. Ascites has resolved since yesterday. Interventional radiology has now been consulted for the finding of a left renal solid mass. Patient initially presented to the hospital with complaint of abdominal distention and decreased urine output. CT scan was performed which demonstrated ascites and a left solid renal mass. Dr. Katerin Jauregui has consulted interventional radiology for biopsy of the left renal mass for diagnosis. Patient has been taking aspirin daily.      Family History[]Expand by Default         Family History   Problem Relation Age of Onset    Arrhythmia Mother      Diabetes Mother      High Blood Pressure Mother      Arrhythmia Father      Breast Cancer Sister      Diabetes Brother      High Blood Pressure Brother             Past Surgical History[]Expand by Default   Past Surgical History:   Procedure Laterality Date    APPENDECTOMY        CHOLECYSTECTOMY        COLONOSCOPY   4/23/2013    FOOT FRACTURE SURGERY Bilateral      FRACTURE SURGERY        HYSTERECTOMY        OTHER SURGICAL HISTORY         removal of anal fistulotomy            Past Medical History[]Expand by Default        Past Medical History:   Diagnosis Date    Arthritis      Cardiomyopathy ([address]       mother history.     Chronic kidney disease      Hypertension      Liver disease      Other disorders of kidney and ureter in diseases classified elsewhere      Pneumonia      Psychiatric problem      Sleep apnea, obstructive      Type II or unspecified type diabetes mellitus without mention of complication, not stated as uncontrolled             Social History[]Expand by Default   Social History            Socioeconomic History    Marital status:        Spouse name: None    Number of children: None    Years of education: None    Highest education level: None   Occupational History    None   Social Needs    Financial resource strain: Not hard at all   Crist[employer]-Sukhdeep insecurity       Worry: Never true       Inability: Never true    Transportation needs       Medical: None       Non-medical: None   Tobacco Use    Smoking status: Never Smoker    Smokeless tobacco: Former User   Substance and Sexual Activity    Alcohol use: No    Drug use: No    Sexual activity: Yes   Lifestyle    Physical activity       Days per week: None       Minutes per session: None    Stress: None   Relationships    Social connections       Talks on phone: None       Gets together: None       Attends Congregation service: None       Active member of club or organization: None       Attends meetings of clubs or organizations: None       Relationship status: None    Intimate partner violence       Fear of current or ex partner: None       Emotionally abused: None       Physically abused: None       Forced sexual activity: None   Other Topics Concern    None   Social History Narrative    None                Allergies   Allergen Reactions    Codeine          No current facility-administered medications on file prior to encounter.              Current Outpatient Medications on File Prior to Encounter   Medication Sig Dispense Refill    citalopram (CELEXA) 10 MG tablet          glimepiride (AMARYL) 2 MG tablet TAKE 1 TABLET EVERY MORNING BEFORE BREAKFAST 90 tablet 1    metoprolol succinate (TOPROL XL) 100 MG extended release tablet Take 1 tablet by mouth daily (Patient taking differently: Take 100 mg by mouth daily Patient takes BID) 90 tablet 1    metFORMIN (GLUCOPHAGE) 1000 MG tablet TAKE 1 TABLET TWICE A DAY WITH MEALS 180 tablet 3    lisinopril (PRINIVIL;ZESTRIL) 5 MG tablet TAKE 1 TABLET DAILY 90 tablet 3    atorvastatin (LIPITOR) 40 MG tablet TAKE 1 TABLET DAILY 90 tablet 3    raloxifene (EVISTA) 60 MG tablet TAKE 1 TABLET DAILY 90 tablet 3    glucose blood VI test strips (ACCU-CHEK MELANIE) strip Test 1-2 times a day. Dx: 250.00. Accu-check melanie strips 180 strip 3    Lancet Device MISC Test 1-2 times a day. Dx: 250.00. Lancets 180 each 3    aspirin 81 MG EC tablet Take 81 mg by mouth 2 times daily.              Review of Systems   Constitutional: Negative. Negative for chills, diaphoresis and fever. HENT: Negative. Negative for congestion, ear pain, hearing loss and tinnitus. Eyes: Negative. Negative for photophobia. Respiratory: Negative. Negative for cough, shortness of breath and wheezing. Cardiovascular: Negative. Negative for chest pain, palpitations and leg swelling. Gastrointestinal: Negative. Negative for abdominal pain, constipation, diarrhea, nausea and vomiting. Abdominal distention resolved since paracentesis yesterday    Genitourinary: Positive for decreased urine volume. Negative for dysuria, flank pain, frequency, hematuria and urgency. Musculoskeletal: Negative. Negative for back pain and neck pain. Skin: Negative. Negative for rash. Allergic/Immunologic: Negative for environmental allergies. Neurological: Negative. Negative for dizziness, tremors, weakness and headaches. Hematological: Does not bruise/bleed easily. Psychiatric/Behavioral: Negative. Negative for hallucinations and suicidal ideas.  The patient is not nervous/anxious.          OBJECTIVE:  BP (!) 121/45   Pulse 94   Temp 98.3 °F (36.8 °C) (Oral)   Resp 16   Ht 5' 3\" (1.6 m)   Wt 200 lb (90.7 kg)   SpO2 100% BMI 35.43 kg/m²      Physical Exam  Constitutional:       General: She is not in acute distress. Appearance: She is well-developed. She is not diaphoretic. HENT:      Head: Normocephalic and atraumatic. Nose: Nose normal.      Mouth/Throat:      Pharynx: No oropharyngeal exudate. Eyes:      General: No scleral icterus. Right eye: No discharge. Left eye: No discharge. Conjunctiva/sclera: Conjunctivae normal.   Neck:      Musculoskeletal: Neck supple. Thyroid: No thyromegaly. Vascular: No JVD. Trachea: No tracheal deviation. Cardiovascular:      Rate and Rhythm: Normal rate and regular rhythm. Heart sounds: Normal heart sounds. No murmur. No friction rub. No gallop. Pulmonary:      Effort: No respiratory distress. Breath sounds: No stridor. No wheezing or rales. Chest:      Chest wall: No tenderness. Abdominal:      General: Bowel sounds are normal. There is no distension. Palpations: Abdomen is soft. There is no mass. Tenderness: There is no abdominal tenderness. There is no guarding or rebound. Hernia: No hernia is present. Musculoskeletal:         General: No tenderness or deformity. Skin:     General: Skin is dry. Coloration: Skin is not pale. Findings: No erythema or rash. Neurological:      Mental Status: She is alert and oriented to person, place, and time. Cranial Nerves: No cranial nerve deficit. Psychiatric:         Behavior: Behavior normal.         Thought Content:  Thought content normal.         Judgment: Judgment normal.            CT images of the abdomen were reviewed which demonstrate a solid left inferior pole exophytic renal mass measuring approximately 5 cm in greatest dimension.        ASSESSMENT ANDPLAN:     ASSESSMENT: Patient with incidentally discovered solid left renal mass     PLAN: We'll plan for biopsy of the left renal mass, likely as an outpatient, since patient has been taking aspirin daily, and saphenous became performed is 5 days after stopping aspirin to decrease risk of hemorrhage.        Christiano Jackson MD, [facility]

## 2020-08-04 NOTE — PROGRESS NOTES
Patient brought to the CT holding room to change into  a gown and start the interview. 9004 IV started and procedure explained briefly. Patient's daughter and  here in the waiting room. Support offered. [id number] Dr Joce Galan here to do the pre-sedation evaluation.

## 2020-08-06 NOTE — PROGRESS NOTES
O:  MSE:    Appearance    alert, cooperative  Appetite abnormal:  low for the past year  Sleep disturbance Yes, including; hypersomnia as well as frequent night time awakening, difficulty falling asleep, non-restful sleep   Fatigue Yes  Loss of pleasure Yes  Impulsive behavior No  Speech    spontaneous, normal rate and normal volume  Mood   neutral   Affect    normal affect  Thought Content    intact  Thought Process    linear, goal directed and coherent  Associations    logical connections  Insight    good  Judgment    good  Orientation    oriented to person, place, time, and general circumstances  Memory    recent and remote memory intact  Attention/Concentration    impaired  Morbid ideation No  Suicide Assessment    no suicidal ideation    History:    Medications:   Current Outpatient Medications   Medication Sig Dispense Refill    verapamil (CALAN SR) 120 MG extended release tablet Take 60 mg by mouth daily      Blood Pressure KIT Check your blood pressure daily 1 kit 0    aspirin 81 MG EC tablet Take 1 tablet by mouth 2 times daily HOld aspirin for planned renal biopsy next week 30 tablet 3    metoprolol tartrate (LOPRESSOR) 50 MG tablet Take 1 tablet by mouth 2 times daily 60 tablet 3    furosemide (LASIX) 40 MG tablet Take 1 tablet by mouth daily 60 tablet 3    spironolactone (ALDACTONE) 50 MG tablet Take 1 tablet by mouth daily 30 tablet 3    citalopram (CELEXA) 10 MG tablet       glimepiride (AMARYL) 2 MG tablet TAKE 1 TABLET EVERY MORNING BEFORE BREAKFAST 90 tablet 1    metFORMIN (GLUCOPHAGE) 1000 MG tablet TAKE 1 TABLET TWICE A DAY WITH MEALS 180 tablet 3    lisinopril (PRINIVIL;ZESTRIL) 5 MG tablet TAKE 1 TABLET DAILY 90 tablet 3    atorvastatin (LIPITOR) 40 MG tablet TAKE 1 TABLET DAILY 90 tablet 3    raloxifene (EVISTA) 60 MG tablet TAKE 1 TABLET DAILY 90 tablet 3    glucose blood VI test strips (ACCU-CHEK MELANIE) strip Test 1-2 times a day. Dx: 250.00.  Accu-check melanie strips 180 strip 3    Lancet Device MISC Test 1-2 times a day. Dx: 250.00. Lancets 180 each 3     No current facility-administered medications for this visit.       Facility-Administered Medications Ordered in Other Visits   Medication Dose Route Frequency Provider Last Rate Last Dose    midazolam (VERSED) injection 0.5 mg  0.5 mg Intravenous PRN TANNER Morales CNP   0.5 mg at 08/04/20 0933    lidocaine 2 % injection 10 mL  10 mL Intradermal PRN TANNER Morales CNP   3 mL at 08/04/20 0938    fentaNYL (SUBLIMAZE) injection 50 mcg  50 mcg Intravenous PRN TANNER Morales CNP   50 mcg at 08/04/20 0933    0.9 % sodium chloride bolus  250 mL Intravenous PRN TANNER Morales  mL/hr at 08/04/20 0930 250 mL at 08/04/20 0930       Social History:   Social History     Socioeconomic History    Marital status:      Spouse name: Not on file    Number of children: Not on file    Years of education: Not on file    Highest education level: Not on file   Occupational History    Not on file   Social Needs    Financial resource strain: Not hard at all   [facility] insecurity     Worry: Never true     Inability: Never true   [employer] needs     Medical: Not on file     Non-medical: Not on file   Tobacco Use    Smoking status: Never Smoker    Smokeless tobacco: Former User   Substance and Sexual Activity    Alcohol use: No    Drug use: No    Sexual activity: Yes   Lifestyle    Physical activity     Days per week: Not on file     Minutes per session: Not on file    Stress: Not on file   Relationships    Social connections     Talks on phone: Not on file     Gets together: Not on file     Attends Baptism service: Not on file     Active member of club or organization: Not on file     Attends meetings of clubs or organizations: Not on file     Relationship status: Not on file    Intimate partner violence     Fear of current or ex partner: Not on file     Emotionally abused: Not on file Physically abused: Not on file     Forced sexual activity: Not on file   Other Topics Concern    Not on file   Social History Narrative    Not on file       Family History:   Family History   Problem Relation Age of Onset    Arrhythmia Mother     Diabetes Mother     High Blood Pressure Mother     Arrhythmia Father     Breast Cancer Sister     Diabetes Brother     High Blood Pressure Brother        TOBACCO:   reports that she has never smoked. She quit smokeless tobacco use about 36 years ago. ETOH:   reports no history of alcohol use. A:  Pt reports that her anxiety symptoms have reduced but she is having increased symptoms associated with depression, however, these may be related to her recent medical problems. Pt would likely benefit from continued [facility] services to increase coping skills and provide symptom control and relief. PHQ Scores 6/8/2020 5/22/2020 10/1/2019 2/20/2018 1/16/2017 9/8/2015 6/24/2014   PHQ2 Score 2 0 3 0 0 0 2   PHQ9 Score 12 0 16 0 0 0 2     Interpretation of Total Score Depression Severity: 1-4 = Minimal depression, 5-9 = Mild depression, 10-14 = Moderate depression, 15-19 = Moderately severe depression, 20-27 = Severe depression      Diagnosis:  Unspecified anxiety and depression    Plan:  Pt interventions:  [place]-setting to identify pt's primary goals for [facility] visit / overall health, Supportive techniques, Emphasized self-care as important for managing overall health, Provided Psychoeducation re: relaxation skills and mindfulness and Identified relevant behavioral strategies for targeting anxiety/depression including taught/practiced mindfulness technique, PMR, and ACT technique    Pt Behavioral Change Plan:  1. Handouts with techniques reviewed today (hearing-seeing-feeling;-breathing, leaves on a stream, & PMR) mailed to pt.  2. F/U in 3 weeks.     Please note this report has been partially produced using speech recognition software and may cause contain errors related to that system including grammar, punctuation and spelling as well as words and phrases that may seem inappropriate. If there are questions or concerns please feel free to contact me to clarify.

## 2020-08-06 NOTE — PATIENT INSTRUCTIONS
Progressive Muscle Relaxation (PMR) reduces tension and is incompatible with anxiety. The action of relaxing the muscles of your body blocks the stress response. PMR can be practiced lying down or in a chair. Each muscle group is tensed for 4-5 seconds and then relaxed for 20-30 seconds and the procedure is repeated at least once. While doing PMR only tighten your muscles enough to feel some tension (about a 1/3 to a 1/2 of fully tense state). You don't want to strain or hold your breath. The goal is to feel what the muscles feel like when they are tense so you can more fully relax them. Focus on the sensations of letting go of the tension and how it feels for the muscle to be completely relaxed. While doing PMR you want to try to notice the difference between the sensations of tension and relaxation. You may find it helpful to say one of the following expressions to yourself while doing PMR. · Let go of tension. · Calm and rested. · Relax and smooth out the muscles. · Let the tension go away. · Let go more and more. · Deeper and Deeper. The following is a procedure for PMR:  1. Get into a comfortable and relaxed posture. Feet on the floor, legs apart, back against your chair. Some people find it's more relaxing to do PMR with their eyes closed. 2. Take a few slow, deep comfortable breaths. Breathe in as deeply as you can, hold for a moment, and exhale. As you breathe in, concentrate just on the sound and feel of the air. As you exhale completely, notice the warmth of the air and silently say the word calm (or choose a statement above) to yourself with each breath you let out. Take a few more slow deep breaths. Imagine your body becoming more relaxed and feeling heavier in your chair each time you exhale. 3. To begin the PMR, start with your legs. Lift your legs slightly off the ground, tense your thighs, and point your toes towards your head.   Hold that position and feel the tension. Now let your legs drop to the ground and release all the tension at once. Notice the difference between the way your legs feel now when relaxed and how they felt when they were tense. 4. Next with your palms facing the ceiling, make a fist and raise your arm bringing your fist as close to your shoulder as you can while at the same time pressing your arms to your sides. Feel the tension in your fingers, hands, and arms. Now relax. As you relax you may notice your arms feel warm and heavy. Notice the difference between relaxation and tension in your arms. 5.  Remember to continue breathing slowly and deeply, and scan your legs, arms, and shoulders releasing any excess tension you notice. Focus on the sensation of relaxation in these areas. 6. Next is your face and neck. To tense your neck bring your chin to your chest or the back of your head to the back of your chair. While doing this squint your eyes and slightly bring your back teeth together tensing just enough to feel the muscles in your jaw. Notice the tension in your face and neck: hold it. Now relax. Let go of all the tension from your face and neck. 7. Continue breathing slowly and enjoy the relaxed feelings throughout your entire body. Scan your body from your head to your toes and notice what your muscles feel like. As you are doing this take 5 more slow deep breaths. Adapted from LAMBERT Ayala., ARTHUR StephensS., Shonda(2009). Integrated Behavioral Health in [address] [place] [address]. ARNULFO Villatoro,  & Danuta Mccartney, [facility]. (2000). The relaxation & stress reduction workbook (5th edition). The short cuts for PMR are:  1. Make a fist like you are squeezing a lemon and then let go. 2. Make a fist like you are squeezing a lemon and then shake your hand out. 3. Tighten all your muscles like you are a robot (arms and legs straight) and then let everything go loose/floppy.     Hold these for a couple seconds each and repeat a few times until you start to feel better. These work best if you've practiced the long version several times before doing the short cuts.      ------------------------------------------  See below for the instructions on mindfulness and how to practice the hearing-seeing-feeling-breathing technique we reviewed during the appointment. Mindfulness Skills       Provided by [employer] © 2015      1. Find a place free of too much noise or distraction to practice. 2. Sit down on a cushion, the floor, or in a chair. You want to sit up straight to allow easy  breathing, but not so straight that youre uncomfortable. 3. Turn your focus toward your breathing. Notice the feeling of the breath entering your body and making its way to your lungs. Pay attention to how your body feels, and what its like as your breath exits your lungs. Continue to focus on the feeling of breathing. 4. As you practice, your mind will wander. Try not to  your thoughts-- simply accept that they are happening. Notice, as an outside observer: Im having a thought.  The same goes for feelings. If you detect sadness, worry, happiness, or excitement, notice how they feel in your body. Acknowledge what you are feeling, even if its an uncomfortable sensation. Simply notice: I am feeling this way.     5. When the thought or feeling passes, return your focus to your breathing and your body. 6. Try to practice for at least 10 to 15 minutes. If you are more experienced, aim for 30 minutes.     The Freddy Boeck, Ph.D., Director        ---------------------------------------------------  Leaves on a stream  This exercise asks you to watch your thoughts for a few minutes  Close your eyes and picture a stream  The stream has trees around it and leaves can fall from the trees into the stream, & float by you  When you notice a thought arise in your mind  Picture it as a leaf & watch it fall

## 2020-08-07 NOTE — TELEPHONE ENCOUNTER
1st attempt to reach patient.  Called patient @  and left message on machine for patient to return call during normal business hours of 8:30 AM and 5 PM @ [phone] option 2

## 2020-08-07 NOTE — TELEPHONE ENCOUNTER
Scheduling outreach call to review Health Maintenance and / or schedule appointment. AWV due  Colonoscopy  done  [facility] [address] GAP YES  Lab work [id number]  Mammogram due  PHQ-9 no due to diagnosis  Last appointment 6-4-20 w/  and f/up in 6 months  Upcoming appointment no  Scanned and updated HM yes    I spoke with patient appointment made 8-20-20.

## 2020-08-11 NOTE — PROGRESS NOTES
OFFICE VISIT        Patient: Chasidy Paul  YOB: 1945  MRN: [id number]    Chief Complaint: Carotid SOB dizzy  Chief Complaint   Patient presents with    1 Month Follow-Up    Hypertension    Cardiomyopathy    Shortness of Breath    Fatigue       CV Data:  1/2018 [facility] PRISCILA   5/2020 DUANE 60-79% ccf  1/25/2006 Cath normal ccf  6/2020 ECHO EF 85% Severe LVOT Gr    Subjective/HPI has been told she needs cardiac transplant years ago. Stamina is decreased. 7/10/2020 feels tired low energy no cp no sob no dizzy. 8/11/2020 still feels tired. No cp no sob no falls no bleed. occ dizzy but no falls     Nonsmoker  No ETOH  Retired-     EKG: SR    Past Medical History:   Diagnosis Date    Arthritis     Cardiomyopathy [facility])     mother history.     Chronic kidney disease     Hypertension     Liver disease     Other disorders of kidney and ureter in diseases classified elsewhere     Pneumonia     Psychiatric problem     Sleep apnea, obstructive     Type II or unspecified type diabetes mellitus without mention of complication, not stated as uncontrolled        Past Surgical History:   Procedure Laterality Date    APPENDECTOMY      CHOLECYSTECTOMY      COLONOSCOPY  4/23/2013    CT BIOPSY RENAL  8/4/2020    CT BIOPSY RENAL 8/4/2020 [facility] CT SCAN    FOOT FRACTURE SURGERY Bilateral     FRACTURE SURGERY      HYSTERECTOMY      OTHER SURGICAL HISTORY      removal of anal fistulotomy    PARACENTESIS Left 07/27/2020       Family History   Problem Relation Age of Onset    Arrhythmia Mother     Diabetes Mother     High Blood Pressure Mother     Arrhythmia Father     Breast Cancer Sister     Diabetes Brother     High Blood Pressure Brother        Social History     Socioeconomic History    Marital status:      Spouse name: None    Number of children: None    Years of education: None    Highest education level: None   Occupational History    None   Social Needs    Financial resource strain: Not hard at all   [employer] insecurity     Worry: Never true     Inability: Never true   [employer] needs     Medical: None     Non-medical: None   Tobacco Use    Smoking status: Never Smoker    Smokeless tobacco: Former User   Substance and Sexual Activity    Alcohol use: No    Drug use: No    Sexual activity: Yes   Lifestyle    Physical activity     Days per week: None     Minutes per session: None    Stress: None   Relationships    Social connections     Talks on phone: None     Gets together: None     Attends Tenriism service: None     Active member of club or organization: None     Attends meetings of clubs or organizations: None     Relationship status: None    Intimate partner violence     Fear of current or ex partner: None     Emotionally abused: None     Physically abused: None     Forced sexual activity: None   Other Topics Concern    None   Social History Narrative    None       Allergies   Allergen Reactions    Codeine        Current Outpatient Medications   Medication Sig Dispense Refill    lisinopril (PRINIVIL;ZESTRIL) 5 MG tablet Take 1 tablet by mouth nightly 90 tablet 3    verapamil (CALAN SR) 120 MG extended release tablet Take 60 mg by mouth daily      Blood Pressure KIT Check your blood pressure daily 1 kit 0    metoprolol tartrate (LOPRESSOR) 50 MG tablet Take 1 tablet by mouth 2 times daily 60 tablet 3    spironolactone (ALDACTONE) 50 MG tablet Take 1 tablet by mouth daily 30 tablet 3    glimepiride (AMARYL) 2 MG tablet TAKE 1 TABLET EVERY MORNING BEFORE BREAKFAST 90 tablet 1    metFORMIN (GLUCOPHAGE) 1000 MG tablet TAKE 1 TABLET TWICE A DAY WITH MEALS 180 tablet 3    atorvastatin (LIPITOR) 40 MG tablet TAKE 1 TABLET DAILY 90 tablet 3    raloxifene (EVISTA) 60 MG tablet TAKE 1 TABLET DAILY 90 tablet 3    glucose blood VI test strips (ACCU-CHEK EMLANIE) strip Test 1-2 times a day. Dx: 250.00.  Accu-check melanie strips 180 strip 3    Lancet Device MISC Test 1-2 times a day. Dx: 250.00. Lancets 180 each 3    aspirin 81 MG EC tablet Take 1 tablet by mouth 2 times daily HOld aspirin for planned renal biopsy next week (Patient not taking: Reported on 8/11/2020) 30 tablet 3    citalopram (CELEXA) 10 MG tablet        No current facility-administered medications for this visit. Review of Systems:   Review of Systems   Constitutional: Positive for fatigue. Negative for diaphoresis. HENT: Negative. Eyes: Negative. Respiratory: Negative. Negative for cough, chest tightness, shortness of breath, wheezing and stridor. Cardiovascular: Negative. Negative for chest pain, palpitations and leg swelling. Gastrointestinal: Negative. Negative for blood in stool and nausea. Genitourinary: Negative. Musculoskeletal: Negative. Skin: Negative. Neurological: Positive for weakness. Negative for dizziness, syncope and light-headedness. Hematological: Negative. Psychiatric/Behavioral: Negative. Physical Examination:    BP (!) 100/44 (Site: Right Upper Arm)   Pulse 84   Resp 18   Ht 5' 7\" (1.702 m)   Wt 200 lb (90.7 kg)   SpO2 98%   BMI 31.32 kg/m²    Physical Exam   Constitutional: No distress. She appears chronically ill. HENT:   Normal cephalic and Atraumatic   Eyes: Pupils are equal, round, and reactive to light. Neck: Normal range of motion and thyroid normal. Neck supple. No JVD present. No neck adenopathy. No thyromegaly present. Cardiovascular: Normal rate, regular rhythm, intact distal pulses and normal pulses. Murmur heard. Harsh holosystolic murmur is present at the lower left sternal border and lower right sternal border. The intensity increases with valsalva. Pulmonary/Chest: Effort normal and breath sounds normal. She has no wheezes. She has no rales. She exhibits no tenderness. Abdominal: Soft. Bowel sounds are normal. There is no abdominal tenderness. Musculoskeletal: Normal range of motion. General: Edema (right trace edema) present. No tenderness. Neurological: She is alert and oriented to person, place, and time. Skin: Skin is warm. No cyanosis. Nails show no clubbing.        LABS:  CBC:   Lab Results   Component Value Date    WBC 5.9 07/30/2020    RBC 3.03 07/30/2020    HGB 8.6 07/30/2020    HCT 27.1 07/30/2020    MCV 89.5 07/30/2020    MCH 28.4 07/30/2020    MCHC 31.7 07/30/2020    RDW 18.3 07/30/2020     07/30/2020    MPV 10.0 09/08/2015     Lipids:  Lab Results   Component Value Date    CHOL 89 01/03/2020    CHOL 81 10/21/2019    CHOL 87 10/02/2018     Lab Results   Component Value Date    TRIG 86 01/03/2020    TRIG 80 10/21/2019    TRIG 91 10/02/2018     Lab Results   Component Value Date    HDL 42 01/03/2020    HDL 40 10/21/2019    HDL 35 (L) 10/02/2018     Lab Results   Component Value Date    LDLCALC 30 01/03/2020    LDLCALC 25 10/21/2019    LDLCALC 34 10/02/2018     No results found for: LABVLDL, VLDL  Lab Results   Component Value Date    CHOLHDLRATIO 4.1 07/20/2011     CMP:    Lab Results   Component Value Date     08/04/2020    K 4.2 08/04/2020    K 4.4 07/27/2020     08/04/2020    CO2 22 08/04/2020    BUN 14 08/04/2020    CREATININE 1.02 08/04/2020    GFRAA >60.0 08/04/2020    LABGLOM 53.2 08/04/2020    GLUCOSE 112 08/04/2020    GLUCOSE 97 07/22/2020    PROT 6.3 07/26/2020    LABALBU 3.2 07/26/2020    LABALBU 4.1 10/21/2011    CALCIUM 8.3 08/04/2020    BILITOT 0.7 07/26/2020    ALKPHOS 87 07/26/2020    AST 23 07/26/2020    ALT 10 07/26/2020     BMP:    Lab Results   Component Value Date     08/04/2020    K 4.2 08/04/2020    K 4.4 07/27/2020     08/04/2020    CO2 22 08/04/2020    BUN 14 08/04/2020    LABALBU 3.2 07/26/2020    LABALBU 4.1 10/21/2011    CREATININE 1.02 08/04/2020    CALCIUM 8.3 08/04/2020    GFRAA >60.0 08/04/2020    LABGLOM 53.2 08/04/2020    GLUCOSE 112 08/04/2020    GLUCOSE 97 07/22/2020     Magnesium:  No results found for: MG  TSH:  Lab Results   Component Value Date    TSH 2.580 10/21/2019             Patient Active Problem List   Diagnosis    Type 2 diabetes mellitus without complication, without long-term current use of insulin (HCC)    Hypertension    Bell-rectal abscess    Bilateral carotid artery stenosis    [facility]    HARDEEP (obstructive sleep apnea)    Pseudophakia of both eyes    Regular astigmatism    Cardiomyopathy [facility]    Rectal pain    Abdominal distension    FALLON (acute kidney injury) ([facility] [address]       Medications Discontinued During This Encounter   Medication Reason    furosemide (LASIX) 40 MG tablet     lisinopril (PRINIVIL;ZESTRIL) 5 MG tablet REORDER       Modified Medications    Modified Medication Previous Medication    LISINOPRIL (PRINIVIL;ZESTRIL) 5 MG TABLET lisinopril (PRINIVIL;ZESTRIL) 5 MG tablet       Take 1 tablet by mouth nightly    TAKE 1 TABLET DAILY       Orders Placed This Encounter   Medications    lisinopril (PRINIVIL;ZESTRIL) 5 MG tablet     Sig: Take 1 tablet by mouth nightly     Dispense:  90 tablet     Refill:  3       Assessment/Plan:    1. Essential hypertension  Too low and symptomatic- dc Lasix and change Lisinopril 5 to QHS. RTO 1 mo. 2. HOCM (hypertrophic obstructive cardiomyopathy) ([facility])   LVOT Gradient was 4 m/s prior Echo   Still with Gr but pt is doing well. No palp no cp no sob. - will refer to [facility]. - saw  Structural hear- they added Verapamil and Aldactone - and opted for med Rx.     3. Bilateral carotid artery stenosis  Stable. Will follow       Counseling:  Heart Healthy Lifestyle, Low Salt Diet, Take Precautions to Prevent Falls and Walk Daily    Return in about 1 month (around 9/11/2020).     Electronically signed by Jazmyn Valdez MD on 8/11/2020 at 3:08 PM

## 2020-08-14 NOTE — PROGRESS NOTES
is supple with out masses, no thyromegaly, trachea midline  Cardiovascular:      Rate and Rhythm: Normal rate and regular rhythm. Heart sounds: Murmur present. Pulmonary:      Effort: Pulmonary effort is normal. No respiratory distress. Breath sounds: Normal breath sounds. Abdominal:      General: There is distension. Palpations: Abdomen is soft. There is no hepatomegaly or splenomegaly. Tenderness: There is no abdominal tenderness. Hernia: No hernia is present. Genitourinary:     Rectum: No mass, tenderness or external hemorrhoid. Normal anal tone. Musculoskeletal:      Comments: Normal gait   Skin:     Findings: No bruising, lesion or rash. Neurological:      Mental Status: She is alert and oriented to person, place, and time. Psychiatric:         Mood and Affect: Mood normal.         Judgment: Judgment normal.       /60   Temp 97.8 °F (36.6 °C) (Temporal)   Ht 5' 7\" (1.702 m)   Wt 204 lb (92.5 kg)   BMI 31.95 kg/m²   Assessment:      Perirectal pain stable with no evidence of abscess  Left renal mass  Cirrhosis of the liver with ascites     Plan:      Consult Dr Trinidad Perez to evaluate her rectal discomfort with colonoscopy and her cirrhosis  The patient is instructed to return to see me in 1 month.    Follow-up with Dr. Winter Black for her renal biopsy  See Dr. Karolyn rizo for overall management        Froilan Daugherty MD

## 2020-08-15 PROBLEM — K74.60 CIRRHOSIS OF LIVER WITH ASCITES (HCC): Status: ACTIVE | Noted: 2020-01-01

## 2020-08-15 PROBLEM — N28.89 RENAL MASS, LEFT: Status: ACTIVE | Noted: 2020-01-01

## 2020-08-15 PROBLEM — R18.8 CIRRHOSIS OF LIVER WITH ASCITES (HCC): Status: ACTIVE | Noted: 2020-01-01

## 2020-08-17 NOTE — TELEPHONE ENCOUNTER
Her daughter Oziel Yoder is asking if you can go over the CT guided needle biopsy and CT biopsy renal that she had at [facility]. Her appointment is Thursday with you.

## 2020-08-18 NOTE — TELEPHONE ENCOUNTER
Daughter aware.   Wants to know what the pathology said about the US guided paracentesis done on 7/28/2020

## 2020-08-20 NOTE — PROGRESS NOTES
Linus Ridley 69 y.o. female presents today with   Chief Complaint   Patient presents with    Check-Up     Follow up on test results       HPI kidney contussion. She had a fall backward looking  Upward on the sidewalk. Past Medical History:   Diagnosis Date    Arthritis     Cardiomyopathy [facility])     mother history.     Chronic kidney disease     Hypertension     Liver disease     Other disorders of kidney and ureter in diseases classified elsewhere     Pneumonia     Psychiatric problem     Sleep apnea, obstructive     Type II or unspecified type diabetes mellitus without mention of complication, not stated as uncontrolled      Patient Active Problem List    Diagnosis Date Noted    Cirrhosis of liver with ascites ([address] 08/15/2020    Renal mass, left 08/15/2020    Abdominal distension 07/26/2020    FALLON (acute kidney injury) ([address] 07/26/2020    Rectal pain 07/12/2020    Cardiomyopathy ([address] 06/09/2020    HARDEEP (obstructive sleep apnea) 12/07/2018    Bilateral carotid artery stenosis 01/06/2017    Pseudophakia of both eyes 03/21/2016    Regular astigmatism 03/21/2016    Bell-rectal abscess 04/17/2013    Hypertension     HOCM (hypertrophic obstructive cardiomyopathy) ([address] 04/08/2013    Type 2 diabetes mellitus without complication, without long-term current use of insulin ([address] 03/15/2013     Past Surgical History:   Procedure Laterality Date    APPENDECTOMY      CHOLECYSTECTOMY      COLONOSCOPY  4/23/2013    CT BIOPSY RENAL  8/4/2020    CT BIOPSY RENAL 8/4/2020 [facility] CT SCAN    FOOT FRACTURE SURGERY Bilateral     FRACTURE SURGERY      HYSTERECTOMY      OTHER SURGICAL HISTORY      removal of anal fistulotomy    PARACENTESIS Left 07/27/2020     Family History   Problem Relation Age of Onset    Arrhythmia Mother     Diabetes Mother     High Blood Pressure Mother     Arrhythmia Father     Breast Cancer Sister     Diabetes Brother     High Blood Pressure Brother      Social History Socioeconomic History    Marital status:      Spouse name: Not on file    Number of children: Not on file    Years of education: Not on file    Highest education level: Not on file   Occupational History    Not on file   Social Needs    Financial resource strain: Not hard at all    Food insecurity     Worry: Never true     Inability: Never true   [employer] needs     Medical: Not on file     Non-medical: Not on file   Tobacco Use    Smoking status: Never Smoker    Smokeless tobacco: Former User   Substance and Sexual Activity    Alcohol use: No    Drug use: No    Sexual activity: Yes   Lifestyle    Physical activity     Days per week: Not on file     Minutes per session: Not on file    Stress: Not on file   Relationships    Social connections     Talks on phone: Not on file     Gets together: Not on file     Attends [employer] service: Not on file     Active member of club or organization: Not on file     Attends meetings of clubs or organizations: Not on file     Relationship status: Not on file    Intimate partner violence     Fear of current or ex partner: Not on file     Emotionally abused: Not on file     Physically abused: Not on file     Forced sexual activity: Not on file   Other Topics Concern    Not on file   Social History Narrative    Not on file     Allergies   Allergen Reactions    Codeine        Review of Systems   Constitutional: Negative for chills and fever. HENT: Negative for facial swelling and nosebleeds. Eyes: Negative for photophobia and visual disturbance. Respiratory: Negative for apnea and choking. Cardiovascular: Negative for chest pain and palpitations. Gastrointestinal: Negative for abdominal distention and blood in stool. Genitourinary: Positive for hematuria (had). Negative for enuresis. Musculoskeletal: Positive for arthralgias and back pain. Negative for gait problem and joint swelling. Skin: Negative for rash.    Neurological: Negative for syncope and speech difficulty. Hematological: Does not bruise/bleed easily. Psychiatric/Behavioral: Negative for hallucinations and suicidal ideas. Vitals:    08/20/20 1257   BP: (!) 109/58   Pulse: 82   Resp: 14   Temp: 97.8 °F (36.6 °C)   SpO2: 97%   Weight: 204 lb 3.2 oz (92.6 kg)   Height: 5' 7\" (1.702 m)       Physical Exam  Constitutional:       Appearance: She is well-developed. HENT:      Head: Normocephalic and atraumatic. Mouth/Throat:      Mouth: Mucous membranes are moist.   Eyes:      Pupils: Pupils are equal, round, and reactive to light. Neck:      Musculoskeletal: Normal range of motion and neck supple. Cardiovascular:      Rate and Rhythm: Normal rate and regular rhythm. Heart sounds: Normal heart sounds. Pulmonary:      Breath sounds: Normal breath sounds. Abdominal:      General: Bowel sounds are normal.      Palpations: Abdomen is soft. Musculoskeletal: Normal range of motion. Neurological:      Mental Status: She is alert and oriented to person, place, and time. Assessment/Plan  Jovita Romberg was seen today for check-up. Diagnoses and all orders for this visit:    Other ascites  -     Comprehensive Metabolic Panel; Future        No follow-ups on file.     Noah Aparicio MD

## 2020-08-28 NOTE — TELEPHONE ENCOUNTER
requesting medication refill.  Please approve or deny this request.    Rx requested:  Requested Prescriptions     Pending Prescriptions Disp Refills    raloxifene (EVISTA) 60 MG tablet [Pharmacy Med Name: RALOXIFENE HCL TABS 60MG] 90 tablet 3     Sig: TAKE 1 TABLET DAILY         Last Office Visit:   8/20/2020      Next Visit Date:  Future Appointments   Date Time Provider Carlota Rodriguez   9/9/2020  3:00 PM Cande Carlin MD [facility] [address] [facility]   9/10/2020 11:00 AM [facility]   9/14/2020  1:00 PM Leah Walker  [address] [place] [address]   9/14/2020  2:15 PM Jennifer Hills MD [facility]

## 2020-09-01 NOTE — TELEPHONE ENCOUNTER
Schedule. Dr. Micki Rae will talk with patient day of procedure. She needs to follow with someone to manage the ascites in order to do any further paracentesis in the future. Thanks.

## 2020-09-02 NOTE — TELEPHONE ENCOUNTER
The patient is having increased foot edema. She can barely get her shoes on. She was on the Lasix 40mg but that was stopped at her last office visit. She is on spironolactone daily. She is to have a paracentesis with Dr. Mica Schultz Friday 9/4/20. Per Dr. Buck Robertson she can resume the 40mg of lasix daily but if she starts to have dizziness and lightheadedness again she would need to hold it. The patient is aware and will keep the follow up in 2 weeks.

## 2020-09-04 NOTE — PROGRESS NOTES
Patient is trying to remain supine. Turned to right side for bandaid check which  is clean and dry. No edema noted in the area. One touch reading prior to having a snack is 114. Pt complains of headache. Rates at level 8. Eating a snack did not relieve the discomfort.

## 2020-09-04 NOTE — PROGRESS NOTES
Dr Marshall Doan arrived in [address] [facility] room and time out was completed. After scanning the left flank site he cleansed the site with chloroprep and draped with sterile drapes. 1:17PM Fentanyl 50mcg given IV push and flushed. 1:19PM Versed . 5cc given IV push and flushed. Dr Marshall Doan inserted an 18Za19mg  Coaxial Temno core biopsy needle to obtain 2 core specimens. The specimens were placed in formalin. 1:24PM Temno needle removed and post scans taken. After holding pressure to site Dr Marshall Doan applied bacitracin ointment to a bandaid and covered the biopsy site. VSS after procedure. 1:30PM Pt taken to CT holding for recovery.

## 2020-09-04 NOTE — PROGRESS NOTES
Arrived per wheelchair for both paracentesis and left renal biopsy and has signed permit for same. Abdomen is distended which causes her to become tired and short of breath.

## 2020-09-04 NOTE — PROGRESS NOTES
NO SEDATION      Pt arrived to U/S room 3 via Wheelchair. U/S images obtained. Pt offered reassurance and VSS. Pt denies pain at this time. Consent signed     Timeout completed. At 11:02AM    Using U/S, Dr. Micki Rae marked LLQ and area cleansed with large tinted chloraprep. Once dry, using sterile technique, Dr. Micki Rae prepped and draped area. Using U/S guidance, Dr. Micki Rae numbed site with 2% lidocaine. 10cc    Using U/S guidance, access obtained with [id number] centesis 5F catheter with return of clear yellow fluid. Catheter tubing connected to Maria Guadalupe machine with suction at 200 mmHG and draining well. Pt tolerating well. VSS. Drainage complete. Total 53645 ml removed. Centesis catheter removed and digital pressure held to site for 3 minutes. LLQ site soft, no drainage, large bandaid applied. Patient taken to CT scan for renal biopsy.

## 2020-09-04 NOTE — PROGRESS NOTES
Returned to CT holding to recover after left renal biopsy. Dr aCrlie Johnson ordered patient to lie supine from end of biopsy at 1:25PM for 3 hours to 3:25PM at which time an ultrasound of the left flank biopsy site will be performed.

## 2020-09-04 NOTE — PRE SEDATION
7/31/20   Ana M Parker MD   spironolactone (ALDACTONE) 50 MG tablet Take 1 tablet by mouth daily 7/31/20   Ana M Cheema MD   citalopram (CELEXA) 10 MG tablet  6/9/20   Myrtle Reis MD   glimepiride (AMARYL) 2 MG tablet TAKE 1 TABLET EVERY MORNING BEFORE BREAKFAST 4/27/20   Haroon Caceres MD   metFORMIN (GLUCOPHAGE) 1000 MG tablet TAKE 1 TABLET TWICE A DAY WITH MEALS 3/27/20   Kory [id number] TANNER Holder CNP   atorvastatin (LIPITOR) 40 MG tablet TAKE 1 TABLET DAILY 10/1/19   Haroon Caceres MD   glucose blood VI test strips (ACCU-CHEK MELANIE) strip Test 1-2 times a day. Dx: 250.00. Accu-check melanie strips 11/28/12   Haroon Caceres MD   Lancet Device MISC Test 1-2 times a day. Dx: 250.00. Lancets 10/10/12   Haroon Caceres MD     Coumadin Use Last 7 Days:  no  Antiplatelet drug therapy use last 7 days: no  Other anticoagulant use last 7 days: no  Additional Medication Information:  None      Pre-Sedation Documentation and Exam:   I have personally completed a history, physical exam & review of systems for this patient (see notes).     Mallampati Airway Assessment:  Mallampati Class I - (soft palate, fauces, uvula & anterior/posterior tonsillar pillars are visible)    Prior History of Anesthesia Complications:   none    ASA Classification:  Class 2 - A normal healthy patient with mild systemic disease    Sedation/ Anesthesia Plan:   intravenous sedation    Medications Planned:   midazolam (Versed) intravenously and fentanyl intravenously    Patient is an appropriate candidate for plan of sedation: yes    Electronically signed by TANNER Taveras CNP on 9/4/2020 at 1:15 PM

## 2020-09-04 NOTE — PROGRESS NOTES
Patient taken by cart for ultrasound of renal biopsy site. Reported to Christian ALEGRE about patient's headache. She will place an order for tylenol if the patient wishes to take it.

## 2020-09-05 NOTE — PROGRESS NOTES
Spoke to patient, She denies any headache, back or abdominal discomfort. bandaids are clean and dry.

## 2020-09-14 PROBLEM — K70.31 ALCOHOLIC CIRRHOSIS OF LIVER WITH ASCITES (HCC): Status: ACTIVE | Noted: 2020-01-01

## 2020-09-14 NOTE — PROGRESS NOTES
Arrhythmia Father     Breast Cancer Sister     Diabetes Brother     High Blood Pressure Brother        Social History     Socioeconomic History    Marital status:      Spouse name: None    Number of children: None    Years of education: None    Highest education level: None   Occupational History    None   Social Needs    Financial resource strain: Not hard at all   [place] insecurity     Worry: Never true     Inability: Never true   [employer] needs     Medical: None     Non-medical: None   Tobacco Use    Smoking status: Never Smoker    Smokeless tobacco: Former User   Substance and Sexual Activity    Alcohol use: No    Drug use: No    Sexual activity: Yes   Lifestyle    Physical activity     Days per week: None     Minutes per session: None    Stress: None   Relationships    Social connections     Talks on phone: None     Gets together: None     Attends Jain service: None     Active member of club or organization: None     Attends meetings of clubs or organizations: None     Relationship status: None    Intimate partner violence     Fear of current or ex partner: None     Emotionally abused: None     Physically abused: None     Forced sexual activity: None   Other Topics Concern    None   Social History Narrative    None       Allergies   Allergen Reactions    Codeine        Current Outpatient Medications   Medication Sig Dispense Refill    raloxifene (EVISTA) 60 MG tablet TAKE 1 TABLET DAILY 90 tablet 3    lisinopril (PRINIVIL;ZESTRIL) 5 MG tablet Take 1 tablet by mouth nightly 90 tablet 3    metoprolol tartrate (LOPRESSOR) 50 MG tablet Take 1 tablet by mouth 2 times daily 180 tablet 3    verapamil (CALAN SR) 120 MG extended release tablet Take 60 mg by mouth daily      Blood Pressure KIT Check your blood pressure daily 1 kit 0    spironolactone (ALDACTONE) 50 MG tablet Take 1 tablet by mouth daily 30 tablet 3    citalopram (CELEXA) 10 MG tablet       glimepiride She has no wheezes. She has no rales. She exhibits no tenderness. Abdominal: Soft. Bowel sounds are normal. There is no abdominal tenderness. Musculoskeletal: Normal range of motion. General: Edema (right trace edema) present. No tenderness. Neurological: She is alert and oriented to person, place, and time. Skin: Skin is warm. No cyanosis. Nails show no clubbing.        LABS:  CBC:   Lab Results   Component Value Date    WBC 5.9 07/30/2020    RBC 3.03 07/30/2020    HGB 8.6 07/30/2020    HCT 27.1 07/30/2020    MCV 89.5 07/30/2020    MCH 28.4 07/30/2020    MCHC 31.7 07/30/2020    RDW 18.3 07/30/2020     07/30/2020    MPV 10.0 09/08/2015     Lipids:  Lab Results   Component Value Date    CHOL 89 01/03/2020    CHOL 81 10/21/2019    CHOL 87 10/02/2018     Lab Results   Component Value Date    TRIG 86 01/03/2020    TRIG 80 10/21/2019    TRIG 91 10/02/2018     Lab Results   Component Value Date    HDL 42 01/03/2020    HDL 40 10/21/2019    HDL 35 (L) 10/02/2018     Lab Results   Component Value Date    LDLCALC 30 01/03/2020    LDLCALC 25 10/21/2019    LDLCALC 34 10/02/2018     No results found for: LABVLDL, VLDL  Lab Results   Component Value Date    CHOLHDLRATIO 4.1 07/20/2011     CMP:    Lab Results   Component Value Date     08/26/2020    K 5.2 08/26/2020    K 4.4 07/27/2020     08/26/2020    CO2 20 08/26/2020    BUN 17 08/26/2020    CREATININE 0.91 08/26/2020    GFRAA >60.0 08/26/2020    LABGLOM >60.0 08/26/2020    GLUCOSE 90 08/26/2020    GLUCOSE 97 07/22/2020    PROT 5.7 08/26/2020    LABALBU 3.0 08/26/2020    LABALBU 4.1 10/21/2011    CALCIUM 8.6 08/26/2020    BILITOT 1.0 08/26/2020    ALKPHOS 72 08/26/2020    AST 23 08/26/2020    ALT 12 08/26/2020     BMP:    Lab Results   Component Value Date     08/26/2020    K 5.2 08/26/2020    K 4.4 07/27/2020     08/26/2020    CO2 20 08/26/2020    BUN 17 08/26/2020    LABALBU 3.0 08/26/2020    LABALBU 4.1 10/21/2011    CREATININE 0.91 08/26/2020    CALCIUM 8.6 08/26/2020    GFRAA >60.0 08/26/2020    LABGLOM >60.0 08/26/2020    GLUCOSE 90 08/26/2020    GLUCOSE 97 07/22/2020     Magnesium:  No results found for: MG  TSH:  Lab Results   Component Value Date    TSH 2.580 10/21/2019             Patient Active Problem List   Diagnosis    Type 2 diabetes mellitus without complication, without long-term current use of insulin (HCC)    Hypertension    Bell-rectal abscess    Bilateral carotid artery stenosis    HOCM (hypertrophic obstructive cardiomyopathy) (HCC)    HARDEEP (obstructive sleep apnea)    Pseudophakia of both eyes    Regular astigmatism    Cardiomyopathy ([address]    Rectal pain    Abdominal distension    FALLON (acute kidney injury) ([address]    Cirrhosis of liver with ascites (HCC)    Renal mass, left    Alcoholic cirrhosis of liver with ascites (HCC)       There are no discontinued medications. Modified Medications    No medications on file       No orders of the defined types were placed in this encounter. Assessment/Plan:    1. Essential hypertension  Too low and symptomatic- dc Lasix and change Lisinopril 5 to QHS. RTO 1 mo. 2. HOCM (hypertrophic obstructive cardiomyopathy) (HCC)   LVOT Gradient was 4 m/s prior Echo   Still with Gr but pt is doing well. No palp no cp no sob. - will refer to [facility]. - saw  Structural heart- they added Verapamil and Aldactone - and opted for med Rx. - will f/u this week. 3. Bilateral carotid artery stenosis  Stable. Will follow  4. Ascites form Liver Cirrhosis. - f/u GI.     5. LE edema - Pedal and ankle- she was suppose to take Lasix 40m qam but she chose not to- she will start now. Qam.        Counseling:  Heart Healthy Lifestyle, Low Salt Diet, Take Precautions to Prevent Falls and Walk Daily    Return in about 3 months (around 12/14/2020).     Electronically signed by Jazmyn Valdez MD on 9/14/2020 at 2:30 PM

## 2020-09-14 NOTE — PROGRESS NOTES
Mouth/Throat:      Mouth: Mucous membranes are moist.      Pharynx: Oropharynx is clear. Eyes:      Pupils: Pupils are equal, round, and reactive to light. Neck:      Comments: Neck is supple with out masses, no thyromegaly, trachea midline  Cardiovascular:      Rate and Rhythm: Normal rate and regular rhythm. Heart sounds: Murmur present. Pulmonary:      Effort: Pulmonary effort is normal. No respiratory distress. Breath sounds: Normal breath sounds. Abdominal:      General: There is distension. Palpations: Abdomen is soft. There is no hepatomegaly or splenomegaly. Tenderness: There is no abdominal tenderness. Hernia: No hernia is present. Genitourinary:     Rectum: No mass, tenderness or external hemorrhoid. Normal anal tone. Musculoskeletal:      Comments: Normal gait   Skin:     Findings: No bruising, lesion or rash. Neurological:      Mental Status: She is alert and oriented to person, place, and time. Psychiatric:         Mood and Affect: Mood normal.         Judgment: Judgment normal.       /76   Temp 97.3 °F (36.3 °C) (Temporal)   Ht 5' 7\" (1.702 m)   Wt 206 lb (93.4 kg)   BMI 32.26 kg/m²   Assessment:      Perirectal pain stable with no evidence of abscess  Left renal mass  Cirrhosis of the liver with ascites     Plan:        The patient is instructed to return to see me in 1 month.            Jori Howell MD

## 2020-09-14 NOTE — TELEPHONE ENCOUNTER
PATIENT IS AWARE HER PROCEDURE IS SCHEDULED ON 9/15/20 AT NOON -- PATIENT WILL BE COMING FROM HOME AND ARRIVE AT 11:30 AM  -- PATIENT WILL CHECK IN AT THE [facility] DESK LOCATED THROUGH THE OUTPATIENT ENTRANCE-- PATIENT WILL NOT BE NPO -- PATIENT WILL NEED TO CALL [phone] TO PRE-REGISTER PRIOR TO PROCEDURE.       PATIENT WAS GIVEN THIS INFORMATION VIA PHONE CONVERSATION - VOICED UNDERSTANDING

## 2020-09-16 PROBLEM — R57.9 SHOCK (HCC): Status: ACTIVE | Noted: 2020-01-01

## 2020-09-16 NOTE — ED PROVIDER NOTES
[address] [facility] ED  EMERGENCY DEPARTMENT ENCOUNTER      Pt Name: Gorge Burgos  MRN: [id number]  [place] 1945  Date of evaluation: 9/16/2020  Provider[facility]       Chief Complaint   Patient presents with    Emesis     pt had paracentesis yesterday 10.4 liters removed    Dizziness         HISTORY OF PRESENT ILLNESS   (Location/Symptom, Timing/Onset, Context/Setting, Quality, Duration, Modifying Factors, Severity)  Note limiting factors. Gorge Burgos is a 69 y.o. female who presents to the emergency department . Patient comes in because she was nauseated and dizzy. She is feeling better now but comes in with a very low blood pressure. Patient had a paracentesis yesterday and had 10.4 L removed. 2 weeks previously she had 11 L removed. She has not yet seen Dr. Noel Dixon regarding her liver. Currently patient had another appointment today and was found to have a systolic blood pressure of 77. HPI    Nursing Notes were reviewed. REVIEW OF SYSTEMS    (2-9 systems for level 4, 10 or more for level 5)     Review of Systems   Constitutional: Negative for activity change, appetite change and fatigue. HENT: Negative for congestion and sore throat. Eyes: Negative for pain and visual disturbance. Respiratory: Negative for chest tightness and shortness of breath. Cardiovascular: Negative for chest pain. Gastrointestinal: Positive for nausea. Negative for abdominal pain and vomiting. Endocrine: Negative for polydipsia. Genitourinary: Negative for flank pain and urgency. Musculoskeletal: Negative for gait problem and neck stiffness. Skin: Negative for rash. Neurological: Positive for light-headedness. Negative for weakness and headaches. Psychiatric/Behavioral: Negative for confusion and sleep disturbance. Except as noted above the remainder of the review of systems was reviewed and negative.        PAST MEDICAL HISTORY     Past Medical History:   Diagnosis Date    Arthritis     Cardiomyopathy [facility])     mother history.  Chronic kidney disease     Hypertension     Liver disease     Other disorders of kidney and ureter in diseases classified elsewhere     Pneumonia     Psychiatric problem     Sleep apnea, obstructive     Type II or unspecified type diabetes mellitus without mention of complication, not stated as uncontrolled          SURGICAL HISTORY       Past Surgical History:   Procedure Laterality Date    APPENDECTOMY      CHOLECYSTECTOMY      COLONOSCOPY  4/23/2013    CT BIOPSY RENAL  8/4/2020    CT BIOPSY RENAL 8/4/2020 [facility] CT SCAN    CT BIOPSY RENAL  9/4/2020    CT BIOPSY RENAL 9/4/2020 [facility] CT SCAN    FOOT FRACTURE SURGERY Bilateral     FRACTURE SURGERY      HYSTERECTOMY      OTHER SURGICAL HISTORY      removal of anal fistulotomy    PARACENTESIS Left 07/28/2020    9015 mls removed by Dr Gabriel Alejandra Left 09/04/2020    02643wh ascites removed by Dr Whit Johansen 50g albumin given    PARACENTESIS Left 09/15/2020    10,400cc removed by Dr Jose Bob [phone]         CURRENT MEDICATIONS       Previous Medications    ATORVASTATIN (LIPITOR) 40 MG TABLET    TAKE 1 TABLET DAILY    BLOOD PRESSURE KIT    Check your blood pressure daily    CITALOPRAM (CELEXA) 10 MG TABLET        FUROSEMIDE (LASIX) 40 MG TABLET    Take 1 tablet by mouth daily    GLIMEPIRIDE (AMARYL) 2 MG TABLET    TAKE 1 TABLET EVERY MORNING BEFORE BREAKFAST    GLUCOSE BLOOD VI TEST STRIPS (ACCU-CHEK MELANIE) STRIP    Test 1-2 times a day. Dx: 250.00. Accu-check melanie strips    LANCET DEVICE MISC    Test 1-2 times a day. Dx: 250.00.  Lancets    LISINOPRIL (PRINIVIL;ZESTRIL) 5 MG TABLET    Take 1 tablet by mouth nightly    METFORMIN (GLUCOPHAGE) 1000 MG TABLET    TAKE 1 TABLET TWICE A DAY WITH MEALS    METOPROLOL TARTRATE (LOPRESSOR) 50 MG TABLET    Take 1 tablet by mouth 2 times daily    RALOXIFENE (EVISTA) 60 MG TABLET    TAKE 1 TABLET DAILY SPIRONOLACTONE (ALDACTONE) 50 MG TABLET    Take 1 tablet by mouth daily    VERAPAMIL (CALAN SR) 120 MG EXTENDED RELEASE TABLET    Take 60 mg by mouth daily       ALLERGIES     Codeine    FAMILY HISTORY       Family History   Problem Relation Age of Onset    Arrhythmia Mother     Diabetes Mother     High Blood Pressure Mother     Arrhythmia Father     Breast Cancer Sister     Diabetes Brother     High Blood Pressure Brother           SOCIAL HISTORY       Social History     Socioeconomic History    Marital status:      Spouse name: None    Number of children: None    Years of education: None    Highest education level: None   Occupational History    None   Social Needs    Financial resource strain: Not hard at all   [employer] insecurity     Worry: Never true     Inability: Never true   [employer] needs     Medical: None     Non-medical: None   Tobacco Use    Smoking status: Never Smoker    Smokeless tobacco: Former User   Substance and Sexual Activity    Alcohol use: No    Drug use: No    Sexual activity: Yes   Lifestyle    Physical activity     Days per week: None     Minutes per session: None    Stress: None   Relationships    Social connections     Talks on phone: None     Gets together: None     Attends Muslim service: None     Active member of club or organization: None     Attends meetings of clubs or organizations: None     Relationship status: None    Intimate partner violence     Fear of current or ex partner: None     Emotionally abused: None     Physically abused: None     Forced sexual activity: None   Other Topics Concern    None   Social History Narrative    None       SCREENINGS                        PHYSICAL EXAM    (up to 7 for level 4, 8 or more for level 5)     ED Triage Vitals [09/16/20 1601]   BP Temp Temp Source Pulse Resp SpO2 Height Weight   (!) 88/37 97.4 °F (36.3 °C) Oral 73 20 97 % -- 189 lb (85.7 kg)       Physical Exam  Vitals signs and nursing note reviewed. Constitutional:       General: She is not in acute distress. Appearance: She is well-developed. She is not diaphoretic. HENT:      Head: Normocephalic and atraumatic. Right Ear: External ear normal.      Left Ear: External ear normal.      Mouth/Throat:      Pharynx: No oropharyngeal exudate. Eyes:      Conjunctiva/sclera: Conjunctivae normal.      Pupils: Pupils are equal, round, and reactive to light. Neck:      Musculoskeletal: Normal range of motion and neck supple. Thyroid: No thyromegaly. Vascular: No JVD. Trachea: No tracheal deviation. Cardiovascular:      Rate and Rhythm: Normal rate. Heart sounds: Normal heart sounds. No murmur. Pulmonary:      Effort: Pulmonary effort is normal. No respiratory distress. Breath sounds: Normal breath sounds. No wheezing. Abdominal:      General: Bowel sounds are normal.      Palpations: Abdomen is soft. Tenderness: There is no abdominal tenderness. There is no guarding. Genitourinary:     Rectum: Guaiac result negative. Comments: Stool yellow and heme-negative  Musculoskeletal: Normal range of motion. Skin:     General: Skin is warm and dry. Coloration: Skin is pale. Findings: No rash. Neurological:      Mental Status: She is alert and oriented to person, place, and time. Cranial Nerves: No cranial nerve deficit.    Psychiatric:         Behavior: Behavior normal.         DIAGNOSTIC RESULTS     EKG: All EKG's are interpreted by the Emergency Department Physician who either signs or Co-signs this chart in the absence of a cardiologist.    Normal sinus rhythm 75 bpm flat T waves no acute ischemia    RADIOLOGY:   Non-plain film images such as CT, Ultrasound and MRI are read by the radiologist. Plain radiographic images are visualized and preliminarily interpreted by the emergency physician with the below findings:        Interpretation per the Radiologist below, if available at the time of this note:    No orders to display         ED BEDSIDE ULTRASOUND:   Performed by ED Physician - none    LABS:  Labs Reviewed   CBC WITH AUTO DIFFERENTIAL   COMPREHENSIVE METABOLIC PANEL   MAGNESIUM   LACTIC ACID, PLASMA   URINE RT REFLEX TO CULTURE       All other labs were within normal range or not returned as of this dictation. EMERGENCY DEPARTMENT COURSE and DIFFERENTIAL DIAGNOSIS/MDM:   Vitals:    Vitals:    09/16/20 1601   BP: (!) 88/37   Pulse: 73   Resp: 20   Temp: 97.4 °F (36.3 °C)   TempSrc: Oral   SpO2: 97%   Weight: 189 lb (85.7 kg)       Patient comes in after having some vomiting and being found to be hypotensive. Patient continues to be hypotensive. She is dehydrated and hemoglobin is only 7.3. Hemoglobin 1 month ago was 8.6. Has not noticed blood per rectum. In the ER patient is getting IV fluids and blood pressure is improving but is only up to 88 systolic from 70. MDM      REASSESSMENT          CRITICAL CARE TIME   Total Critical Care time was 30 minutes, excluding separately reportable procedures. There was a high probability of clinically significant/life threatening deterioration in the patient's condition which required my urgent intervention. CONSULTS:  None    PROCEDURES:  Unless otherwise noted below, none     Procedures        FINAL IMPRESSION      1. Other specified hypotension    2. Anemia, unspecified type    3. Dehydration    4. Alcoholic cirrhosis of liver with ascites (HCC)          DISPOSITION/PLAN   DISPOSITION        PATIENT REFERRED TO:  No follow-up provider specified. DISCHARGE MEDICATIONS:  New Prescriptions    No medications on file     Controlled Substances Monitoring:     No flowsheet data found.     (Please note that portions of this note were completed with a voice recognition program.  Efforts were made to edit the dictations but occasionally words are mis-transcribed.)    Karla Simon DO (electronically signed)  Attending Emergency Physician           Kelin Swenson,   09/16/20 9814

## 2020-09-17 NOTE — H&P
Hospital Medicine  History and Physical    Patient:  Jayson Rubin  MRN: [id number]    CHIEF COMPLAINT:    Chief Complaint   Patient presents with    Emesis     pt had paracentesis yesterday 10.4 liters removed    Dizziness       History Obtained From:  patient, electronic medical record  Primary Care Physician: Manuelito Munroe MD    HISTORY OF PRESENT ILLNESS:   The patient is a 69 y.o. female who presents with hypovolemic shock following paracentesis. Patient is a 72-year-old female with a history of ascites of unclear cause however she was recently worked up in the hospital with a gradient of 2.1 suggestive pointing portal hypertension which may be a result of her history of hypertrophic cardiomyopathy and diastolic failure. Patient was driving home from her cardiology appointment at [facility] today when she became nauseated and dizzy and she was brought to the emergency department found to have a pressure of 77/34. Patient was aggressively hydrated with 1 L of normal saline in the emergency department without resolution of hypotension, second liter was infused and as the patient remained hypotensive she was transferred to the ICU for p for a long time she has been evaluated by a clinical clinic recommended heart transplant. She is currently follows with Dr. Acosta as well as [facility] cardiology. Patient does not drink alcohol. She has not had an acute hepatitis panel according to the [facility] system. INR is elevated 1.4 LFTs are within normal limits albumin 3.0. Patient also has a noted normocytic anemia hemoglobin 7.3    Past Medical History:      Diagnosis Date    Arthritis     Cardiomyopathy [facility])     mother history.     Chronic kidney disease     Hypertension     Liver disease     Other disorders of kidney and ureter in diseases classified elsewhere     Pneumonia     Psychiatric problem     Sleep apnea, obstructive     Type II or unspecified type diabetes mellitus without mention of complication, not stated as uncontrolled        Past Surgical History:      Procedure Laterality Date    APPENDECTOMY      CHOLECYSTECTOMY      COLONOSCOPY  4/23/2013    CT BIOPSY RENAL  8/4/2020    CT BIOPSY RENAL 8/4/2020 [facility] CT SCAN    CT BIOPSY RENAL  9/4/2020    CT BIOPSY RENAL 9/4/2020 [facility] CT SCAN    FOOT FRACTURE SURGERY Bilateral     FRACTURE SURGERY      HYSTERECTOMY      OTHER SURGICAL HISTORY      removal of anal fistulotomy    PARACENTESIS Left 07/28/2020    9015 mls removed by Dr Abdirahman Rizzo Left 09/04/2020    [id number] ascites removed by Dr Rod Stanley 50g albumin given    PARACENTESIS Left 09/15/2020    10,400cc removed by Dr Davis Gilbert [phone]       Medications Prior to Admission:    Prior to Admission medications    Medication Sig Start Date End Date Taking?  Authorizing Provider   furosemide (LASIX) 40 MG tablet Take 1 tablet by mouth daily 9/14/20   Rashid Vera MD   raloxifene (EVISTA) 60 MG tablet TAKE 1 TABLET DAILY 8/28/20   Magda Douglas MD   lisinopril (PRINIVIL;ZESTRIL) 5 MG tablet Take 1 tablet by mouth nightly 8/11/20   Rashid Vera MD   metoprolol tartrate (LOPRESSOR) 50 MG tablet Take 1 tablet by mouth 2 times daily 8/11/20   Rashid Vera MD   verapamil (CALAN SR) 120 MG extended release tablet Take 60 mg by mouth daily 7/22/20   Historical Provider, MD   Blood Pressure KIT Check your blood pressure daily 7/31/20   Ana M Coyle MD   spironolactone (ALDACTONE) 50 MG tablet Take 1 tablet by mouth daily 7/31/20   Ana M Coyle MD   citalopram (CELEXA) 10 MG tablet  6/9/20   Historical Provider, MD   glimepiride (AMARYL) 2 MG tablet TAKE 1 TABLET EVERY MORNING BEFORE BREAKFAST 4/27/20   Magda Douglas MD   metFORMIN (GLUCOPHAGE) 1000 MG tablet TAKE 1 TABLET TWICE A DAY WITH MEALS 3/27/20   Chris Holder, APRN - CNP   atorvastatin (LIPITOR) 40 MG tablet TAKE 1 TABLET DAILY 10/1/19   Magda Douglas MD   glucose blood VI test strips (ACCU-CHEK MELANIE) strip Test 1-2 times a day. Dx: 250.00. Accu-check melanie strips 11/28/12   Sade Lynch MD   Lancet Device MISC Test 1-2 times a day. Dx: 250.00. Lancets 10/10/12   Sade Lynch MD       Allergies:  Codeine    Social History:   TOBACCO:   reports that she has never smoked. She quit smokeless tobacco use about 36 years ago. ETOH:   reports no history of alcohol use. OCCUPATION: Retired    Family History:       Problem Relation Age of Onset    Arrhythmia Mother     Diabetes Mother     High Blood Pressure Mother     Arrhythmia Father     Breast Cancer Sister     Diabetes Brother     High Blood Pressure Brother        REVIEW OF SYSTEMS:  Ten systems reviewed and negative except for as above. Physical Exam:    Vitals: BP (!) 96/51   Pulse 82   Temp 97.4 °F (36.3 °C) (Oral)   Resp 16   Ht 5' 7\" (1.702 m)   Wt 196 lb 6.9 oz (89.1 kg)   SpO2 100%   BMI 30.77 kg/m²   Constitutional: alert, appears stated age and cooperative  Skin: Skin color, texture, turgor normal. No rashes or lesions  Eyes:Eye: Normal external eye, conjunctiva, ANGELLA. ENT: Head: Normocephalic, no lesions, without obvious abnormality. Neck: no adenopathy, no carotid bruit, no JVD, supple, symmetrical, trachea midline and thyroid not enlarged, symmetric, no tenderness/mass/nodules  Respiratory: clear to auscultation bilaterally  Cardiovascular: regular rate and rhythm, YESSENIA 2 out of 6  Gastrointestinal: Diffusely distended nontender no organomegaly  Genitourinary: Deferred  Musculoskeletal:extremities normal, plus bilateral lower extremity edema  Neurologic: Mental status AAOx3 No facial asymmetry or droop. Normal muscle strength b/l. Psychiatric: Appropriate mood and affect.  Good insight and judgement  Hematologic: No obvious bruising or bleeding    Recent Labs     09/16/20  1615   WBC 7.1   HGB 7.3*        Recent Labs     09/16/20  1615   *   K 5.7*      CO2 18*   BUN 22   CREATININE 1.10*   GLUCOSE 126* AST 20   ALT 10   BILITOT 0.7   ALKPHOS 78     Troponin T:   Recent Labs     09/16/20  1615   TROPONINI <0.010     INR:   Recent Labs     09/16/20  1615   INR 1.4     URINALYSIS:  Recent Labs     09/16/20  1615   COLORU DARK YELLOW*   PHUR 5.0   WBCUA 20-50*   RBCUA 0-2   BACTERIA Negative   CLARITYU CLOUDY*   SPECGRAV 1.022   LEUKOCYTESUR SMALL*   UROBILINOGEN 0.2   BILIRUBINUR Negative   BLOODU Negative   GLUCOSEU Negative     -----------------------------------------------------------------   No results found. EKG: Normal sinus rhythm    Assessment and Plan   1. Hypovolemic shock: Following paracentesis yesterday. 2 L IV fluid given in the ED start third liter at 125/h. Levophed. If Levophed doses has to be continually increased will give 1 dose of albumin as well. Consult to hepatology as well as cardiology. Avoid aggressive diuresis due to hypertrophic cardiomyopathy. 2. Ascites unclear cause: SAAG 2.1 suggest portal hypertension. Avoid over aggressive diuresis due to hypertrophic cardiomyopathy. Consult hepatology. Check acute hepatitis panel HIV panel. Mild developed elevated INR with otherwise normal liver labs    3. Type 2 diabetes: Insulin sliding scale coverage. Hold metformin in the event further imaging is indicated  4. Active sleep apnea: Strongly encouraged and educated for CPAP use as well as the reason to do so.   5. DVT prophylaxis with Lovenox    Approximately 47 minutes critical care time directly providing patient care and including any procedural intervention    Patient Active Problem List   Diagnosis Code    Type 2 diabetes mellitus without complication, without long-term current use of insulin ([facility] E11.9    Hypertension I10    Bell-rectal abscess K61.1    Bilateral carotid artery stenosis I65.23    HOCM (hypertrophic obstructive cardiomyopathy) (HCC) I42.1    HARDEEP (obstructive sleep apnea) G47.33    Pseudophakia of both eyes Z96.1    Regular astigmatism H52.229    Cardiomyopathy ([address] I42.9    Rectal pain K62.89    Abdominal distension R14.0    FALLON (acute kidney injury) ([address] N17.9    Cirrhosis of liver with ascites (HCC) K74.60, R18.8    Renal mass, left B48.48    Alcoholic cirrhosis of liver with ascites ([address] K70.31    Shock ([address] R57.9       Kulwinder Whitlock MD  Admitting Hospitalist    Emergency Contact:

## 2020-09-17 NOTE — PLAN OF CARE
Problem: Falls - Risk of:  Goal: Will remain free from falls  Description: Will remain free from falls  Outcome: Ongoing  Goal: Absence of physical injury  Description: Absence of physical injury  Outcome: Ongoing     Problem: Discharge Planning:  Goal: Discharged to appropriate level of care  Description: Discharged to appropriate level of care  Outcome: Ongoing     Problem: Cardiac Output - Decreased:  Goal: Avoidance of environmental tobacco smoke  Description: Absence of orthostatic hypotension  Outcome: Ongoing  Goal: Cardiac output within specified parameters  Description: Cardiac output within specified parameters  Outcome: Ongoing  Goal: Hemodynamic stability will improve  Description: Hemodynamic stability will improve  Outcome: Ongoing     Problem: Fluid Volume - Imbalance:  Goal: Absence of imbalanced fluid volume signs and symptoms  Description: Absence of imbalanced fluid volume signs and symptoms  Outcome: Ongoing     Problem: Tissue Perfusion, Altered:  Goal: Circulatory function within specified parameters  Description: Circulatory function within specified parameters  Outcome: Ongoing     Problem: Tissue Perfusion - Cardiopulmonary, Altered:  Goal: Absence of angina  Description: Absence of angina  Outcome: Ongoing  Goal: Hemodynamic stability will improve  Description: Hemodynamic stability will improve  Outcome: Ongoing

## 2020-09-17 NOTE — CONSULTS
OTHER SURGICAL HISTORY      removal of anal fistulotomy    PARACENTESIS Left 07/28/2020    9015 mls removed by Dr Mary Maya Left 09/04/2020    [id number] ascites removed by Dr Micki Rae 50g albumin given    PARACENTESIS Left 09/15/2020    10,400cc removed by Dr Pavon Brain [phone]       Social History:     reports that she has never smoked. She quit smokeless tobacco use about 36 years ago. She reports that she does not drink alcohol or use drugs. Family History:       Problem Relation Age of Onset    Arrhythmia Mother     Diabetes Mother     High Blood Pressure Mother     Arrhythmia Father     Breast Cancer Sister     Diabetes Brother     High Blood Pressure Brother        Allergies:  Codeine        MEDICATIONS during current hospitalization:    Continuous Infusions:   norepinephrine Stopped (09/17/20 0845)    dextrose      sodium chloride 50 mL/hr at 09/17/20 1052       Scheduled Meds:   atorvastatin  40 mg Oral Daily    citalopram  10 mg Oral Daily    [Held by provider] lisinopril  5 mg Oral Nightly    metoprolol tartrate  50 mg Oral BID    insulin lispro  0-6 Units Subcutaneous TID WC    insulin lispro  0-3 Units Subcutaneous Nightly    enoxaparin  40 mg Subcutaneous Daily       PRN Meds:glucose, dextrose, glucagon (rDNA), dextrose        REVIEW OF SYSTEMS:  As in history of present illness  Other 14 point review of system is negative. PHYSICAL EXAM:    Vitals:  BP (!) 130/50   Pulse 100   Temp 98.1 °F (36.7 °C) (Oral)   Resp 21   Ht 5' 7\" (1.702 m)   Wt 196 lb 6.9 oz (89.1 kg)   SpO2 100%   BMI 30.77 kg/m²   General: Patient is currently alert, awake . comfortable in bed, No distress. Head: Atraumatic , Normocephalic   Eyes: PERRL. No icteric sclera. No conjunctival injection. No discharge   ENT: No nasal  discharge. Pharynx clear. Neck:  Trachea midline. No thyromegaly, no JVD, No cervical adenopathy.   Chest : Adequate spontaneous effort, symmetric bilateral excursions  Lung : Adequate breath sounds bilaterally, diminished at the bases  Heart[de-identified] Regular rhythm and rate. No mumur ,  Rub or gallop  ABD: Distended, positive ascites to examination, bowel sounds are decreased  EXT: Minimal pitting edema both lower extremities , No Cyanosis No clubbing  Neuro: no focal weakness  Skin: Warm and dry. No erythema or rash on exposed extremities. .      Data Review  Recent Labs     09/16/20  1615 09/17/20  0511   WBC 7.1 6.9   HGB 7.3* 6.9*   HCT 23.3* 22.5*    155      Recent Labs     09/16/20  1615 09/17/20  0511   * 138   K 5.7* 4.8    113*   CO2 18* 15*   BUN 22 21   CREATININE 1.10* 0.95*   GLUCOSE 126* 139*       MV Settings: ABGs: No results for input(s): PHART, MFB1FOB, PO2ART, HWL1USM, BEART, D7JVOYVP, EAT1NHB in the last 72 hours. O2 Device: None (Room air)  Lab Results   Component Value Date    LACTA 2.1 09/16/2020       Radiology  Ct Guided Needle Placement    Result Date: 9/8/2020  1. Successful core biopsy of inferior pole left renal mass. HISTORY: Rupinder Chopra is a Female of 69 years age. DIAGNOSIS:  N28.89 Left renal mass ICD10 COMPARISON: None available. CT Dose-Length Product (estimate related to radiation exposure from this exam):  828.68  mGy*cm. PROCEDURE: Following the discussion of the procedure, alternatives, risks versus benefits, informed consent was obtained from the patient. Specifically, risks of after-biopsy pain at the site, rare possibility of excessive hemorrhage, infection, injury to the adjacent organs were discussed and the patient verbalized understanding. Pre-procedure evaluation confirmed that the patient was an appropriate candidate for conscious sedation. Adequate sedation was maintained during the entire procedure. Vital signs, pulse oximetry, and response to verbal commands were monitored and recorded by the nurse throughout the procedure and the recovery period.  Medical information was entered in the medical record including the medications and dosages used. The patient returned to baseline neurologic and physiologic status prior to leaving the department. No immediate sedation related complications were noted. Medication for conscious sedation was administered via IV route. 45 minutes of conscious sedation was provided. Following universal protocol, patient and site verification was performed with a \"timeout\" prior to the procedure. The patient was placed on the CT table in prone position and the left flank area was prepped and draped in usual sterile fashion. Using the usual sterile conditions, lidocaine and CT guidance, the inferior pole renal mass was accessed using an 18-guage Temno coaxial biopsy needle system. After confirmation of appropriate localization of the needle, total of 2 samples were obtained and sent for pathological analysis. The coaxial needle system was removed and hemostasis was achieved by direct digital compression. The patient tolerated the procedure well. There is a very tiny amount of postprocedural blood superficial to the biopsied mass, this did not change in size or growth. The patient left the CT suite in supine position to the recovery room in  stable condition. Total local anesthetic used: lidocaine, approximately 8 mL. Estimated blood loss:  Negligible. The patient was observed in the recovery room for two hours after the procedure and discharged in stable condition. Ir Us Guided Paracentesis    Result Date: 9/8/2020  1. Status post technically successful ultrasound-guided paracentesis. Fernanda Mackey is a Female of 69 years age, referred for Ultrasound Guided Paracentesis. PROCEDURE: Survey of the abdomen showed large amount of ascites fluid. After obtaining informed consent, the patient was positioned supine on the sonography table. Using ultrasound, the skin over the left hemiabdomen was locally anesthetized with 1% lidocaine.   Following that, a Workhint needle was advanced into the fluid pocket using ultrasound visualization. 11,000cc, of clear yellow fluid were aspirated and sent for cytology, and pathology. The needle was removed, and hemostasis was obtained with pressure. A Band-Aid was placed. Post procedure images did not demonstrate hemorrhage at the target site. The patient tolerated the procedure well. The patient left the department in good condition. A radiology nurse was in presence monitoring vital signs, assisting throughout the procedure. Ct Biopsy Renal    Result Date: 9/8/2020  1. Successful core biopsy of inferior pole left renal mass. HISTORY: Stevenson Bryant is a Female of 69 years age. DIAGNOSIS:  N28.89 Left renal mass ICD10 COMPARISON: None available. CT Dose-Length Product (estimate related to radiation exposure from this exam):  828.68  mGy*cm. PROCEDURE: Following the discussion of the procedure, alternatives, risks versus benefits, informed consent was obtained from the patient. Specifically, risks of after-biopsy pain at the site, rare possibility of excessive hemorrhage, infection, injury to the adjacent organs were discussed and the patient verbalized understanding. Pre-procedure evaluation confirmed that the patient was an appropriate candidate for conscious sedation. Adequate sedation was maintained during the entire procedure. Vital signs, pulse oximetry, and response to verbal commands were monitored and recorded by the nurse throughout the procedure and the recovery period. Medical information was entered in the medical record including the medications and dosages used. The patient returned to baseline neurologic and physiologic status prior to leaving the department. No immediate sedation related complications were noted. Medication for conscious sedation was administered via IV route. 45 minutes of conscious sedation was provided.  Following universal protocol, patient and site verification was performed with a \"timeout\" prior to the procedure. The patient was placed on the CT table in prone position and the left flank area was prepped and draped in usual sterile fashion. Using the usual sterile conditions, lidocaine and CT guidance, the inferior pole renal mass was accessed using an 18-guage Temno coaxial biopsy needle system. After confirmation of appropriate localization of the needle, total of 2 samples were obtained and sent for pathological analysis. The coaxial needle system was removed and hemostasis was achieved by direct digital compression. The patient tolerated the procedure well. There is a very tiny amount of postprocedural blood superficial to the biopsied mass, this did not change in size or growth. The patient left the CT suite in supine position to the recovery room in  stable condition. Total local anesthetic used: lidocaine, approximately 8 mL. Estimated blood loss:  Negligible. The patient was observed in the recovery room for two hours after the procedure and discharged in stable condition. Us Guided Paracentesis    Result Date: 9/17/2020  1. Status post technically successful ultrasound-guided paracentesis. Jason Aguilar is a Female of 69 years age, referred for Ultrasound Guided Paracentesis. PROCEDURE: Survey of the abdomen showed large amount of ascites fluid. After obtaining informed consent, the patient was positioned supine on the sonography table. Using ultrasound, the skin over the left hemiabdomen was locally anesthetized with 1% lidocaine. Following that, a Yueh needle was advanced into the fluid pocket using ultrasound visualization. 10,400cc, of clear yellow fluid were aspirated and sent for cytology, and pathology. The needle was removed, and hemostasis was obtained with pressure. A Band-Aid was placed. Post procedure images did not demonstrate hemorrhage at the target site. The patient tolerated the procedure well. The patient left the department in good condition.  A radiology nurse was in presence monitoring vital signs, assisting throughout the procedure. Us Retroperitoneal Limited    Result Date: 9/8/2020  1. Limited left kidney ultrasound demonstrates no evidence of any hemorrhage or other free fluid. Again seen is a solid mass in the inferior pole of the left kidney. COMPARISON:No prior studies are available for comparison. DIAGNOSIS: Post left renal mass biopsy, to assess for bleeding prior to discharge COMMENTS: None TECHNIQUE: Limited left flank ultrasound FINDINGS: Limited ultrasound of the left kidney demonstrates no evidence of bleeding or free fluid. Again seen is an inferior pole solid left renal mass. Assessment, plan:   1. Shock likely hypovolemic following large-volume paracentesis, in addition to underlying hypertrophic cardiomyopathy and diastolic heart failure which usually is volume dependent. Recovered with fluid resuscitation. Has been off pressors for 3 hours and on low infusion of saline at this point  2. Ascites with evidence of portal hypertension causes not clear but suspected to be associated with her chronic diastolic heart failure, hepatology consulted for further work-up  3. Type 2 diabetes on insulin coverage for now  4.  Obstructive sleep apnea, use and treatment with CPAP was discussed  Patient has improved significantly today, we can transfer back to regular medical floor and continue observation while weaning off fluid, GI evaluation, will follow as needed        Electronically signed by Angeline Stout MD, [facility] on 9/17/2020 at 11:37 AM

## 2020-09-17 NOTE — PROGRESS NOTES
Hospitalist Daily Progress Note  Name: Bartolo Almazan  Age: 69 y.o. Gender: female  CodeStatus: Full Code  Allergies: Codeine    Chief Complaint:Emesis (pt had paracentesis yesterday 10.4 liters removed) and Dizziness    Primary Care Provider: Deena Ocampo MD  InpatientTreatment Team: Treatment Team: Attending Provider: Arthur Florez DO; Consulting Physician: Babak Tejada MD; Consulting Physician: Heather Carlton MD; Utilization Reviewer: Rosamaria Marcus, PERNELL; Registered Nurse: Everett Hayden, RN; Registered Nurse: Mack Hart RN  Admission Date: 9/16/2020      Subjective: Patient seen and evaluated bedside. Pressors were weaned off this morning around 0845. Blood pressure remained stable now with a map of 68. Patient does have worsening abdominal pain and distention compared to yesterday but has no new acute concerns she remains afebrile but no shortness of breath wheezing coughing. Discuss further with hepatology and critical care/pulmonary      Physical Exam  Constitutional:       Appearance: Normal appearance. She is obese. She is not ill-appearing or diaphoretic. HENT:      Head: Normocephalic and atraumatic. Mouth/Throat:      Mouth: Mucous membranes are moist.      Pharynx: Oropharynx is clear. Eyes:      Extraocular Movements: Extraocular movements intact. Pupils: Pupils are equal, round, and reactive to light. Cardiovascular:      Rate and Rhythm: Normal rate. Heart sounds: No friction rub. No gallop. Comments: Holosystolic murmur  Pulmonary:      Effort: No respiratory distress. Breath sounds: No wheezing, rhonchi or rales. Abdominal:      General: There is distension. Tenderness: There is no abdominal tenderness. There is no guarding. Comments: Diffuse ascites and distention   Musculoskeletal:      Comments: Trace bilateral lower extremity edema   Neurological:      General: No focal deficit present.       Mental Status: She is alert and oriented to person, place, and time. Psychiatric:         Mood and Affect: Mood normal.         Thought Content: Thought content normal.         Judgment: Judgment normal.         Review of Systems  14 point ROS reviewed negative except for as above  Medications:  Reviewed    Infusion Medications:    norepinephrine Stopped (09/17/20 0845)    dextrose      sodium chloride 50 mL/hr at 09/17/20 1249     Scheduled Medications:    atorvastatin  40 mg Oral Daily    citalopram  10 mg Oral Daily    [Held by provider] lisinopril  5 mg Oral Nightly    metoprolol tartrate  50 mg Oral BID    insulin lispro  0-6 Units Subcutaneous TID WC    insulin lispro  0-3 Units Subcutaneous Nightly    enoxaparin  40 mg Subcutaneous Daily     PRN Meds: glucose, dextrose, glucagon (rDNA), dextrose    Labs:   Recent Labs     09/16/20  1615 09/17/20  0511   WBC 7.1 6.9   HGB 7.3* 6.9*   HCT 23.3* 22.5*    155     Recent Labs     09/16/20  1615 09/17/20  0511   * 138   K 5.7* 4.8    113*   CO2 18* 15*   BUN 22 21   CREATININE 1.10* 0.95*   CALCIUM 8.0* 7.6*     Recent Labs     09/16/20  1615 09/17/20  0511   AST 20 17   ALT 10 10   BILIDIR  --  0.3   BILITOT 0.7 0.7   ALKPHOS 78 70     Recent Labs     09/16/20  1615 09/17/20  0511   INR 1.4 1.6     Recent Labs     09/16/20  1615 09/17/20  0144   TROPONINI <0.010 <0.010       Urinalysis:   Lab Results   Component Value Date    NITRU Negative 09/16/2020    WBCUA 20-50 09/16/2020    BACTERIA Negative 09/16/2020    RBCUA 0-2 09/16/2020    BLOODU Negative 09/16/2020    SPECGRAV 1.022 09/16/2020    GLUCOSEU Negative 09/16/2020       Radiology:   Most recent    Chest CT      WITH CONTRAST:No results found for this or any previous visit. WITHOUT CONTRAST: No results found for this or any previous visit. CXR      2-view: No results found for this or any previous visit. Portable: No results found for this or any previous visit.     Echo No results found for this or any previous visit. Assessment/Plan:    Active Hospital Problems    Diagnosis Date Noted    Arterial hypotension [I95.9]     Shock ([facility] [address] [R57.9] 09/16/2020     Hypovolemic shock following paracentesis: Resuscitated with 3.8 L of fluid so far. Pressor support is discontinued. Shock resolved. Avoid diuretics or aggressive fluid removal with paracentesis at this point. Hypertrophic cardiomyopathy: Consult to cardiology aggressive beta-blocker dosing for rate control to allow for diastolic optimization    Portal hypertension with ascites: Likely exacerbated by the above cardiomyopathy. Doppler ultrasound for IVC diameter. Further paracentesis as per hepatology. Acute viral hepatitis panel negative    Type 2 diabetes:/Scale insulin coverage    HARDEEP: Encourage CPAP compliance    Additional work up or/and treatment plan may be added today or then after based on clinical progression. I am managing a portion of pt care. Some medical issues are handled byother specialists. Additional work up and treatment should be done in out pt setting by pt PCP and other out pt providers. In addition to examining and evaluating pt, I spent additional time explaining care, normaland abnormal findings, and treatment plan. All of pt questions were answered. Counseling, diet and education were provided. Case will be discussed with nursing staff when appropriate. Family will be updated if and whenappropriate.       Electronically signed by Leydi Rodriguez DO on 9/17/2020 at 6:35 PM

## 2020-09-17 NOTE — PROGRESS NOTES
PHARMACY NOTE  Rachel Sale was ordered Evista. Per the [address], this medication is non-formulary and not stocked by pharmacy. The medication can be reordered at discharge.

## 2020-09-17 NOTE — CONSULTS
Consult Note  Patient: Marjorie Horta  Unit/Bed: I993/Q523-30  YOB: 1945  MRN: [id number]  Acct: [de-identified]   Admitting Diagnosis: Shock ([facility] [address] [R57.9]  Date:  9/16/2020  Hospital Day: 1      Chief Complaint:  69 white female who was admitted with hypotension and possibly hypovolemic shock. He was treated with IV fluids. And albumin and low-dose pressor use. He is known to have hypertrophic cardiomyopathy. Had a large volume paracentesis done 2 days ago following which she became came back to the ER nauseated dizzy and hypotensive. History of Present Illness:       Allergies   Allergen Reactions    Codeine        Current Facility-Administered Medications   Medication Dose Route Frequency Provider Last Rate Last Dose    norepinephrine (LEVOPHED) 16 mg in dextrose 5 % 250 mL infusion  2 mcg/min Intravenous Continuous Verlinda Smith Sedar, DO   Stopped at 09/17/20 0845    atorvastatin (LIPITOR) tablet 40 mg  40 mg Oral Daily Rod Ramírez, DO        citalopram (CELEXA) tablet 10 mg  10 mg Oral Daily [place] Charla Carreonar, DO   10 mg at 09/17/20 0834    [Held by provider] lisinopril (PRINIVIL;ZESTRIL) tablet 5 mg  5 mg Oral Nightly Verlinda Smith Sedar, DO        metoprolol tartrate (LOPRESSOR) tablet 50 mg  50 mg Oral BID Verlinda Smith Sedar, DO   Stopped at 09/16/20 2245    glucose (GLUTOSE) 40 % oral gel 15 g  15 g Oral PRN Verlinda Smith Sedar, DO        dextrose 50 % IV solution  12.5 g Intravenous PRN Verlinda Smith Sedar, DO        glucagon (rDNA) injection 1 mg  1 mg Intramuscular PRN Verlinda Smith Sedar, DO        dextrose 5 % solution  100 mL/hr Intravenous PRN Verlinda Smith Sedar, DO        insulin lispro (HUMALOG) injection vial 0-6 Units  0-6 Units Subcutaneous TID  Rod Carreonar, DO        insulin lispro (HUMALOG) injection vial 0-3 Units  0-3 Units Subcutaneous Nightly Verlinda Smith Sedar, DO   1 Units at 09/16/20 2303    0.9 % sodium chloride infusion   Intravenous Continuous Mara Bean MD 50 mL/hr at 09/17/20 1249      enoxaparin (LOVENOX) injection 40 mg  40 mg Subcutaneous Daily Mariely Tadeo Sedar, DO   40 mg at 09/17/20 5997       PMHx:  Past Medical History:   Diagnosis Date    Arthritis     Cardiomyopathy [facility])     mother history.     Chronic kidney disease     Hypertension     Liver disease     Other disorders of kidney and ureter in diseases classified elsewhere     Pneumonia     Psychiatric problem     Sleep apnea, obstructive     Type II or unspecified type diabetes mellitus without mention of complication, not stated as uncontrolled        PSHx:  Past Surgical History:   Procedure Laterality Date    APPENDECTOMY      CHOLECYSTECTOMY      COLONOSCOPY  4/23/2013    CT BIOPSY RENAL  8/4/2020    CT BIOPSY RENAL 8/4/2020 [facility] CT SCAN    CT BIOPSY RENAL  9/4/2020    CT BIOPSY RENAL 9/4/2020 [facility] CT SCAN    FOOT FRACTURE SURGERY Bilateral     FRACTURE SURGERY      HYSTERECTOMY      OTHER SURGICAL HISTORY      removal of anal fistulotomy    PARACENTESIS Left 07/28/2020    9015 mls removed by Dr Eva Lindsey Left 09/04/2020    11834db ascites removed by Dr Denis Silva 50g albumin given    PARACENTESIS Left 09/15/2020    10,400cc removed by Dr Julia Jeffers [phone]       Social Hx:  Social History     Socioeconomic History    Marital status:      Spouse name: None    Number of children: None    Years of education: None    Highest education level: None   Occupational History    None   Social Needs    Financial resource strain: Not hard at all   [place] insecurity     Worry: Never true     Inability: Never true    Transportation needs     Medical: None     Non-medical: None   Tobacco Use    Smoking status: Never Smoker    Smokeless tobacco: Former User   Substance and Sexual Activity    Alcohol use: No    Drug use: No    Sexual activity: Yes   Lifestyle    Physical activity     Days per week: None     Minutes per session: None    Stress: None   Relationships    Social connections     Talks on phone: None     Gets together: None     Attends Orthodox service: None     Active member of club or organization: None     Attends meetings of clubs or organizations: None     Relationship status: None    Intimate partner violence     Fear of current or ex partner: None     Emotionally abused: None     Physically abused: None     Forced sexual activity: None   Other Topics Concern    None   Social History Narrative    None       Family Hx:  Family History   Problem Relation Age of Onset    Arrhythmia Mother     Diabetes Mother     High Blood Pressure Mother     Arrhythmia Father     Breast Cancer Sister     Diabetes Brother     High Blood Pressure Brother        Review of Systems:   Review of Systems   Constitutional: Negative for diaphoresis. HENT: Negative for nosebleeds. Respiratory: Negative for cough, shortness of breath, wheezing and stridor. Cardiovascular: Negative for chest pain, palpitations and leg swelling. Gastrointestinal: Negative for blood in stool, diarrhea, nausea and vomiting. Musculoskeletal: Negative for myalgias. Neurological: Negative for dizziness, seizures, weakness and headaches. Hematological: Does not bruise/bleed easily. Psychiatric/Behavioral: Negative for suicidal ideas. The patient is not nervous/anxious. All other systems reviewed and are negative. Physical Examination:    BP (!) 143/47   Pulse 89   Temp 96.3 °F (35.7 °C) (Oral)   Resp 14   Ht 5' 7\" (1.702 m)   Wt 196 lb 6.9 oz (89.1 kg)   SpO2 99%   BMI 30.77 kg/m²    Physical Exam  Constitutional:       Appearance: She is ill-appearing. HENT:      Head: Normocephalic and atraumatic. Nose: Nose normal.   Neck:      Musculoskeletal: Normal range of motion and neck supple. Cardiovascular:      Rate and Rhythm: Tachycardia present. Heart sounds: Murmur present. Gallop present. Pulmonary:      Effort: Pulmonary effort is normal.      Breath sounds: Normal breath sounds. Radiology:  No results found. EKG: Assessment:    Active Hospital Problems    Diagnosis Date Noted    Arterial hypotension [I95.9]     Shock ([facility] [address] [R57.9] 09/16/2020        Hypertrophic cardiomyopathy       Relative volume depletion because of large amount      of paracentesis done few days prior. Plan:  1. Avoid diuretics  2.  Avoid aleyda tropic agent            Electronically signed by Omar Hewitt MD on 9/17/2020 at 6:28 PM

## 2020-09-17 NOTE — PROGRESS NOTES
0700- shift report from christine GIMENEZ patient on 4 mcg of levo. Patient alert and oriented x4 with gross ascites in abdomen. 0845- levo was turned off.   1000- patient ordered cardiac diet. 1045- patients fluids reduced to 50ml/hr  1130- patient ambulate to toilet and back patient had BM.   1200- notified dr Rigo Ryan of patients HR being 100-115  1230- patient getting transferred to medical floor

## 2020-09-17 NOTE — CARE COORDINATION
[facility] [place] Case Management Initial Discharge Assessment    Met with Patient to discuss discharge plan. PCP: Sade Lynch MD                                Date of Last Visit: \"3 weeks ago\"    If no PCP, list provided? N/A    Discharge Planning    Living Arrangements: independently at home    Who do you live with? spouse    Who helps you with your care:  self    If lives at home:     Do you have any barriers navigating in your home? no    Patient can perform ADL? Yes    Current Services (outpatient and in home) :  None    Dialysis: No    Is transportation available to get to your appointments? Yes    DME Equipment:  no    Respiratory equipment: None    Respiratory provider:  no     Pharmacy:  yes - [facility] Cade with Medication Assistance Program?  No      Patient agreeable to [place] [id number]? Declined    Patient agreeable to SNF/Rehab? Declined    Other discharge needs identified? N/A    Freedom of choice list provided with basic dialogue that supports the patient's individualized plan of care/goals and shares the quality data associated with the providers. Yes    Does Patient Have a High-Risk for Readmission Diagnosis (CHF, PN, MI, COPD)? No    The plan for Transition of Care is related to the following treatment goals:stable v/s with no bp issues. Initial Discharge Plan? (Note: please see concurrent daily documentation for any updates after initial note). Home with spouse.  Denies needs    The Patient and/or patient representative: patient was provided with choice of any post-acute providers for care and equipment and agrees with discharge plan  Yes    Electronically signed by Nelida Jarquin RN on 9/17/2020 at 2:22 PM

## 2020-09-17 NOTE — PROGRESS NOTES
2030 Pt admitted from ER. Alert and oriented. Pt SOB with exertion. Abdomen has ascites. Bandaid to LLQ of abdomen. 2100 Dr. Alexei Castillo here to examine pt.     2132 Levophed started at 2 mcg.     0000 Pt restless. Moves self from side to side. States she cant get comfortable in the bed.     0136 Given Tylenol 650 mg po for c/o headache. Pt voids on bedpan. 0400 Pt states headache is gone. 0600 Remains on levophed drip. Abdomen remains ascitic. SOB with exertion.

## 2020-09-17 NOTE — CONSULTS
Inpatient consult to GI  Consult performed by: TANNER Perea - CNP  Consult ordered by: Angie Pierce DO          Patient Name: Nancy Warner Date: 2020  3:58 PM  MR #: [id number]  : 1945    Attending Physician: Angie Pierce DO  Reason for consult: ascites    History of Presenting Illness:      Simran Benitez is a 69 y.o. female on hospital day 1 with a history of hypertension, pneumonia, sleep apnea, DM 2. Past surgical history significant for appendectomy, cholecystectomy, hysterectomy. Family history was reviewed and is negative for GI malignancies. Social history patient denies nicotine, EtOH, or illicit drug use. History Obtained From:  patient, electronic medical record  GI consult for ascites-admitted for hypovolemic shock post paracentesis yesterday, has a history of ascites, underwent paracentesis and had 11 L removed, previous history of ascites noted since 2020, with 2 prior paracentesis that were well tolerated. No previous hepatology work-up. Noted history of cardiomyopathy most recent echo  noted RVSP 48. Previous ascites fluid analysis noted protein 1.8, SAAG 2.1. Previous ascitic fluid analysis was negative for malignant cells & SBP.  previous CT imaging from 2020 noted cirrhosis. Viral hepatitis panel is pending. Normal LFTs  Of Note pt recently underwent IR ct guided renal mass biopsy, pathology is pending. Previous CEA was normal.  No previous EGD, previous colonoscopy in  noted diverticulosis. History:      Past Medical History:   Diagnosis Date    Arthritis     Cardiomyopathy [facility])     mother history.     Chronic kidney disease     Hypertension     Liver disease     Other disorders of kidney and ureter in diseases classified elsewhere     Pneumonia     Psychiatric problem     Sleep apnea, obstructive     Type II or unspecified type diabetes mellitus without mention of complication, not stated as uncontrolled      Past Surgical Stopped (09/17/20 0845)    sodium chloride      dextrose      sodium chloride 125 mL/hr at 09/17/20 0354      Allergies: Allergies   Allergen Reactions    Codeine       Review of Systems:       [x] CV, Resp, Neuro, , and all other systems reviewed and negative other than listed in HPI.         Objective Findings:     Vitals:   Vitals:    09/17/20 0845 09/17/20 0846 09/17/20 0847 09/17/20 0848   BP: (!) 123/48      Pulse: 98 101 100 96   Resp: 19 20 22 19   Temp:       TempSrc:       SpO2: 100% 100% 99% 99%   Weight:       Height:            Physical Examination:  General: alert  HEENT: Normocephalic, no scleral icterus. Neck: No JVD. Heart: Regular, no murmur, no rub/gallop. No RV heave. Lungs: Clear to ascultation, no rales/wheezing/rhonchi. Good chest wall excursion. Abdomen: Appearance:, Distension, Soft, no tenderness, Scars, Bowel sounds normal  Extremities: No clubbing/cyanosis, no edema. Skin: Warm, dry, normal turgor, no rash, no bruise, no petichiae. Neuro: No myoclonus or tremor.    Psych: Normal affect    Results/ Medications reviewed 9/17/2020, 9:03 AM     Laboratory, Microbiology, Pathology, Radiology, Cardiology, Medications and Transcriptions reviewed  Scheduled Meds:   atorvastatin  40 mg Oral Daily    citalopram  10 mg Oral Daily    [Held by provider] lisinopril  5 mg Oral Nightly    metoprolol tartrate  50 mg Oral BID    insulin lispro  0-6 Units Subcutaneous TID     insulin lispro  0-3 Units Subcutaneous Nightly    enoxaparin  40 mg Subcutaneous Daily     Continuous Infusions:   norepinephrine Stopped (09/17/20 0845)    sodium chloride      dextrose      sodium chloride 125 mL/hr at 09/17/20 0354       Recent Labs     09/16/20  1615 09/17/20  0511   WBC 7.1 6.9   HGB 7.3* 6.9*   HCT 23.3* 22.5*   MCV 88.0 90.7    155     Recent Labs     09/16/20  1615 09/17/20  0511   * 138   K 5.7* 4.8    113*   CO2 18* 15*   BUN 22 21   CREATININE 1.10* 0.95* Recent Labs     09/16/20  1615 09/17/20  0511   AST 20 17   ALT 10 10   BILIDIR  --  0.3   BILITOT 0.7 0.7   ALKPHOS 78 70     No results for input(s): LIPASE, AMYLASE in the last 72 hours. Recent Labs     09/16/20  1615 09/17/20  0511   PROT 5.1* 4.7*   INR 1.4 1.6     Ct Guided Needle Placement    Result Date: 9/8/2020  1. Successful core biopsy of inferior pole left renal mass. HISTORY: Taty Rojas is a Female of 69 years age. DIAGNOSIS:  N28.89 Left renal mass ICD10 COMPARISON: None available. CT Dose-Length Product (estimate related to radiation exposure from this exam):  828.68  mGy*cm. PROCEDURE: Following the discussion of the procedure, alternatives, risks versus benefits, informed consent was obtained from the patient. Specifically, risks of after-biopsy pain at the site, rare possibility of excessive hemorrhage, infection, injury to the adjacent organs were discussed and the patient verbalized understanding. Pre-procedure evaluation confirmed that the patient was an appropriate candidate for conscious sedation. Adequate sedation was maintained during the entire procedure. Vital signs, pulse oximetry, and response to verbal commands were monitored and recorded by the nurse throughout the procedure and the recovery period. Medical information was entered in the medical record including the medications and dosages used. The patient returned to baseline neurologic and physiologic status prior to leaving the department. No immediate sedation related complications were noted. Medication for conscious sedation was administered via IV route. 45 minutes of conscious sedation was provided. Following universal protocol, patient and site verification was performed with a \"timeout\" prior to the procedure. The patient was placed on the CT table in prone position and the left flank area was prepped and draped in usual sterile fashion.   Using the usual sterile conditions, lidocaine and CT guidance, the inferior pole renal mass was accessed using an 18-guage Tem[employer] coaxial biopsy needle system. After confirmation of appropriate localization of the needle, total of 2 samples were obtained and sent for pathological analysis. The coaxial needle system was removed and hemostasis was achieved by direct digital compression. The patient tolerated the procedure well. There is a very tiny amount of postprocedural blood superficial to the biopsied mass, this did not change in size or growth. The patient left the CT suite in supine position to the recovery room in  stable condition. Total local anesthetic used: lidocaine, approximately 8 mL. Estimated blood loss:  Negligible. The patient was observed in the recovery room for two hours after the procedure and discharged in stable condition. Ir Us Guided Paracentesis    Result Date: 9/8/2020  1. Status post technically successful ultrasound-guided paracentesis. Owen Gomez is a Female of 69 years age, referred for Ultrasound Guided Paracentesis. PROCEDURE: Survey of the abdomen showed large amount of ascites fluid. After obtaining informed consent, the patient was positioned supine on the sonography table. Using ultrasound, the skin over the left hemiabdomen was locally anesthetized with 1% lidocaine. Following that, a Yueh needle was advanced into the fluid pocket using ultrasound visualization. 11,000cc, of clear yellow fluid were aspirated and sent for cytology, and pathology. The needle was removed, and hemostasis was obtained with pressure. A Band-Aid was placed. Post procedure images did not demonstrate hemorrhage at the target site. The patient tolerated the procedure well. The patient left the department in good condition. A radiology nurse was in presence monitoring vital signs, assisting throughout the procedure. Ct Biopsy Renal    Result Date: 9/8/2020  1. Successful core biopsy of inferior pole left renal mass.  HISTORY: SHERRIE Rosales is a Female of 69 years age. DIAGNOSIS:  N28.89 Left renal mass ICD10 COMPARISON: None available. CT Dose-Length Product (estimate related to radiation exposure from this exam):  828.68  mGy*cm. PROCEDURE: Following the discussion of the procedure, alternatives, risks versus benefits, informed consent was obtained from the patient. Specifically, risks of after-biopsy pain at the site, rare possibility of excessive hemorrhage, infection, injury to the adjacent organs were discussed and the patient verbalized understanding. Pre-procedure evaluation confirmed that the patient was an appropriate candidate for conscious sedation. Adequate sedation was maintained during the entire procedure. Vital signs, pulse oximetry, and response to verbal commands were monitored and recorded by the nurse throughout the procedure and the recovery period. Medical information was entered in the medical record including the medications and dosages used. The patient returned to baseline neurologic and physiologic status prior to leaving the department. No immediate sedation related complications were noted. Medication for conscious sedation was administered via IV route. 45 minutes of conscious sedation was provided. Following universal protocol, patient and site verification was performed with a \"timeout\" prior to the procedure. The patient was placed on the CT table in prone position and the left flank area was prepped and draped in usual sterile fashion. Using the usual sterile conditions, lidocaine and CT guidance, the inferior pole renal mass was accessed using an 18-guage Temno coaxial biopsy needle system. After confirmation of appropriate localization of the needle, total of 2 samples were obtained and sent for pathological analysis. The coaxial needle system was removed and hemostasis was achieved by direct digital compression. The patient tolerated the procedure well.  There is a very tiny amount of postprocedural blood superficial to the biopsied mass, this did not change in size or growth. The patient left the CT suite in supine position to the recovery room in  stable condition. Total local anesthetic used: lidocaine, approximately 8 mL. Estimated blood loss:  Negligible. The patient was observed in the recovery room for two hours after the procedure and discharged in stable condition. Us Guided Paracentesis    Result Date: 9/17/2020  1. Status post technically successful ultrasound-guided paracentesis. Fernanda Mackey is a Female of 69 years age, referred for Ultrasound Guided Paracentesis. PROCEDURE: Survey of the abdomen showed large amount of ascites fluid. After obtaining informed consent, the patient was positioned supine on the sonography table. Using ultrasound, the skin over the left hemiabdomen was locally anesthetized with 1% lidocaine. Following that, a [employer] needle was advanced into the fluid pocket using ultrasound visualization. 10,400cc, of clear yellow fluid were aspirated and sent for cytology, and pathology. The needle was removed, and hemostasis was obtained with pressure. A Band-Aid was placed. Post procedure images did not demonstrate hemorrhage at the target site. The patient tolerated the procedure well. The patient left the department in good condition. A radiology nurse was in presence monitoring vital signs, assisting throughout the procedure. Us Retroperitoneal Limited    Result Date: 9/8/2020  1. Limited left kidney ultrasound demonstrates no evidence of any hemorrhage or other free fluid. Again seen is a solid mass in the inferior pole of the left kidney. COMPARISON:No prior studies are available for comparison. DIAGNOSIS: Post left renal mass biopsy, to assess for bleeding prior to discharge COMMENTS: None TECHNIQUE: Limited left flank ultrasound FINDINGS: Limited ultrasound of the left kidney demonstrates no evidence of bleeding or free fluid.  Again seen is an inferior pole solid left renal mass. Impression:   61-year-old female admitted with hypovolemic shock post paracentesis, seen in the intensive care unit, volume resuscitated with 2 L, now hemodynamically stable. History of cardiomyopathy with most recent 2D echo noting an RVSP of 48, prior ascites fluid analysis negative for SBP or malignancy. Previous ascitic protein 1.8, SAAG 2.1. Noted microcytic anemia, Hgb 7.3 baseline 10,  Not on anticoagulation or antiplatelets, No previous EGD, previous colonoscopy in 2013 noted diverticulosis, no nausea or vomiting, hematemesis, melena, or hematochezia. Previous radiological evidence of cirrhosis, noted hx of thrombocytopenia & hypoalbunimeia, INR 1.6, normal TB and LFTs. Of note patient recently underwent a right renal mass CT-guided biopsy with path pending. Plan:   Decompensated Cirrhosis/congestive hepatopathy/? PARISH  ? PARISH, maybe a component of cardiac etiology given the hx of cardiomyopathy and RVSP >48, however ascitic fluid protein of 1.8 is not indicative of cardiac origin, also possible Metabolic component given her comorbidities. 1.  Continue supportive course of care and management per ICU team  2. Ongoing evaluation for therapeutic/diagnostic  paracentesis if clinically indicated  3. Obtain blood work to rule out any associated viral, autoimmune or additional metabolic etiologies   4. Obtain doppler liver US  Comments: Thank you for allowing us to participate in the care of this patient. Will continue to follow. Please call if questions or concerns arise. A/P  Agree regarding assessment and plan. Patient admitted to the ICU owing to paracentesis induced circulatory dysfunction. Patient responded to resuscitation measures. Paracentesis results previously noted shows evidence of portal hypertension noted however ascitic protein was 1.8, findings suggestive that the ascites is less likely related to cardiac condition.   Noted patient with reported history of Hypertrophic cardiomyopathy. Ongoing evaluation for cirrhosis etiology and cirrhosis is likely secondary to cryptogenic/ Evelena  though congestive hepatopathy / cardiac etiology still in differential.  Continue to monitor for now and further recommendation to follow. Agree regarding obtaining Doppler ultrasound and assess the IVC diameter. Transjugular liver biopsy with pressure measurement would provide definitive diagnosis however, it will be invasive and may no . Continue optimization of the cardiac condition. Merline Currie MD  Please note this report has been partially produced using speech recognition software and may cause contain errors related to that system including grammar, punctuation and spelling as well as words and phrases that may seem inappropriate. If there are questions or concerns please feel free to contact me to clarify.

## 2020-09-18 NOTE — PROGRESS NOTES
Spiritual Care Services     Summary of Visit:  Initial visit with the patient. She was resting in bed. Expressed that she is doing well and expressed no particular needs. Would like a visit from a  for anointing and communion. Spiritual Assessment/Intervention/Outcomes:    Encounter Summary  Services provided to[de-identified] Patient  Referral/Consult From[de-identified] Rounding  Support System: Spouse, Children  Place of Advent: [facility]  Contact Protestant: No  Continue Visiting: No  Complexity of Encounter: Low  Length of Encounter: 15 minutes  Spiritual Assessment Completed: Yes  Routine  Type: Initial  Assessment: Calm, Approachable, Coping  Intervention: Explored feelings, thoughts, concerns, Nurtured hope, [place], Discussed illness/injury and it's impact, Discussed belief system/Christian practices/barbara  Outcome: Comfort, Expressed gratitude, Engaged in conversation, Receptive, Hopeful, Encouraged              Advance Directives (For Healthcare)  Pre-existing DNR Comfort Care/DNR Arrest/DNI Order: No  Healthcare Directive: No, patient does not have an advance directive for healthcare treatment  Information on Healthcare Directives Requested: No  Patient Requests Assistance: No  Advance Directives: Pt. not interested at this time           Values / Beliefs  Do you have any ethnic, cultural, sacramental, or spiritual Christian needs you would like us to be aware of while you are in the hospital?: No    Care Plan:    Follow up with sacrament of healing and communion. Spiritual Care Services   Electronically signed by Michela Rosa on 9/18/20 at 5:11 PM EDT     To reach a  for emotional and spiritual support, place an [facility].   If a  is needed immediately, dial 0 and ask to page the on-call .

## 2020-09-18 NOTE — PROGRESS NOTES
Hospitalist Daily Progress Note  Name: Nick Owens  Age: 69 y.o. Gender: female  CodeStatus: Full Code  Allergies: Codeine    Chief Complaint:Emesis (pt had paracentesis yesterday 10.4 liters removed) and Dizziness    Primary Care Provider: Li Gross MD  InpatientTreatment Team: Treatment Team: Attending Provider: Stephanie Lynn DO; Consulting Physician: Rody Lerma MD; Consulting Physician: Jemal Farah MD; Utilization Reviewer: Anisha Bell RN; Utilization Reviewer: Ankit Garrido RN; Registered Nurse: Andres Aldridge RN  Admission Date: 9/16/2020      Subjective: Patient seen and evaluated bedside. Afebrile. Vitals stable today. Worsening abdominal distention. Remains on 50ml/hr NS. Physical Exam  Constitutional:       Appearance: Normal appearance. She is obese. She is not ill-appearing or diaphoretic. HENT:      Head: Normocephalic and atraumatic. Mouth/Throat:      Mouth: Mucous membranes are moist.      Pharynx: Oropharynx is clear. Eyes:      Extraocular Movements: Extraocular movements intact. Pupils: Pupils are equal, round, and reactive to light. Cardiovascular:      Rate and Rhythm: Normal rate. Heart sounds: No friction rub. No gallop. Comments: Holosystolic murmur  Pulmonary:      Effort: No respiratory distress. Breath sounds: No wheezing, rhonchi or rales. Abdominal:      General: There is distension. Tenderness: There is no abdominal tenderness. There is no guarding. Comments: Diffuse ascites and distention   Musculoskeletal:      Comments: Trace bilateral lower extremity edema   Neurological:      General: No focal deficit present. Mental Status: She is alert and oriented to person, place, and time. Psychiatric:         Mood and Affect: Mood normal.         Thought Content:  Thought content normal.         Judgment: Judgment normal.         Review of Systems  14 point ROS reviewed negative except for as above  Medications: Reviewed    Infusion Medications:    norepinephrine Stopped (09/17/20 0845)    dextrose      sodium chloride 50 mL/hr at 09/17/20 1249     Scheduled Medications:    metoprolol tartrate  75 mg Oral BID    metoprolol tartrate  25 mg Oral Once    atorvastatin  40 mg Oral Daily    citalopram  10 mg Oral Daily    [Held by provider] lisinopril  5 mg Oral Nightly    insulin lispro  0-6 Units Subcutaneous TID WC    insulin lispro  0-3 Units Subcutaneous Nightly    enoxaparin  40 mg Subcutaneous Daily     PRN Meds: metoprolol, glucose, dextrose, glucagon (rDNA), dextrose    Labs:   Recent Labs     09/16/20  1615 09/17/20  0511 09/18/20  0615   WBC 7.1 6.9 9.5   HGB 7.3* 6.9* 8.0*   HCT 23.3* 22.5* 25.9*    155 214     Recent Labs     09/16/20  1615 09/17/20  0511 09/18/20  0615   * 138 138   K 5.7* 4.8 5.1*    113* 112*   CO2 18* 15* 16*   BUN 22 21 17   CREATININE 1.10* 0.95* 0.76   CALCIUM 8.0* 7.6* 8.3*     Recent Labs     09/16/20  1615 09/17/20  0511   AST 20 17   ALT 10 10   BILIDIR  --  0.3   BILITOT 0.7 0.7   ALKPHOS 78 70     Recent Labs     09/16/20  1615 09/17/20  0511 09/18/20  0615   INR 1.4 1.6 1.5     Recent Labs     09/16/20  1615 09/17/20  0144   TROPONINI <0.010 <0.010       Urinalysis:   Lab Results   Component Value Date    NITRU Negative 09/16/2020    WBCUA 20-50 09/16/2020    BACTERIA Negative 09/16/2020    RBCUA 0-2 09/16/2020    BLOODU Negative 09/16/2020    SPECGRAV 1.022 09/16/2020    GLUCOSEU Negative 09/16/2020       Radiology:   Most recent    Chest CT      WITH CONTRAST:No results found for this or any previous visit. WITHOUT CONTRAST: No results found for this or any previous visit. CXR      2-view: No results found for this or any previous visit. Portable: No results found for this or any previous visit. Echo No results found for this or any previous visit.           Assessment/Plan:    Active Hospital Problems    Diagnosis Date Noted    Arterial hypotension [I95.9]     Shock ([facility] [address] [R57.9] 09/16/2020    Hypertrophic obstructive cardiomyopathy (HOCM) (HCC) [I42.1] 04/08/2013     Hypovolemic shock following paracentesis: resolved. continue NS 50ml/hr. Hypertrophic cardiomyopathy: Consult to cardiology aggressive beta-blocker dosing for rate control to allow for diastolic optimization    Portal hypertension with ascites: Likely exacerbated by the above cardiomyopathy. Doppler ultrasound of liver shows cirrhosis. Type 2 diabetes:/Scale insulin coverage    HARDEEP: Encourage CPAP compliance    Additional work up or/and treatment plan may be added today or then after based on clinical progression. I am managing a portion of pt care. Some medical issues are handled byother specialists. Additional work up and treatment should be done in out pt setting by pt PCP and other out pt providers. In addition to examining and evaluating pt, I spent additional time explaining care, normaland abnormal findings, and treatment plan. All of pt questions were answered. Counseling, diet and education were provided. Case will be discussed with nursing staff when appropriate. Family will be updated if and whenappropriate.       Electronically signed by Shraddha Lopes DO on 9/18/2020 at 6:29 PM

## 2020-09-18 NOTE — PROGRESS NOTES
Gastroenterology Progress Note    Dana Juarez is a 69 y.o. female patient. Hospitalization Day:2    Chief C/O: N/V dizziness post paracentesis    SUBJECTIVE: Seen and examined on the regular medical floor, hemodynamically stable, nausea vomiting are resolved is tolerating diet. ROS:  Gastrointestinal ROS: no abdominal pain, change in bowel habits, or black or bloody stools    Physical    VITALS:  BP (!) 89/41   Pulse 91   Temp 98.2 °F (36.8 °C)   Resp 14   Ht 5' 7\" (1.702 m)   Wt 196 lb 6.9 oz (89.1 kg)   SpO2 99%   BMI 30.77 kg/m²   TEMPERATURE:  Current - Temp: 98.2 °F (36.8 °C); Max - Temp  Av.8 °F (36.6 °C)  Min: 96.3 °F (35.7 °C)  Max: 98.4 °F (36.9 °C)    General:  Alert and oriented,  No apparent distress  Skin- without jaundice  Eyes: anicteric sclera  Cardiac: RRR, Nl s1s2, without murmurs  Lungs CTA Bilaterally, normal effort  Abdomen soft, ND, NT, no HSM, Bowel sounds normal  Ext: without edema  Neuro: no asterixis     Data    Data Review:    Recent Labs     20  0511 20  0615   WBC 7.1 6.9 9.5   HGB 7.3* 6.9* 8.0*   HCT 23.3* 22.5* 25.9*   MCV 88.0 90.7 89.9    155 214     Recent Labs     20  0511 20  0615   * 138 138   K 5.7* 4.8 5.1*    113* 112*   CO2 18* 15* 16*   BUN 22 21 17   CREATININE 1.10* 0.95* 0.76     Recent Labs     20  0511   AST 20 17   ALT 10 10   BILIDIR  --  0.3   BILITOT 0.7 0.7   ALKPHOS 78 70     No results for input(s): LIPASE, AMYLASE in the last 72 hours. Recent Labs     20  0511 20  0615   PROTIME 17.1* 18.7* 17.8*   INR 1.4 1.6 1.5           ASSESSMENT/plan:  1-  Decompensated Cirrhosis secondary to ?  PARISH, congestive hepatopathy  Ongoing evaluation for cirrhosis etiology, blood work and US pending  MELD-Na Score 11  Estimated 90-Day Mortality 6.0%  2 - Grade 3  refractory  Ascites:    Ongoing evlauation for therapeutic/diagnostic paracentesis    Albumin (6 to 8 g/L of fluid removed) should  be administered ( if removed more than 5 L)  \" No added salt diet\" to improve compliance   - No Hx of SBP noted  3- no overt GIB, no current clinical indication for EGD for Variceal surveillance  Can be completed as an OP  4 - HCC screening:   Recent imaging Negative for liver mass  5-  No Overt Hepatic encephalopathy:    Thank you for allowing me to participate in the care of your patient. Please feel free to contact me with any concerns.     Fred Gregg, TANNER - CNP

## 2020-09-19 NOTE — FLOWSHEET NOTE
Blood transfusing at this time. Pt signed consents. Pt aware of possible adverse reactions with blood components. RN will remain with patient for first 15 minutes to observe.  Electronically signed by Dona Pepper RN on 9/19/2020 at 2:30 PM

## 2020-09-19 NOTE — PROGRESS NOTES
Spiritual Care Services     Summary of Visit:  Pt told me that she is tired and wants to home. She added that she is getting well gradually. I prayed with her. She received Holy Communion and I anointed. Spiritual Assessment/Intervention/Outcomes:    Encounter Summary  Services provided to[de-identified] Patient, Patient and family together  Referral/Consult From[de-identified] Other   Support System: Spouse, Children  Place of Mormon: [facility]  Contact Presybeterian: No  Continue Visiting: No  Complexity of Encounter: Low  Length of Encounter: 15 minutes  Spiritual Assessment Completed: Yes  Routine  Type:  Follow up  Assessment: Calm, Approachable, Coping  Intervention: Explored feelings, thoughts, concerns, Nurtured hope, Starlight, Discussed illness/injury and it's impact, Discussed belief system/Mandaen practices/barbara  Outcome: Comfort, Expressed gratitude, Engaged in conversation, Receptive, Hopeful, Encouraged     Spiritual/Voodoo  Type: Spiritual support  Assessment: Approachable, Coping  Intervention: Prayer, Communion, Anointing, Active listening, Explored feelings, thoughts, concerns, Nurtured hope, Explored coping resources  Outcome: Expressed gratitude, Receptive, Encouraged, Hopeful  Sacraments  Sacrament of Sick-Anointing: Anointed  Communion: Patient received communion     Advance Directives (For Healthcare)  Pre-existing DNR Comfort Care/DNR Arrest/DNI Order: No  Healthcare Directive: No, patient does not have an advance directive for healthcare treatment  Information on Healthcare Directives Requested: No  Patient Requests Assistance: No  Advance Directives: Pt. not interested at this time           Values / Beliefs  Do you have any ethnic, cultural, sacramental, or spiritual Mandaen needs you would like us to be aware of while you are in the hospital?: No    Care Plan:        [id number] [address]   Electronically signed by Marquis Joshua on 9/19/20 at 2:16 PM EDT     To reach a  for emotional

## 2020-09-19 NOTE — FLOWSHEET NOTE
Discharge instruction provided to patient and patients daughter. Pt does not have any questions at this time. Transport request placed for patient.  Electronically signed by Flores Schwab RN on 9/19/2020 at 7:42 PM

## 2020-09-19 NOTE — FLOWSHEET NOTE
Discharge orders placed to d/c patient after evening hgb. Hgb came back at 8.1. Preparing d/c instructions at this time.   Electronically signed by Mario Araiza RN on 9/19/2020 at 7:06 PM

## 2020-09-19 NOTE — DISCHARGE INSTR - DIET

## 2020-09-19 NOTE — PROGRESS NOTES
Progress Note  Patient: Marino Guaman  Unit/Bed: Q259/G043-66  YOB: 1945  MRN: [id number]  Acct: [de-identified]   Admitting Diagnosis: Shock ([address] [R57.9]  Admit Date:  9/16/2020  Hospital Day: 3    Chief Complaint: dizzy, vomiting     Subjective/HPI: 69year old female with recent paracentesis for cirrhosis, with subsequent hypotension, dizziness, vomiting. Known HOCM with LVOT gradient. EKG:  Review of Systems:   Review of Systems      Physical Examination:    /78   Pulse 87   Temp 97.1 °F (36.2 °C) (Oral)   Resp 17   Ht 5' 7\" (1.702 m)   Wt 209 lb 12.8 oz (95.2 kg)   SpO2 100%   BMI 32.86 kg/m²    Physical Exam   Constitutional: She appears healthy. No distress. HENT:   Mouth/Throat: Oropharynx is clear. Eyes: Pupils are equal, round, and reactive to light. Neck: Normal range of motion. No JVD present. Cardiovascular: Regular rhythm, S1 normal, S2 normal and intact distal pulses. Exam reveals no gallop. Murmur heard. Pulses:       Radial pulses are 2+ on the right side. Dorsalis pedis pulses are 2+ on the right side. Pulmonary/Chest: Effort normal and breath sounds normal. She has no wheezes. She has no rales. She exhibits no tenderness. Abdominal: Soft. Bowel sounds are normal.   Musculoskeletal: Normal range of motion. General: No edema. Neurological: She is alert and oriented to person, place, and time. She has intact cranial nerves. Skin: Skin is warm and dry. No rash noted.        LABS:  CBC:   Lab Results   Component Value Date    WBC 5.4 09/19/2020    RBC 2.48 09/19/2020    HGB 6.8 09/19/2020    HCT 22.2 09/19/2020    MCV 89.3 09/19/2020    MCH 27.5 09/19/2020    MCHC 30.8 09/19/2020    RDW 18.6 09/19/2020     09/19/2020    MPV 10.0 09/08/2015     CBC with Differential:    Lab Results   Component Value Date    WBC 5.4 09/19/2020    RBC 2.48 09/19/2020    HGB 6.8 09/19/2020    HCT 22.2 09/19/2020     09/19/2020    MCV 89.3 09/19/2020    MCH 27.5 09/19/2020    MCHC 30.8 09/19/2020    RDW 18.6 09/19/2020    NRBC 0.0 07/22/2020    LYMPHOPCT 20.3 09/19/2020    MONOPCT 12.1 09/19/2020    BASOPCT 0.6 09/19/2020    MONOSABS 0.7 09/19/2020    LYMPHSABS 1.1 09/19/2020    EOSABS 0.1 09/19/2020    BASOSABS 0.0 09/19/2020     CMP:    Lab Results   Component Value Date     09/19/2020    K 5.0 09/19/2020     09/19/2020    CO2 17 09/19/2020    BUN 16 09/19/2020    CREATININE 0.86 09/19/2020    GFRAA >60.0 09/19/2020    LABGLOM >60.0 09/19/2020    GLUCOSE 118 09/19/2020    GLUCOSE 97 07/22/2020    PROT 4.7 09/17/2020    LABALBU 2.7 09/17/2020    LABALBU 4.1 10/21/2011    CALCIUM 8.1 09/19/2020    BILITOT 0.7 09/17/2020    ALKPHOS 70 09/17/2020    AST 17 09/17/2020    ALT 10 09/17/2020     BMP:    Lab Results   Component Value Date     09/19/2020    K 5.0 09/19/2020     09/19/2020    CO2 17 09/19/2020    BUN 16 09/19/2020    LABALBU 2.7 09/17/2020    LABALBU 4.1 10/21/2011    CREATININE 0.86 09/19/2020    CALCIUM 8.1 09/19/2020    GFRAA >60.0 09/19/2020    LABGLOM >60.0 09/19/2020    GLUCOSE 118 09/19/2020    GLUCOSE 97 07/22/2020     Magnesium:    Lab Results   Component Value Date    MG 1.8 09/16/2020     Troponin:    Lab Results   Component Value Date    TROPONINI <0.010 09/17/2020         Assessment/Plan:    Active Hospital Problems    Diagnosis Date Noted    Arterial hypotension [I95.9]     Shock ([address] [R57.9] 09/16/2020    Hypertrophic obstructive cardiomyopathy (HOCM) ([facility] [I42.1] 04/08/2013           Cirrhosis apparently of unknown etiology. Difficulty with paracentesis as patient becomes hypotensive and HOCM worsens. Was stated to have refused transplant at [facility] in 2006 but review of [facility] records shows normal coronaries in 2006, HOCM mild/mod and was medically treated. Reeval for HOCM in 2015 and offered surgery myectomy and patient refused surgery. Of note, patients mother also had HOCM and cirrhosis. Recommend holding diuretics, control HR with B-blocker and followup as outpatient. Electronically signed by Christiano Chaudhry.  Laine Villarreal MD [facility] Director of Cardiology Services and Cardiac Catheterization Laboratory  [facility], [place]   on 9/19/2020 at 8:43 AM

## 2020-09-19 NOTE — PROGRESS NOTES
**This is a Medical/ PA/ APRN Student Note and is charted for educational purposes. The non-physician staff attested note is not to be used for billing purposes or to guide patient care. Please see the physician modifications/ attestation for treatment plan/suggestions. This note has been reviewed and feedback has been provided to the student. *    Hospitalist Daily Progress Note  Name: Linus Ridley  Age: 69 y.o. Gender: female  CodeStatus: Full Code  Allergies: Codeine    Chief Complaint:Emesis (pt had paracentesis yesterday 10.4 liters removed) and Dizziness    Primary Care Provider: Radha Case MD  InpatientTreatment Team: Treatment Team: Attending Provider: Shraddha Lopes DO; Consulting Physician: Tori Haile MD; Consulting Physician: Mirian Bird MD; Utilization Reviewer: Elis Casper RN; Utilization Reviewer: Carmencita Gary RN; Nursing Student: Avi Nelson; Registered Nurse: Marleny Patton RN; : Rachelle Stevenson; Patient Care Tech: Pako Harman; Registered Nurse: Hans Davalos RN  Admission Date: 9/16/2020      Subjective:   Linus Ridley is a pleasant 69year old  female with a past medical history of cardiomyopathy, DMII, and obstructive sleep apnea with newly diagnosed (July) of cirrhosis and kidney dysfunction. She was admitted on the 2 days ago for hypovolemic shock following a paracentesis. Her blood pressure has resolved and is /78. Upon assessment today, she reports that she has no pain, no fevers, no nausea, no vomiting, no chills, or any other discomforts at this time. Her HGB is 6.8, and discussed possibility of transfusion. She is agreeable. GI Problem   Primary symptoms do not include nausea or diarrhea. Primary symptoms comment: no nausea at this time but does have it when she eats. . The illness began more than 7 days ago. The onset was gradual. The problem has not changed since onset.   Significant associated medical issues include liver disease. Physical Exam  Vitals signs and nursing note reviewed. Constitutional:       General: She is awake. Appearance: Normal appearance. She is well-groomed. HENT:      Head: Normocephalic and atraumatic. Nose: Nose normal.      Mouth/Throat:      Lips: Pink. Mouth: Mucous membranes are moist.      Pharynx: Oropharynx is clear. Neck:      Musculoskeletal: Normal range of motion and neck supple. Cardiovascular:      Rate and Rhythm: Normal rate and regular rhythm. Pulses: Normal pulses. Radial pulses are 2+ on the right side and 2+ on the left side. Dorsalis pedis pulses are 2+ on the right side and 2+ on the left side. Heart sounds: Murmur present. Systolic murmur present. Pulmonary:      Effort: Pulmonary effort is normal.      Breath sounds: Normal breath sounds. Abdominal:      General: Bowel sounds are normal. There is distension. Palpations: There is fluid wave. Tenderness: There is no abdominal tenderness. Musculoskeletal: Normal range of motion. Right lower leg: No edema. Left lower leg: No edema. Skin:     General: Skin is warm and dry. Capillary Refill: Capillary refill takes less than 2 seconds. Neurological:      Mental Status: She is alert and oriented to person, place, and time. Mental status is at baseline. Coordination: Coordination is intact. Gait: Gait is intact. Psychiatric:         Attention and Perception: Attention and perception normal.         Mood and Affect: Mood and affect normal.         Speech: Speech normal.         Behavior: Behavior is cooperative. Thought Content: Thought content normal.         Cognition and Memory: Cognition and memory normal.         Judgment: Judgment normal.         Review of Systems   Constitutional: Positive for appetite change. Not hungry anymore   HENT: Negative. Eyes: Negative. Respiratory: Negative.     Cardiovascular: Negative. Gastrointestinal: Positive for abdominal distention. Negative for diarrhea and nausea. Endocrine: Negative. Genitourinary: Negative. Musculoskeletal: Negative. Skin: Negative. Allergic/Immunologic: Negative. Neurological: Negative. Negative for headaches. Headache on admission and has been resolved   Hematological: Negative. Psychiatric/Behavioral: Negative.         Medications:  Reviewed  Current Facility-Administered Medications   Medication Dose Route Frequency Provider Last Rate Last Dose    0.9 % sodium chloride bolus  20 mL Intravenous Once Marah WILLIAMSON Sedar, DO        metoprolol (LOPRESSOR) injection 5 mg  5 mg Intravenous Q3H PRN Mariano J Holiday, DO   5 mg at 09/17/20 2202    metoprolol tartrate (LOPRESSOR) tablet 75 mg  75 mg Oral BID Mariano J Holiday, DO   75 mg at 09/19/20 9012    metoprolol tartrate (LOPRESSOR) tablet 25 mg  25 mg Oral Once Mariano J Holiday, DO        norepinephrine (LEVOPHED) 16 mg in dextrose 5 % 250 mL infusion  2 mcg/min Intravenous Continuous Curlie Certain Sedar, DO   Stopped at 09/17/20 0845    atorvastatin (LIPITOR) tablet 40 mg  40 mg Oral Daily oRd WILLIAMSON Sedar, DO   40 mg at 09/18/20 2118    citalopram (CELEXA) tablet 10 mg  10 mg Oral Daily Marahteo WILLIAMSON Sedar, DO   10 mg at 09/19/20 0809    [Held by provider] lisinopril (PRINIVIL;ZESTRIL) tablet 5 mg  5 mg Oral Nightly Marah WILLIAMSON Sedar, DO        glucose (GLUTOSE) 40 % oral gel 15 g  15 g Oral PRN Curlie Certain Sedar, DO        dextrose 50 % IV solution  12.5 g Intravenous PRN Curlie Certain Sedar, DO        glucagon (rDNA) injection 1 mg  1 mg Intramuscular PRN Curlie Certain Sedar, DO        dextrose 5 % solution  100 mL/hr Intravenous PRN Curlie Certain Sedar, DO        insulin lispro (HUMALOG) injection vial 0-6 Units  0-6 Units Subcutaneous TID  Rod WILLIAMSON Sedar, DO   1 Units at 09/18/20 1652    insulin lispro (HUMALOG) injection vial 0-3 Units  0-3 Units Subcutaneous Nightly Curlie Certain Sedar, DO   1 Units at 09/18/20 2118    0.9 % sodium chloride infusion   Intravenous Continuous Pili Foss MD 50 mL/hr at 09/19/20 0443      enoxaparin (LOVENOX) injection 40 mg  40 mg Subcutaneous Daily Loraine Cowden Sedar, DO   40 mg at 09/19/20 0810        Infusion Medications:    norepinephrine Stopped (09/17/20 0845)    dextrose      sodium chloride 50 mL/hr at 09/19/20 0443     Scheduled Medications:    sodium chloride  20 mL Intravenous Once    metoprolol tartrate  75 mg Oral BID    metoprolol tartrate  25 mg Oral Once    atorvastatin  40 mg Oral Daily    citalopram  10 mg Oral Daily    [Held by provider] lisinopril  5 mg Oral Nightly    insulin lispro  0-6 Units Subcutaneous TID WC    insulin lispro  0-3 Units Subcutaneous Nightly    enoxaparin  40 mg Subcutaneous Daily     PRN Meds: metoprolol, glucose, dextrose, glucagon (rDNA), dextrose    Labs:   Recent Labs     09/17/20  0511 09/18/20  0615 09/19/20  0618   WBC 6.9 9.5 5.4   HGB 6.9* 8.0* 6.8*   HCT 22.5* 25.9* 22.2*    214 151     Recent Labs     09/17/20  0511 09/18/20  0615 09/19/20  0618    138 134*   K 4.8 5.1* 5.0*   * 112* 109*   CO2 15* 16* 17*   BUN 21 17 16   CREATININE 0.95* 0.76 0.86   CALCIUM 7.6* 8.3* 8.1*     Recent Labs     09/16/20  1615 09/17/20  0511   AST 20 17   ALT 10 10   BILIDIR  --  0.3   BILITOT 0.7 0.7   ALKPHOS 78 70     Recent Labs     09/17/20  0511 09/18/20  0615 09/19/20  0618   INR 1.6 1.5 1.5     Recent Labs     09/16/20  1615 09/17/20  0144   TROPONINI <0.010 <0.010       Urinalysis:   Lab Results   Component Value Date    NITRU Negative 09/16/2020    WBCUA 20-50 09/16/2020    BACTERIA Negative 09/16/2020    RBCUA 0-2 09/16/2020    BLOODU Negative 09/16/2020    SPECGRAV 1.022 09/16/2020    GLUCOSEU Negative 09/16/2020       Radiology:   Most recent    Chest CT      WITH CONTRAST:No results found for this or any previous visit. WITHOUT CONTRAST: No results found for this or any previous visit.     CXR      2-view: No results found for billing purposes or to guide patient care. Please see the physician modifications/ attestation for treatment plan/suggestions. This note has been reviewed and feedback has been provided to the student.  *

## 2020-09-20 NOTE — DISCHARGE SUMMARY
Hospital Medicine Discharge Summary    Fabián Emmanuel  :  1945  MRN:  [id number]    Admit date:  2020  Discharge date:  2020    Admitting Physician:  Boykin Ganser, DO  Primary Care Physician:  Renetta Cueto MD      Discharge Diagnoses: Active Problems:    Hypertrophic obstructive cardiomyopathy (HOCM) (HCC)    Shock ([address]    Arterial hypotension  Resolved Problems:    * No resolved hospital problems. *      Hospital Course:   Fabián Emmanuel is a 69 y.o. female that was admitted and treated at [facility] for Hypovolemic shock following large volume paracentesis. Patient is a 82-year-old female with a history of hypertrophic cardiomyopathy with an hypertrophic LVEF approximately 85%. Patient developed sudden onset ascites of unknown reason and presented to interventional radiology for paracentesis. 11 L of ascitic fluid was drained and the patient subsequently became severely hypotensive and was admitted to the ICU on pressor support. With aggressive fluid resuscitation the patient's blood pressures are improved however she has developed ascites again. At this point time the patient cirrhosis is of unknown unknown origin. Fluid analysis of most recent parasitic fluid suggests a SAG of 2.1 and portal hypertension of cause however the protein is abnormal for portal hypertension at this time. Patient is following at [facility] for hypertrophic cardiomyopathy at this time. At this point she is hemodynamically stable is encouraged to follow-up with her hypertrophic cardiomyopathy specialist at [facility] she is also encouraged to follow-up with hepatology for further work-up for her cirrhosis of unknown origin. Medications noted below patient is on aggressive beta-blockade in order to improve cardiac output. At this point time all diuretics are recommended to be discontinued.     Patient was seen by the following consultants while admitted to [facility]:   Consults:  IP CONSULT TO GI  IP CONSULT TO CARDIOLOGY    Physical Exam:   Constitutional:       Appearance: Normal appearance. She is obese. She is not ill-appearing or diaphoretic. HENT:      Head: Normocephalic and atraumatic. Mouth/Throat:      Mouth: Mucous membranes are moist.      Pharynx: Oropharynx is clear. Eyes:      Extraocular Movements: Extraocular movements intact. Pupils: Pupils are equal, round, and reactive to light. Cardiovascular:      Rate and Rhythm: Normal rate. Heart sounds: No friction rub. No gallop. Comments: Holosystolic murmur  Pulmonary:      Effort: No respiratory distress. Breath sounds: No wheezing, rhonchi or rales. Abdominal:      General: There is distension. Tenderness: There is no abdominal tenderness. There is no guarding. Comments: Diffuse ascites and distention   Musculoskeletal:      Comments: Trace bilateral lower extremity edema   Neurological:      General: No focal deficit present. Mental Status: She is alert and oriented to person, place, and time. Psychiatric:         Mood and Affect: Mood normal.         Thought Content: Thought content normal.         Judgment: Judgment normal.     Significant Diagnostic Studies: Hyperdynamic LV with EF of 85% no significant valvular disease. Right upper quadrant ultrasound shows a small small coarse cirrhotic liver     Discharge Medications:       Gris Guaman 26 Medication Instructions NJN:[id number]    Printed on:09/19/20 5079   Medication Information                      atorvastatin (LIPITOR) 40 MG tablet  TAKE 1 TABLET DAILY             Blood Pressure KIT  Check your blood pressure daily             citalopram (CELEXA) 10 MG tablet               glimepiride (AMARYL) 2 MG tablet  TAKE 1 TABLET EVERY MORNING BEFORE BREAKFAST             glucose blood VI test strips (ACCU-CHEK MELANIE) strip  Test 1-2 times a day. Dx: 250.00.  Jonna Rain strips             Lancet Device MISC  Test 1-2 times a day. Dx: 250.00. Lancets             metFORMIN (GLUCOPHAGE) 1000 MG tablet  TAKE 1 TABLET TWICE A DAY WITH MEALS             metoprolol tartrate 75 MG TABS  Take 75 mg by mouth 2 times daily             raloxifene (EVISTA) 60 MG tablet  TAKE 1 TABLET DAILY                 Disposition:   Discharged to Home. Any [facility] needs that were indicated and/or required as been addressed and set up by Social Work. Condition at discharge: Pt was medically stable at the time of discharge. Activity: activity as tolerated    Total time taken for discharging this patient: 40 minutes. Greater than 70% of time was spent focused exclusively on this patient. Time was taken to review chart, discuss plans with consultants, reconciling medications, discussing plan answering questions with patient.      Betty Ramírez  9/19/2020, 10:59 PM

## 2020-09-21 NOTE — CARE COORDINATION
Tika [facility] Transitions Initial Follow Up Call    Call within 2 business days of discharge: Yes    Patient: Reid Red Patient : 1945   MRN: [id number]  Reason for Admission: -2020 [address] IP Hypovolemic shock following large volume paracentesis  Discharge Date: 20 RARS: Readmission Risk Score: 20  NR    Last Discharge [facility]       Complaint Diagnosis Description Type Department Provider    20 Emesis; Dizziness Anemia in other chronic diseases classified elsewhere . .. ED to Hosp-Admission (Discharged) (ADMITTED) [id number] Shakira Pluck Sedar, DO; Colonel Bunting. .. Spoke with: Avery Martinez    Pt reports she slept well last night and denies any acute pain concerns. Reports baseline exertional SOB. States abd distension and BLE edema unchanged from dc date. Did not weigh self or check her home/fasting BS today. 2020 A1C 5.2. States appetite is slowly improving. Reports she has/taking her meds and denies any home, DME, or med needs. Denies fever, chills, or flu-like symptoms. Facility: [facility]    PCP  10:45    PCP appt  10:45. Med rec/1111F order completed. Non-face-to-face services provided:  Obtained and reviewed discharge summary and/or continuity of care documents  Education of patient/family/caregiver/guardian to support self-management-Revioewed symptoms of fluid overload and CV-19 to report.     Care Transitions 24 Hour Call    Do you have any ongoing symptoms?:  Yes  Patient-reported symptoms:  Other (Comment: Abd distension and BLE edema.)  Do you have a copy of your discharge instructions?:  Yes  Do you have all of your prescriptions and are they filled?:  Yes  Have you been contacted by a [address]?:  No  Have you scheduled your follow up appointment?:  Yes  Were you discharged with any Home Care or Post Acute Services:  No  Do you feel like you have everything you need to keep you well at home?:  Yes  Are you an active caregiver in your home?: No  Care Transitions Interventions         Follow Up  Future Appointments   Date Time Provider Carlota Powelli   9/23/2020 10:45 AM Annabelle Lynn MD [facility]   9/28/2020  2:00 PM Augusto Alexander MD [address]   10/1/2020 11:00 AM [facility]   10/12/2020  1:45 PM Anjana Goode  [address]   12/14/2020  3:00 PM Evens Pierce MD [address]

## 2020-09-23 NOTE — TELEPHONE ENCOUNTER
Per Dr Tania Tsai and Dr Erasmo Parish pt needs to see a hepatologist  for cirrhosis of liver. Can Dr Sandy Porter please put a referral into dr Mike Hess?

## 2020-09-23 NOTE — PROGRESS NOTES
Bartolo Almazan 69 y.o. female presents today with   Chief Complaint   Patient presents with    Follow-Up from Hospital     [facility] after paracentisis of 11 liters with hypotension. she wanted a lot off. then bp dropped    Past Medical History:   Diagnosis Date    Arthritis     Cardiomyopathy [facility])     mother history.     Chronic kidney disease     Hypertension     Liver disease     Other disorders of kidney and ureter in diseases classified elsewhere     Pneumonia     Psychiatric problem     Sleep apnea, obstructive     Type II or unspecified type diabetes mellitus without mention of complication, not stated as uncontrolled      Patient Active Problem List    Diagnosis Date Noted    Other ascites 09/28/2020    Arterial hypotension     Shock ([address] 97/90/4706    Alcoholic cirrhosis of liver with ascites ([address] 09/14/2020    Other cirrhosis of liver ([address] 08/15/2020    Renal mass, left 08/15/2020    Abdominal distension 07/26/2020    FALLON (acute kidney injury) ([address] 07/26/2020    Rectal pain 07/12/2020    Cardiomyopathy ([address] 06/09/2020    Subconjunctival hemorrhage of left eye 05/20/2020    PVD (posterior vitreous detachment), both eyes 09/06/2019    Dry eye syndrome of bilateral lacrimal glands 09/06/2019    HARDEEP (obstructive sleep apnea) 12/07/2018    Bilateral carotid artery stenosis 01/06/2017    Right-sided carotid artery disease ([address] 12/30/2016    Pseudophakia of both eyes 03/21/2016    Regular astigmatism 03/21/2016    Presbyopia of both eyes 03/21/2016    Bell-rectal abscess 04/17/2013    Hypertension     Hypertrophic obstructive cardiomyopathy (HOCM) ([address] 04/08/2013    Type 2 diabetes mellitus without complication, without long-term current use of insulin ([address] 03/15/2013     Past Surgical History:   Procedure Laterality Date    APPENDECTOMY      CHOLECYSTECTOMY      COLONOSCOPY  4/23/2013    CT BIOPSY RENAL  8/4/2020    CT BIOPSY RENAL 8/4/2020 [facility] CT SCAN    CT BIOPSY RENAL  9/4/2020    CT BIOPSY RENAL 9/4/2020 [facility] CT SCAN    FOOT FRACTURE SURGERY Bilateral     FRACTURE SURGERY      HYSTERECTOMY      OTHER SURGICAL HISTORY      removal of anal fistulotomy    PARACENTESIS Left 07/28/2020    9015 mls removed by Dr Jessica Alvarado Left 09/04/2020    [id number] ascites removed by Dr Reanna Gimenez 50g albumin given    PARACENTESIS Left 09/15/2020    10,400cc removed by Dr Tj Monet [phone]     Family History   Problem Relation Age of Onset    Arrhythmia Mother     Diabetes Mother     High Blood Pressure Mother     Arrhythmia Father     Breast Cancer Sister     Diabetes Brother     High Blood Pressure Brother      Social History     Socioeconomic History    Marital status:      Spouse name: None    Number of children: None    Years of education: None    Highest education level: None   Occupational History    None   Social Needs    Financial resource strain: Not hard at all   [place] insecurity     Worry: Never true     Inability: Never true   [employer] needs     Medical: None     Non-medical: None   Tobacco Use    Smoking status: Never Smoker    Smokeless tobacco: Former User   Substance and Sexual Activity    Alcohol use: No    Drug use: No    Sexual activity: Yes   Lifestyle    Physical activity     Days per week: None     Minutes per session: None    Stress: None   Relationships    Social connections     Talks on phone: None     Gets together: None     Attends Sabianism service: None     Active member of club or organization: None     Attends meetings of clubs or organizations: None     Relationship status: None    Intimate partner violence     Fear of current or ex partner: None     Emotionally abused: None     Physically abused: None     Forced sexual activity: None   Other Topics Concern    None   Social History Narrative    None     Allergies   Allergen Reactions    Codeine        Review of Systems   Constitutional: Negative for chills and fever. HENT: Negative for facial swelling and nosebleeds. Eyes: Negative for photophobia and visual disturbance. Respiratory: Negative for apnea and choking. Cardiovascular: Negative for chest pain and palpitations. Gastrointestinal: Negative for abdominal distention and blood in stool. Genitourinary: Negative for enuresis and hematuria. Musculoskeletal: Negative for gait problem and joint swelling. Skin: Negative for rash. Neurological: Negative for syncope and speech difficulty. Hematological: Does not bruise/bleed easily. Psychiatric/Behavioral: Negative for hallucinations and suicidal ideas. Vitals:    09/23/20 1047   BP: (!) 100/56   Pulse: 74   Resp: 15   Weight: 210 lb (95.3 kg)   Height: 5' 7\" (1.702 m)       Physical Exam  Constitutional:       Appearance: She is well-developed. HENT:      Head: Normocephalic and atraumatic. Nose: No congestion. Eyes:      Pupils: Pupils are equal, round, and reactive to light. Neck:      Musculoskeletal: Normal range of motion. Cardiovascular:      Rate and Rhythm: Normal rate and regular rhythm. Heart sounds: Normal heart sounds. Pulmonary:      Effort: No respiratory distress. Breath sounds: Normal breath sounds. No wheezing. Abdominal:      General: Bowel sounds are normal.      Palpations: Abdomen is soft. Musculoskeletal: Normal range of motion. Neurological:      Mental Status: She is oriented to person, place, and time.      Assessment/Plan  Gi Encinas was seen today for follow-up from hospital.    Diagnoses and all orders for this visit:    Hypotension due to hypovolemia  -     NV DISCHARGE MEDS RECONCILED W/ CURRENT OUTPATIENT MED LIST    Other ascites  -     NV DISCHARGE MEDS RECONCILED W/ CURRENT OUTPATIENT MED LIST    Cirrhosis of liver with ascites, unspecified hepatic cirrhosis type ([facility]  -     NV DISCHARGE MEDS RECONCILED W/ CURRENT OUTPATIENT MED LIST        Return in 1 month (on 10/23/2020). Noah Aparicio MD   Post-Discharge Transitional Care Management Services or Hospital Follow Up      May Seip   YOB: 1945    Date of Office Visit:  9/23/2020  Date of Hospital Admission: 9/16/20  Date of Hospital Discharge: 9/19/20  Risk of hospital readmission (high >=14%.  Medium >=10%) :Readmission Risk Score: 20      Care management risk score Rising risk (score 2-5) and Complex Care (Scores >=6): 5     Non face to face  following discharge, date last encounter closed (first attempt may have been earlier): 9/21/2020  1:53 PM    Call initiated 2 business days of discharge: Yes    Patient Active Problem List   Diagnosis    Type 2 diabetes mellitus without complication, without long-term current use of insulin ([address]    Hypertension    Bell-rectal abscess    Bilateral carotid artery stenosis    Hypertrophic obstructive cardiomyopathy (HOCM) (HCC)    HARDEEP (obstructive sleep apnea)    Pseudophakia of both eyes    Regular astigmatism    Cardiomyopathy ([address]    Rectal pain    Abdominal distension    FALLON (acute kidney injury) ([address]    Other cirrhosis of liver ([address]    Renal mass, left    Alcoholic cirrhosis of liver with ascites ([address]    Shock ([address]    Arterial hypotension    Subconjunctival hemorrhage of left eye    Right-sided carotid artery disease ([address]    PVD (posterior vitreous detachment), both eyes    Presbyopia of both eyes    Dry eye syndrome of bilateral lacrimal glands    Other ascites       Allergies   Allergen Reactions    Codeine        Medications listed as ordered at the time of discharge from hospital   [address] Medication Instructions EDDIE:    Printed on:09/29/20 7121   Medication Information                      atorvastatin (LIPITOR) 40 MG tablet  TAKE 1 TABLET DAILY             Blood Pressure KIT  Check your blood pressure daily             buPROPion (WELLBUTRIN SR) 150 MG extended release tablet  Take by mouth citalopram (CELEXA) 10 MG tablet               glimepiride (AMARYL) 2 MG tablet  TAKE 1 TABLET EVERY MORNING BEFORE BREAKFAST             glucose blood VI test strips (ACCU-CHEK MELANIE) strip  Test 1-2 times a day. Dx: 250.00. Accu-check melanie strips             Lancet Device MISC  Test 1-2 times a day. Dx: 250.00. Lancets             lisinopril (PRINIVIL;ZESTRIL) 5 MG tablet  Take by mouth             metFORMIN (GLUCOPHAGE) 1000 MG tablet  TAKE 1 TABLET TWICE A DAY WITH MEALS             metoprolol tartrate (LOPRESSOR) 25 MG tablet  Take 1 tablet by mouth 2 times daily To take with a 50mg bid to make a 75mg total dose twice daily. metoprolol tartrate (LOPRESSOR) 50 MG tablet  Take 1 tablet by mouth 2 times daily To take with a 25mg tablet to make 75mg total twice daily. raloxifene (EVISTA) 60 MG tablet  TAKE 1 TABLET DAILY                   Medications marked \"taking\" at this time  Outpatient Medications Marked as Taking for the 9/23/20 encounter (Office Visit) with Brendan Whitman MD   Medication Sig Dispense Refill    buPROPion [facility] SR) 150 MG extended release tablet Take by mouth      lisinopril (PRINIVIL;ZESTRIL) 5 MG tablet Take by mouth      metoprolol tartrate (LOPRESSOR) 50 MG tablet Take 1 tablet by mouth 2 times daily To take with a 25mg tablet to make 75mg total twice daily. 60 tablet 5    raloxifene (EVISTA) 60 MG tablet TAKE 1 TABLET DAILY 90 tablet 3    Blood Pressure KIT Check your blood pressure daily 1 kit 0    citalopram (CELEXA) 10 MG tablet       glimepiride (AMARYL) 2 MG tablet TAKE 1 TABLET EVERY MORNING BEFORE BREAKFAST 90 tablet 1    metFORMIN (GLUCOPHAGE) 1000 MG tablet TAKE 1 TABLET TWICE A DAY WITH MEALS 180 tablet 3    [DISCONTINUED] atorvastatin (LIPITOR) 40 MG tablet TAKE 1 TABLET DAILY 90 tablet 3    glucose blood VI test strips (ACCU-CHEK MELANIE) strip Test 1-2 times a day. Dx: 250.00.  Accu-check melanie strips 180 strip 3    Lancet Device MISC Test 1-2 times a day. Dx: 250.00. Lancets 180 each 3        Medications patient taking as of now reconciled against medications ordered at time of hospital discharge: Yes    Chief Complaint   Patient presents with   [address] from [facility]     chirrosis       History of Present illness - Follow up of Hospital diagnosis(es): ascites    Inpatient course: Discharge summary reviewed- see chart. Interval history/Current status: guarded        Vitals:    09/23/20 1047   BP: (!) 100/56   Pulse: 74   Resp: 15   Weight: 210 lb (95.3 kg)   Height: 5' 7\" (1.702 m)     Body mass index is 32.89 kg/m². Wt Readings from Last 3 Encounters:   09/28/20 199 lb (90.3 kg)   09/23/20 210 lb (95.3 kg)   09/19/20 209 lb 12.8 oz (95.2 kg)     BP Readings from Last 3 Encounters:   09/25/20 (!) 119/59   09/23/20 (!) 100/56   09/19/20 (!) 123/39        Physical Exam:      Assessment/Plan:  1. Hypotension due to hypovolemia    - DC DISCHARGE MEDS RECONCILED W/ CURRENT OUTPATIENT MED LIST    2. Other ascites    - DC DISCHARGE MEDS RECONCILED W/ CURRENT OUTPATIENT MED LIST    3.  Cirrhosis of liver with ascites, unspecified hepatic cirrhosis type ([facility]    - DC DISCHARGE MEDS RECONCILED W/ CURRENT OUTPATIENT MED LIST        Medical Decision Making: moderate complexity

## 2020-09-24 NOTE — TELEPHONE ENCOUNTER
1st attempt to reach patient. Called patient @ [phone] and left message on machine for patient to return call during normal business hours of 8:30 AM and 5 PM @ [phone] option 2.

## 2020-09-25 PROBLEM — H43.813 PVD (POSTERIOR VITREOUS DETACHMENT), BOTH EYES: Status: ACTIVE | Noted: 2019-09-06

## 2020-09-25 PROBLEM — H04.123 DRY EYE SYNDROME OF BILATERAL LACRIMAL GLANDS: Status: ACTIVE | Noted: 2019-09-06

## 2020-09-25 PROBLEM — H11.32 SUBCONJUNCTIVAL HEMORRHAGE OF LEFT EYE: Status: ACTIVE | Noted: 2020-01-01

## 2020-09-28 PROBLEM — R18.8 OTHER ASCITES: Status: ACTIVE | Noted: 2020-01-01

## 2020-09-28 PROBLEM — K74.69 OTHER CIRRHOSIS OF LIVER (HCC): Status: ACTIVE | Noted: 2020-01-01

## 2020-09-28 NOTE — PROGRESS NOTES
Gastroenterology Clinic Visit    Boubacar Underwood  [id number]  Chief Complaint   Patient presents with   [facility] Consultation     Rectal pain, consult for colonoscopy. HPI: 69 y.o. female presents to the clinic with history of cirrhosis complicated by ascites and ankle swelling.,  Reviewing her acetic fluid studies shows that it is transudate with a total protein of 1.8 suggestive of hepatic parenchymal disease most probably nonalcoholic fatty liver disease. She was investigated earlier for any autoimmune and metabolic and viral disease liver disease and they were all negative, patient had 3 large-volume paracentesis so far. After last paracentesis patient developed hypotension and has to be kept in the hospital, patient stated that she is taking Aldactone 25 mg a day and Lasix was discontinued because of low blood pressure and  CBC on 923 shows hemoglobin of 9.7 and hematocrit of 30.7 with a platelet count of 777. Pro time was 17.8 with INR 1.5, BMP shows a potassium of 5.0, no history of any hematemesis or melena.,  Ascites fluid has reaccumulated and patient experience some discomfort in the abdomen, no fever no chills. No history of any GI bleeding. Does not experience shortness of breath or dizziness anymore. Review of Systems   Constitutional: Negative. HENT: Negative. Eyes: Negative. Respiratory: Negative. Cardiovascular: Negative. Gastrointestinal: Positive for abdominal distention. Negative for abdominal pain, anal bleeding, blood in stool, constipation, diarrhea, nausea, rectal pain and vomiting. Endocrine: Negative. Genitourinary: Negative. Musculoskeletal: Negative. Skin: Negative. Neurological: Negative. Hematological: Negative. Psychiatric/Behavioral: Negative. Past Medical History:   Diagnosis Date    Arthritis     Cardiomyopathy [facility])     mother history.     Chronic kidney disease     Hypertension     Liver disease     Other disorders of kidney and ureter in diseases classified elsewhere     Pneumonia     Psychiatric problem     Sleep apnea, obstructive     Type II or unspecified type diabetes mellitus without mention of complication, not stated as uncontrolled       Past Surgical History:   Procedure Laterality Date    APPENDECTOMY      CHOLECYSTECTOMY      COLONOSCOPY  4/23/2013    CT BIOPSY RENAL  8/4/2020    CT BIOPSY RENAL 8/4/2020 [facility] CT SCAN    CT BIOPSY RENAL  9/4/2020    CT BIOPSY RENAL 9/4/2020 [facility] CT SCAN    FOOT FRACTURE SURGERY Bilateral     FRACTURE SURGERY      HYSTERECTOMY      OTHER SURGICAL HISTORY      removal of anal fistulotomy    PARACENTESIS Left 07/28/2020    9015 mls removed by Dr Elba Vail Left 09/04/2020    [id number] ascites removed by Dr Jason Sepulveda 50g albumin given    PARACENTESIS Left 09/15/2020    10,400cc removed by Dr Addy Yen [phone]     Current Outpatient Medications on File Prior to Visit   Medication Sig Dispense Refill    atorvastatin (LIPITOR) 40 MG tablet TAKE 1 TABLET DAILY 90 tablet 3    buPROPion (WELLBUTRIN SR) 150 MG extended release tablet Take by mouth      lisinopril (PRINIVIL;ZESTRIL) 5 MG tablet Take by mouth      metoprolol tartrate (LOPRESSOR) 50 MG tablet Take 1 tablet by mouth 2 times daily To take with a 25mg tablet to make 75mg total twice daily. 60 tablet 5    raloxifene (EVISTA) 60 MG tablet TAKE 1 TABLET DAILY 90 tablet 3    Blood Pressure KIT Check your blood pressure daily 1 kit 0    citalopram (CELEXA) 10 MG tablet       glimepiride (AMARYL) 2 MG tablet TAKE 1 TABLET EVERY MORNING BEFORE BREAKFAST 90 tablet 1    metFORMIN (GLUCOPHAGE) 1000 MG tablet TAKE 1 TABLET TWICE A DAY WITH MEALS 180 tablet 3    glucose blood VI test strips (ACCU-CHEK MELANIE) strip Test 1-2 times a day. Dx: 250.00. Accu-check melanie strips 180 strip 3    Lancet Device MISC Test 1-2 times a day. Dx: 250.00.  Lancets 180 each 3    metoprolol tartrate (LOPRESSOR) 25 MG tablet Take 1 Pupils are equal, round, and reactive to light. Neck:      Musculoskeletal: Normal range of motion. Cardiovascular:      Rate and Rhythm: Normal rate. Comments: Systolic murmur heard over the precordium  Pulmonary:      Effort: Pulmonary effort is normal.   Abdominal:      General: Bowel sounds are normal.      Palpations: Abdomen is soft. Comments: Soft distended surgical scar seen, dullness on both flank area   Musculoskeletal: Normal range of motion. Comments: 1+ pitting edema of both ankles   Skin:     General: Skin is warm. Neurological:      Mental Status: She is alert. Comments: Alert and oriented x3 no asterixis         Laboratory, Pathology, Radiology reviewed indetail with relevant important investigations summarized below:  Lab Results   Component Value Date    WBC 6.5 09/23/2020    HGB 9.7 (L) 09/23/2020    HCT 30.7 (L) 09/23/2020    MCV 90.3 09/23/2020     09/23/2020     Lab Results   Component Value Date    ALT 10 09/17/2020    AST 17 09/17/2020    ALKPHOS 70 09/17/2020    BILITOT 0.7 09/17/2020       Ct Guided Needle Placement    Result Date: 9/8/2020  1. Successful core biopsy of inferior pole left renal mass. HISTORY: David Díaz is a Female of 69 years age. DIAGNOSIS:  N28.89 Left renal mass ICD10 COMPARISON: None available. CT Dose-Length Product (estimate related to radiation exposure from this exam):  828.68  mGy*cm. PROCEDURE: Following the discussion of the procedure, alternatives, risks versus benefits, informed consent was obtained from the patient. Specifically, risks of after-biopsy pain at the site, rare possibility of excessive hemorrhage, infection, injury to the adjacent organs were discussed and the patient verbalized understanding. Pre-procedure evaluation confirmed that the patient was an appropriate candidate for conscious sedation. Adequate sedation was maintained during the entire procedure.   Vital signs, pulse oximetry, and response to verbal commands were monitored and recorded by the nurse throughout the procedure and the recovery period. Medical information was entered in the medical record including the medications and dosages used. The patient returned to baseline neurologic and physiologic status prior to leaving the department. No immediate sedation related complications were noted. Medication for conscious sedation was administered via IV route. 45 minutes of conscious sedation was provided. Following universal protocol, patient and site verification was performed with a \"timeout\" prior to the procedure. The patient was placed on the CT table in prone position and the left flank area was prepped and draped in usual sterile fashion. Using the usual sterile conditions, lidocaine and CT guidance, the inferior pole renal mass was accessed using an 18-guage Temno coaxial biopsy needle system. After confirmation of appropriate localization of the needle, total of 2 samples were obtained and sent for pathological analysis. The coaxial needle system was removed and hemostasis was achieved by direct digital compression. The patient tolerated the procedure well. There is a very tiny amount of postprocedural blood superficial to the biopsied mass, this did not change in size or growth. The patient left the CT suite in supine position to the recovery room in  stable condition. Total local anesthetic used: lidocaine, approximately 8 mL. Estimated blood loss:  Negligible. The patient was observed in the recovery room for two hours after the procedure and discharged in stable condition. Ir Us Guided Paracentesis    Result Date: 9/28/2020  1. Status post technically successful ultrasound-guided paracentesis. Jenae Moser is a Female of 69 years age, referred for Ultrasound Guided Paracentesis. PROCEDURE: Survey of the abdomen showed large amount of ascites fluid.  After obtaining informed consent, the patient was positioned supine on the sonography table. Using ultrasound, the skin over the left hemiabdomen was locally anesthetized with 1% lidocaine. Following that, a Yueh needle was advanced into the fluid pocket using ultrasound visualization. 7000cc, of clear yellow fluid were aspirated and sent for cytology, and pathology. The needle was removed, and hemostasis was obtained with pressure. A Band-Aid was placed. Post procedure images did not demonstrate hemorrhage at the target site. The patient tolerated the procedure well. The patient left the department in good condition. A radiology nurse was in presence monitoring vital signs, assisting throughout the procedure. Ir Us Guided Paracentesis    Result Date: 9/8/2020  1. Status post technically successful ultrasound-guided paracentesis. Arnulfo Tello is a Female of 69 years age, referred for Ultrasound Guided Paracentesis. PROCEDURE: Survey of the abdomen showed large amount of ascites fluid. After obtaining informed consent, the patient was positioned supine on the sonography table. Using ultrasound, the skin over the left hemiabdomen was locally anesthetized with 1% lidocaine. Following that, a Yueh needle was advanced into the fluid pocket using ultrasound visualization. 11,000cc, of clear yellow fluid were aspirated and sent for cytology, and pathology. The needle was removed, and hemostasis was obtained with pressure. A Band-Aid was placed. Post procedure images did not demonstrate hemorrhage at the target site. The patient tolerated the procedure well. The patient left the department in good condition. A radiology nurse was in presence monitoring vital signs, assisting throughout the procedure. Us Dup Abd Pel Retro Scrot Limited    Result Date: 9/18/2020  ULTRASOUND, LIMITED ABDOMEN: COMPARISONS:  NONE CLINICAL HISTORY: Alcoholic cirrhosis. Hypertension. History of diabetes. Status post cholecystectomy. FINDINGS: Biplanar images were obtained.   The prior to leaving the department. No immediate sedation related complications were noted. Medication for conscious sedation was administered via IV route. 45 minutes of conscious sedation was provided. Following universal protocol, patient and site verification was performed with a \"timeout\" prior to the procedure. The patient was placed on the CT table in prone position and the left flank area was prepped and draped in usual sterile fashion. Using the usual sterile conditions, lidocaine and CT guidance, the inferior pole renal mass was accessed using an 18-guage Temno coaxial biopsy needle system. After confirmation of appropriate localization of the needle, total of 2 samples were obtained and sent for pathological analysis. The coaxial needle system was removed and hemostasis was achieved by direct digital compression. The patient tolerated the procedure well. There is a very tiny amount of postprocedural blood superficial to the biopsied mass, this did not change in size or growth. The patient left the CT suite in supine position to the recovery room in  stable condition. Total local anesthetic used: lidocaine, approximately 8 mL. Estimated blood loss:  Negligible. The patient was observed in the recovery room for two hours after the procedure and discharged in stable condition. Us Guided Paracentesis    1. Status post technically successful ultrasound-guided paracentesis. Simran Ramirez is a Female of 69 years age, referred for Ultrasound Guided Paracentesis. PROCEDURE: Survey of the abdomen showed large amount of ascites fluid. After obtaining informed consent, the patient was positioned supine on the sonography table. Using ultrasound, the skin over the left hemiabdomen was locally anesthetized with 1% lidocaine. Following that, a Yueh needle was advanced into the fluid pocket using ultrasound visualization.   10,400cc, of clear yellow fluid were aspirated and sent for cytology, and produced using speech recognition software and may cause contain errors related to thatsystem including grammar, punctuation and spelling as well as words and phrases that may seem inappropriate. If there are questions or concerns please feel free to contact me to clarify.

## 2020-09-28 NOTE — CARE COORDINATION
Attempted to reach pt for follow up call.  Received busy signal.     Juanpablo Lomax RN  Care Transition Coordinator  [phone]

## 2020-09-30 NOTE — TELEPHONE ENCOUNTER
2nd attempt to reach patient.  Called patient @ [phone] and left message on machine for patient to return call during normal business hours of 8:30 AM and 5 PM @ [phone] option 2

## 2020-10-01 NOTE — PROGRESS NOTES
Behavioral Health Consultation  Alyce Saldivar PsyD. Psychologist  10/1/20  11:02 AM EDT      Time spent with Patient: 30 minutes  This is patient's fifth  [facility] appointment. Reason for Consult:  depression and anxiety  Referring Provider: Lexie Selby MD  [address] [place] [address]  [address] [facility], [address] [facility]    Feedback given to PCP. TELEHEALTH EVALUATION -- Audio and/or Visual (During YXKLX-92 public health emergency)    Due to COVID 19 outbreak, patient's office visit was converted to a virtual visit. Patient was contacted and agreed to proceed with a virtual visit via Telephone Visit. Patient reports that they are located at home. The risks and benefits of converting to a virtual visit were discussed in light of the current infectious disease epidemic. Patient also understood that insurance coverage and co-pays are up to their individual insurance plans. Patient provides verbal consent for this visit to be billed to insurance. Pursuant to the emergency declaration under the [facility], [facility] waiver authority and the [employer] and [employer] Act, this Virtual  Visit was conducted, with patient's consent, to reduce the patient's risk of exposure to COVID-19 and provide continuity of care for an established patient. Services were provided through a telephonic synchronous discussion virtually to substitute for in-person clinic visit. Provider Location: [place], [place]     Note: A doxy appt was attempted. The patient did sign on but I could not see her or hear her when we connected and it appeared that her connection was poor and kept logging her on and off. We decided to switch to a telephone appointment instead due to these connection issues. S:  Pt reports that she's had a lot of medical problems since her last appointment.  Pt reports that she seems to be handling this better then expected and she has not felt that anxious or down.  She states that she is taking things one day at a time and states that she is getting good/help support from her  and children. She did discuss how she is trying to make sure that she does not nap for too long during the day because then she has trouble sleeping at night. She reports that she somewhat practiced the strategies that we went over last time stating that she is trying to focus on one thing and not think into the future. No SI/HI. O:  MSE:    Appearance    alert, cooperative  Appetite abnormal:  low for the past year  Sleep disturbance Yes, including; hypersomnia as well as frequent night time awakening, difficulty falling asleep, non-restful sleep   Fatigue Yes  Loss of pleasure Yes  Impulsive behavior No  Speech    spontaneous, normal rate and normal volume  Mood   neutral   Affect    normal affect  Thought Content    intact  Thought Process    linear, goal directed and coherent  Associations    logical connections  Insight    good  Judgment    good  Orientation    oriented to person, place, time, and general circumstances  Memory    recent and remote memory intact  Attention/Concentration    impaired  Morbid ideation No  Suicide Assessment    no suicidal ideation    History:    Medications:   Current Outpatient Medications   Medication Sig Dispense Refill    atorvastatin (LIPITOR) 40 MG tablet TAKE 1 TABLET DAILY 90 tablet 3    buPROPion (WELLBUTRIN SR) 150 MG extended release tablet Take by mouth      lisinopril (PRINIVIL;ZESTRIL) 5 MG tablet Take by mouth      metoprolol tartrate (LOPRESSOR) 25 MG tablet Take 1 tablet by mouth 2 times daily To take with a 50mg bid to make a 75mg total dose twice daily. (Patient not taking: Reported on 9/23/2020) 60 tablet 5    metoprolol tartrate (LOPRESSOR) 50 MG tablet Take 1 tablet by mouth 2 times daily To take with a 25mg tablet to make 75mg total twice daily.  60 tablet 5    raloxifene (EVISTA) 60 MG tablet TAKE 1 TABLET DAILY 90 tablet 3    Blood Pressure KIT Check your blood pressure daily 1 kit 0    citalopram (CELEXA) 10 MG tablet       glimepiride (AMARYL) 2 MG tablet TAKE 1 TABLET EVERY MORNING BEFORE BREAKFAST 90 tablet 1    metFORMIN (GLUCOPHAGE) 1000 MG tablet TAKE 1 TABLET TWICE A DAY WITH MEALS 180 tablet 3    glucose blood VI test strips (ACCU-CHEK MELANIE) strip Test 1-2 times a day. Dx: 250.00. Accu-check melanie strips 180 strip 3    Lancet Device MISC Test 1-2 times a day. Dx: 250.00. Lancets 180 each 3     No current facility-administered medications for this visit.         Social History:   Social History     Socioeconomic History    Marital status:      Spouse name: Not on file    Number of children: Not on file    Years of education: Not on file    Highest education level: Not on file   Occupational History    Not on file   Social Needs    Financial resource strain: Not hard at all   [address] insecurity     Worry: Never true     Inability: Never true   [employer] needs     Medical: Not on file     Non-medical: Not on file   Tobacco Use    Smoking status: Never Smoker    Smokeless tobacco: Former User   Substance and Sexual Activity    Alcohol use: No    Drug use: No    Sexual activity: Yes   Lifestyle    Physical activity     Days per week: Not on file     Minutes per session: Not on file    Stress: Not on file   Relationships    Social connections     Talks on phone: Not on file     Gets together: Not on file     Attends Adventism service: Not on file     Active member of club or organization: Not on file     Attends meetings of clubs or organizations: Not on file     Relationship status: Not on file    Intimate partner violence     Fear of current or ex partner: Not on file     Emotionally abused: Not on file     Physically abused: Not on file     Forced sexual activity: Not on file   Other Topics Concern    Not on file   Social History Narrative    Not on file       Family History:   Family

## 2020-10-06 NOTE — PROGRESS NOTES
10/6/2020    TELEHEALTH EVALUATION -- Audio/Visual (During JTLNM-63 public health emergency)    Due to COVID 19 outbreak, patient's office visit was converted to a virtual visit. Patient was contacted and agreed to proceed with a virtual visit via Telephone Visit,15 minutes  The risks and benefits of converting to a virtual visit were discussed in light of the current infectious disease epidemic. Patient also understood that insurance coverage and co-pays are up to their individual insurance plans. 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 component of congestive hepatopathy diagnosed in 2020. Patient with Hx of DM, CAD, Dyslipidemia, & cardiomyopathy. Reports prior complications in term of ascites S/P paracentesis X 3 with no HX of SBP identified. No Hx of esophogeal variceal bleed, no previous hx of EGD. Last [facility] [address] screening/surveillance was Ct scan / US that was negative for any liver mass. Denies any blood per rectum or melena , no juandice or confusion /memory issues. Reports increased abdominal girth and lower extremity edema. Due to hypotension has been on spironolactone 25 mg unable to tolerate dual diuretics, this is on hold due to hyperkalemia. No Hx viral hepatitis or ETOH use reported. Patient was recently seen in the hospital after developing hypotension after large volume paracentesis, has a previous history of ascites noted since 2020 with 2 prior paracentesis that were well-tolerated, she has a significant history of cardiomyopathy with most recent echo noted in our RVSP of 48,  recent ascites fluid analysis noted a protein of 1.8 and was negative for malignant cells and SBP, previous viral hepatitis autoimmune work-up were negative.     Of note patient is currently undergoing evaluation for a right renal mass and has a follow-up [address] she is also encouraged to follow-up with hepatology for further work-up for her cirrhosis of unknown origin. Medications noted below patient is on aggressive beta-blockade in order to improve cardiac output. At this point time all diuretics are recommended to be discontinued. Review of Systems   Constitutional: Negative for appetite change, chills, fever and unexpected weight change. HENT: Negative for nosebleeds, tinnitus, trouble swallowing and voice change. Eyes: Negative for photophobia, pain and redness. Respiratory: Negative for chest tightness, shortness of breath and wheezing. Cardiovascular: Negative for chest pain, palpitations and leg swelling. Gastrointestinal: Negative for abdominal distention, abdominal pain, anal bleeding, blood in stool, constipation, diarrhea, nausea, rectal pain and vomiting. Endocrine: Negative for polydipsia, polyphagia and polyuria. Genitourinary: Negative for difficulty urinating and hematuria. Skin: Negative for color change, pallor and rash. Neurological: Negative for dizziness, speech difficulty and headaches. Psychiatric/Behavioral: Negative for confusion and suicidal ideas. Prior to Visit Medications    Medication Sig Taking? Authorizing Provider   atorvastatin (LIPITOR) 40 MG tablet TAKE 1 TABLET DAILY Yes Medina Deras MD   buPROPion [facility] SR) 150 MG extended release tablet Take by mouth Yes Historical Provider, MD   lisinopril (PRINIVIL;ZESTRIL) 5 MG tablet Take by mouth Yes Historical Provider, MD   metoprolol tartrate (LOPRESSOR) 25 MG tablet Take 1 tablet by mouth 2 times daily To take with a 50mg bid to make a 75mg total dose twice daily. Yes Heidi Rodriguez MD   metoprolol tartrate (LOPRESSOR) 50 MG tablet Take 1 tablet by mouth 2 times daily To take with a 25mg tablet to make 75mg total twice daily.  Yes Heidi Rodriguez MD   raloxifene (EVISTA) 60 MG tablet TAKE 1 [address] Дмитрий Hodge MD   Blood Pressure KIT Check your blood pressure daily Yes Alejandrina Dorman MD   citalopram (CELEXA) 10 MG tablet  Yes Myrtle Reis MD   glimepiride (AMARYL) 2 MG tablet TAKE 1 TABLET EVERY MORNING BEFORE BREAKFAST Yes Rory Bryan MD   metFORMIN (GLUCOPHAGE) 1000 MG tablet TAKE 1 TABLET TWICE A DAY WITH MEALS Yes Kory We-[id number] Gallo, APRN - CNP   glucose blood VI test strips (ACCU-CHEK MELANIE) strip Test 1-2 times a day. Dx: 250.00. Accu-check melanie strips Yes Rory Bryan MD   Lancet Device MISC Test 1-2 times a day. Dx: 250.00. Lancets Yes Rory Bryan MD       Social History     Tobacco Use    Smoking status: Never Smoker    Smokeless tobacco: Former User   Substance Use Topics    Alcohol use: No    Drug use: No        Allergies   Allergen Reactions    Codeine    ,   Past Medical History:   Diagnosis Date    Arthritis     Cardiomyopathy [facility])     mother history.     Chronic kidney disease     Hypertension     Liver disease     Other disorders of kidney and ureter in diseases classified elsewhere     Pneumonia     Psychiatric problem     Sleep apnea, obstructive     Type II or unspecified type diabetes mellitus without mention of complication, not stated as uncontrolled    ,   Past Surgical History:   Procedure Laterality Date    APPENDECTOMY      CHOLECYSTECTOMY      COLONOSCOPY  4/23/2013    CT BIOPSY RENAL  8/4/2020    CT BIOPSY RENAL 8/4/2020 [facility] CT SCAN    CT BIOPSY RENAL  9/4/2020    CT BIOPSY RENAL 9/4/2020 [facility] CT SCAN    FOOT FRACTURE SURGERY Bilateral     FRACTURE SURGERY      HYSTERECTOMY      OTHER SURGICAL HISTORY      removal of anal fistulotomy    PARACENTESIS Left 07/28/2020    9015 mls removed by Dr Nolan Tomlin Left 09/04/2020    [id number] ascites removed by Dr Inna Solorio 50g albumin given    PARACENTESIS Left 09/15/2020    10,400cc removed by Dr Zak Link [phone]   ,   Social History     Tobacco Use    Smoking status: Never Smoker  Smokeless tobacco: Former User   Substance Use Topics    Alcohol use: No    Drug use: No   ,   Family History   Problem Relation Age of Onset    Arrhythmia Mother     Diabetes Mother     High Blood Pressure Mother     Arrhythmia Father     Breast Cancer Sister     Diabetes Brother     High Blood Pressure Brother        PHYSICAL EXAMINATION:  [ INSTRUCTIONS:  \"[x]\" Indicates a positive item  \"[]\" Indicates a negative item  -- DELETE ALL ITEMS NOT EXAMINED]  [] Alert  [] Oriented to person/place/time    [] No apparent distress  [] Toxic appearing    [] Face flushed appearing [] Sclera clear  [] Lips are cyanotic      [] Breathing appears normal  [] Appears tachypneic      [] Rash on visible skin    [] Cranial Nerves II-XII grossly intact    [] Motor grossly intact in visible upper extremities    [] Motor grossly intact in visible lower extremities    [] Normal Mood  [] Anxious appearing    [] Depressed appearing  [] Confused appearing      [] Poor short term memory  [] Poor long term memory    [] OTHER:      Due to this being a TeleHealth encounter, evaluation of the following organ systems is limited: Vitals/Constitutional/EENT/Resp/CV/GI//MS/Neuro/Skin/Heme-Lymph-Imm. ASSESSMENT/PLAN:  1-  Decompensated Cirrhosis secondary to PARISH & ? component of  congestive hepatopathy :  Patient with Hx of DM, CAD, Dyslipidemia, & cardiomyopathy. No Hx viral hepatitis or ETOH use reported. she has a significant history of cardiomyopathy with most recent echo noted RVSP of 48,  recent ascites fluid analysis noted a protein of 1.8 and was negative for malignant cells and SBP, previous viral hepatitis autoimmune work-up were negative.      MELD-Na Score 18  Estimated 90-Day Mortality 6%  2 - Reported Ascites:   Aldactone is on hold given recent high potassium is due for repeat BMP this week, unable to tolerate lasix owing to hypotension and circulatory dysfunction  Schedule for therapeutic paracentesis at next available apt, pt has already contacted IR. Albumin (6 to 8 g/L of fluid removed) should  be administered ( if removed more than 5 L)  \" No added salt diet\" to improve compliance   - No Hx of SBP noted  3- EGD for Variceal surveillance:    No hx of EV, no previous EGD, SAAG 2.1, noted multiple comorbidities seem to make the risk of endoscopic investigation outweigh the benefit at this timeongoing evaluation for EGD  4 - HCC screening:   Recent imaging Negative for liver mass  recent US doppler noted normal vascular flow  5-  No Overt Hepatic encephalopathy  6- Nutrition : continue multivitamin / Nutritional  support   7- Preventive measure:   - Needs Hep A and Hep B immunity, Flu and pneumonia vaccinations per PCP  -  Avoid medications, supplements, and other substances that potentially  Hepatotoxic. Return in about 4 weeks (around 11/3/2020) for Cirrhosis. An  electronic signature was used to authenticate this note. --Benito Buitrago, TANNER - CNP on 10/6/2020 at 4:01 PM        Pursuant to the emergency declaration under the [facility]5 waiver authority and the [employer] and [employer] Act, this Virtual  Visit was conducted, with patient's consent, to reduce the patient's risk of exposure to COVID-19 and provide continuity of care for an established patient. Services were provided through a video synchronous discussion virtually to substitute for in-person clinic visit.

## 2020-10-07 NOTE — CARE COORDINATION
Tika [facility] Transitions Follow Up Call    10/7/2020  Patient: Marleny Soares  Patient : 1945   MRN: [id number]  Reason for Admission: -2020 ED [facility] IP Hypovolemic shock following large volume paracentesis  Discharge Date: 20 RARS: Readmission Risk Score: 20    Care Transitions unable to reach for subsequent dc fu. Phone rings no answer.     Follow Up  Future Appointments   Date Time Provider Carlota Rodriguez   10/12/2020  1:45 PM Gloria Thapa  [address] [place] [address]   10/29/2020 11:00 AM Venkat Webster [facility]   2020  3:00 PM Mini Garrison MD [address]

## 2020-10-07 NOTE — TELEPHONE ENCOUNTER
PATIENT WAS GIVEN THIS INFORMATION VIA PHONE CONVERSATION - VOICED UNDERSTANDING  >  YOUR PROCEDURE IS SCHEDULED ON: 10/9/20 @ 11:00 AM  >  You will need to arrive at 10:30 AM from home and check in at the Welcome Desk located through the Outpatient Entrance before your scheduled time of the procedure.   >  Make arrangements for transportation, as you should not drive immediately after.

## 2020-10-09 NOTE — SEDATION DOCUMENTATION
NO SEDATION    U/S tech obtained images prior to start of procedure using U/S. Pt  VSS. 1135 Procedure explained, consent signed. Timeout completed. Using U/S, Dr. Marilia Beal marked, prepped LLQ with Chloraprep and draped with sterile drape and towels. 1140 Site numbed with lidocaine 2% 8CC. Qjb1dear Centesis 5F catheter placed using Ultrasound guidance. Fluid appears clear yellow. Catheter tubing connected to [employer] machine to remove fluid. Patient stated she has appt with  to see Liver Specialist.  Dr Marilia Beal recommended she see Dr Ely Zaldivar and stated he would talk to Dr Ely Zaldivar to inquire about possible drain placement. Dr Marilia Beal instructed to only remove 7 liters today due to patient's poor tolerance of taking off more fluid. Albumin will be provided. #22 IV left wrist, 2 attempts. 1223 Pt tolerated well. Total 7000 ml removed. Catheter removed, pressure held and bandaid applied. Verbal and tactile reassurance given throughout. Patient taken to CT holding to complete albumin and discharge.

## 2020-10-09 NOTE — TELEPHONE ENCOUNTER
Radiology called stating they removed 7,000 ML of fluid today. They stated there is a lot more fluid that needs to be drained. Dr. David Watson told radiology to call to see if patient can be drained again. Please advise.  Thanks

## 2020-10-09 NOTE — FLOWSHEET NOTE
1230-Patient continues to tolerate albumin well. Patient's , Dahiana Leigh, at cart side. 1245- Patient continues to tolerate receiving albumin well. Discharge instructions gone over with patient. Patient verbalizes understanding. No questions or concerns at this time. 1315-Patient taken out for discharge via wheelchair with her , Dahiana Leigh. 1320-Per Dr. Mirza Orellana, check with Dr. Lucy Henderson regarding patient being scheduled next week for another paracentesis. Call placed to Dr. Tristin Hidalgo office to check with him.  at Dr. Tristin Hidalgo office stated that he may be gone for the day, but will put a note in for him to call Dr. Abel Bautista office to let them know if it was OK to schedule patient for another para next week. Called Dr. Abel Bautista office and spoke with Rich Dean to notify her.     1325-Attempted to call patient to notify her that she may not hear anything regarding scheduling an appointment for next week; no one answered and voice message box is full.

## 2020-10-10 NOTE — PROGRESS NOTES
Spoke to patient. She is doing well after the paracentesis yesterday. There is no leakage or discomfort at the paracentesis site.

## 2020-10-12 NOTE — TELEPHONE ENCOUNTER
PATIENT WAS GIVEN THIS INFORMATION VIA PHONE CONVERSATION - VOICED UNDERSTANDING  >  YOUR PROCEDURE IS SCHEDULED ON: 10/13/20 @ NOON  >  You will need to arrive at 11:30 AM from home and check in at the Welcome Desk located through the Outpatient Entrance before your scheduled time of the procedure.   >  Make arrangements for transportation, as you should not drive immediately after

## 2020-10-13 NOTE — FLOWSHEET NOTE
Patient brought to CT holding room for albumin completion. VSS.  at bedside. 1515 Albumin complete, IV d/c'd catheter intact. Bandaid dry and intact. Discharge instructions reviewed by [facility] RN with patient who verbalized understanding. Pt taken via wheelchair to exit with  and d/c to home.

## 2020-10-13 NOTE — SEDATION DOCUMENTATION
NO SEDATION    U/S tech obtained images prior to start of procedure using U/S. Pt  VSS. 1331 Procedure explained, consent signed. Timeout completed. Using U/S, Dr. Jason Newman marked, prepped LLQ with Chloraprep and draped with sterile drape and towels. 1334 Site numbed with lidocaine 2% 5ml. [id number] Centesis 5F catheter placed using Ultrasound guidance. Fluid appears clear yellow. Catheter tubing connected to [employer] machine to remove fluid. 1420 Pt tolerated well. Total 7000 ml removed. Catheter removed, pressure held and bandaid applied. Verbal and tactile reassurance given throughout.

## 2020-10-26 NOTE — FLOWSHEET NOTE
1503-Patient receiving first of two bottles of Albumin as ordered. Patient tolerating well. Vital signs remain stable. Patient's , Liang Postin at cart side. 1530-Patient continues to tolerate receiving albumin well. Patient's  remains at cart side. Vital signs remain stable. 1600-No change to patient. Vital signs remain stable. 1610-Discharge instructions gone over with patient. Patient verbalizes understanding. Has no questions or concerns at this time.

## 2020-10-26 NOTE — SEDATION DOCUMENTATION
NO SEDATION      1347- Pt arrived to Xray room 6. U/S images obtained. Pt offered reassurance and VSS. Pt denies pain at this time. Consent obtained. 1403-Timeout completed. 1404-Using U/S, Dr. Vikki Alvarado marked LLQ and area cleansed with large tinted chloraprep. Once dry, using sterile technique, Dr. Vikki Alvarado prepped and draped area. 1407-Using U/S guidance, Dr. Machado Patinadir site with 2% lidocaine, see eMar.     1408-Using U/S guidance, access obtained with [id number] centesis 5F catheter with return of clear, yellow fluid. Catheter tubing connected to [employer] Guadal[employer] machine with suction at 140mmHG and draining well. Pt tolerating well. VSS.     1450-#22g IV started in left wrist. Brisk blood return noted. 1500-Drainage complete. Total 7010ml removed. Centesis catheter removed and digital pressure held to site for 1 minute. LLQ site soft, no drainage, large bandaid applied.

## 2020-10-29 NOTE — PROGRESS NOTES
when she is experiencing fluid build up. No SI/HI. O:  MSE:    Appearance    alert, cooperative  Appetite abnormal:  low for the past year  Sleep disturbance Yes, including; hypersomnia as well as frequent night time awakening, difficulty falling asleep, non-restful sleep   Fatigue Yes  Loss of pleasure Yes  Impulsive behavior No  Speech    spontaneous, normal rate and normal volume  Mood   neutral   Affect    normal affect  Thought Content    intact  Thought Process    linear, goal directed and coherent  Associations    logical connections  Insight    good  Judgment    good  Orientation    oriented to person, place, time, and general circumstances  Memory    recent and remote memory intact  Attention/Concentration    impaired  Morbid ideation No  Suicide Assessment    no suicidal ideation    History:    Medications:   Current Outpatient Medications   Medication Sig Dispense Refill    glimepiride (AMARYL) 2 MG tablet TAKE 1 TABLET EVERY MORNING BEFORE BREAKFAST 90 tablet 1    atorvastatin (LIPITOR) 40 MG tablet TAKE 1 TABLET DAILY 90 tablet 3    buPROPion (WELLBUTRIN SR) 150 MG extended release tablet Take by mouth      lisinopril (PRINIVIL;ZESTRIL) 5 MG tablet Take by mouth      metoprolol tartrate (LOPRESSOR) 25 MG tablet Take 1 tablet by mouth 2 times daily To take with a 50mg bid to make a 75mg total dose twice daily. 60 tablet 5    metoprolol tartrate (LOPRESSOR) 50 MG tablet Take 1 tablet by mouth 2 times daily To take with a 25mg tablet to make 75mg total twice daily. 60 tablet 5    raloxifene (EVISTA) 60 MG tablet TAKE 1 TABLET DAILY 90 tablet 3    Blood Pressure KIT Check your blood pressure daily 1 kit 0    citalopram (CELEXA) 10 MG tablet       metFORMIN (GLUCOPHAGE) 1000 MG tablet TAKE 1 TABLET TWICE A DAY WITH MEALS 180 tablet 3    glucose blood VI test strips (ACCU-CHEK MELANIE) strip Test 1-2 times a day. Dx: 250.00.  Accu-check melanie strips 180 strip 3    Lancet Device MISC Test 1-2 times a day. Dx: 250.00. Lancets 180 each 3     No current facility-administered medications for this visit. Social History:   Social History     Socioeconomic History    Marital status:      Spouse name: Not on file    Number of children: Not on file    Years of education: Not on file    Highest education level: Not on file   Occupational History    Not on file   Social Needs    Financial resource strain: Not hard at all   Cristino-Sukhdeep insecurity     Worry: Never true     Inability: Never true   [employer] needs     Medical: Not on file     Non-medical: Not on file   Tobacco Use    Smoking status: Never Smoker    Smokeless tobacco: Former User   Substance and Sexual Activity    Alcohol use: No    Drug use: No    Sexual activity: Yes   Lifestyle    Physical activity     Days per week: Not on file     Minutes per session: Not on file    Stress: Not on file   Relationships    Social connections     Talks on phone: Not on file     Gets together: Not on file     Attends Muslim service: Not on file     Active member of club or organization: Not on file     Attends meetings of clubs or organizations: Not on file     Relationship status: Not on file    Intimate partner violence     Fear of current or ex partner: Not on file     Emotionally abused: Not on file     Physically abused: Not on file     Forced sexual activity: Not on file   Other Topics Concern    Not on file   Social History Narrative    Not on file       Family History:   Family History   Problem Relation Age of Onset    Arrhythmia Mother     Diabetes Mother     High Blood Pressure Mother     Arrhythmia Father     Breast Cancer Sister     Diabetes Brother     High Blood Pressure Brother        TOBACCO:   reports that she has never smoked. She quit smokeless tobacco use about 36 years ago. ETOH:   reports no history of alcohol use. A:  She reports continued improvement in her symptoms of anxiety and depression.

## 2020-11-07 NOTE — PROGRESS NOTES
11/7/2020 1146 Spoke to patient s/p Paracentesis. Patient denied pain, site CD&I soft without brushing noted  . Igor Sarmiento Support offered.

## 2020-11-13 NOTE — TELEPHONE ENCOUNTER
PATIENT CALLING - REQUESTING A PARACENTESIS    SHE STATES AT LAST PARACENTESIS SHE WAS TOLD THEY MAY PUT IN A DRAIN. CAN YOU VERIFY AND ADVISE NEXT STEPS? THANK YOU.

## 2020-11-13 NOTE — TELEPHONE ENCOUNTER
That order usually comes from the Hepatologist Dr. Faith Eldridge. She needs to check with him at her next follow appt. with him. Order placed for paracentesis.

## 2020-11-13 NOTE — TELEPHONE ENCOUNTER
PATIENT CALLED REQUESTING ANOTHER PARACENTESIS - SHE STATES THAT SHE MAY NEED A DRAIN PLACED. CAN YOU VERIFY THIS AND CONTACT PATIENT? IF SO, PLEASE PUT IN A NEW REFERRAL TO DR. Caterina Flor. THANK YOU.

## 2020-11-16 NOTE — TELEPHONE ENCOUNTER
SPOKE TO PATIENT AND ADVISED THE SAME. SHE WILL CALL DR. GUILLORY'S OFFICE TO SEE WHAT NEXT STEPS ARE.

## 2020-11-16 NOTE — TELEPHONE ENCOUNTER
PATIENT CALLED ASKING IF DR. Princess Orlando SPOKE TO DR. PENG REGARDING CANCELING HER CT SCAN.    DR. POST IS AT [facility]

## 2020-11-18 NOTE — PROGRESS NOTES
Behavioral Health Consultation  Na Forbes PsyD. Psychologist  11/18/20  11:00 AM EST      Time spent with Patient: 20 minutes  This is patient's seventh  [facility] appointment. Reason for Consult:  depression and anxiety  Referring Provider: Amanda Shelton MD  [address] [place] [address]  [address] [facility], [address] [facility]    Feedback given to PCP. TELEHEALTH EVALUATION -- Audio and/or Visual (During AWAOP-42 public health emergency)    Due to COVID 19 outbreak, patient's office visit was converted to a virtual visit. Patient was contacted and agreed to proceed with a virtual visit via Telephone Visit. Patient reports that they are located at home. The risks and benefits of converting to a virtual visit were discussed in light of the current infectious disease epidemic. Patient also understood that insurance coverage and co-pays are up to their individual insurance plans. Patient provides verbal consent for this visit to be billed to insurance. Pursuant to the emergency declaration under the [facility], [facility] waiver authority and the [employer] Act, this Virtual  Visit was conducted, with patient's consent, to reduce the patient's risk of exposure to COVID-19 and provide continuity of care for an established patient. Services were provided through a telephonic synchronous discussion virtually to substitute for in-person clinic visit. Provider Location: [place]:  Pt reports that she is feeling a little better. She reports some frustration and anxiety related to her physical health. Pt does feel that her outlook on life has improved, she is managing her anxiety better, and has been getting things done around the house. No SI/HI.      O:  MSE:       Appearance    alert, cooperative  Appetite abnormal:  low for the past year  Sleep disturbance Yes  Fatigue Yes  Loss of pleasure No  Impulsive behavior No  Speech    spontaneous, normal rate and normal volume  Mood   neutral   Affect    normal affect  Thought Content    intact  Thought Process    linear, goal directed and coherent  Associations    logical connections  Insight    good  Judgment    good  Orientation    oriented to person, place, time, and general circumstances  Memory    recent and remote memory intact  Attention/Concentration    intact  Morbid ideation No  Suicide Assessment    no suicidal ideation    History:    Medications:   Current Outpatient Medications   Medication Sig Dispense Refill    glimepiride (AMARYL) 2 MG tablet TAKE 1 TABLET EVERY MORNING BEFORE BREAKFAST 90 tablet 1    atorvastatin (LIPITOR) 40 MG tablet TAKE 1 TABLET DAILY 90 tablet 3    buPROPion (WELLBUTRIN SR) 150 MG extended release tablet Take by mouth      lisinopril (PRINIVIL;ZESTRIL) 5 MG tablet Take by mouth      metoprolol tartrate (LOPRESSOR) 25 MG tablet Take 1 tablet by mouth 2 times daily To take with a 50mg bid to make a 75mg total dose twice daily. 60 tablet 5    metoprolol tartrate (LOPRESSOR) 50 MG tablet Take 1 tablet by mouth 2 times daily To take with a 25mg tablet to make 75mg total twice daily. 60 tablet 5    raloxifene (EVISTA) 60 MG tablet TAKE 1 TABLET DAILY 90 tablet 3    Blood Pressure KIT Check your blood pressure daily 1 kit 0    citalopram (CELEXA) 10 MG tablet       metFORMIN (GLUCOPHAGE) 1000 MG tablet TAKE 1 TABLET TWICE A DAY WITH MEALS 180 tablet 3    glucose blood VI test strips (ACCU-CHEK MELANIE) strip Test 1-2 times a day. Dx: 250.00. Accu-check melanie strips 180 strip 3    Lancet Device MISC Test 1-2 times a day. Dx: 250.00. Lancets 180 each 3     No current facility-administered medications for this visit.         Social History:   Social History     Socioeconomic History    Marital status:      Spouse name: Not on file    Number of children: Not on file    Years of education: Not on file    Highest education 0 16 0 0 0 2     Interpretation of Total Score Depression Severity: 1-4 = Minimal depression, 5-9 = Mild depression, 10-14 = Moderate depression, 15-19 = Moderately severe depression, 20-27 = Severe depression      Diagnosis:  Unspecified anxiety and depression    Plan:  Pt interventions:  [place]-setting to identify pt's primary goals for [facility] visit / overall health, Supportive techniques, Emphasized self-care as important for managing overall health and Provided Psychoeducation re: relapse prevention. Pt Behavioral Change Plan:  1. Patient will monitor her mood, sleep, and anxiety and if symptoms return or worsen or if she is having difficulty managing things she will call the office to schedule a follow-up appointment. 2.  Follow-up as needed. Please note this report has been partially produced using speech recognition software and may cause contain errors related to that system including grammar, punctuation and spelling as well as words and phrases that may seem inappropriate. If there are questions or concerns please feel free to contact me to clarify.

## 2020-11-19 NOTE — SEDATION DOCUMENTATION
NO SEDATION      1218 Pt arrived to U/S room 3 via wheelchair. U/S images obtained. Pt offered reassurance and VSS. Pt denies pain at this time. Consent signed. 1233 Timeout completed. 80 Using U/S, Dr. Madeline Fontenot marked left upper abdomen and area cleansed with large tinted chloraprep. Once dry, using sterile technique, Dr. Madeline Fontenot prepped and draped area. 1237 Using U/S guidance, Dr. Hawkins Carp site with 2% lidocaine, see eMar.     1238 Using U/S guidance, access obtained with [id number] centesis 5F catheter with return of clear yellow fluid. Catheter tubing connected to Maria Guadalupe machine with suction at 200 mmHG and draining well. Pt tolerating well. VSS.     1317 Drainage complete. Total 7000 ml removed. Centesis catheter removed and digital pressure held to site for 1 minute. 1318 Left abdomen site soft, no drainage, large bandaid applied.

## 2020-11-19 NOTE — TELEPHONE ENCOUNTER
Spoke to Dr. Madeline Fontenot -- he did not tell her to cancel it, he said she can have CT here instead of     Patient advised of the same and phone number to [facility] was given

## 2020-11-19 NOTE — PROGRESS NOTES
1341 Patient to CT holding room. Vitals stable. Band aide on left upper abdomen is clean,dry,intact. Patient denies pain. IV started in left arm. Patient tolerated well. 1347 Albumin infusing at this time. Patient tolerating well. Patient drinking water currently. 1425 Albumin infused. Abdomen is soft, Band aide is clean,dry,intact. Discharge instructions printed and reviewed with the patient. Patient verbalizes understanding of discharge instructions. Copy of discharge instructions given to the patient. IV removed. IV end intact. IV site non red or hard. Patient dressed. Gait slow. Patient taken to front of hospital via wheelchair.  to drive her home.

## 2020-11-30 NOTE — SEDATION DOCUMENTATION
NO SEDATION    1157 U/S tech obtained images prior to start of procedure using U/S. Pt  VSS. 1157 Procedure explained, 4861 consent signed. 1209 Timeout completed. Using U/S, Dr. María Elena Frank marked, prepped LLQ with Chloraprep and draped with sterile drape and towels. 1212 Site numbed with lidocaine. [id number] Centesis 5F catheter placed using Ultrasound guidance. 1214 Fluid appears clear yellow. Catheter tubing connected to [place] machine to remove fluid. 1248 Pt tolerated well. Total 7000 ml removed. Catheter removed, pressure held and bandaid applied. Verbal and tactile reassurance given throughout.

## 2020-12-08 NOTE — SEDATION DOCUMENTATION
NO SEDATION    1203 Patient to ultrasound via wheel chair. Patient A&O X4. Respirations even and unlabored. Skin warm and dry. Abdomen distended. 1205Procedure explained, consent signed    3574 U/S tech obtained images prior to start of procedure using U/S. Pt  VSS.     1225 Timeout completed. 65 Using U/S, Dr. Arnulfo Kumar marked, prepped LUQ with Chloraprep and draped with sterile drape and towels. 1228 Site numbed with 2% lidocaine 5 ml. [id number] Centesis 5F catheter placed using Ultrasound guidance. Fluid appears clear yellow. Catheter tubing connected to [employer] machine to remove fluid. 1234 820 ml fluid removed. VSS. Patient tolerating well. 1239 2050 ml fluid removed. VSS. Patient tolerating well. 1244 3030 ml fluid removed. VSS. Patient tolerating well. 1250 4200 ml fluid removed. VSS. Patient tolerating well. 1255 5150 ml fluid removed. VSS. Patient tolerating well. 1259 6160 ml fluid removed. VSS. Patient tolerating well. 1310 Pt tolerated well. Total 7100 ml removed. Catheter removed, pressure held and bandaid applied. Verbal and tactile reassurance given throughout.

## 2020-12-08 NOTE — PROGRESS NOTES
Patient brought to CT holding for IV start and Albumin infusion. Patient denies any pain. Bandaide to site is clean, dry, and intact. 1346 IV started and albumin started. Patient without any complaints. 1349 VSS. [place] placed over patient for comfort. 1409 VSS. Patient tolerating Albumin infusion well.     1422 Discharge instructions reviewed with patient to her understanding. 1424 Albumin infusion completed. Patient tolerated well. VSS. Bandaide over site clean, dry, and intact. [id number] patient's , Allan Rivas, to come  patient. He said he'll be here in 5 minutes. Patient will be taken by wheel chair for discharge.

## 2020-12-17 NOTE — FLOWSHEET NOTE
Patient arrived to CT holding room. Will monitor vitals. First bottle albumin almost done. [address] [facility] Report given to [facility].

## 2020-12-17 NOTE — SEDATION DOCUMENTATION
NO SEDATION    U/S tech obtained images prior to start of procedure using U/S. Pt  VSS. 1200 Procedure explained, consent signed. Timeout completed. Using U/S, Dr. Destiney Platt marked, prepped LLQ with Chloraprep and draped with sterile drape and towels. 1204 Site numbed with lidocaine 2% 6ml. [id number] Centesis 5F catheter placed using Ultrasound guidance. Fluid appears clear yellow. Catheter tubing connected to Maria Guadalupe machine to remove fluid. Dr Destiney Platt spoke to Dr Gonzales Thornton with [address] after Elise's last visit. Dr Gonzales Thornton increased [place]'s Bumex but we do not have record and patient states she doesn't know. Per Dr Destiney Platt, if increasing Bumex is not effective then placing a drain is an option. 1241 IV established #22 L FA (2 attempts), Albumin started, will give 2 bottles 50gm IV.     1250 Pt tolerated well. Total 7000 ml removed. Catheter removed, pressure held and bandaid applied. Verbal and tactile reassurance given throughout. VSS. Pt taken to CT holding to complete albumin infusion.

## 2020-12-17 NOTE — PROGRESS NOTES
Albumin infusing. VSS. Dressing right abdomen is clean , dry, and intact. 1313 VSS. Patient tolerating albumin infusion well. Patient denies pain or any discomfort at this time. 1325 VSS. Patient tolerated Albumin well and denies any pain or any discomfort at this time. 1329 IV removed with catheter intact. 1334 Discharge instructions reviewed with patient to her understanding. 1340 Patient taken via wheelchair for discharge.

## 2020-12-29 NOTE — SEDATION DOCUMENTATION
NO SEDATION    U/S tech obtained images prior to start of procedure using U/S. Pt  VSS. 1146 Procedure explained, consent signed. Timeout completed. Using U/S, Dr. Feli Isbell marked, prepped LLQ with Chloraprep and draped with sterile drape and towels. 1152 Site numbed with lidocaine. [id number] Centesis 5F catheter placed using Ultrasound guidance. Fluid appears clear yellow. Catheter tubing connected to [employer] machine to remove fluid. 1219 IV started #22 Left forearm, albumin started also, 5 liters at this point. Will plan to remove 7 liters only as patient does not tolerate removing larger amounts. Will stop at 7 liters and give total of Albumin 50gm IV.     1234 Pt tolerated well. Total 7240 ml removed. Catheter removed, pressure held and bandaid applied. Verbal and tactile reassurance given throughout.

## 2021-01-01 ENCOUNTER — TELEPHONE (OUTPATIENT)
Dept: PRIMARY CARE CLINIC | Age: 76
End: 2021-01-01

## 2021-01-01 ENCOUNTER — TELEPHONE (OUTPATIENT)
Dept: INTERVENTIONAL RADIOLOGY/VASCULAR | Age: 76
End: 2021-01-01

## 2021-01-01 ENCOUNTER — TELEPHONE (OUTPATIENT)
Dept: CARDIOLOGY CLINIC | Age: 76
End: 2021-01-01

## 2021-01-01 ENCOUNTER — CARE COORDINATION (OUTPATIENT)
Dept: CARE COORDINATION | Age: 76
End: 2021-01-01

## 2021-01-01 ENCOUNTER — HOSPITAL ENCOUNTER (INPATIENT)
Age: 76
LOS: 5 days | Discharge: HOME | DRG: 432 | End: 2021-03-22
Attending: INTERNAL MEDICINE | Admitting: INTERNAL MEDICINE
Payer: MEDICARE

## 2021-01-01 ENCOUNTER — HOSPITAL ENCOUNTER (INPATIENT)
Age: 76
LOS: 3 days | Discharge: HOME | DRG: 844 | End: 2021-03-10
Attending: EMERGENCY MEDICINE | Admitting: INTERNAL MEDICINE
Payer: MEDICARE

## 2021-01-01 ENCOUNTER — HOSPITAL ENCOUNTER (OUTPATIENT)
Dept: CT IMAGING | Age: 76
Discharge: HOME | End: 2021-03-25
Payer: MEDICARE

## 2021-01-01 ENCOUNTER — OFFICE VISIT (OUTPATIENT)
Dept: CARDIOLOGY CLINIC | Age: 76
End: 2021-01-01
Payer: MEDICARE

## 2021-01-01 ENCOUNTER — HOSPITAL ENCOUNTER (OUTPATIENT)
Dept: ULTRASOUND IMAGING | Age: 76
Discharge: HOME | End: 2021-01-21
Payer: MEDICARE

## 2021-01-01 ENCOUNTER — CARE COORDINATION (OUTPATIENT)
Dept: CASE MANAGEMENT | Age: 76
End: 2021-01-01

## 2021-01-01 ENCOUNTER — HOSPITAL ENCOUNTER (INPATIENT)
Age: 76
LOS: 3 days | Discharge: HOSPICE | DRG: 432 | End: 2021-04-01
Attending: EMERGENCY MEDICINE | Admitting: INTERNAL MEDICINE
Payer: MEDICARE

## 2021-01-01 ENCOUNTER — APPOINTMENT (OUTPATIENT)
Dept: GENERAL RADIOLOGY | Age: 76
DRG: 432 | End: 2021-01-01
Payer: MEDICARE

## 2021-01-01 ENCOUNTER — HOSPITAL ENCOUNTER (OUTPATIENT)
Dept: INTERVENTIONAL RADIOLOGY/VASCULAR | Age: 76
Discharge: HOME | End: 2021-01-30
Payer: MEDICARE

## 2021-01-01 ENCOUNTER — APPOINTMENT (OUTPATIENT)
Dept: GENERAL RADIOLOGY | Age: 76
DRG: 844 | End: 2021-01-01
Payer: MEDICARE

## 2021-01-01 ENCOUNTER — TELEPHONE (OUTPATIENT)
Dept: FAMILY MEDICINE CLINIC | Age: 76
End: 2021-01-01

## 2021-01-01 ENCOUNTER — HOSPITAL ENCOUNTER (OUTPATIENT)
Dept: ULTRASOUND IMAGING | Age: 76
Discharge: HOME | End: 2021-01-10
Payer: MEDICARE

## 2021-01-01 ENCOUNTER — OFFICE VISIT (OUTPATIENT)
Dept: INTERVENTIONAL RADIOLOGY/VASCULAR | Age: 76
End: 2021-01-01
Payer: MEDICARE

## 2021-01-01 ENCOUNTER — HOSPITAL ENCOUNTER (OUTPATIENT)
Dept: INTERVENTIONAL RADIOLOGY/VASCULAR | Age: 76
Discharge: HOME | End: 2021-03-25
Payer: MEDICARE

## 2021-01-01 ENCOUNTER — HOSPITAL ENCOUNTER (OUTPATIENT)
Dept: INTERVENTIONAL RADIOLOGY/VASCULAR | Age: 76
Discharge: HOME | DRG: 432 | End: 2021-03-28
Payer: MEDICARE

## 2021-01-01 ENCOUNTER — OFFICE VISIT (OUTPATIENT)
Dept: PRIMARY CARE CLINIC | Age: 76
End: 2021-01-01
Payer: MEDICARE

## 2021-01-01 VITALS
DIASTOLIC BLOOD PRESSURE: 39 MMHG | BODY MASS INDEX: 24.26 KG/M2 | WEIGHT: 154.54 LBS | HEIGHT: 67 IN | SYSTOLIC BLOOD PRESSURE: 115 MMHG | OXYGEN SATURATION: 100 % | RESPIRATION RATE: 16 BRPM | HEART RATE: 61 BPM | TEMPERATURE: 97.2 F

## 2021-01-01 VITALS
RESPIRATION RATE: 16 BRPM | SYSTOLIC BLOOD PRESSURE: 101 MMHG | HEIGHT: 67 IN | WEIGHT: 183 LBS | BODY MASS INDEX: 28.72 KG/M2 | TEMPERATURE: 97.4 F | HEART RATE: 94 BPM | DIASTOLIC BLOOD PRESSURE: 60 MMHG | OXYGEN SATURATION: 97 %

## 2021-01-01 VITALS
HEART RATE: 71 BPM | DIASTOLIC BLOOD PRESSURE: 52 MMHG | BODY MASS INDEX: 30.2 KG/M2 | RESPIRATION RATE: 16 BRPM | HEIGHT: 67 IN | SYSTOLIC BLOOD PRESSURE: 121 MMHG | TEMPERATURE: 97.3 F | OXYGEN SATURATION: 100 % | WEIGHT: 192.4 LBS

## 2021-01-01 VITALS
RESPIRATION RATE: 14 BRPM | DIASTOLIC BLOOD PRESSURE: 58 MMHG | HEART RATE: 89 BPM | SYSTOLIC BLOOD PRESSURE: 118 MMHG | OXYGEN SATURATION: 98 %

## 2021-01-01 VITALS
DIASTOLIC BLOOD PRESSURE: 65 MMHG | HEART RATE: 81 BPM | OXYGEN SATURATION: 100 % | HEIGHT: 67 IN | BODY MASS INDEX: 29.98 KG/M2 | TEMPERATURE: 98.2 F | WEIGHT: 191 LBS | SYSTOLIC BLOOD PRESSURE: 102 MMHG | RESPIRATION RATE: 18 BRPM

## 2021-01-01 VITALS
TEMPERATURE: 96.6 F | DIASTOLIC BLOOD PRESSURE: 46 MMHG | BODY MASS INDEX: 31.32 KG/M2 | HEART RATE: 69 BPM | SYSTOLIC BLOOD PRESSURE: 88 MMHG | WEIGHT: 200 LBS

## 2021-01-01 VITALS
OXYGEN SATURATION: 100 % | SYSTOLIC BLOOD PRESSURE: 133 MMHG | DIASTOLIC BLOOD PRESSURE: 61 MMHG | HEART RATE: 68 BPM | RESPIRATION RATE: 16 BRPM

## 2021-01-01 VITALS
HEART RATE: 76 BPM | OXYGEN SATURATION: 98 % | RESPIRATION RATE: 16 BRPM | DIASTOLIC BLOOD PRESSURE: 53 MMHG | SYSTOLIC BLOOD PRESSURE: 115 MMHG

## 2021-01-01 VITALS
HEART RATE: 77 BPM | OXYGEN SATURATION: 100 % | DIASTOLIC BLOOD PRESSURE: 62 MMHG | SYSTOLIC BLOOD PRESSURE: 107 MMHG | RESPIRATION RATE: 16 BRPM

## 2021-01-01 VITALS
WEIGHT: 183 LBS | SYSTOLIC BLOOD PRESSURE: 122 MMHG | DIASTOLIC BLOOD PRESSURE: 60 MMHG | OXYGEN SATURATION: 100 % | TEMPERATURE: 98.7 F | HEIGHT: 66 IN | HEART RATE: 64 BPM | BODY MASS INDEX: 29.41 KG/M2 | RESPIRATION RATE: 16 BRPM

## 2021-01-01 VITALS
OXYGEN SATURATION: 99 % | SYSTOLIC BLOOD PRESSURE: 117 MMHG | RESPIRATION RATE: 14 BRPM | HEART RATE: 85 BPM | DIASTOLIC BLOOD PRESSURE: 59 MMHG

## 2021-01-01 VITALS
OXYGEN SATURATION: 97 % | DIASTOLIC BLOOD PRESSURE: 49 MMHG | HEART RATE: 63 BPM | RESPIRATION RATE: 16 BRPM | SYSTOLIC BLOOD PRESSURE: 81 MMHG

## 2021-01-01 VITALS
SYSTOLIC BLOOD PRESSURE: 94 MMHG | DIASTOLIC BLOOD PRESSURE: 50 MMHG | OXYGEN SATURATION: 98 % | RESPIRATION RATE: 18 BRPM | HEART RATE: 70 BPM

## 2021-01-01 DIAGNOSIS — R14.0 ABDOMINAL DISTENSION: ICD-10-CM

## 2021-01-01 DIAGNOSIS — I77.9 RIGHT-SIDED CAROTID ARTERY DISEASE, UNSPECIFIED TYPE (HCC): ICD-10-CM

## 2021-01-01 DIAGNOSIS — R18.0 MALIGNANT ASCITES: ICD-10-CM

## 2021-01-01 DIAGNOSIS — I10 ESSENTIAL HYPERTENSION: ICD-10-CM

## 2021-01-01 DIAGNOSIS — K74.60 CIRRHOSIS OF LIVER WITH ASCITES, UNSPECIFIED HEPATIC CIRRHOSIS TYPE (HCC): ICD-10-CM

## 2021-01-01 DIAGNOSIS — I95.9 HYPOTENSION, UNSPECIFIED HYPOTENSION TYPE: ICD-10-CM

## 2021-01-01 DIAGNOSIS — E11.9 TYPE 2 DIABETES MELLITUS WITHOUT COMPLICATION, WITHOUT LONG-TERM CURRENT USE OF INSULIN (HCC): ICD-10-CM

## 2021-01-01 DIAGNOSIS — E87.5 ACUTE HYPERKALEMIA: ICD-10-CM

## 2021-01-01 DIAGNOSIS — R18.8 CIRRHOSIS OF LIVER WITH ASCITES, UNSPECIFIED HEPATIC CIRRHOSIS TYPE (HCC): Primary | ICD-10-CM

## 2021-01-01 DIAGNOSIS — R60.9 PERIPHERAL EDEMA: ICD-10-CM

## 2021-01-01 DIAGNOSIS — R77.8 TROPONIN LEVEL ELEVATED: ICD-10-CM

## 2021-01-01 DIAGNOSIS — Z48.03 CHANGE OR REMOVAL OF DRAINS: ICD-10-CM

## 2021-01-01 DIAGNOSIS — R53.1 GENERALIZED WEAKNESS: ICD-10-CM

## 2021-01-01 DIAGNOSIS — R18.8 CIRRHOSIS OF LIVER WITH ASCITES, UNSPECIFIED HEPATIC CIRRHOSIS TYPE (HCC): ICD-10-CM

## 2021-01-01 DIAGNOSIS — R63.0 LOSS OF APPETITE: ICD-10-CM

## 2021-01-01 DIAGNOSIS — I42.9 CARDIOMYOPATHY, UNSPECIFIED TYPE (HCC): Primary | ICD-10-CM

## 2021-01-01 DIAGNOSIS — E87.5 HYPERKALEMIA: Primary | ICD-10-CM
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
## 2021-01-01 DIAGNOSIS — K74.60 CIRRHOSIS OF LIVER WITH ASCITES, UNSPECIFIED HEPATIC CIRRHOSIS TYPE (HCC): Primary | ICD-10-CM
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
## 2021-01-01 DIAGNOSIS — E87.1 HYPONATREMIA: ICD-10-CM

## 2021-01-01 DIAGNOSIS — E87.5 HYPERKALEMIA: ICD-10-CM

## 2021-01-01 DIAGNOSIS — I95.9 HYPOTENSION, UNSPECIFIED HYPOTENSION TYPE: Primary | ICD-10-CM
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
## 2021-01-01 DIAGNOSIS — E87.20 LACTIC ACIDOSIS: ICD-10-CM

## 2021-01-01 DIAGNOSIS — E87.8 ELECTROLYTE IMBALANCE: ICD-10-CM

## 2021-01-01 DIAGNOSIS — Z13.220 SCREENING FOR HYPERLIPIDEMIA: ICD-10-CM

## 2021-01-01 DIAGNOSIS — Z01.818 ENCOUNTER FOR PREADMISSION TESTING: ICD-10-CM

## 2021-01-01 DIAGNOSIS — E87.8 ELECTROLYTE ABNORMALITY: Primary | ICD-10-CM

## 2021-01-01 DIAGNOSIS — E87.8 ELECTROLYTE ABNORMALITY: ICD-10-CM

## 2021-01-01 DIAGNOSIS — N17.9 ACUTE KIDNEY INJURY (HCC): Primary | ICD-10-CM

## 2021-01-01 DIAGNOSIS — R77.8 ELEVATED TROPONIN: ICD-10-CM

## 2021-01-01 DIAGNOSIS — K74.69 OTHER CIRRHOSIS OF LIVER (HCC): Primary | ICD-10-CM

## 2021-01-01 DIAGNOSIS — E46 MALNUTRITION, UNSPECIFIED TYPE (HCC): ICD-10-CM

## 2021-01-01 DIAGNOSIS — K72.91 LIVER FAILURE WITH HEPATIC COMA, UNSPECIFIED CHRONICITY (HCC): ICD-10-CM

## 2021-01-01 DIAGNOSIS — N17.9 AKI (ACUTE KIDNEY INJURY) (HCC): Primary | ICD-10-CM

## 2021-01-01 DIAGNOSIS — G47.00 INSOMNIA, UNSPECIFIED TYPE: ICD-10-CM

## 2021-01-01 DIAGNOSIS — N18.9 CHRONIC KIDNEY DISEASE, UNSPECIFIED CKD STAGE: ICD-10-CM

## 2021-01-01 LAB
ABO/RH: NORMAL
ALBUMIN FLUID: 0.6 G/DL
ALBUMIN FLUID: 0.9 G/DL
ALBUMIN SERPL-MCNC: 2 G/DL (ref 3.5–4.6)
ALBUMIN SERPL-MCNC: 2.1 G/DL (ref 3.5–4.6)
ALBUMIN SERPL-MCNC: 2.1 G/DL (ref 3.5–4.6)
ALBUMIN SERPL-MCNC: 2.3 G/DL (ref 3.5–4.6)
ALBUMIN SERPL-MCNC: 2.4 G/DL (ref 3.5–4.6)
ALBUMIN SERPL-MCNC: 2.4 G/DL (ref 3.5–4.6)
ALBUMIN SERPL-MCNC: 2.5 G/DL (ref 3.5–4.6)
ALBUMIN SERPL-MCNC: 2.5 G/DL (ref 3.5–4.6)
ALBUMIN SERPL-MCNC: 2.6 G/DL (ref 3.5–4.6)
ALBUMIN SERPL-MCNC: 2.6 G/DL (ref 3.5–4.6)
ALBUMIN SERPL-MCNC: 2.7 G/DL (ref 3.5–4.6)
ALBUMIN SERPL-MCNC: 3 G/DL (ref 3.5–4.6)
ALP BLD-CCNC: 101 U/L (ref 40–130)
ALP BLD-CCNC: 108 U/L (ref 40–130)
ALP BLD-CCNC: 125 U/L (ref 40–130)
ALP BLD-CCNC: 135 U/L (ref 40–130)
ALP BLD-CCNC: 163 U/L (ref 40–130)
ALP BLD-CCNC: 59 U/L (ref 40–130)
ALP BLD-CCNC: 62 U/L (ref 40–130)
ALP BLD-CCNC: 63 U/L (ref 40–130)
ALP BLD-CCNC: 82 U/L (ref 40–130)
ALP BLD-CCNC: 84 U/L (ref 40–130)
ALP BLD-CCNC: 86 U/L (ref 40–130)
ALP BLD-CCNC: 87 U/L (ref 40–130)
ALP BLD-CCNC: 97 U/L (ref 40–130)
ALT SERPL-CCNC: 17 U/L (ref 0–33)
ALT SERPL-CCNC: 18 U/L (ref 0–33)
ALT SERPL-CCNC: 19 U/L (ref 0–33)
ALT SERPL-CCNC: 19 U/L (ref 0–33)
ALT SERPL-CCNC: 20 U/L (ref 0–33)
ALT SERPL-CCNC: 20 U/L (ref 0–33)
ALT SERPL-CCNC: 21 U/L (ref 0–33)
ALT SERPL-CCNC: 25 U/L (ref 0–33)
ALT SERPL-CCNC: 25 U/L (ref 0–33)
ALT SERPL-CCNC: 27 U/L (ref 0–33)
ALT SERPL-CCNC: 33 U/L (ref 0–33)
AMMONIA: 100 UMOL/L (ref 11–51)
AMMONIA: 34 UMOL/L (ref 11–51)
AMYLASE FLUID: 25 U/L
AMYLASE, URINE: 42 U/L
ANION GAP SERPL CALCULATED.3IONS-SCNC: 10 MEQ/L (ref 9–15)
ANION GAP SERPL CALCULATED.3IONS-SCNC: 10 MEQ/L (ref 9–15)
ANION GAP SERPL CALCULATED.3IONS-SCNC: 11 MEQ/L (ref 9–15)
ANION GAP SERPL CALCULATED.3IONS-SCNC: 12 MEQ/L (ref 9–15)
ANION GAP SERPL CALCULATED.3IONS-SCNC: 13 MEQ/L (ref 9–15)
ANION GAP SERPL CALCULATED.3IONS-SCNC: 13 MEQ/L (ref 9–15)
ANION GAP SERPL CALCULATED.3IONS-SCNC: 14 MEQ/L (ref 9–15)
ANION GAP SERPL CALCULATED.3IONS-SCNC: 15 MEQ/L (ref 9–15)
ANION GAP SERPL CALCULATED.3IONS-SCNC: 16 MEQ/L (ref 9–15)
ANION GAP SERPL CALCULATED.3IONS-SCNC: 17 MEQ/L (ref 9–15)
ANION GAP SERPL CALCULATED.3IONS-SCNC: 19 MEQ/L (ref 9–15)
ANION GAP SERPL CALCULATED.3IONS-SCNC: 19 MEQ/L (ref 9–15)
ANION GAP SERPL CALCULATED.3IONS-SCNC: 8 MEQ/L (ref 9–15)
ANION GAP SERPL CALCULATED.3IONS-SCNC: 9 MEQ/L (ref 9–15)
ANTIBODY SCREEN: NORMAL
APPEARANCE FLUID: CLEAR
APPEARANCE FLUID: NORMAL
APTT: 37.4 SEC (ref 24.4–36.8)
APTT: 43.5 SEC (ref 24.4–36.8)
AST SERPL-CCNC: 19 U/L (ref 0–35)
AST SERPL-CCNC: 24 U/L (ref 0–35)
AST SERPL-CCNC: 24 U/L (ref 0–35)
AST SERPL-CCNC: 26 U/L (ref 0–35)
AST SERPL-CCNC: 27 U/L (ref 0–35)
AST SERPL-CCNC: 28 U/L (ref 0–35)
AST SERPL-CCNC: 33 U/L (ref 0–35)
AST SERPL-CCNC: 36 U/L (ref 0–35)
AST SERPL-CCNC: 36 U/L (ref 0–35)
AST SERPL-CCNC: 39 U/L (ref 0–35)
AST SERPL-CCNC: 44 U/L (ref 0–35)
AST SERPL-CCNC: 45 U/L (ref 0–35)
AST SERPL-CCNC: 94 U/L (ref 0–35)
BACTERIA: NEGATIVE /HPF
BASOPHILS ABSOLUTE: 0 K/UL (ref 0–0.2)
BASOPHILS ABSOLUTE: 0.1 K/UL (ref 0–0.2)
BASOPHILS RELATIVE PERCENT: 0.1 %
BASOPHILS RELATIVE PERCENT: 0.2 %
BASOPHILS RELATIVE PERCENT: 0.2 %
BASOPHILS RELATIVE PERCENT: 0.3 %
BASOPHILS RELATIVE PERCENT: 0.4 %
BASOPHILS RELATIVE PERCENT: 0.5 %
BASOPHILS RELATIVE PERCENT: 0.6 %
BILIRUB SERPL-MCNC: 0.5 MG/DL (ref 0.2–0.7)
BILIRUB SERPL-MCNC: 0.5 MG/DL (ref 0.2–0.7)
BILIRUB SERPL-MCNC: 0.6 MG/DL (ref 0.2–0.7)
BILIRUB SERPL-MCNC: 0.8 MG/DL (ref 0.2–0.7)
BILIRUB SERPL-MCNC: 0.9 MG/DL (ref 0.2–0.7)
BILIRUB SERPL-MCNC: 0.9 MG/DL (ref 0.2–0.7)
BILIRUB SERPL-MCNC: 1.2 MG/DL (ref 0.2–0.7)
BILIRUB SERPL-MCNC: 1.3 MG/DL (ref 0.2–0.7)
BILIRUB SERPL-MCNC: 1.3 MG/DL (ref 0.2–0.7)
BILIRUB SERPL-MCNC: 1.4 MG/DL (ref 0.2–0.7)
BILIRUB SERPL-MCNC: 1.4 MG/DL (ref 0.2–0.7)
BILIRUBIN DIRECT: 0.6 MG/DL (ref 0–0.4)
BILIRUBIN URINE: NEGATIVE
BILIRUBIN, INDIRECT: 0.8 MG/DL (ref 0–0.6)
BLOOD BANK DISPENSE STATUS: NORMAL
BLOOD BANK PRODUCT CODE: NORMAL
BLOOD CULTURE, ROUTINE: NORMAL
BLOOD CULTURE, ROUTINE: NORMAL
BLOOD, URINE: NEGATIVE
BODY FLUID CULTURE, STERILE: ABNORMAL
BODY FLUID CULTURE, STERILE: NORMAL
BPU ID: NORMAL
BUN BLDV-MCNC: 44 MG/DL (ref 8–23)
BUN BLDV-MCNC: 44 MG/DL (ref 8–23)
BUN BLDV-MCNC: 46 MG/DL (ref 8–23)
BUN BLDV-MCNC: 47 MG/DL (ref 8–23)
BUN BLDV-MCNC: 47 MG/DL (ref 8–23)
BUN BLDV-MCNC: 49 MG/DL (ref 8–23)
BUN BLDV-MCNC: 49 MG/DL (ref 8–23)
BUN BLDV-MCNC: 50 MG/DL (ref 8–23)
BUN BLDV-MCNC: 51 MG/DL (ref 8–23)
BUN BLDV-MCNC: 52 MG/DL (ref 8–23)
BUN BLDV-MCNC: 55 MG/DL (ref 8–23)
BUN BLDV-MCNC: 56 MG/DL (ref 8–23)
BUN BLDV-MCNC: 57 MG/DL (ref 8–23)
BUN BLDV-MCNC: 81 MG/DL (ref 8–23)
BUN BLDV-MCNC: 83 MG/DL (ref 8–23)
BUN BLDV-MCNC: 84 MG/DL (ref 8–23)
BUN BLDV-MCNC: 85 MG/DL (ref 8–23)
BUN BLDV-MCNC: 86 MG/DL (ref 8–23)
BUN BLDV-MCNC: 87 MG/DL (ref 8–23)
BUN BLDV-MCNC: 88 MG/DL (ref 8–23)
BUN BLDV-MCNC: 91 MG/DL (ref 8–23)
BUN BLDV-MCNC: 91 MG/DL (ref 8–23)
BUN BLDV-MCNC: 92 MG/DL (ref 8–23)
CALCIUM SERPL-MCNC: 10 MG/DL (ref 8.5–9.9)
CALCIUM SERPL-MCNC: 7.7 MG/DL (ref 8.5–9.9)
CALCIUM SERPL-MCNC: 7.8 MG/DL (ref 8.5–9.9)
CALCIUM SERPL-MCNC: 7.9 MG/DL (ref 8.5–9.9)
CALCIUM SERPL-MCNC: 7.9 MG/DL (ref 8.5–9.9)
CALCIUM SERPL-MCNC: 8 MG/DL (ref 8.5–9.9)
CALCIUM SERPL-MCNC: 8.1 MG/DL (ref 8.5–9.9)
CALCIUM SERPL-MCNC: 8.2 MG/DL (ref 8.5–9.9)
CALCIUM SERPL-MCNC: 8.3 MG/DL (ref 8.5–9.9)
CALCIUM SERPL-MCNC: 8.4 MG/DL (ref 8.5–9.9)
CALCIUM SERPL-MCNC: 8.5 MG/DL (ref 8.5–9.9)
CALCIUM SERPL-MCNC: 8.7 MG/DL (ref 8.5–9.9)
CALCIUM SERPL-MCNC: 8.7 MG/DL (ref 8.5–9.9)
CALCIUM SERPL-MCNC: 8.8 MG/DL (ref 8.5–9.9)
CALCIUM SERPL-MCNC: 8.9 MG/DL (ref 8.5–9.9)
CALCIUM SERPL-MCNC: 9.1 MG/DL (ref 8.5–9.9)
CALCIUM SERPL-MCNC: 9.2 MG/DL (ref 8.5–9.9)
CALCIUM SERPL-MCNC: 9.3 MG/DL (ref 8.5–9.9)
CALCIUM SERPL-MCNC: 9.4 MG/DL (ref 8.5–9.9)
CALCIUM SERPL-MCNC: 9.7 MG/DL (ref 8.5–9.9)
CALCIUM URINE: 1.4 MG/DL
CELL COUNT FLUID TYPE: NORMAL
CELL COUNT FLUID TYPE: NORMAL
CHLORIDE BLD-SCNC: 100 MEQ/L (ref 95–107)
CHLORIDE BLD-SCNC: 78 MEQ/L (ref 95–107)
CHLORIDE BLD-SCNC: 78 MEQ/L (ref 95–107)
CHLORIDE BLD-SCNC: 81 MEQ/L (ref 95–107)
CHLORIDE BLD-SCNC: 82 MEQ/L (ref 95–107)
CHLORIDE BLD-SCNC: 83 MEQ/L (ref 95–107)
CHLORIDE BLD-SCNC: 84 MEQ/L (ref 95–107)
CHLORIDE BLD-SCNC: 85 MEQ/L (ref 95–107)
CHLORIDE BLD-SCNC: 85 MEQ/L (ref 95–107)
CHLORIDE BLD-SCNC: 86 MEQ/L (ref 95–107)
CHLORIDE BLD-SCNC: 87 MEQ/L (ref 95–107)
CHLORIDE BLD-SCNC: 88 MEQ/L (ref 95–107)
CHLORIDE BLD-SCNC: 90 MEQ/L (ref 95–107)
CHLORIDE BLD-SCNC: 91 MEQ/L (ref 95–107)
CHLORIDE BLD-SCNC: 91 MEQ/L (ref 95–107)
CHLORIDE BLD-SCNC: 93 MEQ/L (ref 95–107)
CHLORIDE BLD-SCNC: 94 MEQ/L (ref 95–107)
CHLORIDE BLD-SCNC: 95 MEQ/L (ref 95–107)
CHLORIDE BLD-SCNC: 96 MEQ/L (ref 95–107)
CHLORIDE BLD-SCNC: 98 MEQ/L (ref 95–107)
CHLORIDE BLD-SCNC: 99 MEQ/L (ref 95–107)
CHLORIDE BLD-SCNC: 99 MEQ/L (ref 95–107)
CHLORIDE URINE RANDOM: <20 MEQ/L
CHOLESTEROL, FASTING: 72 MG/DL (ref 0–199)
CHP ED QC CHECK: NORMAL
CLARITY: CLEAR
CLOT EVALUATION: NORMAL
CLOT EVALUATION: NORMAL
CO2: 14 MEQ/L (ref 20–31)
CO2: 14 MEQ/L (ref 20–31)
CO2: 15 MEQ/L (ref 20–31)
CO2: 15 MEQ/L (ref 20–31)
CO2: 16 MEQ/L (ref 20–31)
CO2: 16 MEQ/L (ref 20–31)
CO2: 17 MEQ/L (ref 20–31)
CO2: 17 MEQ/L (ref 20–31)
CO2: 19 MEQ/L (ref 20–31)
CO2: 20 MEQ/L (ref 20–31)
CO2: 21 MEQ/L (ref 20–31)
CO2: 24 MEQ/L (ref 20–31)
CO2: 24 MEQ/L (ref 20–31)
CO2: 25 MEQ/L (ref 20–31)
COLOR FLUID: NORMAL
COLOR FLUID: NORMAL
COLOR: YELLOW
CORTISOL - AM: 21.1 UG/DL (ref 6.2–19.4)
CORTISOL TOTAL: 14.7 UG/DL
CORTISOL TOTAL: 20.7 UG/DL
CORTISOL TOTAL: 21.8 UG/DL
CORTISOL TOTAL: 57.6 UG/DL
CREAT SERPL-MCNC: 1.62 MG/DL (ref 0.5–0.9)
CREAT SERPL-MCNC: 1.69 MG/DL (ref 0.5–0.9)
CREAT SERPL-MCNC: 1.75 MG/DL (ref 0.5–0.9)
CREAT SERPL-MCNC: 1.76 MG/DL (ref 0.5–0.9)
CREAT SERPL-MCNC: 1.78 MG/DL (ref 0.5–0.9)
CREAT SERPL-MCNC: 1.91 MG/DL (ref 0.5–0.9)
CREAT SERPL-MCNC: 1.92 MG/DL (ref 0.5–0.9)
CREAT SERPL-MCNC: 1.96 MG/DL (ref 0.5–0.9)
CREAT SERPL-MCNC: 1.98 MG/DL (ref 0.5–0.9)
CREAT SERPL-MCNC: 1.99 MG/DL (ref 0.5–0.9)
CREAT SERPL-MCNC: 2 MG/DL (ref 0.5–0.9)
CREAT SERPL-MCNC: 2.02 MG/DL (ref 0.5–0.9)
CREAT SERPL-MCNC: 2.06 MG/DL (ref 0.5–0.9)
CREAT SERPL-MCNC: 3.25 MG/DL (ref 0.5–0.9)
CREAT SERPL-MCNC: 3.29 MG/DL (ref 0.5–0.9)
CREAT SERPL-MCNC: 3.5 MG/DL (ref 0.5–0.9)
CREAT SERPL-MCNC: 3.53 MG/DL (ref 0.5–0.9)
CREAT SERPL-MCNC: 3.54 MG/DL (ref 0.5–0.9)
CREAT SERPL-MCNC: 3.62 MG/DL (ref 0.5–0.9)
CREAT SERPL-MCNC: 3.67 MG/DL (ref 0.5–0.9)
CREAT SERPL-MCNC: 3.69 MG/DL (ref 0.5–0.9)
CREAT SERPL-MCNC: 3.73 MG/DL (ref 0.5–0.9)
CREAT SERPL-MCNC: 3.79 MG/DL (ref 0.5–0.9)
CREAT SERPL-MCNC: 3.91 MG/DL (ref 0.5–0.9)
CREAT SERPL-MCNC: 3.96 MG/DL (ref 0.5–0.9)
CREAT SERPL-MCNC: 4.06 MG/DL (ref 0.5–0.9)
CREAT SERPL-MCNC: 4.19 MG/DL (ref 0.5–0.9)
CREAT SERPL-MCNC: 4.21 MG/DL (ref 0.5–0.9)
CREATININE URINE: 107.8 MG/DL
CREATININE URINE: 83.5 MG/DL
CULTURE, BLOOD 2: NORMAL
CULTURE, BLOOD 2: NORMAL
DESCRIPTION BLOOD BANK: NORMAL
DIGOXIN LEVEL: 3.7 NG/ML (ref 0.8–2)
DIGOXIN LEVEL: 4.7 NG/ML (ref 0.8–2)
EKG ATRIAL RATE: 107 BPM
EKG ATRIAL RATE: 131 BPM
EKG ATRIAL RATE: 37 BPM
EKG ATRIAL RATE: 78 BPM
EKG ATRIAL RATE: 80 BPM
EKG ATRIAL RATE: 81 BPM
EKG ATRIAL RATE: 82 BPM
EKG P AXIS: 111 DEGREES
EKG P AXIS: 21 DEGREES
EKG P AXIS: 3 DEGREES
EKG P AXIS: 51 DEGREES
EKG P-R INTERVAL: 152 MS
EKG P-R INTERVAL: 162 MS
EKG P-R INTERVAL: 166 MS
EKG P-R INTERVAL: 262 MS
EKG Q-T INTERVAL: 312 MS
EKG Q-T INTERVAL: 336 MS
EKG Q-T INTERVAL: 376 MS
EKG Q-T INTERVAL: 382 MS
EKG Q-T INTERVAL: 392 MS
EKG Q-T INTERVAL: 400 MS
EKG Q-T INTERVAL: 472 MS
EKG QRS DURATION: 118 MS
EKG QRS DURATION: 140 MS
EKG QRS DURATION: 74 MS
EKG QRS DURATION: 84 MS
EKG QRS DURATION: 86 MS
EKG QRS DURATION: 88 MS
EKG QRS DURATION: 88 MS
EKG QTC CALCULATION (BAZETT): 394 MS
EKG QTC CALCULATION (BAZETT): 428 MS
EKG QTC CALCULATION (BAZETT): 440 MS
EKG QTC CALCULATION (BAZETT): 457 MS
EKG QTC CALCULATION (BAZETT): 464 MS
EKG QTC CALCULATION (BAZETT): 477 MS
EKG QTC CALCULATION (BAZETT): 523 MS
EKG R AXIS: 34 DEGREES
EKG R AXIS: 35 DEGREES
EKG R AXIS: 43 DEGREES
EKG R AXIS: 52 DEGREES
EKG R AXIS: 58 DEGREES
EKG R AXIS: 64 DEGREES
EKG R AXIS: 8 DEGREES
EKG T AXIS: 100 DEGREES
EKG T AXIS: 110 DEGREES
EKG T AXIS: 179 DEGREES
EKG T AXIS: 185 DEGREES
EKG T AXIS: 214 DEGREES
EKG T AXIS: 81 DEGREES
EKG T AXIS: 87 DEGREES
EKG VENTRICULAR RATE: 141 BPM
EKG VENTRICULAR RATE: 146 BPM
EKG VENTRICULAR RATE: 42 BPM
EKG VENTRICULAR RATE: 78 BPM
EKG VENTRICULAR RATE: 80 BPM
EKG VENTRICULAR RATE: 81 BPM
EKG VENTRICULAR RATE: 82 BPM
EOSINOPHIL FLUID: 1 %
EOSINOPHIL FLUID: 5 %
EOSINOPHILS ABSOLUTE: 0 K/UL (ref 0–0.7)
EOSINOPHILS ABSOLUTE: 0.1 K/UL (ref 0–0.7)
EOSINOPHILS RELATIVE PERCENT: 0 %
EOSINOPHILS RELATIVE PERCENT: 0.1 %
EOSINOPHILS RELATIVE PERCENT: 0.1 %
EOSINOPHILS RELATIVE PERCENT: 0.2 %
EOSINOPHILS RELATIVE PERCENT: 0.2 %
EOSINOPHILS RELATIVE PERCENT: 0.3 %
EOSINOPHILS RELATIVE PERCENT: 0.3 %
EOSINOPHILS RELATIVE PERCENT: 0.6 %
EOSINOPHILS RELATIVE PERCENT: 0.8 %
EOSINOPHILS RELATIVE PERCENT: 1 %
EPITHELIAL CELLS, UA: ABNORMAL /HPF (ref 0–5)
FLUID PATH CONSULT: YES
FLUID TYPE: NORMAL
FLUID TYPE: NORMAL
GFR AFRICAN AMERICAN: 12.5
GFR AFRICAN AMERICAN: 12.6
GFR AFRICAN AMERICAN: 13
GFR AFRICAN AMERICAN: 13.4
GFR AFRICAN AMERICAN: 13.6
GFR AFRICAN AMERICAN: 14.1
GFR AFRICAN AMERICAN: 14.4
GFR AFRICAN AMERICAN: 14.6
GFR AFRICAN AMERICAN: 14.6
GFR AFRICAN AMERICAN: 14.9
GFR AFRICAN AMERICAN: 15.3
GFR AFRICAN AMERICAN: 15.3
GFR AFRICAN AMERICAN: 15.5
GFR AFRICAN AMERICAN: 16.6
GFR AFRICAN AMERICAN: 16.9
GFR AFRICAN AMERICAN: 28.5
GFR AFRICAN AMERICAN: 29.2
GFR AFRICAN AMERICAN: 29.5
GFR AFRICAN AMERICAN: 29.7
GFR AFRICAN AMERICAN: 29.9
GFR AFRICAN AMERICAN: 30.2
GFR AFRICAN AMERICAN: 30.9
GFR AFRICAN AMERICAN: 31.1
GFR AFRICAN AMERICAN: 33.8
GFR AFRICAN AMERICAN: 34.2
GFR AFRICAN AMERICAN: 34.4
GFR AFRICAN AMERICAN: 35.8
GFR AFRICAN AMERICAN: 37.6
GFR NON-AFRICAN AMERICAN: 10.3
GFR NON-AFRICAN AMERICAN: 10.4
GFR NON-AFRICAN AMERICAN: 10.8
GFR NON-AFRICAN AMERICAN: 11.1
GFR NON-AFRICAN AMERICAN: 11.3
GFR NON-AFRICAN AMERICAN: 11.7
GFR NON-AFRICAN AMERICAN: 11.9
GFR NON-AFRICAN AMERICAN: 12
GFR NON-AFRICAN AMERICAN: 12.1
GFR NON-AFRICAN AMERICAN: 12.3
GFR NON-AFRICAN AMERICAN: 12.6
GFR NON-AFRICAN AMERICAN: 12.7
GFR NON-AFRICAN AMERICAN: 12.8
GFR NON-AFRICAN AMERICAN: 13.7
GFR NON-AFRICAN AMERICAN: 13.9
GFR NON-AFRICAN AMERICAN: 23.6
GFR NON-AFRICAN AMERICAN: 24.1
GFR NON-AFRICAN AMERICAN: 24.4
GFR NON-AFRICAN AMERICAN: 24.5
GFR NON-AFRICAN AMERICAN: 24.7
GFR NON-AFRICAN AMERICAN: 25
GFR NON-AFRICAN AMERICAN: 25.6
GFR NON-AFRICAN AMERICAN: 25.7
GFR NON-AFRICAN AMERICAN: 27.9
GFR NON-AFRICAN AMERICAN: 28.3
GFR NON-AFRICAN AMERICAN: 28.5
GFR NON-AFRICAN AMERICAN: 29.6
GFR NON-AFRICAN AMERICAN: 31.1
GLOBULIN: 1.5 G/DL (ref 2.3–3.5)
GLOBULIN: 1.8 G/DL (ref 2.3–3.5)
GLOBULIN: 1.9 G/DL (ref 2.3–3.5)
GLOBULIN: 2.2 G/DL (ref 2.3–3.5)
GLOBULIN: 2.2 G/DL (ref 2.3–3.5)
GLOBULIN: 2.4 G/DL (ref 2.3–3.5)
GLOBULIN: 2.4 G/DL (ref 2.3–3.5)
GLOBULIN: 2.7 G/DL (ref 2.3–3.5)
GLOBULIN: 3 G/DL (ref 2.3–3.5)
GLOBULIN: 3 G/DL (ref 2.3–3.5)
GLOBULIN: 3.3 G/DL (ref 2.3–3.5)
GLOBULIN: 3.4 G/DL (ref 2.3–3.5)
GLUCOSE BLD-MCNC: 106 MG/DL (ref 70–99)
GLUCOSE BLD-MCNC: 118 MG/DL (ref 70–99)
GLUCOSE BLD-MCNC: 123 MG/DL (ref 70–99)
GLUCOSE BLD-MCNC: 124 MG/DL (ref 70–99)
GLUCOSE BLD-MCNC: 133 MG/DL (ref 60–115)
GLUCOSE BLD-MCNC: 137 MG/DL (ref 60–115)
GLUCOSE BLD-MCNC: 137 MG/DL (ref 70–99)
GLUCOSE BLD-MCNC: 143 MG/DL (ref 70–99)
GLUCOSE BLD-MCNC: 148 MG/DL (ref 70–99)
GLUCOSE BLD-MCNC: 149 MG/DL (ref 60–115)
GLUCOSE BLD-MCNC: 149 MG/DL (ref 60–115)
GLUCOSE BLD-MCNC: 150 MG/DL (ref 70–99)
GLUCOSE BLD-MCNC: 150 MG/DL (ref 70–99)
GLUCOSE BLD-MCNC: 152 MG/DL (ref 70–99)
GLUCOSE BLD-MCNC: 154 MG/DL (ref 60–115)
GLUCOSE BLD-MCNC: 156 MG/DL (ref 70–99)
GLUCOSE BLD-MCNC: 157 MG/DL (ref 60–115)
GLUCOSE BLD-MCNC: 158 MG/DL (ref 60–115)
GLUCOSE BLD-MCNC: 164 MG/DL (ref 70–99)
GLUCOSE BLD-MCNC: 165 MG/DL (ref 60–115)
GLUCOSE BLD-MCNC: 165 MG/DL (ref 60–115)
GLUCOSE BLD-MCNC: 166 MG/DL (ref 60–115)
GLUCOSE BLD-MCNC: 167 MG/DL (ref 60–115)
GLUCOSE BLD-MCNC: 168 MG/DL (ref 60–115)
GLUCOSE BLD-MCNC: 169 MG/DL (ref 60–115)
GLUCOSE BLD-MCNC: 169 MG/DL (ref 60–115)
GLUCOSE BLD-MCNC: 170 MG/DL (ref 60–115)
GLUCOSE BLD-MCNC: 170 MG/DL (ref 70–99)
GLUCOSE BLD-MCNC: 173 MG/DL (ref 60–115)
GLUCOSE BLD-MCNC: 174 MG/DL (ref 60–115)
GLUCOSE BLD-MCNC: 176 MG/DL (ref 70–99)
GLUCOSE BLD-MCNC: 177 MG/DL (ref 60–115)
GLUCOSE BLD-MCNC: 180 MG/DL (ref 60–115)
GLUCOSE BLD-MCNC: 183 MG/DL (ref 70–99)
GLUCOSE BLD-MCNC: 188 MG/DL (ref 70–99)
GLUCOSE BLD-MCNC: 192 MG/DL (ref 60–115)
GLUCOSE BLD-MCNC: 192 MG/DL (ref 70–99)
GLUCOSE BLD-MCNC: 193 MG/DL (ref 60–115)
GLUCOSE BLD-MCNC: 195 MG/DL (ref 60–115)
GLUCOSE BLD-MCNC: 199 MG/DL (ref 60–115)
GLUCOSE BLD-MCNC: 202 MG/DL (ref 70–99)
GLUCOSE BLD-MCNC: 204 MG/DL (ref 60–115)
GLUCOSE BLD-MCNC: 204 MG/DL (ref 60–115)
GLUCOSE BLD-MCNC: 205 MG/DL (ref 60–115)
GLUCOSE BLD-MCNC: 207 MG/DL (ref 70–99)
GLUCOSE BLD-MCNC: 208 MG/DL (ref 60–115)
GLUCOSE BLD-MCNC: 211 MG/DL (ref 70–99)
GLUCOSE BLD-MCNC: 211 MG/DL (ref 70–99)
GLUCOSE BLD-MCNC: 212 MG/DL (ref 70–99)
GLUCOSE BLD-MCNC: 215 MG/DL (ref 60–115)
GLUCOSE BLD-MCNC: 215 MG/DL (ref 70–99)
GLUCOSE BLD-MCNC: 216 MG/DL (ref 60–115)
GLUCOSE BLD-MCNC: 219 MG/DL (ref 70–99)
GLUCOSE BLD-MCNC: 230 MG/DL (ref 60–115)
GLUCOSE BLD-MCNC: 233 MG/DL (ref 60–115)
GLUCOSE BLD-MCNC: 239 MG/DL (ref 60–115)
GLUCOSE BLD-MCNC: 239 MG/DL (ref 70–99)
GLUCOSE BLD-MCNC: 242 MG/DL (ref 60–115)
GLUCOSE BLD-MCNC: 242 MG/DL (ref 70–99)
GLUCOSE BLD-MCNC: 243 MG/DL (ref 60–115)
GLUCOSE BLD-MCNC: 248 MG/DL (ref 60–115)
GLUCOSE BLD-MCNC: 258 MG/DL (ref 60–115)
GLUCOSE BLD-MCNC: 265 MG/DL
GLUCOSE BLD-MCNC: 265 MG/DL (ref 60–115)
GLUCOSE BLD-MCNC: 270 MG/DL (ref 60–115)
GLUCOSE BLD-MCNC: 280 MG/DL (ref 60–115)
GLUCOSE BLD-MCNC: 59 MG/DL (ref 70–99)
GLUCOSE BLD-MCNC: 75 MG/DL (ref 70–99)
GLUCOSE BLD-MCNC: 83 MG/DL (ref 70–99)
GLUCOSE BLD-MCNC: 90 MG/DL (ref 70–99)
GLUCOSE URINE: NEGATIVE MG/DL
GLUCOSE, FLUID: 178 MG/DL
GRAM STAIN RESULT: NORMAL
GRAM STAIN RESULT: NORMAL
HBA1C MFR BLD: 5.4 % (ref 4.8–5.9)
HBA1C MFR BLD: 5.4 % (ref 4.8–5.9)
HCT VFR BLD CALC: 21 % (ref 37–47)
HCT VFR BLD CALC: 23 % (ref 37–47)
HCT VFR BLD CALC: 23.1 % (ref 37–47)
HCT VFR BLD CALC: 23.5 % (ref 37–47)
HCT VFR BLD CALC: 23.8 % (ref 37–47)
HCT VFR BLD CALC: 24.2 % (ref 37–47)
HCT VFR BLD CALC: 24.3 % (ref 37–47)
HCT VFR BLD CALC: 26 % (ref 37–47)
HCT VFR BLD CALC: 26.1 % (ref 37–47)
HCT VFR BLD CALC: 26.1 % (ref 37–47)
HCT VFR BLD CALC: 27.5 % (ref 37–47)
HCT VFR BLD CALC: 27.6 % (ref 37–47)
HCT VFR BLD CALC: 28.9 % (ref 37–47)
HCT VFR BLD CALC: 31.2 % (ref 37–47)
HCT VFR BLD CALC: 34.3 % (ref 37–47)
HDLC SERPL-MCNC: 38 MG/DL (ref 40–59)
HEMOGLOBIN: 10.1 G/DL (ref 12–16)
HEMOGLOBIN: 11.1 G/DL (ref 12–16)
HEMOGLOBIN: 6.9 G/DL (ref 12–16)
HEMOGLOBIN: 7.7 G/DL (ref 12–16)
HEMOGLOBIN: 7.7 G/DL (ref 12–16)
HEMOGLOBIN: 7.8 G/DL (ref 12–16)
HEMOGLOBIN: 7.9 G/DL (ref 12–16)
HEMOGLOBIN: 7.9 G/DL (ref 12–16)
HEMOGLOBIN: 8 G/DL (ref 12–16)
HEMOGLOBIN: 8.4 G/DL (ref 12–16)
HEMOGLOBIN: 8.4 G/DL (ref 12–16)
HEMOGLOBIN: 8.6 G/DL (ref 12–16)
HEMOGLOBIN: 9.1 G/DL (ref 12–16)
HEMOGLOBIN: 9.1 G/DL (ref 12–16)
HEMOGLOBIN: 9.4 G/DL (ref 12–16)
HYALINE CASTS: ABNORMAL /HPF (ref 0–5)
INR BLD: 1.4
INR BLD: 1.4
INR BLD: 1.5
IRON SATURATION: 7 % (ref 11–46)
IRON: 20 UG/DL (ref 37–145)
KETONES, URINE: NEGATIVE MG/DL
LACTIC ACID: 2.4 MMOL/L (ref 0.5–2.2)
LACTIC ACID: 3.4 MMOL/L (ref 0.5–2.2)
LACTIC ACID: 3.5 MMOL/L (ref 0.5–2.2)
LACTIC ACID: 4.4 MMOL/L (ref 0.5–2.2)
LACTIC ACID: 4.4 MMOL/L (ref 0.5–2.2)
LACTIC ACID: 4.8 MMOL/L (ref 0.5–2.2)
LACTIC ACID: 4.8 MMOL/L (ref 0.5–2.2)
LACTIC ACID: 5.1 MMOL/L (ref 0.5–2.2)
LACTIC ACID: 5.2 MMOL/L (ref 0.5–2.2)
LACTIC ACID: 5.3 MMOL/L (ref 0.5–2.2)
LACTIC ACID: 5.4 MMOL/L (ref 0.5–2.2)
LD, FLUID: 64 U/L
LD, FLUID: 65 U/L
LDL CHOLESTEROL CALCULATED: 22 MG/DL (ref 0–129)
LEUKOCYTE ESTERASE, URINE: ABNORMAL
LIPASE: 84 U/L (ref 12–95)
LYMPHOCYTES ABSOLUTE: 0.3 K/UL (ref 1–4.8)
LYMPHOCYTES ABSOLUTE: 0.5 K/UL (ref 1–4.8)
LYMPHOCYTES ABSOLUTE: 0.6 K/UL (ref 1–4.8)
LYMPHOCYTES ABSOLUTE: 0.7 K/UL (ref 1–4.8)
LYMPHOCYTES RELATIVE PERCENT: 10.4 %
LYMPHOCYTES RELATIVE PERCENT: 2.8 %
LYMPHOCYTES RELATIVE PERCENT: 3.8 %
LYMPHOCYTES RELATIVE PERCENT: 3.8 %
LYMPHOCYTES RELATIVE PERCENT: 5.5 %
LYMPHOCYTES RELATIVE PERCENT: 5.8 %
LYMPHOCYTES RELATIVE PERCENT: 6.9 %
LYMPHOCYTES RELATIVE PERCENT: 7.4 %
LYMPHOCYTES RELATIVE PERCENT: 7.7 %
LYMPHOCYTES RELATIVE PERCENT: 9 %
LYMPHOCYTES RELATIVE PERCENT: 9 %
LYMPHOCYTES RELATIVE PERCENT: 9.9 %
LYMPHOCYTES, BODY FLUID: 16 %
LYMPHOCYTES, BODY FLUID: 34 %
MACROPHAGE FLUID: 7 %
MAGNESIUM: 1.9 MG/DL (ref 1.7–2.4)
MAGNESIUM: 1.9 MG/DL (ref 1.7–2.4)
MAGNESIUM: 2 MG/DL (ref 1.7–2.4)
MAGNESIUM: 2 MG/DL (ref 1.7–2.4)
MAGNESIUM: 2.1 MG/DL (ref 1.7–2.4)
MCH RBC QN AUTO: 27.4 PG (ref 27–31.3)
MCH RBC QN AUTO: 27.8 PG (ref 27–31.3)
MCH RBC QN AUTO: 27.9 PG (ref 27–31.3)
MCH RBC QN AUTO: 28.4 PG (ref 27–31.3)
MCH RBC QN AUTO: 28.5 PG (ref 27–31.3)
MCH RBC QN AUTO: 29.1 PG (ref 27–31.3)
MCH RBC QN AUTO: 29.2 PG (ref 27–31.3)
MCH RBC QN AUTO: 29.3 PG (ref 27–31.3)
MCH RBC QN AUTO: 29.3 PG (ref 27–31.3)
MCH RBC QN AUTO: 29.9 PG (ref 27–31.3)
MCH RBC QN AUTO: 30.2 PG (ref 27–31.3)
MCH RBC QN AUTO: 30.2 PG (ref 27–31.3)
MCH RBC QN AUTO: 30.5 PG (ref 27–31.3)
MCHC RBC AUTO-ENTMCNC: 32.2 % (ref 33–37)
MCHC RBC AUTO-ENTMCNC: 32.2 % (ref 33–37)
MCHC RBC AUTO-ENTMCNC: 32.3 % (ref 33–37)
MCHC RBC AUTO-ENTMCNC: 32.5 % (ref 33–37)
MCHC RBC AUTO-ENTMCNC: 32.6 % (ref 33–37)
MCHC RBC AUTO-ENTMCNC: 32.7 % (ref 33–37)
MCHC RBC AUTO-ENTMCNC: 32.7 % (ref 33–37)
MCHC RBC AUTO-ENTMCNC: 32.8 % (ref 33–37)
MCHC RBC AUTO-ENTMCNC: 33 % (ref 33–37)
MCHC RBC AUTO-ENTMCNC: 33.1 % (ref 33–37)
MCHC RBC AUTO-ENTMCNC: 33.4 % (ref 33–37)
MCHC RBC AUTO-ENTMCNC: 33.5 % (ref 33–37)
MCHC RBC AUTO-ENTMCNC: 33.7 % (ref 33–37)
MCV RBC AUTO: 85 FL (ref 82–100)
MCV RBC AUTO: 85.2 FL (ref 82–100)
MCV RBC AUTO: 85.7 FL (ref 82–100)
MCV RBC AUTO: 86 FL (ref 82–100)
MCV RBC AUTO: 86.8 FL (ref 82–100)
MCV RBC AUTO: 86.9 FL (ref 82–100)
MCV RBC AUTO: 87.1 FL (ref 82–100)
MCV RBC AUTO: 88.2 FL (ref 82–100)
MCV RBC AUTO: 88.2 FL (ref 82–100)
MCV RBC AUTO: 88.5 FL (ref 82–100)
MCV RBC AUTO: 89.5 FL (ref 82–100)
MCV RBC AUTO: 90.1 FL (ref 82–100)
MCV RBC AUTO: 91.4 FL (ref 82–100)
MCV RBC AUTO: 91.5 FL (ref 82–100)
MCV RBC AUTO: 92.3 FL (ref 82–100)
MONOCYTE, FLUID: 53 %
MONOCYTE, FLUID: 56 %
MONOCYTES ABSOLUTE: 0.4 K/UL (ref 0.2–0.8)
MONOCYTES ABSOLUTE: 0.6 K/UL (ref 0.2–0.8)
MONOCYTES ABSOLUTE: 0.7 K/UL (ref 0.2–0.8)
MONOCYTES ABSOLUTE: 0.8 K/UL (ref 0.2–0.8)
MONOCYTES ABSOLUTE: 0.8 K/UL (ref 0.2–0.8)
MONOCYTES ABSOLUTE: 0.9 K/UL (ref 0.2–0.8)
MONOCYTES ABSOLUTE: 0.9 K/UL (ref 0.2–0.8)
MONOCYTES ABSOLUTE: 1.1 K/UL (ref 0.2–0.8)
MONOCYTES ABSOLUTE: 1.2 K/UL (ref 0.2–0.8)
MONOCYTES ABSOLUTE: 1.3 K/UL (ref 0.2–0.8)
MONOCYTES RELATIVE PERCENT: 10.1 %
MONOCYTES RELATIVE PERCENT: 10.8 %
MONOCYTES RELATIVE PERCENT: 11.7 %
MONOCYTES RELATIVE PERCENT: 12 %
MONOCYTES RELATIVE PERCENT: 12.7 %
MONOCYTES RELATIVE PERCENT: 13.2 %
MONOCYTES RELATIVE PERCENT: 17.1 %
MONOCYTES RELATIVE PERCENT: 3.7 %
MONOCYTES RELATIVE PERCENT: 5.5 %
MONOCYTES RELATIVE PERCENT: 6.1 %
MONOCYTES RELATIVE PERCENT: 6.6 %
MONOCYTES RELATIVE PERCENT: 9.2 %
NEUTROPHIL, FLUID: 23 %
NEUTROPHIL, FLUID: 5 %
NEUTROPHILS ABSOLUTE: 10.6 K/UL (ref 1.4–6.5)
NEUTROPHILS ABSOLUTE: 12.7 K/UL (ref 1.4–6.5)
NEUTROPHILS ABSOLUTE: 16.9 K/UL (ref 1.4–6.5)
NEUTROPHILS ABSOLUTE: 4.2 K/UL (ref 1.4–6.5)
NEUTROPHILS ABSOLUTE: 5 K/UL (ref 1.4–6.5)
NEUTROPHILS ABSOLUTE: 5.1 K/UL (ref 1.4–6.5)
NEUTROPHILS ABSOLUTE: 5.4 K/UL (ref 1.4–6.5)
NEUTROPHILS ABSOLUTE: 5.8 K/UL (ref 1.4–6.5)
NEUTROPHILS ABSOLUTE: 5.9 K/UL (ref 1.4–6.5)
NEUTROPHILS ABSOLUTE: 7.1 K/UL (ref 1.4–6.5)
NEUTROPHILS ABSOLUTE: 7.4 K/UL (ref 1.4–6.5)
NEUTROPHILS ABSOLUTE: 9.7 K/UL (ref 1.4–6.5)
NEUTROPHILS RELATIVE PERCENT: 71.3 %
NEUTROPHILS RELATIVE PERCENT: 76.6 %
NEUTROPHILS RELATIVE PERCENT: 77.3 %
NEUTROPHILS RELATIVE PERCENT: 79 %
NEUTROPHILS RELATIVE PERCENT: 79.1 %
NEUTROPHILS RELATIVE PERCENT: 80.4 %
NEUTROPHILS RELATIVE PERCENT: 82.4 %
NEUTROPHILS RELATIVE PERCENT: 84.8 %
NEUTROPHILS RELATIVE PERCENT: 88.4 %
NEUTROPHILS RELATIVE PERCENT: 89.2 %
NEUTROPHILS RELATIVE PERCENT: 90 %
NEUTROPHILS RELATIVE PERCENT: 93.2 %
NITRITE, URINE: NEGATIVE
NUCLEATED CELLS FLUID: 185 /CUMM
NUCLEATED CELLS FLUID: 71 /CUMM
NUMBER OF CELLS COUNTED FLUID: 100
NUMBER OF CELLS COUNTED FLUID: 100
ORGANISM: ABNORMAL
OSMOLALITY URINE: 348 MOSM/KG (ref 300–900)
OSMOLALITY URINE: 357 MOSM/KG (ref 300–900)
OSMOLALITY: 285 MOSM/KG (ref 280–303)
OSMOLALITY: 288 MOSM/KG (ref 280–303)
PATH CONSULT FLUID: NORMAL
PDW BLD-RTO: 14.1 % (ref 11.5–14.5)
PDW BLD-RTO: 14.3 % (ref 11.5–14.5)
PDW BLD-RTO: 14.4 % (ref 11.5–14.5)
PDW BLD-RTO: 14.5 % (ref 11.5–14.5)
PDW BLD-RTO: 14.7 % (ref 11.5–14.5)
PDW BLD-RTO: 14.8 % (ref 11.5–14.5)
PDW BLD-RTO: 15.6 % (ref 11.5–14.5)
PDW BLD-RTO: 15.7 % (ref 11.5–14.5)
PERFORMED ON: ABNORMAL
PH UA: 5 (ref 5–9)
PHOSPHORUS  URINE: 62.4 MG/DL
PHOSPHORUS: 3.4 MG/DL (ref 2.3–4.8)
PHOSPHORUS: 4 MG/DL (ref 2.3–4.8)
PHOSPHORUS: 4.6 MG/DL (ref 2.3–4.8)
PLATELET # BLD: 121 K/UL (ref 130–400)
PLATELET # BLD: 124 K/UL (ref 130–400)
PLATELET # BLD: 130 K/UL (ref 130–400)
PLATELET # BLD: 131 K/UL (ref 130–400)
PLATELET # BLD: 133 K/UL (ref 130–400)
PLATELET # BLD: 138 K/UL (ref 130–400)
PLATELET # BLD: 143 K/UL (ref 130–400)
PLATELET # BLD: 147 K/UL (ref 130–400)
PLATELET # BLD: 156 K/UL (ref 130–400)
PLATELET # BLD: 157 K/UL (ref 130–400)
PLATELET # BLD: 168 K/UL (ref 130–400)
PLATELET # BLD: 178 K/UL (ref 130–400)
PLATELET # BLD: 199 K/UL (ref 130–400)
PLATELET # BLD: 230 K/UL (ref 130–400)
PLATELET # BLD: 254 K/UL (ref 130–400)
POTASSIUM REFLEX MAGNESIUM: 3.5 MEQ/L (ref 3.4–4.9)
POTASSIUM REFLEX MAGNESIUM: 3.6 MEQ/L (ref 3.4–4.9)
POTASSIUM REFLEX MAGNESIUM: 3.7 MEQ/L (ref 3.4–4.9)
POTASSIUM REFLEX MAGNESIUM: 4.1 MEQ/L (ref 3.4–4.9)
POTASSIUM REFLEX MAGNESIUM: 4.2 MEQ/L (ref 3.4–4.9)
POTASSIUM REFLEX MAGNESIUM: 5.2 MEQ/L (ref 3.4–4.9)
POTASSIUM REFLEX MAGNESIUM: 6.2 MEQ/L (ref 3.4–4.9)
POTASSIUM REFLEX MAGNESIUM: 6.4 MEQ/L (ref 3.4–4.9)
POTASSIUM SERPL-SCNC: 3.5 MEQ/L (ref 3.4–4.9)
POTASSIUM SERPL-SCNC: 4.2 MEQ/L (ref 3.4–4.9)
POTASSIUM SERPL-SCNC: 4.3 MEQ/L (ref 3.4–4.9)
POTASSIUM SERPL-SCNC: 4.8 MEQ/L (ref 3.4–4.9)
POTASSIUM SERPL-SCNC: 5 MEQ/L (ref 3.4–4.9)
POTASSIUM SERPL-SCNC: 5.1 MEQ/L (ref 3.4–4.9)
POTASSIUM SERPL-SCNC: 5.2 MEQ/L (ref 3.4–4.9)
POTASSIUM SERPL-SCNC: 5.2 MEQ/L (ref 3.4–4.9)
POTASSIUM SERPL-SCNC: 5.4 MEQ/L (ref 3.4–4.9)
POTASSIUM SERPL-SCNC: 5.5 MEQ/L (ref 3.4–4.9)
POTASSIUM SERPL-SCNC: 5.8 MEQ/L (ref 3.4–4.9)
POTASSIUM SERPL-SCNC: 5.8 MEQ/L (ref 3.4–4.9)
POTASSIUM SERPL-SCNC: 5.9 MEQ/L (ref 3.4–4.9)
POTASSIUM SERPL-SCNC: 6 MEQ/L (ref 3.4–4.9)
POTASSIUM SERPL-SCNC: 6 MEQ/L (ref 3.4–4.9)
POTASSIUM SERPL-SCNC: 6.1 MEQ/L (ref 3.4–4.9)
POTASSIUM SERPL-SCNC: 6.1 MEQ/L (ref 3.4–4.9)
POTASSIUM SERPL-SCNC: 6.2 MEQ/L (ref 3.4–4.9)
POTASSIUM SERPL-SCNC: 6.3 MEQ/L (ref 3.4–4.9)
POTASSIUM SERPL-SCNC: 6.4 MEQ/L (ref 3.4–4.9)
POTASSIUM SERPL-SCNC: 6.7 MEQ/L (ref 3.4–4.9)
POTASSIUM SERPL-SCNC: 7.1 MEQ/L (ref 3.4–4.9)
POTASSIUM SERPL-SCNC: 7.4 MEQ/L (ref 3.4–4.9)
POTASSIUM SERPL-SCNC: 7.5 MEQ/L (ref 3.4–4.9)
POTASSIUM, UR: 16.6 MEQ/L
PRO-BNP: 6909 PG/ML
PROCALCITONIN: 0.41 NG/ML (ref 0–0.15)
PROTEIN FLUID: 1.2 G/DL
PROTEIN FLUID: 1.4 G/DL
PROTEIN UA: NEGATIVE MG/DL
PROTHROMBIN TIME: 16.9 SEC (ref 12.3–14.9)
PROTHROMBIN TIME: 17.2 SEC (ref 12.3–14.9)
PROTHROMBIN TIME: 18.1 SEC (ref 12.3–14.9)
RBC # BLD: 2.3 M/UL (ref 4.2–5.4)
RBC # BLD: 2.57 M/UL (ref 4.2–5.4)
RBC # BLD: 2.6 M/UL (ref 4.2–5.4)
RBC # BLD: 2.64 M/UL (ref 4.2–5.4)
RBC # BLD: 2.65 M/UL (ref 4.2–5.4)
RBC # BLD: 2.65 M/UL (ref 4.2–5.4)
RBC # BLD: 2.79 M/UL (ref 4.2–5.4)
RBC # BLD: 2.96 M/UL (ref 4.2–5.4)
RBC # BLD: 3.03 M/UL (ref 4.2–5.4)
RBC # BLD: 3.05 M/UL (ref 4.2–5.4)
RBC # BLD: 3.11 M/UL (ref 4.2–5.4)
RBC # BLD: 3.23 M/UL (ref 4.2–5.4)
RBC # BLD: 3.25 M/UL (ref 4.2–5.4)
RBC # BLD: 3.64 M/UL (ref 4.2–5.4)
RBC # BLD: 3.89 M/UL (ref 4.2–5.4)
RBC FLUID: 3662 /CUMM
RBC FLUID: 479 /CUMM
RBC UA: ABNORMAL /HPF (ref 0–5)
REASON FOR REJECTION: NORMAL
REJECTED TEST: NORMAL
SARS-COV-2, NAAT: NOT DETECTED
SODIUM BLD-SCNC: 115 MEQ/L (ref 135–144)
SODIUM BLD-SCNC: 117 MEQ/L (ref 135–144)
SODIUM BLD-SCNC: 118 MEQ/L (ref 135–144)
SODIUM BLD-SCNC: 119 MEQ/L (ref 135–144)
SODIUM BLD-SCNC: 120 MEQ/L (ref 135–144)
SODIUM BLD-SCNC: 120 MEQ/L (ref 135–144)
SODIUM BLD-SCNC: 121 MEQ/L (ref 135–144)
SODIUM BLD-SCNC: 121 MEQ/L (ref 135–144)
SODIUM BLD-SCNC: 122 MEQ/L (ref 135–144)
SODIUM BLD-SCNC: 122 MEQ/L (ref 135–144)
SODIUM BLD-SCNC: 123 MEQ/L (ref 135–144)
SODIUM BLD-SCNC: 124 MEQ/L (ref 135–144)
SODIUM BLD-SCNC: 124 MEQ/L (ref 135–144)
SODIUM BLD-SCNC: 126 MEQ/L (ref 135–144)
SODIUM BLD-SCNC: 127 MEQ/L (ref 135–144)
SODIUM BLD-SCNC: 127 MEQ/L (ref 135–144)
SODIUM BLD-SCNC: 128 MEQ/L (ref 135–144)
SODIUM BLD-SCNC: 129 MEQ/L (ref 135–144)
SODIUM URINE: <20 MEQ/L
SODIUM URINE: <20 MEQ/L
SPECIFIC GRAVITY UA: 1.02 (ref 1–1.03)
TOTAL IRON BINDING CAPACITY: 278 UG/DL (ref 178–450)
TOTAL PROTEIN: 4.5 G/DL (ref 6.3–8)
TOTAL PROTEIN: 4.7 G/DL (ref 6.3–8)
TOTAL PROTEIN: 4.8 G/DL (ref 6.3–8)
TOTAL PROTEIN: 4.9 G/DL (ref 6.3–8)
TOTAL PROTEIN: 5 G/DL (ref 6.3–8)
TOTAL PROTEIN: 5.1 G/DL (ref 6.3–8)
TOTAL PROTEIN: 5.1 G/DL (ref 6.3–8)
TOTAL PROTEIN: 5.3 G/DL (ref 6.3–8)
TOTAL PROTEIN: 5.7 G/DL (ref 6.3–8)
TOTAL PROTEIN: 5.8 G/DL (ref 6.3–8)
TOTAL PROTEIN: 5.9 G/DL (ref 6.3–8)
TRIGLYCERIDE, FASTING: 59 MG/DL (ref 0–150)
TROPONIN: 0.02 NG/ML (ref 0–0.01)
TROPONIN: 0.03 NG/ML (ref 0–0.01)
TROPONIN: 0.44 NG/ML (ref 0–0.01)
TROPONIN: 0.48 NG/ML (ref 0–0.01)
TROPONIN: 0.55 NG/ML (ref 0–0.01)
TROPONIN: 1.45 NG/ML (ref 0–0.01)
TROPONIN: 2.85 NG/ML (ref 0–0.01)
TSH SERPL DL<=0.05 MIU/L-ACNC: 3.62 UIU/ML (ref 0.44–3.86)
URIC ACID, UR: 15.6 MG/DL
URINE CULTURE, ROUTINE: NORMAL
UROBILINOGEN, URINE: 0.2 E.U./DL
WBC # BLD: 11 K/UL (ref 4.8–10.8)
WBC # BLD: 11.3 K/UL (ref 4.8–10.8)
WBC # BLD: 14.1 K/UL (ref 4.8–10.8)
WBC # BLD: 19 K/UL (ref 4.8–10.8)
WBC # BLD: 4.7 K/UL (ref 4.8–10.8)
WBC # BLD: 5.5 K/UL (ref 4.8–10.8)
WBC # BLD: 6.3 K/UL (ref 4.8–10.8)
WBC # BLD: 6.3 K/UL (ref 4.8–10.8)
WBC # BLD: 6.4 K/UL (ref 4.8–10.8)
WBC # BLD: 7 K/UL (ref 4.8–10.8)
WBC # BLD: 7.1 K/UL (ref 4.8–10.8)
WBC # BLD: 7.2 K/UL (ref 4.8–10.8)
WBC # BLD: 7.2 K/UL (ref 4.8–10.8)
WBC # BLD: 8.8 K/UL (ref 4.8–10.8)
WBC # BLD: 9 K/UL (ref 4.8–10.8)
WBC UA: ABNORMAL /HPF (ref 0–5)

## 2021-01-01 PROCEDURE — 93005 ELECTROCARDIOGRAM TRACING: CPT | Performed by: INTERNAL MEDICINE

## 2021-01-01 PROCEDURE — 83605 ASSAY OF LACTIC ACID: CPT

## 2021-01-01 PROCEDURE — 6370000000 HC RX 637 (ALT 250 FOR IP): Performed by: INTERNAL MEDICINE

## 2021-01-01 PROCEDURE — 96375 TX/PRO/DX INJ NEW DRUG ADDON: CPT

## 2021-01-01 PROCEDURE — 82310 ASSAY OF CALCIUM: CPT

## 2021-01-01 PROCEDURE — 85025 COMPLETE CBC W/AUTO DIFF WBC: CPT

## 2021-01-01 PROCEDURE — 80053 COMPREHEN METABOLIC PANEL: CPT

## 2021-01-01 PROCEDURE — 97535 SELF CARE MNGMENT TRAINING: CPT

## 2021-01-01 PROCEDURE — 83930 ASSAY OF BLOOD OSMOLALITY: CPT

## 2021-01-01 PROCEDURE — 93010 ELECTROCARDIOGRAM REPORT: CPT | Performed by: INTERNAL MEDICINE

## 2021-01-01 PROCEDURE — 84105 ASSAY OF URINE PHOSPHORUS: CPT

## 2021-01-01 PROCEDURE — 97162 PT EVAL MOD COMPLEX 30 MIN: CPT

## 2021-01-01 PROCEDURE — P9047 ALBUMIN (HUMAN), 25%, 50ML: HCPCS | Performed by: INTERNAL MEDICINE

## 2021-01-01 PROCEDURE — P9047 ALBUMIN (HUMAN), 25%, 50ML: HCPCS | Performed by: RADIOLOGY

## 2021-01-01 PROCEDURE — 2580000003 HC RX 258: Performed by: NURSE PRACTITIONER

## 2021-01-01 PROCEDURE — 6360000002 HC RX W HCPCS: Performed by: INTERNAL MEDICINE

## 2021-01-01 PROCEDURE — 2060000000 HC ICU INTERMEDIATE R&B

## 2021-01-01 PROCEDURE — 1111F DSCHRG MED/CURRENT MED MERGE: CPT | Performed by: INTERNAL MEDICINE

## 2021-01-01 PROCEDURE — 86900 BLOOD TYPING SEROLOGIC ABO: CPT

## 2021-01-01 PROCEDURE — 85027 COMPLETE CBC AUTOMATED: CPT

## 2021-01-01 PROCEDURE — 2500000003 HC RX 250 WO HCPCS: Performed by: RADIOLOGY

## 2021-01-01 PROCEDURE — 83615 LACTATE (LD) (LDH) ENZYME: CPT

## 2021-01-01 PROCEDURE — 49418 INSERT TUN IP CATH PERC: CPT | Performed by: RADIOLOGY

## 2021-01-01 PROCEDURE — 1210000000 HC MED SURG R&B

## 2021-01-01 PROCEDURE — 80162 ASSAY OF DIGOXIN TOTAL: CPT

## 2021-01-01 PROCEDURE — 71045 X-RAY EXAM CHEST 1 VIEW: CPT

## 2021-01-01 PROCEDURE — 80048 BASIC METABOLIC PNL TOTAL CA: CPT

## 2021-01-01 PROCEDURE — 2500000003 HC RX 250 WO HCPCS: Performed by: INTERNAL MEDICINE

## 2021-01-01 PROCEDURE — 83690 ASSAY OF LIPASE: CPT

## 2021-01-01 PROCEDURE — 2580000003 HC RX 258: Performed by: EMERGENCY MEDICINE

## 2021-01-01 PROCEDURE — 97167 OT EVAL HIGH COMPLEX 60 MIN: CPT

## 2021-01-01 PROCEDURE — 2000000000 HC ICU R&B

## 2021-01-01 PROCEDURE — 99231 SBSQ HOSP IP/OBS SF/LOW 25: CPT | Performed by: SPECIALIST

## 2021-01-01 PROCEDURE — 99233 SBSQ HOSP IP/OBS HIGH 50: CPT | Performed by: INTERNAL MEDICINE

## 2021-01-01 PROCEDURE — 36415 COLL VENOUS BLD VENIPUNCTURE: CPT

## 2021-01-01 PROCEDURE — 6360000002 HC RX W HCPCS: Performed by: EMERGENCY MEDICINE

## 2021-01-01 PROCEDURE — 99232 SBSQ HOSP IP/OBS MODERATE 35: CPT | Performed by: INTERNAL MEDICINE

## 2021-01-01 PROCEDURE — 87070 CULTURE OTHR SPECIMN AEROBIC: CPT

## 2021-01-01 PROCEDURE — 6360000002 HC RX W HCPCS: Performed by: RADIOLOGY

## 2021-01-01 PROCEDURE — 99223 1ST HOSP IP/OBS HIGH 75: CPT | Performed by: INTERNAL MEDICINE

## 2021-01-01 PROCEDURE — 51798 US URINE CAPACITY MEASURE: CPT

## 2021-01-01 PROCEDURE — 87205 SMEAR GRAM STAIN: CPT

## 2021-01-01 PROCEDURE — 2709999900 IR CHANGE DRAINAGE CATH

## 2021-01-01 PROCEDURE — 99285 EMERGENCY DEPT VISIT HI MDM: CPT

## 2021-01-01 PROCEDURE — 84484 ASSAY OF TROPONIN QUANT: CPT

## 2021-01-01 PROCEDURE — 6360000002 HC RX W HCPCS: Performed by: NURSE PRACTITIONER

## 2021-01-01 PROCEDURE — 94761 N-INVAS EAR/PLS OXIMETRY MLT: CPT

## 2021-01-01 PROCEDURE — 94618 PULMONARY STRESS TESTING: CPT

## 2021-01-01 PROCEDURE — 87040 BLOOD CULTURE FOR BACTERIA: CPT

## 2021-01-01 PROCEDURE — 2580000003 HC RX 258: Performed by: PHYSICIAN ASSISTANT

## 2021-01-01 PROCEDURE — 82570 ASSAY OF URINE CREATININE: CPT

## 2021-01-01 PROCEDURE — 86923 COMPATIBILITY TEST ELECTRIC: CPT

## 2021-01-01 PROCEDURE — 97166 OT EVAL MOD COMPLEX 45 MIN: CPT

## 2021-01-01 PROCEDURE — 99232 SBSQ HOSP IP/OBS MODERATE 35: CPT | Performed by: NURSE PRACTITIONER

## 2021-01-01 PROCEDURE — 2580000003 HC RX 258: Performed by: INTERNAL MEDICINE

## 2021-01-01 PROCEDURE — 85610 PROTHROMBIN TIME: CPT

## 2021-01-01 PROCEDURE — 96361 HYDRATE IV INFUSION ADD-ON: CPT

## 2021-01-01 PROCEDURE — 82945 GLUCOSE OTHER FLUID: CPT

## 2021-01-01 PROCEDURE — 2500000003 HC RX 250 WO HCPCS: Performed by: EMERGENCY MEDICINE

## 2021-01-01 PROCEDURE — 6370000000 HC RX 637 (ALT 250 FOR IP): Performed by: NURSE PRACTITIONER

## 2021-01-01 PROCEDURE — 84443 ASSAY THYROID STIM HORMONE: CPT

## 2021-01-01 PROCEDURE — 99214 OFFICE O/P EST MOD 30 MIN: CPT | Performed by: INTERNAL MEDICINE

## 2021-01-01 PROCEDURE — 83735 ASSAY OF MAGNESIUM: CPT

## 2021-01-01 PROCEDURE — 81001 URINALYSIS AUTO W/SCOPE: CPT

## 2021-01-01 PROCEDURE — P9047 ALBUMIN (HUMAN), 25%, 50ML: HCPCS | Performed by: NURSE PRACTITIONER

## 2021-01-01 PROCEDURE — 2709999900 IR US GUIDED PARACENTESIS

## 2021-01-01 PROCEDURE — G0378 HOSPITAL OBSERVATION PER HR: HCPCS

## 2021-01-01 PROCEDURE — 82533 TOTAL CORTISOL: CPT

## 2021-01-01 PROCEDURE — 88305 TISSUE EXAM BY PATHOLOGIST: CPT

## 2021-01-01 PROCEDURE — 82042 OTHER SOURCE ALBUMIN QUAN EA: CPT

## 2021-01-01 PROCEDURE — 99214 OFFICE O/P EST MOD 30 MIN: CPT | Performed by: NURSE PRACTITIONER

## 2021-01-01 PROCEDURE — 6370000000 HC RX 637 (ALT 250 FOR IP): Performed by: PHYSICIAN ASSISTANT

## 2021-01-01 PROCEDURE — 83036 HEMOGLOBIN GLYCOSYLATED A1C: CPT

## 2021-01-01 PROCEDURE — 86901 BLOOD TYPING SEROLOGIC RH(D): CPT

## 2021-01-01 PROCEDURE — 83935 ASSAY OF URINE OSMOLALITY: CPT

## 2021-01-01 PROCEDURE — 89051 BODY FLUID CELL COUNT: CPT

## 2021-01-01 PROCEDURE — 84300 ASSAY OF URINE SODIUM: CPT

## 2021-01-01 PROCEDURE — 6370000000 HC RX 637 (ALT 250 FOR IP): Performed by: EMERGENCY MEDICINE

## 2021-01-01 PROCEDURE — 84157 ASSAY OF PROTEIN OTHER: CPT

## 2021-01-01 PROCEDURE — 96374 THER/PROPH/DIAG INJ IV PUSH: CPT

## 2021-01-01 PROCEDURE — 84132 ASSAY OF SERUM POTASSIUM: CPT

## 2021-01-01 PROCEDURE — 80076 HEPATIC FUNCTION PANEL: CPT

## 2021-01-01 PROCEDURE — P9016 RBC LEUKOCYTES REDUCED: HCPCS

## 2021-01-01 PROCEDURE — 93005 ELECTROCARDIOGRAM TRACING: CPT | Performed by: EMERGENCY MEDICINE

## 2021-01-01 PROCEDURE — 87077 CULTURE AEROBIC IDENTIFY: CPT

## 2021-01-01 PROCEDURE — 93308 TTE F-UP OR LMTD: CPT

## 2021-01-01 PROCEDURE — 84560 ASSAY OF URINE/URIC ACID: CPT

## 2021-01-01 PROCEDURE — 88341 IMHCHEM/IMCYTCHM EA ADD ANTB: CPT

## 2021-01-01 PROCEDURE — 99231 SBSQ HOSP IP/OBS SF/LOW 25: CPT | Performed by: NURSE PRACTITIONER

## 2021-01-01 PROCEDURE — 49083 ABD PARACENTESIS W/IMAGING: CPT | Performed by: RADIOLOGY

## 2021-01-01 PROCEDURE — C1729 CATH, DRAINAGE: HCPCS

## 2021-01-01 PROCEDURE — 84145 PROCALCITONIN (PCT): CPT

## 2021-01-01 PROCEDURE — 93005 ELECTROCARDIOGRAM TRACING: CPT | Performed by: PHYSICIAN ASSISTANT

## 2021-01-01 PROCEDURE — 93320 DOPPLER ECHO COMPLETE: CPT

## 2021-01-01 PROCEDURE — 80069 RENAL FUNCTION PANEL: CPT

## 2021-01-01 PROCEDURE — 85730 THROMBOPLASTIN TIME PARTIAL: CPT

## 2021-01-01 PROCEDURE — 49423 EXCHANGE DRAINAGE CATHETER: CPT | Performed by: RADIOLOGY

## 2021-01-01 PROCEDURE — 83540 ASSAY OF IRON: CPT

## 2021-01-01 PROCEDURE — 99222 1ST HOSP IP/OBS MODERATE 55: CPT | Performed by: INTERNAL MEDICINE

## 2021-01-01 PROCEDURE — 74150 CT ABDOMEN W/O CONTRAST: CPT

## 2021-01-01 PROCEDURE — 74150 CT ABDOMEN W/O CONTRAST: CPT | Performed by: RADIOLOGY

## 2021-01-01 PROCEDURE — 82150 ASSAY OF AMYLASE: CPT

## 2021-01-01 PROCEDURE — 96365 THER/PROPH/DIAG IV INF INIT: CPT

## 2021-01-01 PROCEDURE — 99222 1ST HOSP IP/OBS MODERATE 55: CPT | Performed by: SPECIALIST

## 2021-01-01 PROCEDURE — 82140 ASSAY OF AMMONIA: CPT

## 2021-01-01 PROCEDURE — 87635 SARS-COV-2 COVID-19 AMP PRB: CPT

## 2021-01-01 PROCEDURE — 6360000002 HC RX W HCPCS: Performed by: PHYSICIAN ASSISTANT

## 2021-01-01 PROCEDURE — 82436 ASSAY OF URINE CHLORIDE: CPT

## 2021-01-01 PROCEDURE — 2709999900 IR GUIDED TUNNELED INTRAPERITONEAL CATH W OR WO CONTRAST PERC

## 2021-01-01 PROCEDURE — C9488 CONIVAPTAN HCL: HCPCS | Performed by: INTERNAL MEDICINE

## 2021-01-01 PROCEDURE — 2700000000 HC OXYGEN THERAPY PER DAY

## 2021-01-01 PROCEDURE — 96372 THER/PROPH/DIAG INJ SC/IM: CPT

## 2021-01-01 PROCEDURE — 84133 ASSAY OF URINE POTASSIUM: CPT

## 2021-01-01 PROCEDURE — 36430 TRANSFUSION BLD/BLD COMPNT: CPT

## 2021-01-01 PROCEDURE — 94660 CPAP INITIATION&MGMT: CPT

## 2021-01-01 PROCEDURE — 49083 ABD PARACENTESIS W/IMAGING: CPT

## 2021-01-01 PROCEDURE — 83550 IRON BINDING TEST: CPT

## 2021-01-01 PROCEDURE — 49999 UNLISTED PX ABD PERTM&OMN: CPT | Performed by: RADIOLOGY

## 2021-01-01 PROCEDURE — 97161 PT EVAL LOW COMPLEX 20 MIN: CPT

## 2021-01-01 PROCEDURE — 88112 CYTOPATH CELL ENHANCE TECH: CPT

## 2021-01-01 PROCEDURE — 83880 ASSAY OF NATRIURETIC PEPTIDE: CPT

## 2021-01-01 PROCEDURE — 87086 URINE CULTURE/COLONY COUNT: CPT

## 2021-01-01 PROCEDURE — 2500000003 HC RX 250 WO HCPCS: Performed by: PHYSICIAN ASSISTANT

## 2021-01-01 PROCEDURE — APPSS30 APP SPLIT SHARED TIME 16-30 MINUTES: Performed by: PHYSICIAN ASSISTANT

## 2021-01-01 PROCEDURE — 99284 EMERGENCY DEPT VISIT MOD MDM: CPT

## 2021-01-01 PROCEDURE — 0W9G30Z DRAINAGE OF PERITONEAL CAVITY WITH DRAINAGE DEVICE, PERCUTANEOUS APPROACH: ICD-10-PCS | Performed by: RADIOLOGY

## 2021-01-01 PROCEDURE — 97116 GAIT TRAINING THERAPY: CPT

## 2021-01-01 PROCEDURE — 93005 ELECTROCARDIOGRAM TRACING: CPT | Performed by: NURSE PRACTITIONER

## 2021-01-01 PROCEDURE — 88342 IMHCHEM/IMCYTCHM 1ST ANTB: CPT

## 2021-01-01 PROCEDURE — 87186 SC STD MICRODIL/AGAR DIL: CPT

## 2021-01-01 PROCEDURE — 97165 OT EVAL LOW COMPLEX 30 MIN: CPT

## 2021-01-01 PROCEDURE — 99283 EMERGENCY DEPT VISIT LOW MDM: CPT

## 2021-01-01 PROCEDURE — 86850 RBC ANTIBODY SCREEN: CPT

## 2021-01-01 RX ORDER — LIDOCAINE HYDROCHLORIDE 20 MG/ML
INJECTION, SOLUTION INFILTRATION; PERINEURAL
Status: COMPLETED | OUTPATIENT
Start: 2021-01-01 | End: 2021-01-01

## 2021-01-01 RX ORDER — METOPROLOL TARTRATE 5 MG/5ML
5 INJECTION INTRAVENOUS ONCE
Status: DISCONTINUED | OUTPATIENT
Start: 2021-01-01 | End: 2021-01-01 | Stop reason: HOSPADM

## 2021-01-01 RX ORDER — DIGOXIN 125 MCG
125 TABLET ORAL DAILY
Status: DISCONTINUED | OUTPATIENT
Start: 2021-01-01 | End: 2021-01-01

## 2021-01-01 RX ORDER — TOLVAPTAN 15 MG/1
15 TABLET ORAL DAILY
Status: COMPLETED | OUTPATIENT
Start: 2021-01-01 | End: 2021-01-01

## 2021-01-01 RX ORDER — 0.9 % SODIUM CHLORIDE 0.9 %
500 INTRAVENOUS SOLUTION INTRAVENOUS ONCE
Status: COMPLETED | OUTPATIENT
Start: 2021-01-01 | End: 2021-01-01

## 2021-01-01 RX ORDER — ATORVASTATIN CALCIUM 10 MG/1
10 TABLET, FILM COATED ORAL DAILY
Qty: 30 TABLET | Refills: 3 | Status: SHIPPED | OUTPATIENT
Start: 2021-01-01

## 2021-01-01 RX ORDER — PROMETHAZINE HYDROCHLORIDE 12.5 MG/1
12.5 TABLET ORAL EVERY 6 HOURS PRN
Status: DISCONTINUED | OUTPATIENT
Start: 2021-01-01 | End: 2021-01-01 | Stop reason: HOSPADM

## 2021-01-01 RX ORDER — TRANSPARENT DRESSING 4"X4 3/4"
BANDAGE TOPICAL
Qty: 1 EACH | Refills: 3 | Status: SHIPPED | OUTPATIENT
Start: 2021-01-01

## 2021-01-01 RX ORDER — ALBUMIN, HUMAN INJ 5% 5 %
50 SOLUTION INTRAVENOUS ONCE
Status: DISCONTINUED | OUTPATIENT
Start: 2021-01-01 | End: 2021-01-01

## 2021-01-01 RX ORDER — ONDANSETRON 2 MG/ML
4 INJECTION INTRAMUSCULAR; INTRAVENOUS EVERY 6 HOURS PRN
Status: DISCONTINUED | OUTPATIENT
Start: 2021-01-01 | End: 2021-01-01 | Stop reason: HOSPADM

## 2021-01-01 RX ORDER — RALOXIFENE HYDROCHLORIDE 60 MG/1
60 TABLET, FILM COATED ORAL DAILY
Status: DISCONTINUED | OUTPATIENT
Start: 2021-01-01 | End: 2021-01-01

## 2021-01-01 RX ORDER — SODIUM CHLORIDE 0.9 % (FLUSH) 0.9 %
10 SYRINGE (ML) INJECTION EVERY 12 HOURS SCHEDULED
Status: DISCONTINUED | OUTPATIENT
Start: 2021-01-01 | End: 2021-01-01 | Stop reason: HOSPADM

## 2021-01-01 RX ORDER — SODIUM POLYSTYRENE SULFONATE 15 G/60ML
45 SUSPENSION ORAL; RECTAL ONCE
Status: DISCONTINUED | OUTPATIENT
Start: 2021-01-01 | End: 2021-01-01

## 2021-01-01 RX ORDER — MORPHINE SULFATE 2 MG/ML
4 INJECTION, SOLUTION INTRAMUSCULAR; INTRAVENOUS
Status: COMPLETED | OUTPATIENT
Start: 2021-01-01 | End: 2021-01-01

## 2021-01-01 RX ORDER — METOPROLOL SUCCINATE 25 MG/1
25 TABLET, EXTENDED RELEASE ORAL 2 TIMES DAILY
Status: DISCONTINUED | OUTPATIENT
Start: 2021-01-01 | End: 2021-01-01

## 2021-01-01 RX ORDER — DILTIAZEM HYDROCHLORIDE 5 MG/ML
5 INJECTION INTRAVENOUS ONCE
Status: COMPLETED | OUTPATIENT
Start: 2021-01-01 | End: 2021-01-01

## 2021-01-01 RX ORDER — RANOLAZINE 500 MG/1
500 TABLET, EXTENDED RELEASE ORAL 2 TIMES DAILY
Status: DISCONTINUED | OUTPATIENT
Start: 2021-01-01 | End: 2021-01-01 | Stop reason: HOSPADM

## 2021-01-01 RX ORDER — ACETAMINOPHEN 650 MG/1
650 SUPPOSITORY RECTAL EVERY 6 HOURS PRN
Status: DISCONTINUED | OUTPATIENT
Start: 2021-01-01 | End: 2021-01-01 | Stop reason: HOSPADM

## 2021-01-01 RX ORDER — DEXAMETHASONE SODIUM PHOSPHATE 4 MG/ML
4 INJECTION, SOLUTION INTRA-ARTICULAR; INTRALESIONAL; INTRAMUSCULAR; INTRAVENOUS; SOFT TISSUE EVERY 12 HOURS
Status: DISCONTINUED | OUTPATIENT
Start: 2021-01-01 | End: 2021-01-01

## 2021-01-01 RX ORDER — RALOXIFENE HYDROCHLORIDE 60 MG/1
1 TABLET, FILM COATED ORAL DAILY
Status: DISCONTINUED | OUTPATIENT
Start: 2021-01-01 | End: 2021-01-01

## 2021-01-01 RX ORDER — ATORVASTATIN CALCIUM 40 MG/1
40 TABLET, FILM COATED ORAL DAILY
Status: DISCONTINUED | OUTPATIENT
Start: 2021-01-01 | End: 2021-01-01

## 2021-01-01 RX ORDER — BUMETANIDE 1 MG/1
2 TABLET ORAL DAILY
Status: ON HOLD | COMMUNITY
Start: 2020-01-01 | End: 2021-01-01 | Stop reason: HOSPADM

## 2021-01-01 RX ORDER — ALBUMIN (HUMAN) 12.5 G/50ML
25 SOLUTION INTRAVENOUS ONCE
Status: COMPLETED | OUTPATIENT
Start: 2021-01-01 | End: 2021-01-01

## 2021-01-01 RX ORDER — ACETAMINOPHEN 325 MG/1
650 TABLET ORAL EVERY 6 HOURS PRN
Status: DISCONTINUED | OUTPATIENT
Start: 2021-01-01 | End: 2021-01-01 | Stop reason: HOSPADM

## 2021-01-01 RX ORDER — SODIUM POLYSTYRENE SULFONATE 15 G/60ML
15 SUSPENSION ORAL; RECTAL ONCE
Status: COMPLETED | OUTPATIENT
Start: 2021-01-01 | End: 2021-01-01

## 2021-01-01 RX ORDER — CALCIUM CHLORIDE 100 MG/ML
1000 INJECTION INTRAVENOUS; INTRAVENTRICULAR ONCE
Status: COMPLETED | OUTPATIENT
Start: 2021-01-01 | End: 2021-01-01

## 2021-01-01 RX ORDER — NICOTINE POLACRILEX 4 MG
15 LOZENGE BUCCAL PRN
Status: DISCONTINUED | OUTPATIENT
Start: 2021-01-01 | End: 2021-01-01

## 2021-01-01 RX ORDER — MIDODRINE HYDROCHLORIDE 5 MG/1
10 TABLET ORAL
Status: DISCONTINUED | OUTPATIENT
Start: 2021-01-01 | End: 2021-01-01 | Stop reason: HOSPADM

## 2021-01-01 RX ORDER — ACETAMINOPHEN 325 MG/1
650 TABLET ORAL EVERY 6 HOURS PRN
Status: CANCELLED | OUTPATIENT
Start: 2021-01-01

## 2021-01-01 RX ORDER — METOPROLOL SUCCINATE 100 MG/1
50 TABLET, EXTENDED RELEASE ORAL 2 TIMES DAILY
Qty: 15 TABLET | Refills: 0 | Status: ON HOLD | OUTPATIENT
Start: 2021-01-01 | End: 2021-01-01

## 2021-01-01 RX ORDER — DEXTROSE MONOHYDRATE 50 MG/ML
100 INJECTION, SOLUTION INTRAVENOUS PRN
Status: DISCONTINUED | OUTPATIENT
Start: 2021-01-01 | End: 2021-01-01 | Stop reason: HOSPADM

## 2021-01-01 RX ORDER — TRAZODONE HYDROCHLORIDE 50 MG/1
25 TABLET ORAL NIGHTLY PRN
Status: DISCONTINUED | OUTPATIENT
Start: 2021-01-01 | End: 2021-01-01 | Stop reason: HOSPADM

## 2021-01-01 RX ORDER — METOPROLOL SUCCINATE 25 MG/1
12.5 TABLET, EXTENDED RELEASE ORAL 2 TIMES DAILY
Status: DISCONTINUED | OUTPATIENT
Start: 2021-01-01 | End: 2021-01-01 | Stop reason: HOSPADM

## 2021-01-01 RX ORDER — DEXTROSE MONOHYDRATE 25 G/50ML
12.5 INJECTION, SOLUTION INTRAVENOUS PRN
Status: DISCONTINUED | OUTPATIENT
Start: 2021-01-01 | End: 2021-01-01 | Stop reason: HOSPADM

## 2021-01-01 RX ORDER — NICOTINE POLACRILEX 4 MG
15 LOZENGE BUCCAL PRN
Status: DISCONTINUED | OUTPATIENT
Start: 2021-01-01 | End: 2021-01-01 | Stop reason: HOSPADM

## 2021-01-01 RX ORDER — SODIUM CHLORIDE 9 MG/ML
INJECTION, SOLUTION INTRAVENOUS CONTINUOUS
Status: DISPENSED | OUTPATIENT
Start: 2021-01-01 | End: 2021-01-01

## 2021-01-01 RX ORDER — METOPROLOL SUCCINATE 25 MG/1
12.5 TABLET, EXTENDED RELEASE ORAL 2 TIMES DAILY
Status: CANCELLED | OUTPATIENT
Start: 2021-01-01

## 2021-01-01 RX ORDER — DEXTROSE MONOHYDRATE 50 MG/ML
100 INJECTION, SOLUTION INTRAVENOUS PRN
Status: CANCELLED | OUTPATIENT
Start: 2021-01-01

## 2021-01-01 RX ORDER — SODIUM CHLORIDE 0.9 % (FLUSH) 0.9 %
10 SYRINGE (ML) INJECTION PRN
Status: DISCONTINUED | OUTPATIENT
Start: 2021-01-01 | End: 2021-01-01 | Stop reason: HOSPADM

## 2021-01-01 RX ORDER — ATORVASTATIN CALCIUM 10 MG/1
10 TABLET, FILM COATED ORAL DAILY
Status: CANCELLED | OUTPATIENT
Start: 2021-01-01

## 2021-01-01 RX ORDER — ALBUMIN (HUMAN) 12.5 G/50ML
50 SOLUTION INTRAVENOUS ONCE
Status: COMPLETED | OUTPATIENT
Start: 2021-01-01 | End: 2021-01-01

## 2021-01-01 RX ORDER — DEXTROSE MONOHYDRATE 25 G/50ML
25 INJECTION, SOLUTION INTRAVENOUS ONCE
Status: COMPLETED | OUTPATIENT
Start: 2021-01-01 | End: 2021-01-01

## 2021-01-01 RX ORDER — NICOTINE POLACRILEX 4 MG
15 LOZENGE BUCCAL PRN
Status: CANCELLED | OUTPATIENT
Start: 2021-01-01

## 2021-01-01 RX ORDER — NITROGLYCERIN 0.4 MG/1
TABLET SUBLINGUAL
Qty: 25 TABLET | Refills: 3 | Status: SHIPPED | OUTPATIENT
Start: 2021-01-01

## 2021-01-01 RX ORDER — POLYETHYLENE GLYCOL 3350 17 G/17G
17 POWDER, FOR SOLUTION ORAL DAILY PRN
Status: DISCONTINUED | OUTPATIENT
Start: 2021-01-01 | End: 2021-01-01 | Stop reason: HOSPADM

## 2021-01-01 RX ORDER — METOPROLOL SUCCINATE 100 MG/1
100 TABLET, EXTENDED RELEASE ORAL 2 TIMES DAILY
COMMUNITY
Start: 2020-01-01 | End: 2021-01-01 | Stop reason: SDUPTHER

## 2021-01-01 RX ORDER — 0.9 % SODIUM CHLORIDE 0.9 %
250 INTRAVENOUS SOLUTION INTRAVENOUS ONCE
Status: COMPLETED | OUTPATIENT
Start: 2021-01-01 | End: 2021-01-01

## 2021-01-01 RX ORDER — SODIUM CHLORIDE 0.9 % (FLUSH) 0.9 %
10 SYRINGE (ML) INJECTION PRN
Status: CANCELLED | OUTPATIENT
Start: 2021-01-01

## 2021-01-01 RX ORDER — MIDODRINE HYDROCHLORIDE 5 MG/1
5 TABLET ORAL
Status: DISCONTINUED | OUTPATIENT
Start: 2021-01-01 | End: 2021-01-01

## 2021-01-01 RX ORDER — METOPROLOL SUCCINATE 50 MG/1
50 TABLET, EXTENDED RELEASE ORAL 2 TIMES DAILY
Status: DISCONTINUED | OUTPATIENT
Start: 2021-01-01 | End: 2021-01-01 | Stop reason: HOSPADM

## 2021-01-01 RX ORDER — DIPHENHYDRAMINE HCL 25 MG
CAPSULE ORAL
Qty: 1 EACH | Refills: 3 | Status: SHIPPED | OUTPATIENT
Start: 2021-01-01 | End: 2021-01-01 | Stop reason: SDUPTHER

## 2021-01-01 RX ORDER — ALBUMIN (HUMAN) 12.5 G/50ML
SOLUTION INTRAVENOUS CONTINUOUS PRN
Status: DISCONTINUED | OUTPATIENT
Start: 2021-01-01 | End: 2021-01-01 | Stop reason: HOSPADM

## 2021-01-01 RX ORDER — ATORVASTATIN CALCIUM 10 MG/1
10 TABLET, FILM COATED ORAL DAILY
Status: DISCONTINUED | OUTPATIENT
Start: 2021-01-01 | End: 2021-01-01 | Stop reason: HOSPADM

## 2021-01-01 RX ORDER — SODIUM CHLORIDE 0.9 % (FLUSH) 0.9 %
10 SYRINGE (ML) INJECTION EVERY 12 HOURS SCHEDULED
Status: CANCELLED | OUTPATIENT
Start: 2021-01-01

## 2021-01-01 RX ORDER — MIDODRINE HYDROCHLORIDE 5 MG/1
10 TABLET ORAL
Status: CANCELLED | OUTPATIENT
Start: 2021-01-01

## 2021-01-01 RX ORDER — METOPROLOL TARTRATE 50 MG/1
100 TABLET, FILM COATED ORAL 2 TIMES DAILY
Qty: 120 TABLET | Refills: 5 | Status: SHIPPED | OUTPATIENT
Start: 2021-01-01 | End: 2021-01-01 | Stop reason: SDUPTHER

## 2021-01-01 RX ORDER — 0.9 % SODIUM CHLORIDE 0.9 %
1000 INTRAVENOUS SOLUTION INTRAVENOUS ONCE
Status: COMPLETED | OUTPATIENT
Start: 2021-01-01 | End: 2021-01-01

## 2021-01-01 RX ORDER — METOPROLOL SUCCINATE 25 MG/1
12.5 TABLET, EXTENDED RELEASE ORAL 2 TIMES DAILY
Qty: 30 TABLET | Refills: 3 | Status: SHIPPED | OUTPATIENT
Start: 2021-01-01

## 2021-01-01 RX ORDER — DEXTROSE MONOHYDRATE 25 G/50ML
12.5 INJECTION, SOLUTION INTRAVENOUS PRN
Status: CANCELLED | OUTPATIENT
Start: 2021-01-01

## 2021-01-01 RX ORDER — DEXTROSE MONOHYDRATE 25 G/50ML
12.5 INJECTION, SOLUTION INTRAVENOUS PRN
Status: DISCONTINUED | OUTPATIENT
Start: 2021-01-01 | End: 2021-01-01

## 2021-01-01 RX ORDER — PROMETHAZINE HYDROCHLORIDE 12.5 MG/1
12.5 TABLET ORAL EVERY 6 HOURS PRN
Status: CANCELLED | OUTPATIENT
Start: 2021-01-01

## 2021-01-01 RX ORDER — RALOXIFENE HYDROCHLORIDE 60 MG/1
60 TABLET, FILM COATED ORAL DAILY
Status: DISCONTINUED | OUTPATIENT
Start: 2021-01-01 | End: 2021-01-01 | Stop reason: CLARIF

## 2021-01-01 RX ORDER — FLUDROCORTISONE ACETATE 0.1 MG/1
0.1 TABLET ORAL DAILY
Status: CANCELLED | OUTPATIENT
Start: 2021-01-01

## 2021-01-01 RX ORDER — POLYETHYLENE GLYCOL 3350 17 G/17G
17 POWDER, FOR SOLUTION ORAL DAILY PRN
Status: CANCELLED | OUTPATIENT
Start: 2021-01-01

## 2021-01-01 RX ORDER — NICOTINE POLACRILEX 2 MG
1 LOZENGE BUCCAL 4 TIMES DAILY PRN
Qty: 6 EACH | Refills: 3 | Status: SHIPPED | OUTPATIENT
Start: 2021-01-01

## 2021-01-01 RX ORDER — ATORVASTATIN CALCIUM 10 MG/1
10 TABLET, FILM COATED ORAL DAILY
Qty: 30 TABLET | Refills: 3 | Status: SHIPPED | OUTPATIENT
Start: 2021-01-01 | End: 2021-01-01 | Stop reason: SDUPTHER

## 2021-01-01 RX ORDER — DIGOXIN 0.25 MG/ML
500 INJECTION INTRAMUSCULAR; INTRAVENOUS ONCE
Status: COMPLETED | OUTPATIENT
Start: 2021-01-01 | End: 2021-01-01

## 2021-01-01 RX ORDER — SODIUM CHLORIDE 0.9 % (FLUSH) 0.9 %
3 SYRINGE (ML) INJECTION EVERY 8 HOURS
Status: DISCONTINUED | OUTPATIENT
Start: 2021-01-01 | End: 2021-01-01

## 2021-01-01 RX ORDER — RANOLAZINE 500 MG/1
500 TABLET, EXTENDED RELEASE ORAL 2 TIMES DAILY
Qty: 60 TABLET | Refills: 3 | Status: SHIPPED | OUTPATIENT
Start: 2021-01-01

## 2021-01-01 RX ORDER — RANOLAZINE 500 MG/1
500 TABLET, EXTENDED RELEASE ORAL 2 TIMES DAILY
Status: CANCELLED | OUTPATIENT
Start: 2021-01-01

## 2021-01-01 RX ORDER — COSYNTROPIN 0.25 MG/ML
250 INJECTION, POWDER, FOR SOLUTION INTRAMUSCULAR; INTRAVENOUS ONCE
Status: COMPLETED | OUTPATIENT
Start: 2021-01-01 | End: 2021-01-01

## 2021-01-01 RX ORDER — SODIUM CHLORIDE 9 MG/ML
25 INJECTION, SOLUTION INTRAVENOUS PRN
Status: CANCELLED | OUTPATIENT
Start: 2021-01-01

## 2021-01-01 RX ORDER — SODIUM POLYSTYRENE SULFONATE 15 G/60ML
45 SUSPENSION ORAL; RECTAL ONCE
Status: COMPLETED | OUTPATIENT
Start: 2021-01-01 | End: 2021-01-01

## 2021-01-01 RX ORDER — TRAZODONE HYDROCHLORIDE 50 MG/1
25 TABLET ORAL NIGHTLY PRN
Qty: 30 TABLET | Refills: 1 | Status: SHIPPED | OUTPATIENT
Start: 2021-01-01

## 2021-01-01 RX ORDER — MIDODRINE HYDROCHLORIDE 10 MG/1
10 TABLET ORAL
Qty: 90 TABLET | Refills: 3 | Status: SHIPPED | OUTPATIENT
Start: 2021-01-01

## 2021-01-01 RX ORDER — DIGOXIN 125 MCG
125 TABLET ORAL DAILY
Status: DISCONTINUED | OUTPATIENT
Start: 2021-01-01 | End: 2021-01-01 | Stop reason: HOSPADM

## 2021-01-01 RX ORDER — FEEDER CONTAINER WITH PUMP SET
1 EACH MISCELLANEOUS 3 TIMES DAILY
Qty: 270 CAN | Refills: 5 | Status: SHIPPED | OUTPATIENT
Start: 2021-01-01

## 2021-01-01 RX ORDER — METOPROLOL SUCCINATE 100 MG/1
50 TABLET, EXTENDED RELEASE ORAL 2 TIMES DAILY
Qty: 30 TABLET | Refills: 0 | Status: SHIPPED | OUTPATIENT
Start: 2021-01-01 | End: 2021-01-01 | Stop reason: SDUPTHER

## 2021-01-01 RX ORDER — DIPHENHYDRAMINE HCL 25 MG
CAPSULE ORAL
Qty: 5 EACH | Refills: 3 | Status: SHIPPED | OUTPATIENT
Start: 2021-01-01

## 2021-01-01 RX ORDER — SODIUM CHLORIDE 9 MG/ML
INJECTION, SOLUTION INTRAVENOUS PRN
Status: DISCONTINUED | OUTPATIENT
Start: 2021-01-01 | End: 2021-01-01 | Stop reason: HOSPADM

## 2021-01-01 RX ORDER — DEXTROSE MONOHYDRATE 50 MG/ML
100 INJECTION, SOLUTION INTRAVENOUS PRN
Status: DISCONTINUED | OUTPATIENT
Start: 2021-01-01 | End: 2021-01-01

## 2021-01-01 RX ORDER — METOPROLOL SUCCINATE 100 MG/1
50 TABLET, EXTENDED RELEASE ORAL 2 TIMES DAILY
Qty: 90 TABLET | Refills: 2 | Status: ON HOLD | OUTPATIENT
Start: 2021-01-01 | End: 2021-01-01 | Stop reason: HOSPADM

## 2021-01-01 RX ORDER — NITROGLYCERIN 0.4 MG/1
0.4 TABLET SUBLINGUAL EVERY 5 MIN PRN
Status: DISCONTINUED | OUTPATIENT
Start: 2021-01-01 | End: 2021-01-01 | Stop reason: HOSPADM

## 2021-01-01 RX ORDER — ALBUMIN (HUMAN) 12.5 G/50ML
SOLUTION INTRAVENOUS CONTINUOUS PRN
Status: COMPLETED | OUTPATIENT
Start: 2021-01-01 | End: 2021-01-01

## 2021-01-01 RX ORDER — SODIUM CHLORIDE 9 MG/ML
25 INJECTION, SOLUTION INTRAVENOUS PRN
Status: DISCONTINUED | OUTPATIENT
Start: 2021-01-01 | End: 2021-01-01 | Stop reason: HOSPADM

## 2021-01-01 RX ORDER — ONDANSETRON 2 MG/ML
4 INJECTION INTRAMUSCULAR; INTRAVENOUS EVERY 6 HOURS PRN
Status: CANCELLED | OUTPATIENT
Start: 2021-01-01

## 2021-01-01 RX ORDER — SODIUM POLYSTYRENE SULFONATE 15 G/60ML
30 SUSPENSION ORAL; RECTAL ONCE
Status: COMPLETED | OUTPATIENT
Start: 2021-01-01 | End: 2021-01-01

## 2021-01-01 RX ORDER — MIDODRINE HYDROCHLORIDE 5 MG/1
5 TABLET ORAL 3 TIMES DAILY
Qty: 90 TABLET | Refills: 3 | Status: ON HOLD | OUTPATIENT
Start: 2021-01-01 | End: 2021-01-01 | Stop reason: HOSPADM

## 2021-01-01 RX ORDER — FLUDROCORTISONE ACETATE 0.1 MG/1
0.1 TABLET ORAL DAILY
Status: DISCONTINUED | OUTPATIENT
Start: 2021-01-01 | End: 2021-01-01 | Stop reason: HOSPADM

## 2021-01-01 RX ORDER — ACETAMINOPHEN 650 MG/1
650 SUPPOSITORY RECTAL EVERY 6 HOURS PRN
Status: CANCELLED | OUTPATIENT
Start: 2021-01-01

## 2021-01-01 RX ORDER — MORPHINE SULFATE 2 MG/ML
2 INJECTION, SOLUTION INTRAMUSCULAR; INTRAVENOUS EVERY 4 HOURS PRN
Status: DISCONTINUED | OUTPATIENT
Start: 2021-01-01 | End: 2021-01-01

## 2021-01-01 RX ORDER — DIGOXIN 125 MCG
125 TABLET ORAL DAILY
Qty: 30 TABLET | Refills: 3 | Status: ON HOLD | OUTPATIENT
Start: 2021-01-01 | End: 2021-01-01 | Stop reason: HOSPADM

## 2021-01-01 RX ORDER — FEEDER CONTAINER WITH PUMP SET
1 EACH MISCELLANEOUS 3 TIMES DAILY
Status: DISCONTINUED | OUTPATIENT
Start: 2021-01-01 | End: 2021-01-01 | Stop reason: CLARIF

## 2021-01-01 RX ADMIN — CONIVAPTAN HYDROCHLORIDE 20 MG: 20 INJECTION, SOLUTION INTRAVENOUS at 13:56

## 2021-01-01 RX ADMIN — SODIUM POLYSTYRENE SULFONATE 45 G: 15 SUSPENSION ORAL; RECTAL at 18:16

## 2021-01-01 RX ADMIN — MIDODRINE HYDROCHLORIDE 10 MG: 5 TABLET ORAL at 11:18

## 2021-01-01 RX ADMIN — DIGOXIN 500 MCG: 250 INJECTION, SOLUTION INTRAMUSCULAR; INTRAVENOUS; PARENTERAL at 18:14

## 2021-01-01 RX ADMIN — DEXTROSE MONOHYDRATE 25 G: 25 INJECTION, SOLUTION INTRAVENOUS at 17:42

## 2021-01-01 RX ADMIN — SODIUM CHLORIDE, PRESERVATIVE FREE 10 ML: 5 INJECTION INTRAVENOUS at 19:36

## 2021-01-01 RX ADMIN — DEXTROSE MONOHYDRATE 150 MG: 50 INJECTION, SOLUTION INTRAVENOUS at 19:23

## 2021-01-01 RX ADMIN — SODIUM BICARBONATE: 84 INJECTION, SOLUTION INTRAVENOUS at 03:14

## 2021-01-01 RX ADMIN — METOPROLOL SUCCINATE 12.5 MG: 25 TABLET, EXTENDED RELEASE ORAL at 08:41

## 2021-01-01 RX ADMIN — ALBUMIN (HUMAN) 50 G: 0.25 INJECTION, SOLUTION INTRAVENOUS at 10:55

## 2021-01-01 RX ADMIN — RANOLAZINE 500 MG: 500 TABLET, FILM COATED, EXTENDED RELEASE ORAL at 08:07

## 2021-01-01 RX ADMIN — COSYNTROPIN 250 MCG: 0.25 INJECTION, POWDER, LYOPHILIZED, FOR SOLUTION INTRAMUSCULAR; INTRAVENOUS at 10:59

## 2021-01-01 RX ADMIN — SODIUM CHLORIDE 1000 ML: 9 INJECTION, SOLUTION INTRAVENOUS at 16:21

## 2021-01-01 RX ADMIN — CALCIUM GLUCONATE 1000 MG: 98 INJECTION, SOLUTION INTRAVENOUS at 04:21

## 2021-01-01 RX ADMIN — LIDOCAINE HYDROCHLORIDE 8 ML: 20 INJECTION, SOLUTION INFILTRATION; PERINEURAL at 12:29

## 2021-01-01 RX ADMIN — ENOXAPARIN SODIUM 30 MG: 30 INJECTION SUBCUTANEOUS at 08:51

## 2021-01-01 RX ADMIN — ACETAMINOPHEN 650 MG: 325 TABLET ORAL at 22:49

## 2021-01-01 RX ADMIN — ALBUMIN (HUMAN) 50 G: 0.25 INJECTION, SOLUTION INTRAVENOUS at 14:01

## 2021-01-01 RX ADMIN — SODIUM ZIRCONIUM CYCLOSILICATE 10 G: 5 POWDER, FOR SUSPENSION ORAL at 08:08

## 2021-01-01 RX ADMIN — INSULIN HUMAN 10 UNITS: 100 INJECTION, SOLUTION PARENTERAL at 02:25

## 2021-01-01 RX ADMIN — MIDODRINE HYDROCHLORIDE 10 MG: 5 TABLET ORAL at 12:08

## 2021-01-01 RX ADMIN — MIDODRINE HYDROCHLORIDE 10 MG: 5 TABLET ORAL at 08:40

## 2021-01-01 RX ADMIN — SODIUM CHLORIDE, PRESERVATIVE FREE 10 ML: 5 INJECTION INTRAVENOUS at 08:48

## 2021-01-01 RX ADMIN — SODIUM CHLORIDE, PRESERVATIVE FREE 10 ML: 5 INJECTION INTRAVENOUS at 13:13

## 2021-01-01 RX ADMIN — ALBUMIN (HUMAN) 50 G: 0.25 INJECTION, SOLUTION INTRAVENOUS at 14:06

## 2021-01-01 RX ADMIN — MIDODRINE HYDROCHLORIDE 5 MG: 5 TABLET ORAL at 18:29

## 2021-01-01 RX ADMIN — METOPROLOL SUCCINATE 12.5 MG: 25 TABLET, EXTENDED RELEASE ORAL at 21:20

## 2021-01-01 RX ADMIN — ATORVASTATIN CALCIUM 10 MG: 10 TABLET, FILM COATED ORAL at 21:23

## 2021-01-01 RX ADMIN — ENOXAPARIN SODIUM 30 MG: 30 INJECTION SUBCUTANEOUS at 08:39

## 2021-01-01 RX ADMIN — MIDODRINE HYDROCHLORIDE 10 MG: 5 TABLET ORAL at 15:59

## 2021-01-01 RX ADMIN — SODIUM CHLORIDE 1000 ML: 9 INJECTION, SOLUTION INTRAVENOUS at 17:42

## 2021-01-01 RX ADMIN — MIDODRINE HYDROCHLORIDE 5 MG: 5 TABLET ORAL at 12:59

## 2021-01-01 RX ADMIN — SODIUM BICARBONATE: 84 INJECTION, SOLUTION INTRAVENOUS at 13:26

## 2021-01-01 RX ADMIN — DIGOXIN 125 MCG: 125 TABLET ORAL at 08:40

## 2021-01-01 RX ADMIN — FLUDROCORTISONE ACETATE 0.1 MG: 0.1 TABLET ORAL at 08:33

## 2021-01-01 RX ADMIN — SODIUM CHLORIDE, PRESERVATIVE FREE 10 ML: 5 INJECTION INTRAVENOUS at 08:29

## 2021-01-01 RX ADMIN — VANCOMYCIN HYDROCHLORIDE 1000 MG: 1 INJECTION, POWDER, LYOPHILIZED, FOR SOLUTION INTRAVENOUS at 10:38

## 2021-01-01 RX ADMIN — METOPROLOL SUCCINATE 50 MG: 50 TABLET, EXTENDED RELEASE ORAL at 12:29

## 2021-01-01 RX ADMIN — MIDODRINE HYDROCHLORIDE 10 MG: 5 TABLET ORAL at 08:33

## 2021-01-01 RX ADMIN — DIGOXIN 125 MCG: 125 TABLET ORAL at 13:43

## 2021-01-01 RX ADMIN — ENOXAPARIN SODIUM 30 MG: 30 INJECTION SUBCUTANEOUS at 12:59

## 2021-01-01 RX ADMIN — MORPHINE SULFATE 2 MG: 2 INJECTION, SOLUTION INTRAMUSCULAR; INTRAVENOUS at 17:23

## 2021-01-01 RX ADMIN — SODIUM POLYSTYRENE SULFONATE 30 G: 15 SUSPENSION ORAL; RECTAL at 17:41

## 2021-01-01 RX ADMIN — DEXTROSE MONOHYDRATE 25 G: 25 INJECTION, SOLUTION INTRAVENOUS at 22:33

## 2021-01-01 RX ADMIN — MIDODRINE HYDROCHLORIDE 10 MG: 5 TABLET ORAL at 10:29

## 2021-01-01 RX ADMIN — ALBUMIN (HUMAN) 25 G: 0.25 INJECTION, SOLUTION INTRAVENOUS at 16:06

## 2021-01-01 RX ADMIN — INSULIN HUMAN 10 UNITS: 100 INJECTION, SOLUTION PARENTERAL at 17:42

## 2021-01-01 RX ADMIN — ENOXAPARIN SODIUM 30 MG: 30 INJECTION SUBCUTANEOUS at 10:29

## 2021-01-01 RX ADMIN — SODIUM CHLORIDE 250 ML: 9 INJECTION, SOLUTION INTRAVENOUS at 16:28

## 2021-01-01 RX ADMIN — MIDODRINE HYDROCHLORIDE 10 MG: 5 TABLET ORAL at 08:41

## 2021-01-01 RX ADMIN — LIDOCAINE HYDROCHLORIDE 5 ML: 20 INJECTION, SOLUTION INFILTRATION; PERINEURAL at 16:01

## 2021-01-01 RX ADMIN — SODIUM ZIRCONIUM CYCLOSILICATE 10 G: 10 POWDER, FOR SUSPENSION ORAL at 04:24

## 2021-01-01 RX ADMIN — MIDODRINE HYDROCHLORIDE 10 MG: 5 TABLET ORAL at 16:46

## 2021-01-01 RX ADMIN — RANOLAZINE 500 MG: 500 TABLET, FILM COATED, EXTENDED RELEASE ORAL at 08:33

## 2021-01-01 RX ADMIN — RANOLAZINE 500 MG: 500 TABLET, FILM COATED, EXTENDED RELEASE ORAL at 20:19

## 2021-01-01 RX ADMIN — RANOLAZINE 500 MG: 500 TABLET, FILM COATED, EXTENDED RELEASE ORAL at 13:43

## 2021-01-01 RX ADMIN — ALBUMIN (HUMAN) 25 G: 0.25 INJECTION, SOLUTION INTRAVENOUS at 08:21

## 2021-01-01 RX ADMIN — FLUDROCORTISONE ACETATE 0.1 MG: 0.1 TABLET ORAL at 08:40

## 2021-01-01 RX ADMIN — ATORVASTATIN CALCIUM 10 MG: 10 TABLET, FILM COATED ORAL at 08:07

## 2021-01-01 RX ADMIN — MIDODRINE HYDROCHLORIDE 10 MG: 5 TABLET ORAL at 08:42

## 2021-01-01 RX ADMIN — CALCIUM CHLORIDE 1000 MG: 100 INJECTION, SOLUTION INTRAVENOUS at 22:31

## 2021-01-01 RX ADMIN — RANOLAZINE 500 MG: 500 TABLET, FILM COATED, EXTENDED RELEASE ORAL at 21:20

## 2021-01-01 RX ADMIN — METOPROLOL SUCCINATE 12.5 MG: 25 TABLET, EXTENDED RELEASE ORAL at 10:29

## 2021-01-01 RX ADMIN — SODIUM BICARBONATE 50 MEQ: 84 INJECTION, SOLUTION INTRAVENOUS at 22:33

## 2021-01-01 RX ADMIN — SODIUM POLYSTYRENE SULFONATE 30 G: 15 SUSPENSION ORAL; RECTAL at 23:16

## 2021-01-01 RX ADMIN — SODIUM BICARBONATE: 84 INJECTION, SOLUTION INTRAVENOUS at 15:19

## 2021-01-01 RX ADMIN — SODIUM CHLORIDE, PRESERVATIVE FREE 10 ML: 5 INJECTION INTRAVENOUS at 09:42

## 2021-01-01 RX ADMIN — LIDOCAINE HYDROCHLORIDE 10 ML: 20 INJECTION, SOLUTION INFILTRATION; PERINEURAL at 10:03

## 2021-01-01 RX ADMIN — FLUDROCORTISONE ACETATE 0.1 MG: 0.1 TABLET ORAL at 21:20

## 2021-01-01 RX ADMIN — DARBEPOETIN ALFA 40 MCG: 40 SOLUTION INTRAVENOUS; SUBCUTANEOUS at 13:46

## 2021-01-01 RX ADMIN — METOPROLOL SUCCINATE 12.5 MG: 25 TABLET, EXTENDED RELEASE ORAL at 20:34

## 2021-01-01 RX ADMIN — SODIUM CHLORIDE, PRESERVATIVE FREE 10 ML: 5 INJECTION INTRAVENOUS at 20:04

## 2021-01-01 RX ADMIN — ALBUMIN (HUMAN) 50 G: 0.25 INJECTION, SOLUTION INTRAVENOUS at 11:31

## 2021-01-01 RX ADMIN — ATORVASTATIN CALCIUM 10 MG: 10 TABLET, FILM COATED ORAL at 09:42

## 2021-01-01 RX ADMIN — METOPROLOL SUCCINATE 12.5 MG: 25 TABLET, EXTENDED RELEASE ORAL at 20:19

## 2021-01-01 RX ADMIN — INSULIN LISPRO 3 UNITS: 100 INJECTION, SOLUTION INTRAVENOUS; SUBCUTANEOUS at 17:52

## 2021-01-01 RX ADMIN — INSULIN HUMAN 4 UNITS: 100 INJECTION, SOLUTION PARENTERAL at 22:42

## 2021-01-01 RX ADMIN — METOPROLOL SUCCINATE 50 MG: 50 TABLET, EXTENDED RELEASE ORAL at 20:38

## 2021-01-01 RX ADMIN — DEXTROSE MONOHYDRATE 25 G: 25 INJECTION, SOLUTION INTRAVENOUS at 02:23

## 2021-01-01 RX ADMIN — MIDODRINE HYDROCHLORIDE 10 MG: 5 TABLET ORAL at 08:07

## 2021-01-01 RX ADMIN — SODIUM CHLORIDE: 9 INJECTION, SOLUTION INTRAVENOUS at 18:13

## 2021-01-01 RX ADMIN — ALBUMIN (HUMAN) 25 G: 0.25 INJECTION, SOLUTION INTRAVENOUS at 21:33

## 2021-01-01 RX ADMIN — LIDOCAINE HYDROCHLORIDE 10 ML: 20 INJECTION, SOLUTION INFILTRATION; PERINEURAL at 09:59

## 2021-01-01 RX ADMIN — CONIVAPTAN HYDROCHLORIDE 20 MG: 20 INJECTION, SOLUTION INTRAVENOUS at 10:46

## 2021-01-01 RX ADMIN — INSULIN HUMAN 10 UNITS: 100 INJECTION, SOLUTION PARENTERAL at 02:59

## 2021-01-01 RX ADMIN — RANOLAZINE 500 MG: 500 TABLET, FILM COATED, EXTENDED RELEASE ORAL at 08:40

## 2021-01-01 RX ADMIN — METOPROLOL SUCCINATE 50 MG: 50 TABLET, EXTENDED RELEASE ORAL at 09:42

## 2021-01-01 RX ADMIN — ACETAMINOPHEN 650 MG: 325 TABLET ORAL at 19:34

## 2021-01-01 RX ADMIN — METOPROLOL SUCCINATE 12.5 MG: 25 TABLET, EXTENDED RELEASE ORAL at 08:42

## 2021-01-01 RX ADMIN — MIDODRINE HYDROCHLORIDE 10 MG: 5 TABLET ORAL at 13:02

## 2021-01-01 RX ADMIN — CALCIUM GLUCONATE 1000 MG: 98 INJECTION, SOLUTION INTRAVENOUS at 16:23

## 2021-01-01 RX ADMIN — SODIUM CHLORIDE, PRESERVATIVE FREE 10 ML: 5 INJECTION INTRAVENOUS at 20:19

## 2021-01-01 RX ADMIN — SODIUM BICARBONATE: 84 INJECTION, SOLUTION INTRAVENOUS at 02:58

## 2021-01-01 RX ADMIN — Medication 15 MG: at 11:15

## 2021-01-01 RX ADMIN — SODIUM CHLORIDE 500 ML: 9 INJECTION, SOLUTION INTRAVENOUS at 01:46

## 2021-01-01 RX ADMIN — SODIUM CHLORIDE, PRESERVATIVE FREE 10 ML: 5 INJECTION INTRAVENOUS at 00:59

## 2021-01-01 RX ADMIN — ALBUMIN (HUMAN) 25 G: 0.25 INJECTION, SOLUTION INTRAVENOUS at 16:20

## 2021-01-01 RX ADMIN — METOPROLOL SUCCINATE 12.5 MG: 25 TABLET, EXTENDED RELEASE ORAL at 20:03

## 2021-01-01 RX ADMIN — DIGOXIN 125 MCG: 125 TABLET ORAL at 10:29

## 2021-01-01 RX ADMIN — MIDODRINE HYDROCHLORIDE 10 MG: 5 TABLET ORAL at 11:56

## 2021-01-01 RX ADMIN — OVINE DIGOXIN IMMUNE FAB 160 MG: 40 INJECTION, POWDER, FOR SOLUTION INTRAVENOUS at 16:00

## 2021-01-01 RX ADMIN — SODIUM POLYSTYRENE SULFONATE 15 G: 15 SUSPENSION ORAL; RECTAL at 02:24

## 2021-01-01 RX ADMIN — MIDODRINE HYDROCHLORIDE 10 MG: 5 TABLET ORAL at 13:01

## 2021-01-01 RX ADMIN — RANOLAZINE 500 MG: 500 TABLET, FILM COATED, EXTENDED RELEASE ORAL at 08:42

## 2021-01-01 RX ADMIN — ATORVASTATIN CALCIUM 10 MG: 10 TABLET, FILM COATED ORAL at 08:40

## 2021-01-01 RX ADMIN — SODIUM ZIRCONIUM CYCLOSILICATE 10 G: 10 POWDER, FOR SUSPENSION ORAL at 14:32

## 2021-01-01 RX ADMIN — MIDODRINE HYDROCHLORIDE 5 MG: 5 TABLET ORAL at 18:21

## 2021-01-01 RX ADMIN — SODIUM CHLORIDE, PRESERVATIVE FREE 10 ML: 5 INJECTION INTRAVENOUS at 20:39

## 2021-01-01 RX ADMIN — ATORVASTATIN CALCIUM 10 MG: 10 TABLET, FILM COATED ORAL at 08:50

## 2021-01-01 RX ADMIN — Medication 10 ML: at 11:02

## 2021-01-01 RX ADMIN — SODIUM ZIRCONIUM CYCLOSILICATE 10 G: 5 POWDER, FOR SUSPENSION ORAL at 13:01

## 2021-01-01 RX ADMIN — ATORVASTATIN CALCIUM 10 MG: 10 TABLET, FILM COATED ORAL at 12:59

## 2021-01-01 RX ADMIN — SODIUM CHLORIDE, PRESERVATIVE FREE 10 ML: 5 INJECTION INTRAVENOUS at 22:21

## 2021-01-01 RX ADMIN — ALBUMIN (HUMAN) 25 G: 5 SOLUTION INTRAVENOUS at 16:30

## 2021-01-01 RX ADMIN — SODIUM BICARBONATE 25 MEQ: 84 INJECTION, SOLUTION INTRAVENOUS at 14:39

## 2021-01-01 RX ADMIN — METOPROLOL SUCCINATE 50 MG: 50 TABLET, EXTENDED RELEASE ORAL at 11:19

## 2021-01-01 RX ADMIN — NITROGLYCERIN 0.4 MG: 0.4 TABLET, ORALLY DISINTEGRATING SUBLINGUAL at 16:26

## 2021-01-01 RX ADMIN — METOPROLOL SUCCINATE 50 MG: 50 TABLET, EXTENDED RELEASE ORAL at 21:01

## 2021-01-01 RX ADMIN — ACETAMINOPHEN 650 MG: 325 TABLET ORAL at 08:50

## 2021-01-01 RX ADMIN — DIGOXIN 125 MCG: 125 TABLET ORAL at 08:42

## 2021-01-01 RX ADMIN — CALCIUM GLUCONATE 1000 MG: 98 INJECTION, SOLUTION INTRAVENOUS at 22:53

## 2021-01-01 RX ADMIN — DIGOXIN 125 MCG: 125 TABLET ORAL at 08:08

## 2021-01-01 RX ADMIN — ENOXAPARIN SODIUM 30 MG: 30 INJECTION SUBCUTANEOUS at 11:18

## 2021-01-01 RX ADMIN — SODIUM ZIRCONIUM CYCLOSILICATE 10 G: 5 POWDER, FOR SUSPENSION ORAL at 08:43

## 2021-01-01 RX ADMIN — SODIUM ZIRCONIUM CYCLOSILICATE 10 G: 10 POWDER, FOR SUSPENSION ORAL at 10:28

## 2021-01-01 RX ADMIN — ENOXAPARIN SODIUM 30 MG: 30 INJECTION SUBCUTANEOUS at 08:40

## 2021-01-01 RX ADMIN — METOPROLOL SUCCINATE 12.5 MG: 25 TABLET, EXTENDED RELEASE ORAL at 08:08

## 2021-01-01 RX ADMIN — ALBUMIN (HUMAN) 25 G: 0.25 INJECTION, SOLUTION INTRAVENOUS at 08:38

## 2021-01-01 RX ADMIN — SODIUM BICARBONATE 50 MEQ: 84 INJECTION, SOLUTION INTRAVENOUS at 02:23

## 2021-01-01 RX ADMIN — NITROGLYCERIN 0.4 MG: 0.4 TABLET, ORALLY DISINTEGRATING SUBLINGUAL at 16:17

## 2021-01-01 RX ADMIN — SODIUM ZIRCONIUM CYCLOSILICATE 10 G: 10 POWDER, FOR SUSPENSION ORAL at 10:38

## 2021-01-01 RX ADMIN — MIDODRINE HYDROCHLORIDE 10 MG: 5 TABLET ORAL at 08:12

## 2021-01-01 RX ADMIN — ATORVASTATIN CALCIUM 10 MG: 10 TABLET, FILM COATED ORAL at 20:03

## 2021-01-01 RX ADMIN — NITROGLYCERIN 0.4 MG: 0.4 TABLET, ORALLY DISINTEGRATING SUBLINGUAL at 16:12

## 2021-01-01 RX ADMIN — DILTIAZEM HYDROCHLORIDE 5 MG/HR: 5 INJECTION INTRAVENOUS at 18:12

## 2021-01-01 RX ADMIN — ENOXAPARIN SODIUM 30 MG: 30 INJECTION SUBCUTANEOUS at 08:33

## 2021-01-01 RX ADMIN — DEXTROSE MONOHYDRATE 25 G: 25 INJECTION, SOLUTION INTRAVENOUS at 02:59

## 2021-01-01 RX ADMIN — Medication 10 ML: at 08:59

## 2021-01-01 RX ADMIN — ENOXAPARIN SODIUM 30 MG: 30 INJECTION SUBCUTANEOUS at 08:09

## 2021-01-01 RX ADMIN — SODIUM CHLORIDE, PRESERVATIVE FREE 10 ML: 5 INJECTION INTRAVENOUS at 08:39

## 2021-01-01 RX ADMIN — ENOXAPARIN SODIUM 30 MG: 30 INJECTION SUBCUTANEOUS at 08:43

## 2021-01-01 RX ADMIN — RANOLAZINE 500 MG: 500 TABLET, FILM COATED, EXTENDED RELEASE ORAL at 20:04

## 2021-01-01 RX ADMIN — ATORVASTATIN CALCIUM 10 MG: 10 TABLET, FILM COATED ORAL at 21:20

## 2021-01-01 RX ADMIN — MORPHINE SULFATE 4 MG: 2 INJECTION, SOLUTION INTRAMUSCULAR; INTRAVENOUS at 04:01

## 2021-01-01 RX ADMIN — ACETAMINOPHEN 650 MG: 325 TABLET ORAL at 22:21

## 2021-01-01 RX ADMIN — ALBUMIN (HUMAN) 25 G: 0.25 INJECTION, SOLUTION INTRAVENOUS at 00:18

## 2021-01-01 RX ADMIN — ACETAMINOPHEN 650 MG: 325 TABLET ORAL at 15:05

## 2021-01-01 RX ADMIN — MIDODRINE HYDROCHLORIDE 10 MG: 5 TABLET ORAL at 11:21

## 2021-01-01 RX ADMIN — ALBUMIN (HUMAN) 25 G: 0.25 INJECTION, SOLUTION INTRAVENOUS at 03:02

## 2021-01-01 RX ADMIN — DILTIAZEM HYDROCHLORIDE 5 MG: 5 INJECTION INTRAVENOUS at 16:21

## 2021-01-01 RX ADMIN — ALBUMIN (HUMAN) 50 G: 0.25 INJECTION, SOLUTION INTRAVENOUS at 13:05

## 2021-01-01 RX ADMIN — LIDOCAINE HYDROCHLORIDE 5 ML: 20 INJECTION, SOLUTION INFILTRATION; PERINEURAL at 13:31

## 2021-01-01 RX ADMIN — SODIUM ZIRCONIUM CYCLOSILICATE 10 G: 10 POWDER, FOR SUSPENSION ORAL at 21:22

## 2021-01-01 RX ADMIN — ALBUMIN (HUMAN) 25 G: 0.25 INJECTION, SOLUTION INTRAVENOUS at 19:31

## 2021-01-01 RX ADMIN — ATORVASTATIN CALCIUM 10 MG: 10 TABLET, FILM COATED ORAL at 08:42

## 2021-01-01 RX ADMIN — MIDODRINE HYDROCHLORIDE 5 MG: 5 TABLET ORAL at 08:50

## 2021-01-01 RX ADMIN — DEXAMETHASONE SODIUM PHOSPHATE 4 MG: 4 INJECTION, SOLUTION INTRA-ARTICULAR; INTRALESIONAL; INTRAMUSCULAR; INTRAVENOUS; SOFT TISSUE at 11:09

## 2021-01-01 RX ADMIN — RANOLAZINE 500 MG: 500 TABLET, FILM COATED, EXTENDED RELEASE ORAL at 21:23

## 2021-01-01 RX ADMIN — SODIUM CHLORIDE, PRESERVATIVE FREE 10 ML: 5 INJECTION INTRAVENOUS at 08:16

## 2021-01-01 RX ADMIN — MIDODRINE HYDROCHLORIDE 10 MG: 5 TABLET ORAL at 16:53

## 2021-01-01 RX ADMIN — ALBUMIN (HUMAN) 50 G: 0.25 INJECTION, SOLUTION INTRAVENOUS at 15:22

## 2021-01-01 RX ADMIN — ACETAMINOPHEN 650 MG: 325 TABLET ORAL at 03:48

## 2021-01-01 RX ADMIN — SODIUM CHLORIDE, PRESERVATIVE FREE 10 ML: 5 INJECTION INTRAVENOUS at 20:38

## 2021-01-01 RX ADMIN — SODIUM CHLORIDE 500 ML: 9 INJECTION, SOLUTION INTRAVENOUS at 04:32

## 2021-01-01 RX ADMIN — MIDODRINE HYDROCHLORIDE 5 MG: 5 TABLET ORAL at 13:12

## 2021-01-01 RX ADMIN — HYDROMORPHONE HYDROCHLORIDE 1 MG: 1 INJECTION, SOLUTION INTRAMUSCULAR; INTRAVENOUS; SUBCUTANEOUS at 18:23

## 2021-01-01 RX ADMIN — SODIUM CHLORIDE, PRESERVATIVE FREE 10 ML: 5 INJECTION INTRAVENOUS at 08:08

## 2021-01-01 RX ADMIN — SODIUM CHLORIDE 1000 ML: 9 INJECTION, SOLUTION INTRAVENOUS at 00:18

## 2021-01-01 ASSESSMENT — ENCOUNTER SYMPTOMS
CONSTIPATION: 0
RECTAL PAIN: 0
WHEEZING: 0
ABDOMINAL PAIN: 0
SHORTNESS OF BREATH: 0
EYE DISCHARGE: 0
ABDOMINAL DISTENTION: 0
VOMITING: 0
SHORTNESS OF BREATH: 0
WHEEZING: 0
CONSTIPATION: 0
TROUBLE SWALLOWING: 0
COUGH: 0
SORE THROAT: 0
VOICE CHANGE: 0
VOICE CHANGE: 0
CHEST TIGHTNESS: 0
STRIDOR: 0
BACK PAIN: 0
ANAL BLEEDING: 0
ABDOMINAL PAIN: 0
COUGH: 0
BLOOD IN STOOL: 0
ABDOMINAL PAIN: 0
BACK PAIN: 0
COUGH: 0
SINUS PRESSURE: 0
NAUSEA: 0
PHOTOPHOBIA: 0
COUGH: 0
TROUBLE SWALLOWING: 0
ABDOMINAL PAIN: 0
APNEA: 0
GASTROINTESTINAL NEGATIVE: 1
APNEA: 0
SHORTNESS OF BREATH: 0
STRIDOR: 0
RESPIRATORY NEGATIVE: 1
EYE DISCHARGE: 0
BACK PAIN: 0
GASTROINTESTINAL NEGATIVE: 1
COLOR CHANGE: 0
ABDOMINAL PAIN: 0
SHORTNESS OF BREATH: 0
COLOR CHANGE: 0
VOICE CHANGE: 0
RESPIRATORY NEGATIVE: 1
BLOOD IN STOOL: 0
SHORTNESS OF BREATH: 0
WHEEZING: 0
NAUSEA: 0
ANAL BLEEDING: 0
STRIDOR: 0
BLOOD IN STOOL: 0
ABDOMINAL DISTENTION: 1
APNEA: 0
WHEEZING: 0
ABDOMINAL PAIN: 0
EYES NEGATIVE: 1
DIARRHEA: 0
WHEEZING: 0
NAUSEA: 0
ABDOMINAL PAIN: 0
ABDOMINAL PAIN: 1
NAUSEA: 0
ABDOMINAL DISTENTION: 1
NAUSEA: 0
TROUBLE SWALLOWING: 0
APNEA: 0
CHEST TIGHTNESS: 0
COLOR CHANGE: 0
DIARRHEA: 0
COLOR CHANGE: 0
WHEEZING: 0
NAUSEA: 0
WHEEZING: 0
BLOOD IN STOOL: 0
CHEST TIGHTNESS: 0
SORE THROAT: 0
TROUBLE SWALLOWING: 0
BACK PAIN: 0
STRIDOR: 0
SORE THROAT: 0
BLOOD IN STOOL: 0
VOMITING: 0
DIARRHEA: 0
TROUBLE SWALLOWING: 0
CHOKING: 0
RECTAL PAIN: 0
CHOKING: 0
ANAL BLEEDING: 0
WHEEZING: 0
VOMITING: 0
CONSTIPATION: 0
SHORTNESS OF BREATH: 0
CHOKING: 0
EYES NEGATIVE: 1
ABDOMINAL DISTENTION: 1
VOMITING: 0
APNEA: 0
ANAL BLEEDING: 0
EYE DISCHARGE: 0
CHEST TIGHTNESS: 0
CHEST TIGHTNESS: 0
ANAL BLEEDING: 0
VOMITING: 0
ABDOMINAL DISTENTION: 1
VOMITING: 0
RECTAL PAIN: 0
SHORTNESS OF BREATH: 0
EYES NEGATIVE: 1
RECTAL PAIN: 0
DIARRHEA: 0
CHEST TIGHTNESS: 0
DIARRHEA: 0
CHEST TIGHTNESS: 0
APNEA: 0
GASTROINTESTINAL NEGATIVE: 1
ABDOMINAL DISTENTION: 1
SORE THROAT: 0
WHEEZING: 0
SHORTNESS OF BREATH: 0
ABDOMINAL PAIN: 0
ABDOMINAL PAIN: 0
SWOLLEN GLANDS: 0
BLOOD IN STOOL: 0
CONSTIPATION: 0
WHEEZING: 0
STRIDOR: 0
EYE DISCHARGE: 0
COUGH: 0
NAUSEA: 0
VOICE CHANGE: 0
DIARRHEA: 0
SHORTNESS OF BREATH: 0
NAUSEA: 0
CONSTIPATION: 0
APNEA: 0
COUGH: 0
SHORTNESS OF BREATH: 0
VOMITING: 0
COUGH: 0
SHORTNESS OF BREATH: 0
BLOOD IN STOOL: 0
ABDOMINAL PAIN: 0
NAUSEA: 0
VOICE CHANGE: 0
SHORTNESS OF BREATH: 0
BACK PAIN: 0
COLOR CHANGE: 0
NAUSEA: 0
VOMITING: 0
WHEEZING: 0
VOMITING: 0
COUGH: 0
CHOKING: 0
BLOOD IN STOOL: 0
VOICE CHANGE: 0
EYE DISCHARGE: 0
DIARRHEA: 0
FACIAL SWELLING: 0
RECTAL PAIN: 0
COUGH: 0
DIARRHEA: 0
STRIDOR: 0
ABDOMINAL PAIN: 0
APNEA: 0
NAUSEA: 0
WHEEZING: 0
BACK PAIN: 0
EYES NEGATIVE: 1
ABDOMINAL DISTENTION: 1
ABDOMINAL DISTENTION: 1
CHEST TIGHTNESS: 0
SORE THROAT: 0
CHEST TIGHTNESS: 0
TROUBLE SWALLOWING: 0
COLOR CHANGE: 0
STRIDOR: 0
COUGH: 0
SORE THROAT: 0
VOICE CHANGE: 0
TROUBLE SWALLOWING: 0
NAUSEA: 0
CHEST TIGHTNESS: 0
ALLERGIC/IMMUNOLOGIC NEGATIVE: 1
VOMITING: 0
CHOKING: 0
COUGH: 0
BACK PAIN: 0
NAUSEA: 0
SHORTNESS OF BREATH: 0
EYES NEGATIVE: 1
ABDOMINAL DISTENTION: 1
ANAL BLEEDING: 0
SHORTNESS OF BREATH: 0
COLOR CHANGE: 0
SORE THROAT: 0
CONSTIPATION: 0
DIARRHEA: 0
EYE DISCHARGE: 0
ABDOMINAL DISTENTION: 1
VOMITING: 0
RESPIRATORY NEGATIVE: 1
ANAL BLEEDING: 0

## 2021-01-01 ASSESSMENT — PAIN DESCRIPTION - DESCRIPTORS: DESCRIPTORS: PRESSURE

## 2021-01-01 ASSESSMENT — PAIN SCALES - GENERAL
PAINLEVEL_OUTOF10: 0
PAINLEVEL_OUTOF10: 3
PAINLEVEL_OUTOF10: 0
PAINLEVEL_OUTOF10: 8
PAINLEVEL_OUTOF10: 3
PAINLEVEL_OUTOF10: 0
PAINLEVEL_OUTOF10: 0
PAINLEVEL_OUTOF10: 4
PAINLEVEL_OUTOF10: 0
PAINLEVEL_OUTOF10: 10
PAINLEVEL_OUTOF10: 0
PAINLEVEL_OUTOF10: 4
PAINLEVEL_OUTOF10: 1
PAINLEVEL_OUTOF10: 0
PAINLEVEL_OUTOF10: 6
PAINLEVEL_OUTOF10: 0

## 2021-01-01 ASSESSMENT — PAIN DESCRIPTION - PAIN TYPE
TYPE: ACUTE PAIN;CHRONIC PAIN
TYPE: ACUTE PAIN;CHRONIC PAIN

## 2021-01-01 ASSESSMENT — PAIN DESCRIPTION - LOCATION
LOCATION: ABDOMEN
LOCATION: ABDOMEN
LOCATION: GENERALIZED

## 2021-01-01 ASSESSMENT — PATIENT HEALTH QUESTIONNAIRE - PHQ9
2. FEELING DOWN, DEPRESSED OR HOPELESS: 0
SUM OF ALL RESPONSES TO PHQ9 QUESTIONS 1 & 2: 0
SUM OF ALL RESPONSES TO PHQ QUESTIONS 1-9: 0

## 2021-01-01 ASSESSMENT — PAIN DESCRIPTION - FREQUENCY: FREQUENCY: CONTINUOUS

## 2021-01-01 ASSESSMENT — PAIN DESCRIPTION - ORIENTATION: ORIENTATION: OUTER

## 2021-01-06 NOTE — TELEPHONE ENCOUNTER
Patient MUST make a follow up appt with Hepatology. She is VERY over due for appt with them. I will order paracentesis this one time but she must make a follow up appt with them ASAP. Does not need to hold medications. She needs an INR drawn at her next paracentesis by radiology RN's please.

## 2021-01-08 NOTE — PROGRESS NOTES
Patient brought back to CT holding for Albumin infusions. Pateint denies pain or any discomfort at this time. 1427 VSS. Patiet tolerating albumin well. 1420 VSS. Patient tolerating albumin well. 1425 Discharge instructions reviewed with patient and all questions answered. 1449 VSS. Albumin infusion of 50 g total complete. Patient tolerated well. 1457 IV removed with catheter intact dressing applied. Patient taken via wheel chair for discharge home with .

## 2021-01-08 NOTE — SEDATION DOCUMENTATION
NO SEDATION    1325 U/S tech obtained images prior to start of procedure using U/S. Pt  VSS.     1328 Procedure explained, consent signed. Timeout completed. 1331 Using U/S, Dr. Fatou Landa marked, prepped LUQ with Chloraprep and draped with sterile drape and towels. 1331 Site numbed with lidocaine 2% 5ml. [id number] Centesis 5F catheter placed using Ultrasound guidance. Fluid appears clear yellow. Catheter tubing connected to [place] machine to remove fluid. 1338 VSS. 1090 ml removed. Patient tolerating well and is without complaint. 1342 VSS. 2090 ml removed. Patient tolerating well and is without complaints. 1347 VSS. 3030 ml removed. Patient tolerating well and is without complaints. 1352 VSS. 4020 ml removed. Patient tolerating well and is without complaints. 1400 VSS. 5700 ml removed. Patient tolerated well and is without complaints. 1409 Pt tolerated well. Total 7150 ml removed. Catheter removed, pressure held and bandaid applied. Verbal and tactile reassurance given throughout.

## 2021-01-19 NOTE — PROGRESS NOTES
1415 IV albumin done. Pt tolerated well. VSS. Bandaid clean, dry, intact, site soft. Inez Pt has appt with Dr Ty Garcia on 1-28-21 at 10 am, to be here at 9 am. Information given to pt.     [phone] D/C instructions given to pt. Pt verbalized understanding of given info. 46 Pt wheeled to the surgical entrance area. Pt walked short distance with steady gait. Pt d/c home with .

## 2021-01-19 NOTE — SEDATION DOCUMENTATION
NO SEDATION    U/S tech obtained images prior to start of procedure using U/S. Pt  VSS.      1224 Procedure explained, consent signed. Timeout completed for Ultrasound Guided Paracentesis. Using U/S, Dr. Stacy Espinosa marked, prepped LLQ with Chloraprep and draped with sterile drape and towels. 1229 Site numbed with 8 ml lidocaine. [id number] Centesis 5F catheter placed using Ultrasound guidance. Fluid appears clear yellow. Catheter tubing connected to [employer] machine to remove fluid. 1235-Dr. Stacy Espinosa spoke with Dr. Brisa Miller at [facility] about placing permanent Aspira drain catheter next week. 1311 Pt tolerated well. Total 7000 ml removed. 1314 Catheter removed, pressure held and bandaid applied. Verbal and tactile reassurance given throughout.

## 2021-01-20 NOTE — TELEPHONE ENCOUNTER
The patient called for a refill of Metoprolol but she is stating she has been taking 100mg bid but the last RX we sent in was for 75mg bid. I did not see anywhere in the notes that she was to increase the dose to 100mg bid. Thank you.

## 2021-01-28 NOTE — SEDATION DOCUMENTATION
NO SEDATION    [id number] Dr. Liz Schultz here speaking with pt and explained procedure and assessing abdomen for fluid. LLQ site cleansed generously with chloraprep.  5700 Consent verified. Vitals stable. Emotional Support offered to pt.     2604 Time out completed for percutaneous ultrasound guided placement of intraperitoneal aspira drain. 0957 chloroprep and draped area in a sterile fashion. 0959 2% lidocaine given at Q site. All equipment used from the [address] [place] [address] [employer]) kit. 18 g access needle placed with US guidance, J tipped guidewire ). 038 by 70 cm placed through needle. Access needle removed and 10-12 dilators used. 16.5 Fr valved introducer placed over guidewire. 1002 Additional lidocaine used above site and for the tunneled area. 1005 Tunnel created for Aspira 15.5 Fr drain and catheter tunneled to insertion site. ( lot # I [id number]  Exp 7-)    1005 Wire removed and catheter inserted into sheath. 1006  Sheath removed. Adapter from kit connected to catheter and paracentesis tubing to [employer] , clear yellow  Fluid noted. 1009 4.0 Vicryl suture used to close insertion site per Dr Liz Schultz. Light pressure held around insertion and catheter site for minimal  bleeding. 1012 Pt draining well. Vitals stable. Per Dr. Liz Schultz, patient to drain a maximum of 7 liters and receive 50 g albumin. 1020 Drain complete,  total of  8100 ml removed. Tubing disconnected form suction catheter. 1035 Aspira tubing coiled on abdomen, drain sponge, guaze and Tegaderm dressing applied. End of catheter remains outside of dressing for future draining. No bleeding noted. 3655 Thanh Travis at bedside speaking to patient. Patient tolerated procedure very well. Received 50 g albumin and denied any symptoms. Sent supplies of dressing and bags and Aspira folder home with pt and daughter.

## 2021-02-03 NOTE — TELEPHONE ENCOUNTER
Patients daughter called in stating dressing over drain site is requiring at least once daily dressing changes due to saturation from ascitic fluid leaking from drain site. She is worried there wont be enough dressing supplies from [employer] as the site is only to be changed weekly. Discussed with daughter that until tract where drain site is heals, it may continue to leak. Have patient try and lie or lean away from drain site to assist healing and change dressing site daily and PRN x 2 weeks, then resume to weekly. Drain intraperitoneal drain 3x/week and no more than 2 Liters/drain. Will fax new order to [employer]. Please call with any further concerns.

## 2021-02-03 NOTE — TELEPHONE ENCOUNTER
PATIENT'S DAUGHTER CALLED - PATIENT'S INSURANCE [employer]OVERS %    SHE IS REQUESTING PRESCRIPTION FOR TEGADERM AND WOUND PATCHES TO BE FAXED TO [facility] -[phone] [facility] [phone]).

## 2021-02-03 NOTE — TELEPHONE ENCOUNTER
Talked to Dr. John Conley in regards to Tegaderm X4 X6 and cotton wound patch x4 x4 and and said they could not order it. Patient wound soaking through bed and would appreciate a large enough Tegaderm size and chucks for the bed. Patient's daughter Maria Del Rosario's  # [phone], Please advise.

## 2021-02-22 NOTE — PROGRESS NOTES
Detail Level: Detailed OFFICE VISIT        Patient: Nhung Johnson  YOB: 1945  MRN: [id number]    Chief Complaint: Carotid SOB dizzy  Chief Complaint   Patient presents with    Hypotension     low BP at office visit with Judit Del Angel today (88/46)       CV Data:  1/2018 [facility] PRISCILA   5/2020 DUANE 60-79% ccf  1/25/2006 Cath normal ccf    6/2020 ECHO EF 85% Severe LVOT Gr  Ascites    Subjective/HPI has been told she needs cardiac transplant years ago. Stamina is decreased. 7/10/2020 feels tired low energy no cp no sob no dizzy. 8/11/2020 still feels tired. No cp no sob no falls no bleed. occ dizzy but no falls     9/14/2020 Already had 2 Paracentesis. abd filling up again and uncomfortable. Last time took out 11 L- 2 weeks ago. No CP but SOB    12/14/2020 last Tuesday had 7L Paracentesis. Still SOB no cp no palp no LH    2/22/21 had 2L Paracentesis this am. Noted low BP but no new symptoms. No Dizzy no sob no cp. Always tired. Ate well this am. No fevers no falls no bleed    Nonsmoker  Lifelong rare ETOH. No ETOH  Retired-     EKG: SR    Past Medical History:   Diagnosis Date    Arthritis     Cardiomyopathy [facility])     mother history.     Chronic kidney disease     Hypertension     Liver disease     Other disorders of kidney and ureter in diseases classified elsewhere     Pneumonia     Psychiatric problem     Sleep apnea, obstructive     Type II or unspecified type diabetes mellitus without mention of complication, not stated as uncontrolled        Past Surgical History:   Procedure Laterality Date    APPENDECTOMY      CHOLECYSTECTOMY      COLONOSCOPY  04/23/2013    CT BIOPSY RENAL  08/04/2020    CT BIOPSY RENAL 8/4/2020 [facility] CT SCAN    CT BIOPSY RENAL  09/04/2020    CT BIOPSY RENAL 9/4/2020 [facility] CT SCAN    FOOT FRACTURE SURGERY Bilateral     FRACTURE SURGERY      HYSTERECTOMY      IR TUNNELED INTRAPERITNL CATH W/IMAG  1/28/2021    IR TUNNELED INTRAPERITNL CATH W/IMAG 1/28/2021 [facility] SPECIAL Body Location Override (Optional - Billing Will Still Be Based On Selected Body Map Location If Applicable): right Lateral Back PROCEDURE    OTHER SURGICAL HISTORY      removal of anal fistulotomy    PARACENTESIS Left 07/28/2020    9015 mls removed by Dr Arlette Blackwood Left 09/04/2020    [id number] ascites removed by Dr Sue Horan 50g albumin given    PARACENTESIS Left 09/15/2020    10,400cc removed by Dr Bobby Suarez [phone]    PARACENTESIS Left 10/09/2020    7000cc removed by Dr. Arlette Blackwood Left 10/13/2020    7000ml removed by Dr Bobby Suarez [phone]    PARACENTESIS Left 10/26/2020    7010cc removed by Dr. Dolly Pagan Left 11/06/2020    7020 mls removed ny Dr Arlette Blackwood Left 11/19/2020    7000 ml removed by Dr. Sue Horan.     PARACENTESIS Left 11/30/2020    7000 ml removed by paracentesis by Dr. Arlette Blackwood Left 12/08/2020    7100 ml removed    PARACENTESIS Left 01/08/2021    ultrasound guided paracentesis 7150 ml removed    PARACENTESIS Left 01/19/2021    7000ml removed by Dr Bobby Suarez [phone]    PARACENTESIS Left 01/28/2021    by Dr. Sue Horan 8100 ml removed   [address] Left 12/17/2020    7000ml removed by Dr Bobby Suarez       Family History   Problem Relation Age of Onset    Arrhythmia Mother     Diabetes Mother     High Blood Pressure Mother     Arrhythmia Father     Breast Cancer Sister     Diabetes Brother     High Blood Pressure Brother        Social History     Socioeconomic History    Marital status:      Spouse name: None    Number of children: None    Years of education: None    Highest education level: None   Occupational History    None   Social Needs    Financial resource strain: Not hard at all   [employer] insecurity     Worry: Never true     Inability: Never true   [employer] needs     Medical: None     Non-medical: None   Tobacco Use    Smoking status: Never Smoker    Smokeless tobacco: Former User   Substance and Sexual Activity    Alcohol use: No    Drug use: No    Sexual activity: Yes   Lifestyle    Physical Add 34403 Cpt? (Important Note: In 2017 The Use Of 51724 Is Being Tracked By Cms To Determine Future Global Period Reimbursement For Global Periods): yes activity     Days per week: None     Minutes per session: None    Stress: None   Relationships    Social connections     Talks on phone: None     Gets together: None     Attends [employer] service: None     Active member of club or organization: None     Attends meetings of clubs or organizations: None     Relationship status: None    Intimate partner violence     Fear of current or ex partner: None     Emotionally abused: None     Physically abused: None     Forced sexual activity: None   Other Topics Concern    None   Social History Narrative    None       Allergies   Allergen Reactions    Codeine        Current Outpatient Medications   Medication Sig Dispense Refill    Adhesive Tape (PAPER TAPE 1\"X12YD) TAPE 1 each by Does not apply route 4 times daily as needed (SOILAGE) 6 each 3    Gauze Pads & Dressings ([employer] GAUZE PAD STERILE 4\"X4\") 4\"X4\" PADS Change intraperitoneal dressing - apply daily and PRN soilage (EXTRA ABSORBENT PADS) 5 each 3    Transparent Dressings (TEGADERM FIRST AID STYLE) MISC One box 50. Change daily one tegaderm to intraperitoneal site and PRN soilage.  1 each 3    bumetanide (BUMEX) 1 MG tablet Take 2 mg by mouth daily      metoprolol succinate (TOPROL XL) 100 MG extended release tablet Take 100 mg by mouth 2 times daily      glimepiride (AMARYL) 2 MG tablet TAKE 1 TABLET EVERY MORNING BEFORE BREAKFAST (Patient taking differently: Take 2 mg by mouth every morning (before breakfast) ) 90 tablet 1    atorvastatin (LIPITOR) 40 MG tablet TAKE 1 TABLET DAILY (Patient taking differently: Take 40 mg by mouth daily TAKE 1 TABLET DAILY) 90 tablet 3    raloxifene (EVISTA) 60 MG tablet TAKE 1 TABLET DAILY (Patient taking differently: Take 60 mg by mouth daily ) 90 tablet 3    Blood Pressure KIT Check your blood pressure daily 1 kit 0    metFORMIN (GLUCOPHAGE) 1000 MG tablet TAKE 1 TABLET TWICE A DAY WITH MEALS (Patient taking differently: Take 1,000 mg by mouth 2 times daily (with meals) TAKE 1 TABLET TWICE A DAY WITH MEALS) 180 tablet 3    glucose blood VI test strips (ACCU-CHEK MELANIE) strip Test 1-2 times a day. Dx: 250.00. Accu-check melanie strips 180 strip 3    Lancet Device MISC Test 1-2 times a day. Dx: 250.00. Lancets 180 each 3     No current facility-administered medications for this visit. Review of Systems:   Review of Systems   Constitutional: Positive for fatigue. Negative for diaphoresis. HENT: Negative. Eyes: Negative. Respiratory: Negative. Negative for cough, chest tightness, shortness of breath, wheezing and stridor. Cardiovascular: Negative. Negative for chest pain, palpitations and leg swelling. Gastrointestinal: Negative. Negative for blood in stool and nausea. Genitourinary: Negative. Musculoskeletal: Negative. Skin: Negative. Neurological: Positive for weakness. Negative for dizziness, syncope and light-headedness. Hematological: Negative. Psychiatric/Behavioral: Negative. Physical Examination:    BP (!) 81/49 (Site: Left Upper Arm, Position: Sitting, Cuff Size: Medium Adult)   Pulse 63   Resp 16   SpO2 97%    Physical Exam   Constitutional: No distress. She appears chronically ill. HENT:   Normal cephalic and Atraumatic   Eyes: Pupils are equal, round, and reactive to light. Neck: Normal range of motion and thyroid normal. Neck supple. No JVD present. No neck adenopathy. No thyromegaly present. Cardiovascular: Normal rate, regular rhythm, intact distal pulses and normal pulses. Murmur heard. Harsh holosystolic murmur is present at the lower left sternal border and lower right sternal border. The intensity increases with valsalva. Pulmonary/Chest: Effort normal and breath sounds normal. She has no wheezes. She has no rales. She exhibits no tenderness. Abdominal: Soft. Bowel sounds are normal. There is no abdominal tenderness. Musculoskeletal: Normal range of motion.          General: Edema (right trace edema) present. No tenderness. Neurological: She is alert and oriented to person, place, and time. Skin: Skin is warm. No cyanosis. Nails show no clubbing.        LABS:  CBC:   Lab Results   Component Value Date    WBC 6.8 12/04/2020    WBC 6.5 09/23/2020    RBC 3.24 12/04/2020    HGB 9.1 12/04/2020    HCT 30.1 12/04/2020    MCV 93 12/04/2020    MCH 28.4 09/23/2020    MCHC 30.2 12/04/2020    RDW 18.4 09/23/2020     12/04/2020    MPV 10.0 09/08/2015     Lipids:  Lab Results   Component Value Date    CHOL 89 01/03/2020    CHOL 81 10/21/2019    CHOL 87 10/02/2018     Lab Results   Component Value Date    TRIG 86 01/03/2020    TRIG 80 10/21/2019    TRIG 91 10/02/2018     Lab Results   Component Value Date    HDL 42 01/03/2020    HDL 40 10/21/2019    HDL 35 (L) 10/02/2018     Lab Results   Component Value Date    LDLCALC 30 01/03/2020    LDLCALC 25 10/21/2019    LDLCALC 34 10/02/2018     No results found for: LABVLDL, VLDL  Lab Results   Component Value Date    CHOLHDLRATIO 4.1 07/20/2011     CMP:    Lab Results   Component Value Date     12/04/2020    K 4.2 12/04/2020    K 5.0 09/19/2020     12/04/2020    CO2 20 11/06/2020    BUN 34 11/06/2020    CREATININE 1.58 12/04/2020    GFRAA 39 12/04/2020    LABGLOM 32 12/04/2020    GLUCOSE 84 12/04/2020    PROT 6.4 12/04/2020    LABALBU 3.3 12/04/2020    CALCIUM 8.6 12/04/2020    BILITOT 1.2 12/04/2020    ALKPHOS 130 12/04/2020    AST 26 12/04/2020    ALT 15 12/04/2020     BMP:    Lab Results   Component Value Date     12/04/2020    K 4.2 12/04/2020    K 5.0 09/19/2020     12/04/2020    CO2 20 11/06/2020    BUN 34 11/06/2020    LABALBU 3.3 12/04/2020    CREATININE 1.58 12/04/2020    CALCIUM 8.6 12/04/2020    GFRAA 39 12/04/2020    LABGLOM 32 12/04/2020    GLUCOSE 84 12/04/2020     Magnesium:    Lab Results   Component Value Date    MG 1.8 09/16/2020     TSH:  Lab Results   Component Value Date    TSH 3.91 12/04/2020             Patient Active Problem List   Diagnosis    Type 2 diabetes mellitus without complication, without long-term current use of insulin ([address]    Hypertension    Bell-rectal abscess    Bilateral carotid artery stenosis    Hypertrophic obstructive cardiomyopathy (HOCM) (HCC)    HARDEEP (obstructive sleep apnea)    Pseudophakia of both eyes    Regular astigmatism    Cardiomyopathy ([address]    Rectal pain    Abdominal distension    FALLON (acute kidney injury) ([address]    Other cirrhosis of liver ([address]    Renal mass, left    Alcoholic cirrhosis of liver with ascites ([address]    Shock ([address]    Arterial hypotension    Subconjunctival hemorrhage of left eye    Right-sided carotid artery disease ([address]    PVD (posterior vitreous detachment), both eyes    Presbyopia of both eyes    Dry eye syndrome of bilateral lacrimal glands    Other ascites       There are no discontinued medications. Modified Medications    No medications on file       No orders of the defined types were placed in this encounter. Assessment/Plan:    1. Essential hypertension  Too low and symptomatic-decrease BB to 50 bid. If no improvement then will need Midodrine next visit    2. HOCM (hypertrophic obstructive cardiomyopathy) (HCC)   LVOT Gradient was 4 m/s prior Echo   Still with Gr but pt is doing well. No palp no cp no sob. - will refer to [facility]. - saw  Structural heart- they added Verapamil and Aldactone - and opted for med Rx. - will f/u this week. 3. Bilateral carotid artery stenosis  Stable. Will follow  4. Ascites form Liver Cirrhosis. - f/u GI.     5. LE edema - Pedal and ankle- she was suppose to take Lasix 40m qam but she chose not to- she will start now. Qam. - pt stopped on her own. She has CKD also. Will stay off for now. Use compression stockings. Counseling:  Heart Healthy Lifestyle, Low Salt Diet, Take Precautions to Prevent Falls and Walk Daily    Return for As scheduled.     Electronically signed by Roney Black MD on 2/22/2021 at 2:04 PM

## 2021-02-22 NOTE — PROGRESS NOTES
Rose Roberto, a female of 70 y.o. came to the office 2/22/2021. No chief complaint on file. PROGRESS NOTE:     SUBJECTIVE:     2/22/2021 Rose Roberto is here for procedural follow up S/P 3 weeks percutaneous placement of peritoneal drain for ascites secondary to Liver cirrhosis. Currently changing dressing site 4x/day and draining 3x/week no more than 2 Liters per drain. She feels this is enough draining to keep her comfortable and without abdominal discomfort. Drainage from around drain site has decreased considerably since first placed. Denies any abdominal pain or discomfort. State she feels week and appetite is fair. B/P noted to be low during office visit. Daughter assists with care of peritoneal drain. She follows with Hepatologist Dr. Darci Meza from [facility].      Family History   Problem Relation Age of Onset    Arrhythmia Mother     Diabetes Mother     High Blood Pressure Mother     Arrhythmia Father     Breast Cancer Sister     Diabetes Brother     High Blood Pressure Brother        Past Surgical History:   Procedure Laterality Date    APPENDECTOMY      CHOLECYSTECTOMY      COLONOSCOPY  04/23/2013    CT BIOPSY RENAL  08/04/2020    CT BIOPSY RENAL 8/4/2020 [facility] CT SCAN    CT BIOPSY RENAL  09/04/2020    CT BIOPSY RENAL 9/4/2020 [facility] CT SCAN    FOOT FRACTURE SURGERY Bilateral     FRACTURE SURGERY      HYSTERECTOMY      IR TUNNELED INTRAPERITNL CATH W/IMAG  1/28/2021    IR TUNNELED INTRAPERITNL CATH W/IMAG 1/28/2021 [facility] SPECIAL PROCEDURE    OTHER SURGICAL HISTORY      removal of anal fistulotomy    PARACENTESIS Left 07/28/2020    9015 mls removed by Dr Rigoberto Fuchs Left 09/04/2020    [id number] ascites removed by Dr Lord Avitia 50g albumin given    PARACENTESIS Left 09/15/2020    10,400cc removed by Dr Brisa Cristina [phone]    PARACENTESIS Left 10/09/2020    7000cc removed by Dr. Rigoberto Fuchs Left 10/13/2020    7000ml removed by Dr Brisa Cristina [phone]   Nancy Bhatti PARACENTESIS Left 10/26/2020    7010cc removed by Dr. Fausto Merino Left 11/06/2020    7020 mls removed ny Dr Austin Adam Left 11/19/2020    7000 ml removed by Dr. Kenny Cerda.  PARACENTESIS Left 11/30/2020    7000 ml removed by paracentesis by Dr. Austin Adam Left 12/08/2020    7100 ml removed    PARACENTESIS Left 01/08/2021    ultrasound guided paracentesis 7150 ml removed    PARACENTESIS Left 01/19/2021    7000ml removed by Dr Yanira Howard [phone]    PARACENTESIS Left 01/28/2021    by Dr. Kenny Cerda 8100 ml removed    [address] Left 12/17/2020    7000ml removed by Dr Yanira Howard        Past Medical History:   Diagnosis Date    Arthritis     Cardiomyopathy [facility])     mother history.     Chronic kidney disease     Hypertension     Liver disease     Other disorders of kidney and ureter in diseases classified elsewhere     Pneumonia     Psychiatric problem     Sleep apnea, obstructive     Type II or unspecified type diabetes mellitus without mention of complication, not stated as uncontrolled        Social History     Socioeconomic History    Marital status:      Spouse name: Not on file    Number of children: Not on file    Years of education: Not on file    Highest education level: Not on file   Occupational History    Not on file   Social Needs    Financial resource strain: Not hard at all   [employer] insecurity     Worry: Never true     Inability: Never true   [email or web address] needs     Medical: Not on file     Non-medical: Not on file   Tobacco Use    Smoking status: Never Smoker    Smokeless tobacco: Former User   Substance and Sexual Activity    Alcohol use: No    Drug use: No    Sexual activity: Yes   Lifestyle    Physical activity     Days per week: Not on file     Minutes per session: Not on file    Stress: Not on file   Relationships    Social connections     Talks on phone: Not on file     Gets together: Not on file     Attends Church service: Not on file     Active member of club or organization: Not on file     Attends meetings of clubs or organizations: Not on file     Relationship status: Not on file    Intimate partner violence     Fear of current or ex partner: Not on file     Emotionally abused: Not on file     Physically abused: Not on file     Forced sexual activity: Not on file   Other Topics Concern    Not on file   Social History Narrative    Not on file       Allergies   Allergen Reactions    Codeine        Current Outpatient Medications on File Prior to Visit   Medication Sig Dispense Refill    Adhesive Tape (PAPER TAPE 1\"X12YD) TAPE 1 each by Does not apply route 4 times daily as needed (SOILAGE) 6 each 3    Gauze Pads & Dressings ([employer] GAUZE PAD STERILE 4\"X4\") 4\"X4\" PADS Change intraperitoneal dressing - apply daily and PRN soilage (EXTRA ABSORBENT PADS) 5 each 3    Transparent Dressings (TEGADERM FIRST [facility] [email or web address]) [facility] [place] [place] One box 50. Change daily one tegaderm to intraperitoneal site and PRN soilage.  1 each 3    bumetanide (BUMEX) 1 MG tablet Take 2 mg by mouth daily      metoprolol succinate (TOPROL XL) 100 MG extended release tablet Take 100 mg by mouth 2 times daily      glimepiride (AMARYL) 2 MG tablet TAKE 1 TABLET EVERY MORNING BEFORE BREAKFAST (Patient taking differently: Take 2 mg by mouth every morning (before breakfast) ) 90 tablet 1    atorvastatin (LIPITOR) 40 MG tablet TAKE 1 TABLET DAILY (Patient taking differently: Take 40 mg by mouth daily TAKE 1 TABLET DAILY) 90 tablet 3    raloxifene (EVISTA) 60 MG tablet TAKE 1 TABLET DAILY (Patient taking differently: Take 60 mg by mouth daily ) 90 tablet 3    Blood Pressure KIT Check your blood pressure daily 1 kit 0    metFORMIN (GLUCOPHAGE) 1000 MG tablet TAKE 1 TABLET TWICE A DAY WITH MEALS (Patient taking differently: Take 1,000 mg by mouth 2 times daily (with meals) TAKE 1 TABLET TWICE A DAY WITH MEALS) 180 tablet 3    glucose blood VI test strips (ACCU-CHEK MELANIE) strip Test 1-2 times a day. Dx: 250.00. Accu-check melanie strips 180 strip 3    Lancet Device MISC Test 1-2 times a day. Dx: 250.00. Lancets 180 each 3     No current facility-administered medications on file prior to visit. Review of Systems   Constitutional: Positive for appetite change (fair po intake) and fatigue. Negative for chills and fever. HENT: Negative. Eyes: Negative. Respiratory: Negative. Cardiovascular: Negative. Gastrointestinal: Positive for abdominal distention (mild ). Negative for abdominal pain. Peritoneal drain with dressing intact. Endocrine: Negative. Genitourinary: Negative. Musculoskeletal: Negative. Skin: Negative. Neurological: Positive for weakness. Hematological: Negative. Psychiatric/Behavioral: Negative. OBJECTIVE:  There were no vitals taken for this visit. Physical Exam  Constitutional:       General: She is not in acute distress. Appearance: She is not ill-appearing. Comments: Presents in wheelchair. HENT:      Head: Normocephalic and atraumatic. Nose: Nose normal. No congestion. Neck:      Musculoskeletal: Normal range of motion. Cardiovascular:      Rate and Rhythm: Normal rate. Heart sounds: Normal heart sounds. Pulmonary:      Effort: Pulmonary effort is normal.   Abdominal:      General: There is distension (mild to moderate. ). Tenderness: There is no abdominal tenderness. There is no guarding. Comments: Left abdominal peritoneal drain with dry dressing intact. Musculoskeletal: Normal range of motion. Skin:     General: Skin is warm. Capillary Refill: Capillary refill takes 2 to 3 seconds. Neurological:      Mental Status: She is alert and oriented to person, place, and time. Psychiatric:         Mood and Affect: Mood normal.         Behavior: Behavior normal.         Chart, medications, and lab work reviewed. No s/sx SBP, infection to drain site. ASSESSMENT AND PLAN:    Assessment:   1. S/P 3 weeks percutaneous placement of peritoneal drain for ascites secondary to Liver cirrhosis. 2. Hypotension   3. Generalized weakness. Plan:   1. Change dressing site twice daily and PRN soilage x 1 week and then once weekly after that. Continue to drain 3x/week and PRN abdominal distention with no more than 2 Liters/drain. Notify IR office if develop severe abdominal pain, fever, purulent drainage from drain site, cloudy and/or bloody ascitic fluid, increased abdominal distention not relieved with draining. 2. Fax new supply order to [address] [facility] today  3. Call [facility] Cardiology Dr. Jodi Rueda to evaluate hypotension. He is able to see patient today.    4. PRN return to IR office      TANNER Pedroza - CNP

## 2021-03-02 NOTE — TELEPHONE ENCOUNTER
Scheduling outreach call to review Health Maintenance and / or schedule appointment. AWV  due  Colonoscopy done  [facility] [address] GAP YES  Lab work Lipid-microalbumin  Mammogram due  PHQ-9   Last appointment 9- w/ Mavis Arm and f/u in 1 month  Upcoming appointment Yes 3- w/   Scanned and updated HM yes    I spoke with male person and she usually is sleeping in the afternoon advised him that I would call back, earlier in the day.

## 2021-03-07 PROBLEM — I95.9 HYPOTENSION: Status: ACTIVE | Noted: 2021-01-01

## 2021-03-07 NOTE — H&P
Hospital Medicine  History and Physical    Patient:  Kita Sanchez  MRN: [id number]    CHIEF COMPLAINT:    Chief Complaint   Patient presents with    Hypotension     after decreased metropolol 2 weeks ago, home HD 2L off yesterday       History Obtained From:  Patient, EMR  Primary Care Physician: Felicity Curling, MD    HISTORY OF PRESENT ILLNESS:   The patient is a 70 y.o. female with PMH of HTN, HOCM, carotid stenosis s/p left CEA, decompensated nonalcoholic liver cirrhosis requiring frequent large volume paracentesis s/p peritoneal drain placement on 1/28, DM2, CKD, anxiety,depression who presented with the above CC, has been experiencing low BP at home, seen by cardiology on 2/22, her Toprol was decreased from 100 BID to 50 BID, SBP in the 70-80s on arrival, improved with IVFs to 110s, w/u showed evidence of FALLON, hyperkalemia, hyponatremia,lactic acidosis elevated troponin, anemia, Kayexalate and IV insulin were administered, patient was admitted for further management. Past Medical History:      Diagnosis Date    Arthritis     Cardiomyopathy [facility])     mother history.     Chronic kidney disease     Hypertension     Liver disease     Other disorders of kidney and ureter in diseases classified elsewhere     Pneumonia     Psychiatric problem     Sleep apnea, obstructive     Type II or unspecified type diabetes mellitus without mention of complication, not stated as uncontrolled        Past Surgical History:      Procedure Laterality Date    APPENDECTOMY      CHOLECYSTECTOMY      COLONOSCOPY  04/23/2013    CT BIOPSY RENAL  08/04/2020    CT BIOPSY RENAL 8/4/2020 [facility] CT SCAN    CT BIOPSY RENAL  09/04/2020    CT BIOPSY RENAL 9/4/2020 [facility] CT SCAN    FOOT FRACTURE SURGERY Bilateral     FRACTURE SURGERY      HYSTERECTOMY      IR TUNNELED INTRAPERITNL CATH W/IMAG  1/28/2021    IR TUNNELED INTRAPERITNL CATH W/IMAG 1/28/2021 [facility] SPECIAL PROCEDURE    OTHER SURGICAL HISTORY      removal of anal fistulotomy  PARACENTESIS Left 07/28/2020    9015 mls removed by Dr Sushila Maldonado Left 09/04/2020    [id number] ascites removed by Dr Alberto Hood 50g albumin given    PARACENTESIS Left 09/15/2020    10,400cc removed by Dr Demond Valdes [phone]    PARACENTESIS Left 10/09/2020    7000cc removed by Dr. Sushila Maldonado Left 10/13/2020    7000ml removed by Dr Demond Valdes [phone]    PARACENTESIS Left 10/26/2020    7010cc removed by Dr. Mayur Piedra Left 11/06/2020    7020 mls removed ny Dr Sushila Maldonado Left 11/19/2020    7000 ml removed by Dr. Alberto Hood.  PARACENTESIS Left 11/30/2020    7000 ml removed by paracentesis by Dr. Sushila Maldonado Left 12/08/2020    7100 ml removed    PARACENTESIS Left 01/08/2021    ultrasound guided paracentesis 7150 ml removed    PARACENTESIS Left 01/19/2021    7000ml removed by Dr Demond Valdes [phone]    PARACENTESIS Left 01/28/2021    by Dr. Alberto Hood 8100 ml removed   [address] Left 12/17/2020    7000ml removed by Dr Demond Valdes       Medications Prior to Admission:    Prior to Admission medications    Medication Sig Start Date End Date Taking? Authorizing Provider   lisinopril (PRINIVIL;ZESTRIL) 5 MG tablet Take 1 tablet by mouth daily 2/24/21   Justin Ledbetter MD   metoprolol succinate (TOPROL XL) 100 MG extended release tablet Take 0.5 tablets by mouth 2 times daily 2/22/21   Paul Evans MD   Adhesive Tape (PAPER TAPE 1\"X12YD) TAPE 1 each by Does not apply route 4 times daily as needed (SOILAGE) 2/12/21   TANNER Morales CNP   Gauze Pads & Dressings ([employer] GAUZE PAD STERILE 4\"X4\") 4\"X4\" PADS Change intraperitoneal dressing - apply daily and PRN soilage (EXTRA ABSORBENT PADS) 2/12/21   TANNER Morales CNP   Transparent Dressings (TEGADERM FIRST AID STYLE) MISC One box 50. Change daily one tegaderm to intraperitoneal site and PRN soilage.  2/3/21   Robby Charles, APRN - CNP   bumetanide (BUMEX) 1 MG tablet Take 2 mg by mouth daily 12/9/20   Historical Provider, MD   glimepiride (AMARYL) 2 MG tablet TAKE 1 TABLET EVERY MORNING BEFORE BREAKFAST  Patient taking differently: Take 2 mg by mouth every morning (before breakfast)  10/23/20   Radha Varma MD   atorvastatin (LIPITOR) 40 MG tablet TAKE 1 TABLET DAILY  Patient taking differently: Take 40 mg by mouth daily TAKE 1 TABLET DAILY 9/25/20   Radha Varma MD   raloxifene (EVISTA) 60 MG tablet TAKE 1 TABLET DAILY  Patient taking differently: Take 60 mg by mouth daily  8/28/20   Radha Varma MD   Blood Pressure KIT Check your blood pressure daily 7/31/20   Mora Cantrell MD   metFORMIN (GLUCOPHAGE) 1000 MG tablet TAKE 1 TABLET TWICE A DAY WITH MEALS  Patient taking differently: Take 1,000 mg by mouth 2 times daily (with meals) TAKE 1 TABLET TWICE A DAY WITH MEALS 3/27/20   Kory We-07-A 1498 Kaitlyn-Lonny, APRN - CNP   glucose blood VI test strips (ACCU-CHEK MELANIE) strip Test 1-2 times a day. Dx: 250.00. Accu-check melanie strips 11/28/12   Radha Varma MD   Lancet Device MISC Test 1-2 times a day. Dx: 250.00. Lancets 10/10/12   Radha Varma MD       Allergies:  Codeine    Social History:   TOBACCO:   reports that she has never smoked. She quit smokeless tobacco use about 37 years ago. ETOH:   reports no history of alcohol use. Family History:       Problem Relation Age of Onset    Arrhythmia Mother     Diabetes Mother     High Blood Pressure Mother     Arrhythmia Father     Breast Cancer Sister     Diabetes Brother     High Blood Pressure Brother        REVIEW OF SYSTEMS:  Ten systems reviewed and negative except for stated in HPI    Physical Exam:    Vitals: BP (!) 106/45   Pulse 83   Temp 98 °F (36.7 °C) (Oral)   Resp 18   Ht 5' 7\" (1.702 m)   Wt 191 lb (86.6 kg)   SpO2 100%   BMI 29.91 kg/m²   General appearance: alert, cooperative,   Skin: Skin color, texture, turgor normal.   HEENT: Head: Normocephalic, no lesions, without obvious abnormality.   Eye: Normal external eye, conjunctiva, lids cornea, ANGELLA. Neck: supple, symmetrical, trachea midline  Lungs: clear to auscultation bilaterally  Heart: S1S2,RRR, murmur present  Abdomen: distended, no tenderness, active BS  Extremities: bilateral pitting edema  Neurologic: Mental status: Alert, oriented, thought content appropriate     Recent Labs     03/07/21  1545   WBC 9.0   HGB 8.0*        Recent Labs     03/07/21  1545   *   K 6.3*   CL 94*   CO2 19*   BUN 57*   CREATININE 2.02*   GLUCOSE 106*   AST 27   ALT 21   BILITOT 0.5   ALKPHOS 108     Troponin T:   Recent Labs     03/07/21  1545   TROPONINI 0.024*       ABGs: No results found for: PHART, PO2ART, HLH1KDZ  INR:   Recent Labs     03/07/21  1545   INR 1.4     URINALYSIS:No results for input(s): NITRITE, COLORU, PHUR, LABCAST, WBCUA, RBCUA, MUCUS, TRICHOMONAS, YEAST, BACTERIA, CLARITYU, SPECGRAV, LEUKOCYTESUR, UROBILINOGEN, BILIRUBINUR, BLOODU, GLUCOSEU, AMORPHOUS in the last 72 hours. Invalid input(s): [place]  -----------------------------------------------------------------   No results found.         Assessment and Plan     70 y.o. female with PMH of HTN, HOCM, carotid stenosis s/p left CEA, decompensated nonalcoholic liver cirrhosis requiring frequent large volume paracentesis s/p peritoneal drain placement on 1/28, DM2, CKD, anxiety, depression who presented with:    Hypotension / elevated troponin  - seen by cardiology on 2/22, Toprol was decreased from 100 BID to 50 BID, - SBP in the 70-80s on arrival, improved with IVFs to 110s in ED  - cardiology consult  - monitor BP closely    FALLON / Hyperkalemia / Hyponatremia / lactic acidosis   - s/p IV hydration, Kayexalate and IV insulin   - monitor renal function, K and Na level closely  - nephrology consult    Decompensated nonalcoholic liver cirrhosis s/p peritoneal drain placement on 1/28  - drains it 3 times a week about 2 liters each time  - follows with hepatology(Post) at [facility]    DM2  - ISS, hypoglycemia protocol    Anemia   - no overt bleeding  - follow H/H            Lovely Diaz MD  Admitting Hospitalist

## 2021-03-07 NOTE — ED NOTES
Pt resting in bed with daughter at bedside. Lights dimmed per request. Denies needs at this time. Pt aware of need for urine specimen, states unable to void at this time and does not void very much usually.       Maxime Handley, RN  03/07/21 [address] Mehdi Gregory, RN  03/07/21 8467

## 2021-03-07 NOTE — ED PROVIDER NOTES
[facility] ED  eMERGENCYdEPARTMENT eNCOUnter      Pt Name: Faisal Huang  MRN: [id number]  Caty 1945  Date of evaluation: 3/7/2021  Ck Calvin MD    CHIEF COMPLAINT           HPI  Faisal Huang is a 70 y.o. female per chart review has a h/o DM II, HTn, Hpl HARDEEP, cirrhosis, CKD presents to the ED with hypotension, weakness. Pt notes gradual onset, moderate, constant, diffuse weakness x 3 days. Pt noted to have a low bp 3 days ago at cardiology's office. Pt's metoprolol was then decrease. Daughter and pt note pt's bp has not increased past [de-identified]. Pt denies fever, n/v, cp, sob, dysuria, diarrhea. ROS  Review of Systems   Constitutional: Negative for activity change, chills and fever. HENT: Negative for ear pain and sore throat. Eyes: Negative for visual disturbance. Respiratory: Negative for cough and shortness of breath. Cardiovascular: Negative for chest pain, palpitations and leg swelling. Gastrointestinal: Negative for abdominal pain, diarrhea, nausea and vomiting. Genitourinary: Negative for dysuria. Musculoskeletal: Negative for back pain. Skin: Negative for rash. Neurological: Positive for weakness. Negative for dizziness. Except as noted above the remainder of the review of systems was reviewed and negative. PAST MEDICAL HISTORY     Past Medical History:   Diagnosis Date    Arthritis     Cardiomyopathy [facility])     mother history.     Chronic kidney disease     Hypertension     Liver disease     Other disorders of kidney and ureter in diseases classified elsewhere     Pneumonia     Psychiatric problem     Sleep apnea, obstructive     Type II or unspecified type diabetes mellitus without mention of complication, not stated as uncontrolled          SURGICAL HISTORY       Past Surgical History:   Procedure Laterality Date    APPENDECTOMY      CHOLECYSTECTOMY      COLONOSCOPY  04/23/2013    CT BIOPSY RENAL  08/04/2020    CT BIOPSY RENAL 8/4/2020 [facility] CT SCAN    CT BIOPSY RENAL  09/04/2020    CT BIOPSY RENAL 9/4/2020 [facility] CT SCAN    FOOT FRACTURE SURGERY Bilateral     FRACTURE SURGERY      HYSTERECTOMY      IR TUNNELED INTRAPERITNL CATH W/IMAG  1/28/2021    IR TUNNELED INTRAPERITNL CATH W/IMAG 1/28/2021 [facility] SPECIAL PROCEDURE    OTHER SURGICAL HISTORY      removal of anal fistulotomy    PARACENTESIS Left 07/28/2020    9015 mls removed by Dr Arlette Blackwood Left 09/04/2020    [id number] ascites removed by Dr Sue Horan 50g albumin given    PARACENTESIS Left 09/15/2020    10,400cc removed by Dr Bobby Suarez [phone]    PARACENTESIS Left 10/09/2020    7000cc removed by Dr. Arlette Blackwood Left 10/13/2020    7000ml removed by Dr Bobby Suarez [phone]    PARACENTESIS Left 10/26/2020    7010cc removed by Dr. Dolly Pagan Left 11/06/2020    7020 mls removed ny Dr Arlette Blackwood Left 11/19/2020    7000 ml removed by Dr. Sue Horan.     PARACENTESIS Left 11/30/2020    7000 ml removed by paracentesis by Dr. Arlette Blackwood Left 12/08/2020    7100 ml removed    PARACENTESIS Left 01/08/2021    ultrasound guided paracentesis 7150 ml removed    PARACENTESIS Left 01/19/2021    7000ml removed by Dr Bobby Suarez [phone]    PARACENTESIS Left 01/28/2021    by Dr. Sue Horan 8100 ml removed   [address] Left 12/17/2020    7000ml removed by Dr Isis Ly       Previous Medications    ADHESIVE TAPE (PAPER TAPE 1\"X12YD) TAPE    1 each by Does not apply route 4 times daily as needed (SOILAGE)    ATORVASTATIN (LIPITOR) 40 MG TABLET    TAKE 1 TABLET DAILY    BLOOD PRESSURE KIT    Check your blood pressure daily    BUMETANIDE (BUMEX) 1 MG TABLET    Take 2 mg by mouth daily    GAUZE PADS & DRESSINGS ([employer] GAUZE PAD STERILE 4\"X4\") 4\"X4\" PADS    Change intraperitoneal dressing - apply daily and PRN soilage (EXTRA ABSORBENT PADS)    GLIMEPIRIDE (AMARYL) 2 MG TABLET    TAKE 1 TABLET EVERY MORNING BEFORE BREAKFAST    GLUCOSE BLOOD VI TEST STRIPS (ACCU-CHEK MELANIE) STRIP    Test 1-2 times a day. Dx: 250.00. Accu-check melanie strips    LANCET DEVICE MISC    Test 1-2 times a day. Dx: 250.00. Lancets    LISINOPRIL (PRINIVIL;ZESTRIL) 5 MG TABLET    Take 1 tablet by mouth daily    METFORMIN (GLUCOPHAGE) 1000 MG TABLET    TAKE 1 TABLET TWICE A DAY WITH MEALS    METOPROLOL SUCCINATE (TOPROL XL) 100 MG EXTENDED RELEASE TABLET    Take 0.5 tablets by mouth 2 times daily    RALOXIFENE (EVISTA) 60 MG TABLET    TAKE 1 TABLET DAILY    TRANSPARENT DRESSINGS (TEGADERM FIRST AID STYLE) MISC    One box 50. Change daily one tegaderm to intraperitoneal site and PRN soilage.        ALLERGIES     Codeine    FAMILY HISTORY       Family History   Problem Relation Age of Onset    Arrhythmia Mother     Diabetes Mother     High Blood Pressure Mother     Arrhythmia Father     Breast Cancer Sister     Diabetes Brother     High Blood Pressure Brother           SOCIAL HISTORY       Social History     Socioeconomic History    Marital status:      Spouse name: None    Number of children: None    Years of education: None    Highest education level: None   Occupational History    None   Social Needs    Financial resource strain: Not hard at all   EMCAS insecurity     Worry: Never true     Inability: Never true   0xdata needs     Medical: None     Non-medical: None   Tobacco Use    Smoking status: Never Smoker    Smokeless tobacco: Former User   Substance and Sexual Activity    Alcohol use: No    Drug use: No    Sexual activity: Yes   Lifestyle    Physical activity     Days per week: None     Minutes per session: None    Stress: None   Relationships    Social connections     Talks on phone: None     Gets together: None     Attends [employer] service: None     Active member of club or organization: None     Attends meetings of clubs or organizations: None     Relationship status: None    Intimate partner violence     Fear of current or ex partner: None     Emotionally abused: None     Physically abused: None     Forced sexual activity: None   Other Topics Concern    None   Social History Narrative    None         PHYSICAL EXAM       ED Triage Vitals [03/07/21 1520]   BP Temp Temp Source Pulse Resp SpO2 Height Weight   (!) 79/48 98 °F (36.7 °C) Oral 78 18 100 % 5' 7\" (1.702 m) 191 lb (86.6 kg)       Physical Exam  Vitals signs and nursing note reviewed. Constitutional:       Appearance: She is well-developed. HENT:      Head: Normocephalic. Right Ear: External ear normal.      Left Ear: External ear normal.   Eyes:      Conjunctiva/sclera: Conjunctivae normal.      Pupils: Pupils are equal, round, and reactive to light. Neck:      Musculoskeletal: Normal range of motion and neck supple. Cardiovascular:      Rate and Rhythm: Normal rate and regular rhythm. Heart sounds: Normal heart sounds. Pulmonary:      Effort: Pulmonary effort is normal.      Breath sounds: Normal breath sounds. Abdominal:      General: Bowel sounds are normal. There is no distension. Palpations: Abdomen is soft. Tenderness: There is no abdominal tenderness. Musculoskeletal: Normal range of motion. Comments: 2+ edema noted in bilateral lower extremities. Skin:     General: Skin is warm and dry. Neurological:      Mental Status: She is alert and oriented to person, place, and time. Psychiatric:         Mood and Affect: Mood normal.           MDM  69 yo female presents to the ED with weakness, hypotension. Pt's bp noted to be 79M systolics. Pt given 1 L NS in the ED. Pt with echo 6/20 which showed EF 85%. EKG shows NSR with HR 80, normal axis, normal intervals, no ST changes. Labs remarkable for Hb 8, Na 124, K 6.3, BUN 57, Cr 2.02, lactic acid 3.4, troponin 0.024. Pt reassessed and bp noted to be 100-110s. Pt states she has not been eating/drinking well for the past week.   Pt likely with FALLON resulting in elevated troponin, lactic acidosis, hyperkalemia. Pt given PO kayexalate, IV insulin, IV d50 in the ED. Pt reassessed and able to tolerate PO. However, given hyperkalemia, FALLON, hyponatremia, lactic acidosis, elevated troponin, case discussed with Dr. Shari Love and pt admitted to medicine in stable condition. Pt understands plan. FINAL IMPRESSION      1. FALLON (acute kidney injury) ([address]    2. Hypotension, unspecified hypotension type    3. Hyperkalemia    4. Lactic acidosis    5. Elevated troponin    6.  Hyponatremia          DISPOSITION/PLAN   DISPOSITION Decision To Admit 03/07/2021 05:45:22 PM        DISCHARGE MEDICATIONS:  [unfilled]         Greg Pulliam MD(electronically signed)  Attending Emergency Physician            Greg Pulliam MD  03/07/21 3601

## 2021-03-07 NOTE — ED TRIAGE NOTES
Pt to ed from home via triage with daughter with report of hypotension on home monitor. Daughter reports that Dr Taylor Morales lowered metoprolol 2 weeks ago for low BP. Daughter reports that she has home peritoneal dialysis and 2L were removed yesterday. She reports that after session pt had \"terrible migraine\" that has resolved. Daughter states \"we are new to this:\". On arrival, pt calm and cooperative. Pt skin very pale dry. Respiration even and unlabored.  Pt denies pain for SOB

## 2021-03-07 NOTE — ED NOTES
Pt sitting on bed with sig other. Alert, oriented. Skin warm and dry. Slowly drinking kayexalate. Denies needs at this time.      Esperanza Rodriguez, PERNELL  03/07/21 5907

## 2021-03-08 NOTE — CONSULTS
Consults    Patient Name: Jesika Copeland  Admit Date: 3/7/2021  3:25 PM  MR #: [id number]  : 1945    Attending Physician: Aram Patel DO  Reason for consult: Ascites    History of Presenting Illness:      Jesika Copeland is a 70 y.o. female on hospital day 1 with a history of generalized weakness and fatigue, GI consult was requested because of history of cirrhosis and ascites, it seems patient has been getting large-volume paracentesis by Dr. Albertina Galvez,  Recently a peritoneal catheter was placed for continuous drainage of the fluid. She reports no fever chills or GI bleeding, has abdominal pain and also complains of swelling of her both lower extremity, etiology of the cirrhosis is unclear, also felt that patient has hypertrophic cardiomyopathy with heart failure which may also be contributing to the ascites. history Obtained From:  patient      History:      Past Medical History:   Diagnosis Date    Arthritis     Cardiomyopathy [facility])     mother history.     Chronic kidney disease     Hypertension     Liver disease     Other disorders of kidney and ureter in diseases classified elsewhere     Pneumonia     Psychiatric problem     Sleep apnea, obstructive     Type II or unspecified type diabetes mellitus without mention of complication, not stated as uncontrolled      Past Surgical History:   Procedure Laterality Date    APPENDECTOMY      CHOLECYSTECTOMY      COLONOSCOPY  2013    CT BIOPSY RENAL  2020    CT BIOPSY RENAL 2020 [facility] CT SCAN    CT BIOPSY RENAL  2020    CT BIOPSY RENAL 2020 [facility] CT SCAN    FOOT FRACTURE SURGERY Bilateral     FRACTURE SURGERY      HYSTERECTOMY      IR TUNNELED INTRAPERITNL CATH W/IMAG  2021    IR TUNNELED INTRAPERITNL CATH W/IMAG 2021 [facility] SPECIAL PROCEDURE    OTHER SURGICAL HISTORY      removal of anal fistulotomy    PARACENTESIS Left 2020    9015 mls removed by Dr Irena Vera Left 2020    00156eq ascites removed by Dr Colletta Das 50g albumin given    PARACENTESIS Left 09/15/2020    10,400cc removed by Dr Bessy Mann [phone]    PARACENTESIS Left 10/09/2020    7000cc removed by Dr. Craig Blair Left 10/13/2020    7000ml removed by Dr Bessy Mann [phone]    PARACENTESIS Left 10/26/2020    7010cc removed by Dr. Pramod Ramos Left 11/06/2020    7020 mls removed ny Dr Craig Blair Left 11/19/2020    7000 ml removed by Dr. Colletta Das.     PARACENTESIS Left 11/30/2020    7000 ml removed by paracentesis by Dr. Craig Blair Left 12/08/2020    7100 ml removed    PARACENTESIS Left 01/08/2021    ultrasound guided paracentesis 7150 ml removed    PARACENTESIS Left 01/19/2021    7000ml removed by Dr Bessy Mann [phone]    PARACENTESIS Left 01/28/2021    by Dr. Colletta Das 8100 ml removed   [address] Left 12/17/2020    7000ml removed by Dr Bessy Mann       Family History  Family History   Problem Relation Age of Onset    Arrhythmia Mother     Diabetes Mother     High Blood Pressure Mother     Arrhythmia Father     Breast Cancer Sister     Diabetes Brother     High Blood Pressure Brother      [] Unable to obtain due to ventilated and/ or neurologic status    Social History     Socioeconomic History    Marital status:      Spouse name: Not on file    Number of children: Not on file    Years of education: Not on file    Highest education level: Not on file   Occupational History    Not on file   Social Needs    Financial resource strain: Not hard at all   Crist[employer] insecurity     Worry: Never true     Inability: Never true   [employer] needs     Medical: Not on file     Non-medical: Not on file   Tobacco Use    Smoking status: Never Smoker    Smokeless tobacco: Former User   Substance and Sexual Activity    Alcohol use: No    Drug use: No    Sexual activity: Yes   Lifestyle    Physical activity     Days per week: Not on file     Minutes per Dressings ([employer] GAUZE PAD STERILE 4\"X4\") 4\"X4\" PADS, Change intraperitoneal dressing - apply daily and PRN soilage (EXTRA ABSORBENT PADS)  Transparent Dressings (TEGADERM FIRST AID STYLE) MISC, One box 50. Change daily one tegaderm to intraperitoneal site and PRN soilage. Current Hospital Medications:     Scheduled Meds:   metoprolol succinate  50 mg Oral BID    atorvastatin  40 mg Oral Daily    sodium chloride flush  10 mL Intravenous 2 times per day    enoxaparin  30 mg Subcutaneous Daily    insulin lispro  0-6 Units Subcutaneous TID WC    insulin lispro  0-3 Units Subcutaneous Nightly     Continuous Infusions:   dextrose       PRN Meds:.glucose, dextrose, glucagon (rDNA), dextrose, sodium chloride flush, promethazine **OR** ondansetron, polyethylene glycol, acetaminophen **OR** acetaminophen  .  dextrose          Allergies: Allergies   Allergen Reactions    Codeine         Review of Systems:       [x] CV, Resp, Neuro, , and all other systems reviewed and negative other than listed in HPI.      [] Unable to obtain due to ventilated and/ or neurologic status      Objective Findings:     Vitals:   Vitals:    03/07/21 2036 03/07/21 2104 03/08/21 0719 03/08/21 1316   BP:  (!) 106/46 (!) 112/55 (!) 128/59   Pulse: 84 85 95 95   Resp: 18  16 16   Temp:   98.6 °F (37 °C) 98.4 °F (36.9 °C)   TempSrc: Oral  Oral Oral   SpO2: 100% 100% 99% 100%   Weight:       Height:            Physical Examination:  General: In mild distress, patient appears very cachectic with loss of muscle mass. HEENT: Normocephalic,  scleral icterus. None  Neck: No jugular venous distention. Heart: S1-S2 regular with a pansystolic murmur  Lungs: Clear to ascultation, no rales/wheezing/rhonchi. Good chest wall excursion.   Abdomen: Significant ascites, mildly tender on deep palpation, surgical scar seen in the abdomen, has a catheter in the left side of the abdomen draining peritoneal fluid  Extremities: 2+ pitting edema  Skin: Warm, dry, normal turgor, no rash, no bruise, no petichiae. No spider angiomas or palmar erythema noted  Neuro: No myoclonus or tremor. Awake and alert and no asterixis seen  Psych: Normal affect    Results/ Medications reviewed 3/8/2021, 5:24 PM     Laboratory, Microbiology, Pathology, Radiology, Cardiology, Medications and Transcriptions reviewed  Scheduled Meds:   metoprolol succinate  50 mg Oral BID    atorvastatin  40 mg Oral Daily    sodium chloride flush  10 mL Intravenous 2 times per day    enoxaparin  30 mg Subcutaneous Daily    insulin lispro  0-6 Units Subcutaneous TID     insulin lispro  0-3 Units Subcutaneous Nightly     Continuous Infusions:   dextrose         Recent Labs     03/07/21  1545 03/08/21  0539   WBC 9.0 6.4   HGB 8.0* 7.8*   HCT 24.3* 23.8*   MCV 91.5 92.3    157     Recent Labs     03/07/21  1545 03/07/21  2115 03/08/21  0539   * 129* 129*   K 6.3* 5.0* 4.2   CL 94* 100 99   CO2 19* 15* 20   PHOS  --   --  4.6   BUN 57* 55* 56*   CREATININE 2.02* 1.99* 1.92*     Recent Labs     03/07/21  1545   AST 27   ALT 21   BILITOT 0.5   ALKPHOS 108     No results for input(s): LIPASE, AMYLASE in the last 72 hours.   Recent Labs     03/07/21  1545   PROT 5.8*   INR 1.4     Echocardiogram Limited    Result Date: 3/8/2021  Transthoracic Echocardiography Report (TTE)  Demographics  Patient Name    Edi Alexander Gender               Female  Patient Number  [id number]    Race                                               Ethnicity  Visit Number    [id number]   Room Number          W170  Corporate ID                Date of Study        03/08/2021  Accession       [id number]  Referring Physician  Lj Scherer  Number  Date of Birth   1945  Sonographer          Pasquale Tao LPN  Age             70 year(s)  Interpreting         [facility]) Cardiology                              Physician            Darwin Mac MD Nathaniel Procedure Type of Study  TTE procedure:ECHOCARDIOGRAM LIMITED. Procedure Date Date: 03/08/2021 Start: 02:47 PM Study Location: Portable Technical Quality: Adequate visualization Indications:LVF. Patient Status: Routine Height: 67 inches Weight: 191 pounds BSA: 1.98 m^2 BMI: 29.91 kg/m^2 BP: 128/59 mmHg  Conclusions  Summary  Technically difficult examination. Left ventricular ejection fraction is visually estimated at 65%. Moderate concentric left ventricular hypertrophy with more pronounced  basal septal hypertrophy. severely Increased LVOT gradient of 107mmHg unchanged as compared to  previous echo on 6/2020, likely due to basal septal hypertrophy and KIKO of  anterior mitral valve leaflet  Normal right ventricle structure and function. Normal right ventricle systolic pressure. Recommend Cardiac MRI for further evaluation of LVOT/septal hypertrophy. Signature  ----------------------------------------------------------------  Electronically signed by Prateek Armstrong(Interpreting  physician) on 03/08/2021 04:49 PM  ----------------------------------------------------------------  Findings Left Ventricle Left ventricular ejection fraction is visually estimated at 65%. Normal left ventricular size and function. Moderate concentric left ventricular hypertrophy with more pronounced basal septal hypertophy. severely Increased LVOT gradient of 105mmHg unchanged as compared to previous echo on 6/2020, likely due to basal septal hypertrophy and KIKO of anterior mitral valve leaflet Right Ventricle Normal right ventricle structure and function. Normal right ventricle systolic pressure. Left Atrium The left atrium is Moderately dilated. Mitral Valve No evidence of mitral valve stenosis. Trace (+) mitral regurgitation is present. Aortic Valve Individual aortic valve leaflets are not clearly visualized. Pericardial Effusion No evidence of pericardial effusion. Pleural Effusion No evidence of pleural effusion. Doppler Measurements:  AV Peak Gradient: 105.67 mmHg                                               MV P1/2t: 46.7 msec                                               MVA by PHT4.71 cm^2 Valves  Mitral Valve  P1/2t: 46.7 msec  Area (PHT): 4.71 cm^2  Aortic Valve  Peak Velocity: 5.14 m/s  Peak Gradient: 105.67 mmHg    Xr Chest Portable    Result Date: 3/8/2021  EXAMINATION: XR CHEST PORTABLE CLINICAL HISTORY: HYPOTENSION COMPARISONS: CT CHEST, DECEMBER 29, 2020 FINDINGS: Osseous structures are intact. Cardiopericardial silhouette is normal. Pulmonary vasculature is normal. Lungs are clear. NO ACUTE CARDIOPULMONARY DISEASE. Impression:   Ascites, I am not sure this can be explained by intrinsic liver disease alone as patient has normal platelets and minimally elevated INR. Patient also has hyponatremia and renal insufficiency, right-sided cardiac failure may also be a contributing factor for ascites. Plan:   Patient will need fluid restriction, low-sodium diet, check ascitic fluid for cell count protein albumin LDH culture and cytology, would also check iron studies to rule out possible hemochromatosis since patient has cardiomyopathy diabetes and possible cirrhosis. Comments: Thank you for allowing us to participate in the care of this patient. Will continue to follow. Please call if questions or concerns arise.     Electronically signed by Elane Lombard, MD on 3/8/2021 at 5:24 PM

## 2021-03-08 NOTE — CONSULTS
Consult Note  Patient: Oziel Moralez  Unit/Bed: D880/B072-43  YOB: 1945  MRN: [id number]  Acct: [de-identified]   Admitting Diagnosis: Hypotension [I95.9]  Date:  3/7/2021  Hospital Day: 1      Chief Complaint  HOCM by history    History of Present Illness:  72-year-old female who is a patient of Dr. Sarah Beth Saldivar. Chronically ill person with showed cirrhosis of the liver and recurrent ascites tap primary care is Dr. Amberly Martinez. Patient is independent. She has a peritoneal drain that is draining every 3 days by her daughter. An echo done last year showed HOCM, diastolic function was normal no significant hypertrophy. I am not sure if there was significant gradient from dehydration or HOCM. Apparently no left atrial enlargement. Was sent to Dr Chandan Monet. Was advised verapamil. And beta-blocker.   On her home list of medications she is currently only on Toprol XL 50 twice daily    Allergies   Allergen Reactions    Codeine        Current Facility-Administered Medications   Medication Dose Route Frequency Provider Last Rate Last Admin    metoprolol succinate (TOPROL XL) extended release tablet 50 mg  50 mg Oral BID Mona Dewey Sedar, DO   50 mg at 03/08/21 1119    atorvastatin (LIPITOR) tablet 40 mg  40 mg Oral Daily Rod Ramírez,         glucose (GLUTOSE) 40 % oral gel 15 g  15 g Oral PRN Yamini Burt MD        dextrose 50 % IV solution  12.5 g Intravenous PRN Yamini Burt MD        glucagon (rDNA) injection 1 mg  1 mg Intramuscular PRN Yamini Burt MD        dextrose 5 % solution  100 mL/hr Intravenous PRN Yamini Burt MD        sodium chloride flush 0.9 % injection 10 mL  10 mL Intravenous 2 times per day Yamini Burt MD        sodium chloride flush 0.9 % injection 10 mL  10 mL Intravenous PRN Yamini Burt MD        enoxaparin (LOVENOX) injection 30 mg  30 mg Subcutaneous Daily Yamini Burt MD   30 mg at 03/08/21 1118    promethazine (PHENERGAN) tablet 12.5 mg  12.5 mg Oral Q6H PRN Bledar [phone]    PARACENTESIS Left 10/26/2020    7010cc removed by Dr. Fausto Merino Left 11/06/2020    7020 mls removed ny Dr Austin Adam Left 11/19/2020    7000 ml removed by Dr. Kenny Cerda.     PARACENTESIS Left 11/30/2020    7000 ml removed by paracentesis by Dr. Austin Adam Left 12/08/2020    7100 ml removed    PARACENTESIS Left 01/08/2021    ultrasound guided paracentesis 7150 ml removed    PARACENTESIS Left 01/19/2021    7000ml removed by Dr Yanira Howard [phone]    PARACENTESIS Left 01/28/2021    by Dr. Kenny Cerda 8100 ml removed    [address] Left 12/17/2020    7000ml removed by Dr April Duran Hx:  Social History     Socioeconomic History    Marital status:      Spouse name: None    Number of children: None    Years of education: None    Highest education level: None   Occupational History    None   Social Needs    Financial resource strain: Not hard at all   [employer] insecurity     Worry: Never true     Inability: Never true   [employer] needs     Medical: None     Non-medical: None   Tobacco Use    Smoking status: Never Smoker    Smokeless tobacco: Former User   Substance and Sexual Activity    Alcohol use: No    Drug use: No    Sexual activity: Yes   Lifestyle    Physical activity     Days per week: None     Minutes per session: None    Stress: None   Relationships    Social connections     Talks on phone: None     Gets together: None     Attends Oriental orthodox service: None     Active member of club or organization: None     Attends meetings of clubs or organizations: None     Relationship status: None    Intimate partner violence     Fear of current or ex partner: None     Emotionally abused: None     Physically abused: None     Forced sexual activity: None   Other Topics Concern    None   Social History Narrative    None       Family Hx:  Family History   Problem Relation Age of Onset    Arrhythmia Mother     Diabetes Mother     High Blood Pressure Mother     Arrhythmia Father     Breast Cancer Sister     Diabetes Brother     High Blood Pressure Brother        Review of Systems:   Review of Systems   Constitutional: Positive for fatigue. Negative for activity change, appetite change, diaphoresis and unexpected weight change. HENT: Negative for facial swelling, nosebleeds, trouble swallowing and voice change. Respiratory: Negative for apnea, chest tightness, shortness of breath and wheezing. Cardiovascular: Negative for chest pain, palpitations and leg swelling. Gastrointestinal: Positive for abdominal distention. Negative for anal bleeding, blood in stool, nausea and vomiting. Genitourinary: Negative for decreased urine volume and dysuria. Musculoskeletal: Negative for gait problem and myalgias. Skin: Negative for color change, pallor, rash and wound. Neurological: Negative for dizziness, syncope, facial asymmetry, weakness, light-headedness, numbness and headaches. Hematological: Does not bruise/bleed easily. Psychiatric/Behavioral: Negative for agitation, behavioral problems, confusion, hallucinations and suicidal ideas. The patient is not nervous/anxious. All other systems reviewed and are negative. Physical Examination:    BP (!) 128/59   Pulse 95   Temp 98.4 °F (36.9 °C) (Oral)   Resp 16   Ht 5' 7\" (1.702 m)   Wt 191 lb (86.6 kg)   SpO2 100%   BMI 29.91 kg/m²    Physical Exam  Constitutional:       Appearance: She is ill-appearing. HENT:      Head: Normocephalic and atraumatic. Nose: Nose normal.   Eyes:      Extraocular Movements: Extraocular movements intact. Pupils: Pupils are equal, round, and reactive to light. Neck:      Musculoskeletal: Normal range of motion. Cardiovascular:      Rate and Rhythm: Regular rhythm. Heart sounds: Murmur present. Gallop present. Pulmonary:      Effort: Respiratory distress present.    Abdominal:      General: There is distension. Musculoskeletal: Normal range of motion. Skin:     Coloration: Skin is pale. Neurological:      General: No focal deficit present. Mental Status: She is alert and oriented to person, place, and time.    Psychiatric:         Mood and Affect: Mood normal.         LABS:  CBC:  Lab Results   Component Value Date    WBC 6.4 03/08/2021    RBC 2.57 03/08/2021    HGB 7.8 03/08/2021    HCT 23.8 03/08/2021    MCV 92.3 03/08/2021    MCH 30.5 03/08/2021    MCHC 33.0 03/08/2021    RDW 14.4 03/08/2021     03/08/2021    MPV 10.0 09/08/2015     CBC with Differential:   Lab Results   Component Value Date    WBC 6.4 03/08/2021    RBC 2.57 03/08/2021    HGB 7.8 03/08/2021    HCT 23.8 03/08/2021     03/08/2021    MCV 92.3 03/08/2021    MCH 30.5 03/08/2021    MCHC 33.0 03/08/2021    RDW 14.4 03/08/2021    NRBC 0.0 07/22/2020    LYMPHOPCT 7.7 03/07/2021    MONOPCT 12.7 03/07/2021    BASOPCT 0.4 03/07/2021    MONOSABS 1.1 03/07/2021    LYMPHSABS 0.7 03/07/2021    EOSABS 0.0 03/07/2021    BASOSABS 0.0 03/07/2021     CMP:    Lab Results   Component Value Date     03/08/2021    K 4.2 03/08/2021    K 5.0 09/19/2020    CL 99 03/08/2021    CO2 20 03/08/2021    BUN 56 03/08/2021    CREATININE 1.92 03/08/2021    GFRAA 30.9 03/08/2021    LABGLOM 25.6 03/08/2021    GLUCOSE 148 03/08/2021    GLUCOSE 84 12/04/2020    PROT 5.8 03/07/2021    LABALBU 2.1 03/08/2021    LABALBU 3.3 12/04/2020    CALCIUM 8.2 03/08/2021    BILITOT 0.5 03/07/2021    ALKPHOS 108 03/07/2021    AST 27 03/07/2021    ALT 21 03/07/2021     BMP:    Lab Results   Component Value Date     03/08/2021    K 4.2 03/08/2021    K 5.0 09/19/2020    CL 99 03/08/2021    CO2 20 03/08/2021    BUN 56 03/08/2021    LABALBU 2.1 03/08/2021    LABALBU 3.3 12/04/2020    CREATININE 1.92 03/08/2021    CALCIUM 8.2 03/08/2021    GFRAA 30.9 03/08/2021    LABGLOM 25.6 03/08/2021    GLUCOSE 148 03/08/2021    GLUCOSE 84 12/04/2020     Magnesium:    Lab Results

## 2021-03-08 NOTE — CONSULTS
Renal consult    Crystal  Cardiorenal  Hyperkalemia improved  OHDx HCOM related  85% EF    Cachexia  Dm type-2  Hx Ascites d/t cardiac suspected along with NAFL    Plan  Will give albumin to improve intravascular volume   B Blocker continue  next cardiac  med ca++ blocker for failure of beta blocker / diuretic judiciously  Avoid nephrotoxins  Low salt diet  Bmp houser  Protein supplements  Long term prognosis poor  Consider pericentesis repeat

## 2021-03-08 NOTE — PROGRESS NOTES
Procedure Laterality Date    APPENDECTOMY      CHOLECYSTECTOMY      COLONOSCOPY  04/23/2013    CT BIOPSY RENAL  08/04/2020    CT BIOPSY RENAL 8/4/2020 [facility] CT SCAN    CT BIOPSY RENAL  09/04/2020    CT BIOPSY RENAL 9/4/2020 [facility] CT SCAN    FOOT FRACTURE SURGERY Bilateral     FRACTURE SURGERY      HYSTERECTOMY      IR TUNNELED INTRAPERITNL CATH W/IMAG  1/28/2021    IR TUNNELED INTRAPERITNL CATH W/IMAG 1/28/2021 [facility] SPECIAL PROCEDURE    OTHER SURGICAL HISTORY      removal of anal fistulotomy    PARACENTESIS Left 07/28/2020    9015 mls removed by Dr Del Hastings Left 09/04/2020    [id number] ascites removed by Dr Gaston Colon 50g albumin given    PARACENTESIS Left 09/15/2020    10,400cc removed by Dr Rufino Abbott [phone]    PARACENTESIS Left 10/09/2020    7000cc removed by Dr. Del Hastings Left 10/13/2020    7000ml removed by Dr Rufino Abbott [phone]    PARACENTESIS Left 10/26/2020    7010cc removed by Dr. Delia Rosario Left 11/06/2020    7020 mls removed ny Dr Del Hastings Left 11/19/2020    7000 ml removed by Dr. Gaston Colon.     PARACENTESIS Left 11/30/2020    7000 ml removed by paracentesis by Dr. Del Hastings Left 12/08/2020    7100 ml removed    PARACENTESIS Left 01/08/2021    ultrasound guided paracentesis 7150 ml removed    PARACENTESIS Left 01/19/2021    7000ml removed by Dr Rufino Abbott [phone]    PARACENTESIS Left 01/28/2021    by Dr. Gaston Colon 8100 ml removed   [address] Left 12/17/2020    7000ml removed by Dr Rufino Abbott        Restrictions  Restrictions/Precautions: Fall Risk(Medium per giron)     Safety Devices: Safety Devices  Safety Devices in place: Not Applicable        Subjective  Pre Treatment Pain Screening  Pain at present: 0  Scale Used: Numeric Score  Intervention List: Patient able to continue with treatment, Patient declined any intervention    Pain Reassessment:   Pain Assessment  Patient Currently in Pain: No  Pain Assessment: 0-10  Pain Level: 0       Prior Level of Function:  Social/Functional History  Lives With: Spouse  Type of Home: House  Home Layout: One level, Laundry in basement  Home Access: Stairs to enter with rails  Entrance Stairs - Number of Steps: 3  Bathroom Shower/Tub: Tub/Shower unit  Bathroom Equipment: Shower chair, Grab bars in shower, Hand-held shower  ADL Assistance: [facility]: Needs assistance(Kids are doing for her; 'someone comes every day')  Ambulation Assistance: Independent  Transfer Assistance: Independent  Active : No  Patient's  Info: - pt. reports she has license but hasn't done driving lately  Occupation: Retired  Type of occupation: [place]    OBJECTIVE:     Orientation Status:  Orientation  Overall Orientation Status: Within Normal Limits    Observation:  Observation/Palpation  Posture: Fair  Observation: distended abdomen    Cognition Status:  Cognition  Overall Cognitive Status: WFL    Perception Status:  Perception  Overall Perceptual Status: WFL    Sensation Status:  Sensation  Overall Sensation Status: WFL    Vision and Hearing Status:  Vision  Vision: Impaired  Vision Exceptions: Wears glasses at all times  Hearing  Hearing: Within functional limits     ROM:   LUE AROM (degrees)  LUE AROM : WFL  Left Hand AROM (degrees)  Left Hand AROM: WFL  RUE AROM (degrees)  RUE AROM : WFL  Right Hand AROM (degrees)  Right Hand AROM: WFL    Strength:  LUE Strength  Gross LUE Strength: Exceptions to [facility]  L Hand General: 3+/5  LUE Strength Comment: 3+/5 all planes  RUE Strength  Gross RUE Strength: Exceptions to [facility]  R Hand General: 3+/5  RUE Strength Comment: 3+/5 all planes    Coordination, Tone, Quality of Movement:    Tone RUE  RUE Tone: Normotonic  Tone LUE  LUE Tone: Normotonic  Coordination  Movements Are Fluid And Coordinated: Yes    Hand Dominance:  Hand Dominance  Hand Dominance: Right    ADL Status:  ADL  Feeding: Independent  Grooming: Setup  UE Bathing: Setup  LE Bathing: Contact guard assistance  UE Dressing: Setup  LE Dressing: Contact guard assistance  Toileting: Contact guard assistance  Additional Comments: Simulated ADLs as above. Decreased balance when managing pants, requires increased time for mobility and difficulty noted during weight shifting  Toilet Transfers  Toilet - Technique: Ambulating  Equipment Used: Grab bars  Toilet Transfer: Contact guard assistance  Toilet Transfers Comments: Increased effort for transitions       Therapy key for assistance levels -   Independent = Pt. is able to perform task with no assistance but may require a device   Stand by assistance = Pt. does not perform task at an independent level but does not need physical assistance, requires verbal cues  Minimal, Moderate, Maximal Assistance = Pt. requires physical assistance (25%, 50%, 75% assist from helper) for task but is able to actively participate in task   Dependent = Pt. requires total assistance with task and is not able to actively participate with task completion     Functional Mobility:  Functional Mobility  Functional - Mobility Device: No device  Activity: To/from bathroom  Assist Level: Contact guard assistance  Functional Mobility Comments: Increased effort for mobility.  Reaching for walls at times  Transfers  Sit to stand: Contact guard assistance  Stand to sit: Contact guard assistance  Transfer Comments: Increased effort for transitions    Bed Mobility  Bed mobility  Supine to Sit: Moderate assistance  Sit to Supine: Stand by assistance  Comment: Increased effort for transitions, requires occasional rest breaks    Seated and Standing Balance:  Balance  Sitting Balance: Supervision  Standing Balance: Contact guard assistance    Functional Endurance:  Activity Tolerance  Activity Tolerance: Patient Tolerated treatment well    D/C Recommendations:  OT D/C RECOMMENDATIONS  REQUIRES OT FOLLOW UP: Yes    Equipment Recommendations:  OT Equipment Recommendations  Other: Continue to assess    OT Education:   OT Education  OT Education: OT Role, Plan of Care  Patient Education: Educated pt. on role of acute care OT  Barriers to Learning: None    OT Follow Up:  OT D/C RECOMMENDATIONS  REQUIRES OT FOLLOW UP: Yes       Assessment/Discharge Disposition:  Assessment: Pt is a 70year old woman from home with spouse who presents to [facility] with the above deficits which impact her ability to perform ADLs and IADLs. Pt. limited d/t fatigue and weakness. Pt. would benefit from continued OT to maximize independence and safety with ADL tasks.   Performance deficits / Impairments: Decreased functional mobility , Decreased ADL status, Decreased strength, Decreased endurance, Decreased balance, Decreased high-level IADLs  Prognosis: Good  Discharge Recommendations: Continue to assess pending progress  Decision Making: Medium Complexity  History: Pt's medical history is moderately complex  Exam: Pt. has 6 performance deficits  Assistance / Modification: Pt. requires min A    Six Click Score   How much help for putting on and taking off regular lower body clothing?: A Little  How much help for Bathing?: A Little  How much help for Toileting?: A Little  How much help for putting on and taking off regular upper body clothing?: A Little  How much help for taking care of personal grooming?: A Little  How much help for eating meals?: None  AM-[facility] Inpatient Daily Activity Raw Score: 19  AM-PAC Inpatient ADL T-Scale Score : 40.22  ADL Inpatient CMS 0-100% Score: 42.8    Plan:  Plan  Times per week: 1-3x  Plan weeks: Length of acute stay  Current Treatment Recommendations: Functional Mobility Training, Strengthening, Balance Training, Endurance Training, Pain Management, Safety Education & Training, Patient/Caregiver Education & Training, Equipment Evaluation, Education, & procurement, Self-Care / ADL, Home Management Training    Goals:   Patient will:    - Improve functional endurance to tolerate/complete 30 mins of ADL's  - Be Mod I in UB ADLs   - Be Mod I in LB ADLs  - Be Mod I in ADL transfers without LOB  - Be Mod I in toileting tasks  - Improve B UE strength and endurance to 4/5 in order to participate in self-care activities as projected. - Access appropriate D/C site with as few architectural barriers as possible. - Sequence self-care tasks with no verbal cues for safety    Patient Goal: \"I want to get home\"  Discussed and agreed upon: Yes Comments:     Therapy Time:   OT Individual Minutes  Time In: 0936  Time Out: 0950  Minutes: 14    Eval: 14 minutes     Electronically signed by:     AISHA Haile  3/8/2021, 10:21 AM

## 2021-03-08 NOTE — FLOWSHEET NOTE
Nurse in to pass meds,  in the room at bedside. Pt is currently sitting up on the edge of her bed and states her left quad drainage tube dressing needs changed. The pts abdomen is distended and firm. The pt states she just drained her tube Saturday and she normally gets around 2 liters of fluid off. The pt also is very sensitive to tactile sensation. The pt is moaning out loud periodically but denies any pain. Call bell in reach. [address] drained pts aspire tube for over 2000cc. Specimens sent as ordered. The pt moans but denies pain. She does admit to having a headache. The drainage tube was redressed. Call light in reach.

## 2021-03-08 NOTE — PROGRESS NOTES
+BS, drain in place  MSK: minimal bilateral pedal edema noted  Skin: warm, dry  Psych: appropriate affect     Data    CBC:   Lab Results   Component Value Date    WBC 6.4 03/08/2021    RBC 2.57 03/08/2021    HGB 7.8 03/08/2021    HCT 23.8 03/08/2021    MCV 92.3 03/08/2021    MCH 30.5 03/08/2021    MCHC 33.0 03/08/2021    RDW 14.4 03/08/2021     03/08/2021    MPV 10.0 09/08/2015     CMP:    Lab Results   Component Value Date     03/08/2021    K 4.2 03/08/2021    K 5.0 09/19/2020    CL 99 03/08/2021    CO2 20 03/08/2021    BUN 56 03/08/2021    CREATININE 1.92 03/08/2021    GFRAA 30.9 03/08/2021    LABGLOM 25.6 03/08/2021    GLUCOSE 148 03/08/2021    GLUCOSE 84 12/04/2020    PROT 5.8 03/07/2021    LABALBU 2.1 03/08/2021    LABALBU 3.3 12/04/2020    CALCIUM 8.2 03/08/2021    BILITOT 0.5 03/07/2021    ALKPHOS 108 03/07/2021    AST 27 03/07/2021    ALT 21 03/07/2021       ASSESSMENT AND PLAN      # Hypotension / elevated troponin  - seen by cardiology on 2/22, Toprol was decreased from 100 BID to 50 BID, - SBP in the 70-80s on arrival, improved with IVFs to 110s in ED  - cardiology consulted  - monitor BP closely     # FALLON / Hyperkalemia / Hyponatremia / lactic acidosis   - baseline CKD3 with baseline Cr ~1.5. today is 1.92  - s/p IV hydration, Kayexalate and IV insulin   - monitor renal function, K and Na level closely  - nephrology consulted - albumin infusion today to increase intravascular volume     # Decompensated nonalcoholic liver cirrhosis s/p peritoneal drain placement on 1/28  - drains it 3 times a week about 2 liters each time  - follows with hepatology(Post) at [address]  # DM2  - ISS, hypoglycemia protocol     # Anemia   - no overt bleeding  - follow H/H    Disposition: Albumin today per nephrology. Monitoring renal function and BP.  PT/OT      Esperanza Ramon,   Internal Medicine

## 2021-03-08 NOTE — PROGRESS NOTES
PHARMACY NOTE  Geoff Metz was ordered Evista. Per the [address], this medication is non-formulary and not stocked by pharmacy. The medication can be reordered at discharge.

## 2021-03-08 NOTE — CONSULTS
[address]                      [address], [address] [facility]                                  CONSULTATION    PATIENT NAME: Migdalia Salvador                        :        1945  MED REC NO:   [id number]                            ROOM:       W170  ACCOUNT NO:   [de-identified]                           ADMIT DATE: 2021  PROVIDER:     Narayan Garcia DO    CONSULT DATE:  2021    RENAL CONSULT    HISTORY OF PRESENT ILLNESS:  A 56-year-old malnourished-appearing female  admitted to hospital because of swelling of the lower limbs and overall  general weakness. Family members saw her 24 hours ago and insisted she  goes to the emergency room. She does have a history of NALF and has  paracentesis performed on her scheduled basis. She admits to having  swelling of her abdomen again with some discomfort. She has known  peripheral edema. The patient also has a history of underlying organic  heart disease with hypertrophic cardiomyopathy. On admission to the hospital at this time, the patient has a serum  creatinine of 2 with a GFR of 25 mL per minute. She is mildly  hyponatremia. The patient six months ago had a relatively normal renal  function. She denies any gross hematuria, urinary tract infections, or  kidney stones. She denies any excessive use of nonsteroidal  anti-inflammatory medications. She does not notice any difference in  her urine outputs. PAST MEDICAL HISTORY:  Nonalcoholic liver disease, hypertrophic  cardiomyopathy, diabetes mellitus type 2, hypertension, carotid  stenosis, harsh systolic murmur. PAST SURGICAL HISTORY:  Multiple paracentesis too numerous to count,  appendectomy, cholecystectomy, bilateral foot surgery, hysterectomy. SOCIAL HISTORY:  Smoking history, quit in . No alcohol, opioids, or  nonsteroidals. MEDICATIONS:  At the time of her admission; Glucophage, Evista, Lipitor,  Amaryl, Bumex, Toprol.     ALLERGIES TO MEDICATIONS:  CODEINE. PHYSICAL EXAMINATION:  VITAL SIGNS:  Height 5 feet 7 inches, 190 pounds, blood pressure 106/50,  heart rate 80, respirations 18, afebrile. HEENT:  Normocephalic. Facial muscle wasting noted. NECK:  Soft bruits. No JVD. CARDIOVASCULAR:  Heart with a 2/6 systolic murmur. CHEST:  Lungs are relatively clear. No wheezing, rales, or rhonchi. ABDOMEN:  Distended. No guarding or rigidity. Bowel sounds decreased. No evidence of hernia grossly. Percussion of the flank area was  negative. EXTREMITIES:  The patient 2 to 3+ edema of lower limbs. Skin is warm  and dry. No cyanosis. No calf tenderness. IMPRESSION:  1. FALLON, cardiorenal.  2.  Hyperkalemia, resolved. 3.  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 4.  Cachexia. 5.  Diabetes mellitus type 2.  6.  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 PLAN:  We will give albumin improved intravascular volume beta-blocker  to continue and in light of cardiac issues consider calcium blocker for  failure, if needed diuretics judiciously, low-salt diet, BMP daily,  protein supplements. Long-term prognosis poor. Albumin to be given IV  one dose. Avoid nephrotoxic exposure. Consider repeat paracentesis. Thank you very much.         Josi Sosa DO    D: 03/08/2021 13:46:30       T: 03/08/2021 13:51:24     [email or web address]  Job#: [id number]     Doc#: [id number]    CC:

## 2021-03-08 NOTE — PROGRESS NOTES
Physical Therapy Med Surg Initial Assessment  Facility/Department: [address]  Room: [place] [address]       NAME: Lala Azar  : 1945 (67 y.o.)  MRN: [id number]  CODE STATUS: Full Code    Date of Service: 3/8/2021    Patient Diagnosis(es): Hypotension [I95.9]   Chief Complaint   Patient presents with    Hypotension     after decreased metropolol 2 weeks ago, home HD 2L off yesterday     Patient Active Problem List    Diagnosis Date Noted    Hypotension 2021    Other ascites 2020    Arterial hypotension     Shock ([address]     Alcoholic cirrhosis of liver with ascites ([address] 2020    Cirrhosis of liver with ascites ([address] 08/15/2020    Renal mass, left 08/15/2020    Abdominal distension 2020    FALLON (acute kidney injury) ([address] 2020    Rectal pain 2020    Cardiomyopathy ([address] 2020    Subconjunctival hemorrhage of left eye 2020    PVD (posterior vitreous detachment), both eyes 2019    Dry eye syndrome of bilateral lacrimal glands 2019    HARDEEP (obstructive sleep apnea) 2018    Bilateral carotid artery stenosis 2017    Right-sided carotid artery disease ([address] 2016    Pseudophakia of both eyes 2016    Regular astigmatism 2016    Presbyopia of both eyes 2016    Bell-rectal abscess 2013    Hypertension     Hypertrophic obstructive cardiomyopathy (HOCM) ([address] 2013    Type 2 diabetes mellitus without complication, without long-term current use of insulin ([address] 03/15/2013        Past Medical History:   Diagnosis Date    Arthritis     Cardiomyopathy [facility])     mother history.     Chronic kidney disease     Hypertension     Liver disease     Other disorders of kidney and ureter in diseases classified elsewhere     Pneumonia     Psychiatric problem     Sleep apnea, obstructive     Type II or unspecified type diabetes mellitus without mention of complication, not stated as uncontrolled      Past Surgical History:   Procedure Laterality Date    APPENDECTOMY      CHOLECYSTECTOMY      COLONOSCOPY  04/23/2013    CT BIOPSY RENAL  08/04/2020    CT BIOPSY RENAL 8/4/2020 [facility] CT SCAN    CT BIOPSY RENAL  09/04/2020    CT BIOPSY RENAL 9/4/2020 [facility] CT SCAN    FOOT FRACTURE SURGERY Bilateral     FRACTURE SURGERY      HYSTERECTOMY      IR TUNNELED INTRAPERITNL CATH W/IMAG  1/28/2021    IR TUNNELED INTRAPERITNL CATH W/IMAG 1/28/2021 [facility] SPECIAL PROCEDURE    OTHER SURGICAL HISTORY      removal of anal fistulotomy    PARACENTESIS Left 07/28/2020    9015 mls removed by Dr Taqueria Jack Left 09/04/2020    60377cu ascites removed by Dr Renee Crocker 50g albumin given    PARACENTESIS Left 09/15/2020    10,400cc removed by Dr Rich Meza [phone]    PARACENTESIS Left 10/09/2020    7000cc removed by Dr. Taqueria Jack Left 10/13/2020    7000ml removed by Dr Rich Meza [phone]    PARACENTESIS Left 10/26/2020    7010cc removed by Dr. Smith Mustafa Left 11/06/2020    7020 mls removed ny Dr Taqueria Jack Left 11/19/2020    7000 ml removed by Dr. Renee Crocker.     PARACENTESIS Left 11/30/2020    7000 ml removed by paracentesis by Dr. Taqueria Jack Left 12/08/2020    7100 ml removed    PARACENTESIS Left 01/08/2021    ultrasound guided paracentesis 7150 ml removed    PARACENTESIS Left 01/19/2021    7000ml removed by Dr Rich Meza [phone]    PARACENTESIS Left 01/28/2021    by Dr. Renee Crocker 8100 ml removed   [address] Left 12/17/2020    7000ml removed by Dr Rich Meza       Chart Reviewed: Yes  Patient assessed for rehabilitation services?: Yes    Restrictions:  Restrictions/Precautions: Fall Risk(Medium per giron)     SUBJECTIVE: Subjective: \"I'm ok\"    Pain       Post Treatment Pain Screening:   Pain Screening  Patient Currently in Pain: No  Pain Assessment  Pain Assessment: 0-10  Pain Level: 0    Prior Level of Function:  Social/Functional History  Lives With: Spouse  Type of Home: House  Home Layout: One level, Laundry in basement  Home Access: Stairs to enter with rails  Entrance Stairs - Number of Steps: 3  Bathroom Shower/Tub: Tub/Shower unit  Bathroom Equipment: Shower chair, Grab bars in shower, Hand-held shower  ADL Assistance: Independent  Homemaking Assistance: Needs assistance(Kids are doing for her; 'someone comes every day')  Ambulation Assistance: Independent  Transfer Assistance: Independent  Active : No  Patient's  Info: - pt. reports she has license but hasn't done driving lately  Occupation: Retired  Type of occupation:     OBJECTIVE:   Vision: Impaired  Vision Exceptions: Wears glasses at all times  Hearing: Within functional limits    Cognition:  Overall Orientation Status: Within Normal Limits  Follows Commands: Within Functional Limits    Observation/Palpation  Posture: Fair  Observation: distended abdomen    ROM:  RLE AROM: WFL  LLE AROM : WFL    Strength:  Strength RLE  Comment: grossly 4/5  Strength LLE  Comment: grossly 4/5    Neuro:  Balance  Sitting - Static: Good  Sitting - Dynamic: Good;-  Standing - Static: Fair;+  Standing - Dynamic: Fair        Sensation  Overall Sensation Status: WFL    Bed mobility  Supine to Sit: Moderate assistance  Sit to Supine: Stand by assistance    Transfers  Sit to Stand: Contact guard assistance  Stand to sit: Contact guard assistance    Ambulation  Ambulation?: Yes  Ambulation 1  Surface: level tile  Device: No Device  Assistance: Contact guard assistance  Quality of Gait: unsteady  Gait Deviations: Slow Samantha;Decreased step length;Decreased step height  Distance: 20 ft                              ASSESSMENT:   Body structures, Functions, Activity limitations: Decreased functional mobility ; Decreased strength;Decreased endurance;Decreased balance;Decreased posture  Decision Making: Medium Complexity  History: high  Exam: high  Clinical Presentation: evolving    Prognosis: Good    DISCHARGE RECOMMENDATIONS:  Discharge Recommendations: Continue to assess pending progress    Assessment: Patient demonstrates decline in functional mobility and balance upon PT evaluation. Further physical therapy recommended to improve mobility, strength, and endurance for overall quality of life. REQUIRES PT FOLLOW UP: Yes      PLAN OF CARE:  Plan  Times per week: 3-6  Current Treatment Recommendations: Strengthening, ROM, Balance Training, Functional Mobility Training, Transfer Training, Gait Training, Endurance Training, Neuromuscular Re-education, Home Exercise Program, Safety Education & Training, Patient/Caregiver Education & Training, Equipment Evaluation, Education, & procurement  Safety Devices  Type of devices: All fall risk precautions in place, Bed alarm in place, Call light within reach    Goals:  Long term goals  Long term goal 1: Patient will be independent with bed mobility. Long term goal 2: Patient will be independent with transfers. Long term goal 3: Patient will be independent with 150ft of gait using LRD. Long term goal 4: Patient will ascend/descend 4 steps using HR with SBA. AMPA (6 CLICK) BASIC MOBILITY  AM-PAC Inpatient Mobility Raw Score : 18     Therapy Time:   Individual   Time In 0936   Time Out 0950   Minutes [address], 03/08/21 at 10:16 AM         Definitions for assistance levels  Independent = pt does not require any physical supervision or assistance from another person for activity completion. Device may be needed.   Stand by assistance = pt requires verbal cues or instructions from another person, close to but not touching, to perform the activity  Minimal assistance= pt performs 75% or more of the activity; assistance is required to complete the activity  Moderate assistance= pt performs 50% of the activity; assistance is required to complete the activity  Maximal assistance = pt performs 25% of the activity; assistance is required to complete the activity  Dependent = pt requires total physical assistance to accomplish the task

## 2021-03-08 NOTE — CARE COORDINATION
[facility] Case Management Initial Discharge Assessment    Met with Patient to discuss discharge plan. READMIT 24%    PCP: Tomas Valladares MD                                Date of Last Visit: 9/23    Discharge Planning    Living Arrangements: independently at home    Who do you live with? SPOUSE    Who helps you with your care:  self, family or spouse    If lives at home:     Do you have any barriers navigating in your home? no    Patient can perform ADL? Yes    Current Services (outpatient and in home) :  None    Dialysis: No    Is transportation available to get to your appointments? YesPT CAN DRIVE, FAMILY TAKES TO APPTS. DME Equipment:  yes - PT IS INDEPENDENT BUT HAS ACCESS TO WHEELED WALKER AND CANE IF NEEDED, PERITONEAL DRAIN-DTR DRAINS EVERY 3 DAYS. Respiratory equipment: None    Respiratory provider:  no     Pharmacy:  yes [facility] [place] [facility]    Consult with Medication Assistance Program?  No      [facility] Declined--STATES HER SPOUSE AND 3 CHILDREN HELP HER    Patient agreeable to SNF/Rehab? Declined    Other discharge needs identified? Palliative Care-PT COULD BENEFIT-NOTE LEFT    Freedom of choice list provided with basic dialogue that supports the patient's individualized plan of care/goals and shares the quality data associated with the providers. Yes    Does Patient Have a High-Risk for Readmission Diagnosis (CHF, PN, MI, COPD)? No    The plan for Transition of Care is related to the following treatment goals: VSS    Initial Discharge Plan? (Note: please see concurrent daily documentation for any updates after initial note). MET WITH PATIENT, FROM HOME WITH SPOUSE, FAMILY HELPS. DISCUSSED DC PLAN, PT DECLINES NEED FOR HHC. NOTE LEFT FOR DRS PT COULD BENEFIT FROM \[facility] [place] [facility]\"" CARE SERVICES.       The Patient and/or patient representative: PT was provided with choice of any post-acute providers for care and equipment and agrees with discharge plan  Yes    Electronically signed by Serafin Mckeon RN on 3/8/2021 at 12:25 PM

## 2021-03-09 NOTE — FLOWSHEET NOTE
Nurse in to start the blood. Nurse changed the dressing over the pts drainage tube. Dressing was saturated with serous fluid. Pts linens changed and she was repositioned in bed. Call bell in reach     [phone]  Pt assisted to the br. Gait steady with standby assist..

## 2021-03-09 NOTE — PROGRESS NOTES
Nephrology Progress Note    Assessment:  CKD  OHDX HCOM  Ascites recurrent  Malnutrition  Hyponatremia related to cardiac and liver issues      Plan: restrict fluids  pericentesis prn     Patient Active Problem List:     Type 2 diabetes mellitus without complication, without long-term current use of insulin (HCC)     Hypertension     Bell-rectal abscess     Bilateral carotid artery stenosis     Hypertrophic obstructive cardiomyopathy (HOCM) (HCC)     HARDEEP (obstructive sleep apnea)     Pseudophakia of both eyes     Regular astigmatism     Cardiomyopathy ([address]     Rectal pain     Abdominal distension     FALLON (acute kidney injury) ([address]     Cirrhosis of liver with ascites (HCC)     Renal mass, left     Alcoholic cirrhosis of liver with ascites (HCC)     Shock (HCC)     Arterial hypotension     Subconjunctival hemorrhage of left eye     Right-sided carotid artery disease (HCC)     PVD (posterior vitreous detachment), both eyes     Presbyopia of both eyes     Dry eye syndrome of bilateral lacrimal glands     Other ascites     Hypotension      Subjective:  Admit Date: 3/7/2021    Interval History: no major complaints    Medications:  Scheduled Meds:   metoprolol succinate  50 mg Oral BID    atorvastatin  40 mg Oral Daily    sodium chloride flush  10 mL Intravenous 2 times per day    insulin lispro  0-6 Units Subcutaneous TID WC    insulin lispro  0-3 Units Subcutaneous Nightly     Continuous Infusions:   sodium chloride      dextrose         CBC:   Recent Labs     03/08/21  0539 03/09/21  0537   WBC 6.4 4.7*   HGB 7.8* 6.9*    131     CMP:    Recent Labs     03/07/21  2115 03/08/21  0539 03/09/21  0537   * 129* 129*   K 5.0* 4.2 4.3    99 98   CO2 15* 20 20   BUN 55* 56* 50*   CREATININE 1.99* 1.92* 1.75*   GLUCOSE 83 148* 118*   CALCIUM 8.1* 8.2* 8.1*   LABGLOM 24.5* 25.6* 28.5*     Troponin:   Recent Labs     03/07/21  1545   TROPONINI 0.024*     BNP: No results for input(s): BNP in the last 72 hours.  INR:   Recent Labs     03/07/21  1545   INR 1.4     Lipids: No results for input(s): CHOL, LDLDIRECT, TRIG, HDL, AMYLASE, LIPASE in the last 72 hours. Liver:   Recent Labs     03/07/21  1545 03/07/21  1545 03/09/21  0537   AST 27  --   --    ALT 21  --   --    ALKPHOS 108  --   --    PROT 5.8*  --   --    LABALBU 2.5*   < > 2.4*   BILITOT 0.5  --   --     < > = values in this interval not displayed. Iron:  No results for input(s): IRONS, FERRITIN in the last 72 hours. Invalid input(s): LABIRONS  Urinalysis: No results for input(s): UA in the last 72 hours.     Objective:  Vitals: BP (!) 103/42   Pulse 84   Temp 98.4 °F (36.9 °C) (Oral)   Resp 18   Ht 5' 7\" (1.702 m)   Wt 191 lb (86.6 kg)   SpO2 100%   BMI 29.91 kg/m²    Wt Readings from Last 3 Encounters:   03/07/21 191 lb (86.6 kg)   02/22/21 200 lb (90.7 kg)   11/09/20 200 lb (90.7 kg)      24HR INTAKE/OUTPUT:  No intake or output data in the 24 hours ending 03/09/21 1117    General: alert, in no apparent distress  HEENT: normocephalic, atraumatic, anicteric  Neck: supple, no mass  Lungs: non-labored respirations, clear to auscultation bilaterally  Heart: regular rate and rhythm, no murmurs or rubs  Abdomen: soft, non-tender, yes-distended  Ext: no cyanosis, 2+ peripheral edema  Neuro: alert and oriented, no gross abnormalities  Psych: normal mood and affect  Skin: no rash      Electronically signed by Luciana Snow MD

## 2021-03-09 NOTE — PROGRESS NOTES
Progress Note  Patient: Su Wood  Unit/Bed: G530/Q335-27  YOB: 1945  MRN: [id number]  Acct: [de-identified]   Admitting Diagnosis: Hypotension [I95.9]  Date:  3/7/2021  Hospital Day: 2    Chief Complaint:  SOB    Subjective  Recurrent ascites with cirrhosis of the liver. HOCM. Normal LV ejection fraction. Very weak. Multiple paracentesis. Rhythm is stable    Review of Systems:   Review of Systems   Constitutional: Negative for appetite change, diaphoresis and fatigue. Respiratory: Negative for apnea, chest tightness and shortness of breath. Cardiovascular: Negative for chest pain, palpitations and leg swelling. Skin: Negative for color change, pallor, rash and wound. Neurological: Negative for dizziness, syncope, weakness, light-headedness and headaches. Psychiatric/Behavioral: Negative for agitation, behavioral problems and confusion. The patient is not nervous/anxious and is not hyperactive. Physical Examination:    BP (!) 128/47   Pulse 87   Temp 98.4 °F (36.9 °C) (Oral)   Resp 16   Ht 5' 7\" (1.702 m)   Wt 191 lb (86.6 kg)   SpO2 100%   BMI 29.91 kg/m²    Physical Exam  Cardiovascular:      Heart sounds: Gallop present. Abdominal:      General: There is distension. Skin:     Coloration: Skin is pale. Neurological:      General: No focal deficit present.    Psychiatric:         Behavior: Behavior normal.         LABS:  CBC:   Lab Results   Component Value Date    WBC 4.7 03/09/2021    RBC 2.30 03/09/2021    HGB 6.9 03/09/2021    HCT 21.0 03/09/2021    MCV 91.4 03/09/2021    MCH 29.9 03/09/2021    MCHC 32.7 03/09/2021    RDW 14.3 03/09/2021     03/09/2021    MPV 10.0 09/08/2015     CBC with Differential:   Lab Results   Component Value Date    WBC 4.7 03/09/2021    RBC 2.30 03/09/2021    HGB 6.9 03/09/2021    HCT 21.0 03/09/2021     03/09/2021    MCV 91.4 03/09/2021    MCH 29.9 03/09/2021    MCHC 32.7 03/09/2021    RDW 14.3 03/09/2021    NRBC 0.0 07/22/2020    LYMPHOPCT 7.7 03/07/2021    MONOPCT 12.7 03/07/2021    BASOPCT 0.4 03/07/2021    MONOSABS 1.1 03/07/2021    LYMPHSABS 0.7 03/07/2021    EOSABS 0.0 03/07/2021    BASOSABS 0.0 03/07/2021     CMP:    Lab Results   Component Value Date     03/09/2021    K 4.3 03/09/2021    K 5.0 09/19/2020    CL 98 03/09/2021    CO2 20 03/09/2021    BUN 50 03/09/2021    CREATININE 1.75 03/09/2021    GFRAA 34.4 03/09/2021    LABGLOM 28.5 03/09/2021    GLUCOSE 118 03/09/2021    GLUCOSE 84 12/04/2020    PROT 5.8 03/07/2021    LABALBU 2.4 03/09/2021    LABALBU 3.3 12/04/2020    CALCIUM 8.1 03/09/2021    BILITOT 0.5 03/07/2021    ALKPHOS 108 03/07/2021    AST 27 03/07/2021    ALT 21 03/07/2021     BMP:    Lab Results   Component Value Date     03/09/2021    K 4.3 03/09/2021    K 5.0 09/19/2020    CL 98 03/09/2021    CO2 20 03/09/2021    BUN 50 03/09/2021    LABALBU 2.4 03/09/2021    LABALBU 3.3 12/04/2020    CREATININE 1.75 03/09/2021    CALCIUM 8.1 03/09/2021    GFRAA 34.4 03/09/2021    LABGLOM 28.5 03/09/2021    GLUCOSE 118 03/09/2021    GLUCOSE 84 12/04/2020     Magnesium:    Lab Results   Component Value Date    MG 2.0 03/09/2021     Troponin:    Lab Results   Component Value Date    TROPONINI 0.024 03/07/2021       Radiology:  Echocardiogram Limited    Result Date: 3/8/2021  Transthoracic Echocardiography Report (TTE)  Demographics  Patient Name    Richard Her Gender               Female  Patient Number  [id number]    Race                                               Ethnicity  Visit Number    [id number]   Room Number          W170  Corporate ID                Date of Study        03/08/2021  Accession       [id number]  Referring Physician  Mile Hearn  Number  Date of Birth   1945  Sonographer          Deri Angelucci, LPN  Age             70 year(s)  Interpreting         [facility]) Cardiology                              Physician Elif Armstrong Procedure Type of Study  TTE procedure:ECHOCARDIOGRAM LIMITED. Procedure Date Date: 03/08/2021 Start: 02:47 PM Study Location: Portable Technical Quality: Adequate visualization Indications:LVF. Patient Status: Routine Height: 67 inches Weight: 191 pounds BSA: 1.98 m^2 BMI: 29.91 kg/m^2 BP: 128/59 mmHg  Conclusions  Summary  Technically difficult examination. Left ventricular ejection fraction is visually estimated at 65%. Moderate concentric left ventricular hypertrophy with more pronounced  basal septal hypertrophy. severely Increased LVOT gradient of 107mmHg unchanged as compared to  previous echo on 6/2020, likely due to basal septal hypertrophy and KIKO of  anterior mitral valve leaflet  Normal right ventricle structure and function. Normal right ventricle systolic pressure. Recommend Cardiac MRI for further evaluation of LVOT/septal hypertrophy. Signature  ----------------------------------------------------------------  Electronically signed by Elif Armstrong(Interpreting  physician) on 03/08/2021 04:49 PM  ----------------------------------------------------------------  Findings Left Ventricle Left ventricular ejection fraction is visually estimated at 65%. Normal left ventricular size and function. Moderate concentric left ventricular hypertrophy with more pronounced basal septal hypertophy. severely Increased LVOT gradient of 105mmHg unchanged as compared to previous echo on 6/2020, likely due to basal septal hypertrophy and KIKO of anterior mitral valve leaflet Right Ventricle Normal right ventricle structure and function. Normal right ventricle systolic pressure. Left Atrium The left atrium is Moderately dilated. Mitral Valve No evidence of mitral valve stenosis. Trace (+) mitral regurgitation is present. Aortic Valve Individual aortic valve leaflets are not clearly visualized. Pericardial Effusion No evidence of pericardial effusion.  Pleural Effusion No evidence of pleural effusion. Doppler Measurements:  AV Peak Gradient: 105.67 mmHg                                               MV P1/2t: 46.7 msec                                               MVA by PHT4.71 cm^2 Valves  Mitral Valve  P1/2t: 46.7 msec  Area (PHT): 4.71 cm^2  Aortic Valve  Peak Velocity: 5.14 m/s  Peak Gradient: 105.67 mmHg       EKG:   Assessment:    Active Hospital Problems    Diagnosis Date Noted    Hypotension [I95.9] 03/07/2021         Cirrhosis liver         Recurrent ascites         HOCM      Plan:  1. Continue with the Lopressor         Appreciate GI input         Reduce lipitor to 10 mg.          Increase toprol to 25 BID         Palliative care     Electronically signed by Ann Wilkinson MD on 3/9/2021 at 4:34 PM

## 2021-03-09 NOTE — CONSULTS
Palliative Care Consult Note  Patient: Edita Muro  Gender: female  YOB: 1945  Unit/Bed: A046/T223-94  CodeStatus: Full Code  Inpatient Treatment Team: Treatment Team: Attending Provider: Esperanza Ramon DO; Consulting Physician: Jess Martinez MD; Consulting Physician: Ama Perry DO; Utilization Reviewer: Laura Shin RN; Consulting Physician: Neda Odonnell MD; Consulting Physician: Dali Simmons MD; : Marychuy Mcghee RN; Patient Care Tech: Love Duty; : LEON Peters; Utilization Reviewer: Ana Mancini, PERNELL; Registered Nurse: Daysi Espana RN  Admit Date:  3/7/2021    Chief Complaint: Goals of Care    History of Presenting Illness:      Edita Muro is a 70 y.o. female on hospital day 2 with a history of HTN, HOCM, carotid stenosis s/p left CEA, CHF, decompensated nonalcoholic liver cirrhosis requiring frequent large volume paracentesis s/p peritoneal drain placement on 1/28, DM2, CKD, anxiety,depression who presented with weakness, fatigue and hypotension, seen by cardiology on 2/22, her Toprol was decreased from 100 BID to 50 BID, SBP in the 70-80s on arrival, improved with IVFs to 110s, w/u showed evidence of FALLON, hyperkalemia, hyponatremia,lactic acidosis elevated troponin, anemia, Kayexalate and IV insulin were administered, patient was admitted for further management. She has normal platelets and minimally elevated INR, it is felt ascites and edema may also be a product of right sided heart failure, EF 65%. Was given albumin IV. Today she feels better, Renal function improved. Pt drained her abdominal drain with nursing and relieved 2L fluid Hgb dropped to 6.9 today. 1U blood ordered. Pt hoping to go home tomorrow. She is having no pain. No sob, cough, or sputum production. Ascites noted, but abdomen is soft. Negative abdominal pain or nausea. No GI or  complaints. Appetite is improving since her drain was placed.  Negative significant  emotional or sleep disturbance. She performs all of her own ADLS, drives, and ambulates without assitance.            Review of Systems:       Review of Systems    Physical Examination:       BP (!) 117/47   Pulse 84   Temp 98.2 °F (36.8 °C) (Oral)   Resp 18   Ht 5' 7\" (1.702 m)   Wt 191 lb (86.6 kg)   SpO2 100%   BMI 29.91 kg/m²    Physical Exam  Constitutional:       General: She is not in acute distress. Appearance: She is well-developed. She is not diaphoretic. HENT:      Head: Normocephalic and atraumatic. Right Ear: External ear normal.      Left Ear: External ear normal.      Nose: Nose normal.      Mouth/Throat:      Pharynx: No oropharyngeal exudate. Eyes:      General: No scleral icterus. Right eye: No discharge. Left eye: No discharge. Conjunctiva/sclera: Conjunctivae normal.      Pupils: Pupils are equal, round, and reactive to light. Neck:      Musculoskeletal: Normal range of motion and neck supple. Thyroid: No thyromegaly. Vascular: No JVD. Trachea: No tracheal deviation. Cardiovascular:      Rate and Rhythm: Normal rate and regular rhythm. Heart sounds: Normal heart sounds. Pulmonary:      Effort: Pulmonary effort is normal. No respiratory distress. Breath sounds: Normal breath sounds. No stridor. No wheezing or rales. Chest:      Chest wall: No tenderness. Abdominal:      General: Bowel sounds are decreased. There is distension. Palpations: Abdomen is soft. There is no mass. Tenderness: There is no abdominal tenderness. There is no guarding or rebound. Comments:  catheter in the left side of the abdomen draining peritoneal fluid   Musculoskeletal: Normal range of motion. General: No tenderness or deformity. Lymphadenopathy:      Cervical: No cervical adenopathy. Skin:     General: Skin is warm and dry. Findings: No erythema or rash.    Neurological:      Mental Status: She is alert and oriented to person, place, and time. Psychiatric:         Behavior: Behavior normal.         Thought Content: Thought content normal.         Allergies: Allergies   Allergen Reactions    Codeine        Medications:      Current Facility-Administered Medications   Medication Dose Route Frequency Provider Last Rate Last Admin    0.9 % sodium chloride infusion   Intravenous PRN Chester Quinones DO        metoprolol succinate (TOPROL XL) extended release tablet 50 mg  50 mg Oral BID Jefferson Croissant Sedar, DO   50 mg at 03/08/21 2101    atorvastatin (LIPITOR) tablet 40 mg  40 mg Oral Daily Rod D Sedar, DO        glucose (GLUTOSE) 40 % oral gel 15 g  15 g Oral PRN Alberto Augustin MD        dextrose 50 % IV solution  12.5 g Intravenous PRN Alberto Augustin MD        glucagon (rDNA) injection 1 mg  1 mg Intramuscular PRN Alberto Augustin MD        dextrose 5 % solution  100 mL/hr Intravenous PRN Alberto Augustin MD        sodium chloride flush 0.9 % injection 10 mL  10 mL Intravenous 2 times per day Alberto Augustin MD        sodium chloride flush 0.9 % injection 10 mL  10 mL Intravenous PRN Alberto Augustin MD        promethazine (PHENERGAN) tablet 12.5 mg  12.5 mg Oral Q6H PRN Alberto Augustin MD        Or    ondansetron (ZOFRAN) injection 4 mg  4 mg Intravenous Q6H PRN Alberto Augustin MD        polyethylene glycol (GLYCOLAX) packet 17 g  17 g Oral Daily PRN Alberto Augustin MD        acetaminophen (TYLENOL) tablet 650 mg  650 mg Oral Q6H PRN Alberto Augustin MD   650 mg at 03/08/21 1934    Or    acetaminophen (TYLENOL) suppository 650 mg  650 mg Rectal Q6H PRN Alberto Augustin MD        insulin lispro (HUMALOG) injection vial 0-6 Units  0-6 Units Subcutaneous TID WC Alberto Augustin MD        insulin lispro (HUMALOG) injection vial 0-3 Units  0-3 Units Subcutaneous Nightly Alberto Augustin MD           History: PMHx:  Past Medical History:   Diagnosis Date    Arthritis     Cardiomyopathy [facility])     mother history.     Chronic kidney disease     Hypertension     Liver disease     Other disorders of kidney and ureter in diseases classified elsewhere     Pneumonia     Psychiatric problem     Sleep apnea, obstructive     Type II or unspecified type diabetes mellitus without mention of complication, not stated as uncontrolled        PSHx:  Past Surgical History:   Procedure Laterality Date    APPENDECTOMY      CHOLECYSTECTOMY      COLONOSCOPY  04/23/2013    CT BIOPSY RENAL  08/04/2020    CT BIOPSY RENAL 8/4/2020 [facility] CT SCAN    CT BIOPSY RENAL  09/04/2020    CT BIOPSY RENAL 9/4/2020 [facility] CT SCAN    FOOT FRACTURE SURGERY Bilateral     FRACTURE SURGERY      HYSTERECTOMY      IR TUNNELED INTRAPERITNL CATH W/IMAG  1/28/2021    IR TUNNELED INTRAPERITNL CATH W/IMAG 1/28/2021 [facility] SPECIAL PROCEDURE    OTHER SURGICAL HISTORY      removal of anal fistulotomy    PARACENTESIS Left 07/28/2020    9015 mls removed by Dr Wong Davenport Left 09/04/2020    58378ot ascites removed by Dr Yasmin Marie 50g albumin given    PARACENTESIS Left 09/15/2020    10,400cc removed by Dr Jesus Stallworth [phone]    PARACENTESIS Left 10/09/2020    7000cc removed by Dr. Wong Davenport Left 10/13/2020    7000ml removed by Dr Jesus Stallworth [phone]    PARACENTESIS Left 10/26/2020    7010cc removed by Dr. Emily Phipps Left 11/06/2020    7020 mls removed ny Dr Wong Davenport Left 11/19/2020    7000 ml removed by Dr. Yasmin Marie.     PARACENTESIS Left 11/30/2020    7000 ml removed by paracentesis by Dr. Wong Davenport Left 12/08/2020    7100 ml removed    PARACENTESIS Left 01/08/2021    ultrasound guided paracentesis 7150 ml removed    PARACENTESIS Left 01/19/2021    7000ml removed by Dr Jesus Stallworth [phone]    PARACENTESIS Left 01/28/2021    by Dr. Yasmin Marie 8100 ml removed   [address] Left 12/17/2020    7000ml removed by Dr Karmen Bowen Hx:  Social History     Socioeconomic History  Marital status:      Spouse name: None    Number of children: None    Years of education: None    Highest education level: None   Occupational History    None   Social Needs    Financial resource strain: Not hard at all   [place]-S[place] insecurity     Worry: Never true     Inability: Never true   Biglion needs     Medical: None     Non-medical: None   Tobacco Use    Smoking status: Never Smoker    Smokeless tobacco: Former User   Substance and Sexual Activity    Alcohol use: No    Drug use: No    Sexual activity: Yes   Lifestyle    Physical activity     Days per week: None     Minutes per session: None    Stress: None   Relationships    Social connections     Talks on phone: None     Gets together: None     Attends Latter day service: None     Active member of club or organization: None     Attends meetings of clubs or organizations: None     Relationship status: None    Intimate partner violence     Fear of current or ex partner: None     Emotionally abused: None     Physically abused: None     Forced sexual activity: None   Other Topics Concern    None   Social History Narrative    None       Family Hx:  Family History   Problem Relation Age of Onset    Arrhythmia Mother     Diabetes Mother     High Blood Pressure Mother     Arrhythmia Father     Breast Cancer Sister     Diabetes Brother     High Blood Pressure Brother        LABS: Reviewed     CBC:  Lab Results   Component Value Date    WBC 4.7 03/09/2021    RBC 2.30 03/09/2021    HGB 6.9 03/09/2021    HCT 21.0 03/09/2021    MCV 91.4 03/09/2021    MCH 29.9 03/09/2021    MCHC 32.7 03/09/2021    RDW 14.3 03/09/2021     03/09/2021    MPV 10.0 09/08/2015     CBC with Differential:   Lab Results   Component Value Date    WBC 4.7 03/09/2021    RBC 2.30 03/09/2021    HGB 6.9 03/09/2021    HCT 21.0 03/09/2021     03/09/2021    MCV 91.4 03/09/2021    MCH 29.9 03/09/2021    MCHC 32.7 03/09/2021    RDW 14.3 03/09/2021    NRBC Echocardiography Report (TTE)  Demographics  Patient Name    Raul Leach Gender               Female  Patient Number  [id number]    Race                                               Ethnicity  Visit Number    [id number]   Room Number          W170  Corporate ID                Date of Study        03/08/2021  Accession       [id number]  Referring Physician  Feliz Govea  Number  Date of Birth   1945  Sonographer          Shani ElenaSHOSHANA  Age             70 year(s)  Interpreting         [facility]) Cardiology                              Physician            Vanessa Fernando Procedure Type of Study  TTE procedure:ECHOCARDIOGRAM LIMITED. Procedure Date Date: 03/08/2021 Start: 02:47 PM Study Location: Portable Technical Quality: Adequate visualization Indications:LVF. Patient Status: Routine Height: 67 inches Weight: 191 pounds BSA: 1.98 m^2 BMI: 29.91 kg/m^2 BP: 128/59 mmHg  Conclusions  Summary  Technically difficult examination. Left ventricular ejection fraction is visually estimated at 65%. Moderate concentric left ventricular hypertrophy with more pronounced  basal septal hypertrophy. severely Increased LVOT gradient of 107mmHg unchanged as compared to  previous echo on 6/2020, likely due to basal septal hypertrophy and KIKO of  anterior mitral valve leaflet  Normal right ventricle structure and function. Normal right ventricle systolic pressure. Recommend Cardiac MRI for further evaluation of LVOT/septal hypertrophy. Signature  ----------------------------------------------------------------  Electronically signed by Vanessa Armstrong(Interpreting  physician) on 03/08/2021 04:49 PM  ----------------------------------------------------------------  Findings Left Ventricle Left ventricular ejection fraction is visually estimated at 65%. Normal left ventricular size and function.  Moderate concentric left ventricular hypertrophy with more pronounced basal septal hypertophy. severely Increased LVOT gradient of 105mmHg unchanged as compared to previous echo on 6/2020, likely due to basal septal hypertrophy and KIKO of anterior mitral valve leaflet Right Ventricle Normal right ventricle structure and function. Normal right ventricle systolic pressure. Left Atrium The left atrium is Moderately dilated. Mitral Valve No evidence of mitral valve stenosis. Trace (+) mitral regurgitation is present. Aortic Valve Individual aortic valve leaflets are not clearly visualized. Pericardial Effusion No evidence of pericardial effusion. Pleural Effusion No evidence of pleural effusion. Doppler Measurements:  AV Peak Gradient: 105.67 mmHg                                               MV P1/2t: 46.7 msec                                               MVA by PHT4.71 cm^2 Valves  Mitral Valve  P1/2t: 46.7 msec  Area (PHT): 4.71 cm^2  Aortic Valve  Peak Velocity: 5.14 m/s  Peak Gradient: 105.67 mmHg    Xr Chest Portable    Result Date: 3/8/2021  EXAMINATION: XR CHEST PORTABLE CLINICAL HISTORY: HYPOTENSION COMPARISONS: CT CHEST, DECEMBER 29, 2020 FINDINGS: Osseous structures are intact. Cardiopericardial silhouette is normal. Pulmonary vasculature is normal. Lungs are clear. NO ACUTE CARDIOPULMONARY DISEASE. Assessment and plan:      -Advance Care Planning  Discussed goals of care with patient. Explained in extensive detail nuances between full code, DNR CCA and DNR CC. Patient has made the decision to be FULL CODE    NO MPOA or Living Will in place  Discussed importance of discussing advance directives when not in crisis with her  who would be her primary medical decision maker as it will be the only way she can control her health care if not able to speak for herself and offers her  the gift of guidance in a crisis.      Pallitive Performance Scale of 50%  MELD 26  Pallitive care eligible      -Goals of Care Discussion:  Disease process and goals of treatment were discussed in basic terms. Elise's goal is to optimize available comfort care measures to decrease hospitalizations and maximize comfort. We discussed the palliative care philosophy in light of those goals. We discussed all care options contingent on treatment response and QOL. Much active listening, presence, and emotional support were given.        In light of her multiple comorbidities and high risk of symptom burden Suki Diana may benefit form pallitive care upon discharge      Electronically signed by TANNER Powers CNP on 3/9/2021 at 12:18 PM

## 2021-03-09 NOTE — FLOWSHEET NOTE
Received new Hgb result.= 6.9. Messaged Dr. Sheng Lebron via [employer]. Received new orders for blood products.

## 2021-03-09 NOTE — PROGRESS NOTES
Ruth Mcrae is a 70 y.o. female patient. Current Facility-Administered Medications   Medication Dose Route Frequency Provider Last Rate Last Admin    0.9 % sodium chloride infusion   Intravenous PRN Chester Quinones DO        [START ON 3/10/2021] atorvastatin (LIPITOR) tablet 10 mg  10 mg Oral Daily Grover Umanzor MD        metoprolol succinate (TOPROL XL) extended release tablet 50 mg  50 mg Oral BID Eddi Ben Carreonar DO   50 mg at 03/09/21 1229    glucose (GLUTOSE) 40 % oral gel 15 g  15 g Oral PRN James Tubbs MD        dextrose 50 % IV solution  12.5 g Intravenous PRN James Tubbs MD        glucagon (rDNA) injection 1 mg  1 mg Intramuscular PRN James Tubbs MD        dextrose 5 % solution  100 mL/hr Intravenous PRN James Tubbs MD        sodium chloride flush 0.9 % injection 10 mL  10 mL Intravenous 2 times per day James Tubbs MD        sodium chloride flush 0.9 % injection 10 mL  10 mL Intravenous PRN James Tubbs MD        promethazine (PHENERGAN) tablet 12.5 mg  12.5 mg Oral Q6H PRN James Tubbs MD        Or    ondansetron (ZOFRAN) injection 4 mg  4 mg Intravenous Q6H PRN James Tubbs MD        polyethylene glycol (GLYCOLAX) packet 17 g  17 g Oral Daily PRN James Tubbs MD        acetaminophen (TYLENOL) tablet 650 mg  650 mg Oral Q6H PRN James Tubbs MD   650 mg at 03/08/21 1934    Or    acetaminophen (TYLENOL) suppository 650 mg  650 mg Rectal Q6H PRN James Tubbs MD        insulin lispro (HUMALOG) injection vial 0-6 Units  0-6 Units Subcutaneous TID WC James Tubbs MD   3 Units at 03/09/21 1752    insulin lispro (HUMALOG) injection vial 0-3 Units  0-3 Units Subcutaneous Nightly James Tubbs MD         Allergies   Allergen Reactions    Codeine      Active Problems:    Hypotension  Resolved Problems:    * No resolved hospital problems. *    Blood pressure (!) 133/48, pulse 91, temperature 98.5 °F (36.9 °C), temperature source Oral, resp.  rate 20, height 5' 7\" (1.702 m), weight 191 lb (86.6 kg), SpO2 100 %, not currently breastfeeding. Subjective no significant change in clinical status  Objective acetic fluid studies were reviewed , consistent with transudate with low fluid protein indicating chronic liver disease.,  Cell count not suggestive of SBP  Assessment & Plan, cirrhosis complicated by ascites and also has heart failure and has renal insufficiency with hyperkalemia, not a candidate for diuretics because of renal insufficiency and hyperkalemia, continue current management I am told hospice been consulted.     Shila Carmona MD  3/9/2021

## 2021-03-09 NOTE — PROGRESS NOTES
Physical Therapy Missed Treatment   Facility/Department: [facility] MED SURG F855/Q071-55    NAME: Chanel Merritt    : 1945 (53 y.o.)  MRN: [id number]    Account: [de-identified]  Gender: female    Chart reviewed, attempted PT at 36. Patient unavailable 2° to:    [] Hold per nsg request    [] Pt declined   [] Nsg notified   [] Other notified    [] Pt. . off floor for test/procedure. [x] Pt. Unavailable pt with low CBC levels, HGB 6.9 hematocrit 21. Hold until values are back within therapeutic range       Will attempt PT treatment again at earliest convenience.       Electronically signed by Ced Preston PTA on 3/9/21 at 10:49 AM EST

## 2021-03-09 NOTE — PROGRESS NOTES
Department of Internal Medicine  General Internal Medicine  Attending Progress Note      SUBJECTIVE:  Pt seen and examined. Feels better today. Renal function improved. Pt drained her abdominal drain with nursing and relieved 2L fluid and feels much better. Hgb dropped to 6.9 today. 1U blood ordered. Pt hoping to go home tomorrow    OBJECTIVE      Medications    Current Facility-Administered Medications: 0.9 % sodium chloride infusion, , Intravenous, PRN  metoprolol succinate (TOPROL XL) extended release tablet 50 mg, 50 mg, Oral, BID  atorvastatin (LIPITOR) tablet 40 mg, 40 mg, Oral, Daily  glucose (GLUTOSE) 40 % oral gel 15 g, 15 g, Oral, PRN  dextrose 50 % IV solution, 12.5 g, Intravenous, PRN  glucagon (rDNA) injection 1 mg, 1 mg, Intramuscular, PRN  dextrose 5 % solution, 100 mL/hr, Intravenous, PRN  sodium chloride flush 0.9 % injection 10 mL, 10 mL, Intravenous, 2 times per day  sodium chloride flush 0.9 % injection 10 mL, 10 mL, Intravenous, PRN  promethazine (PHENERGAN) tablet 12.5 mg, 12.5 mg, Oral, Q6H PRN **OR** ondansetron (ZOFRAN) injection 4 mg, 4 mg, Intravenous, Q6H PRN  polyethylene glycol (GLYCOLAX) packet 17 g, 17 g, Oral, Daily PRN  acetaminophen (TYLENOL) tablet 650 mg, 650 mg, Oral, Q6H PRN **OR** acetaminophen (TYLENOL) suppository 650 mg, 650 mg, Rectal, Q6H PRN  insulin lispro (HUMALOG) injection vial 0-6 Units, 0-6 Units, Subcutaneous, TID WC  insulin lispro (HUMALOG) injection vial 0-3 Units, 0-3 Units, Subcutaneous, Nightly  Physical    VITALS:  BP (!) 103/42   Pulse 84   Temp 98.4 °F (36.9 °C) (Oral)   Resp 18   Ht 5' 7\" (1.702 m)   Wt 191 lb (86.6 kg)   SpO2 100%   BMI 29.91 kg/m²   Constitutional: Awake and alert in no acute distress.  Lying in bed comfortably  Head: Normocephalic, atraumatic  Eyes: EOMI, PERRLA  ENT: moist mucous membranes  Neck: neck supple, trachea midline  Lungs: Good inspiratory effort, no wheeze, no rhonchi, no rales  Heart: RRR, normal S1 and S2  GI: Soft, distended, non tender, no guarding, no rebound, +BS, drain in place  MSK: minimal bilateral pedal edema noted  Skin: warm, dry  Psych: appropriate affect     Data    CBC:   Lab Results   Component Value Date    WBC 4.7 03/09/2021    RBC 2.30 03/09/2021    HGB 6.9 03/09/2021    HCT 21.0 03/09/2021    MCV 91.4 03/09/2021    MCH 29.9 03/09/2021    MCHC 32.7 03/09/2021    RDW 14.3 03/09/2021     03/09/2021    MPV 10.0 09/08/2015     CMP:    Lab Results   Component Value Date     03/09/2021    K 4.3 03/09/2021    K 5.0 09/19/2020    CL 98 03/09/2021    CO2 20 03/09/2021    BUN 50 03/09/2021    CREATININE 1.75 03/09/2021    GFRAA 34.4 03/09/2021    LABGLOM 28.5 03/09/2021    GLUCOSE 118 03/09/2021    GLUCOSE 84 12/04/2020    PROT 5.8 03/07/2021    LABALBU 2.4 03/09/2021    LABALBU 3.3 12/04/2020    CALCIUM 8.1 03/09/2021    BILITOT 0.5 03/07/2021    ALKPHOS 108 03/07/2021    AST 27 03/07/2021    ALT 21 03/07/2021       ASSESSMENT AND PLAN      # Hypotension / elevated troponin  - seen by cardiology on 2/22, Toprol was decreased from 100 BID to 50 BID, - SBP in the 70-80s on arrival, improved with IVFs to 110s in ED  - cardiology consulted  - monitor BP closely     # FALLON / Hyperkalemia / Hyponatremia / lactic acidosis   - baseline CKD3 with baseline Cr ~1.5. today is 1.75  - s/p IV hydration, Kayexalate and IV insulin   - monitor renal function, K and Na level closely  - nephrology consulted - albumin infusion to increase intravascular volume     # Decompensated nonalcoholic liver cirrhosis s/p peritoneal drain placement on 1/28  - drains it 3 times a week about 2 liters each time. Drained today -2L removed  - follows with hepatology(Post) at [address] [place] [address]     # DM2  - ISS, hypoglycemia protocol     # Anemia   - no overt bleeding  - follow H/H. 6.9 today. 1U blood ordered and will recheck    Disposition: Albumin per nephrology. Renal function improved. Anemia worsened today. Transfusing.   Monitoring renal function and BP. PT/OT. Possible discharge tomorrow if renal function improved and Hgb stable. Palliative consulted.        Mago Taylor DO  Internal Medicine

## 2021-03-09 NOTE — PROGRESS NOTES
Physician Progress Note      PATIENT:               Lelia Hashimoto  CSN #:                  [id number]  :                       1945  ADMIT DATE:       3/7/2021 3:25 PM  [address] [place] DATE:  RESPONDING  PROVIDER #:        Ermu Gaspar DO          QUERY TEXT:    Pt admitted with hypotension, elevated troponin, FALLON, hyperkalemia, and   hyponatremia. Please document in progress notes and discharge summary the   etiology of the hypotension. The medical record reflects the following:  Risk Factors: age 70  DM2  anemia  Decompensated nonalcoholic liver cirrhosis   s/p peritoneal drain placement on   Clinical Indicators:  3/9 Dr. Robel Sibley: Hypotension / elevated troponin -?seen by cardiology on   , Toprol was decreased from 100 BID to 50 BID,?-?SBP in the 70-80s on   arrival, improved with IVFs to 110s?in ED   FALLON?/ Hyperkalemia?/   Hyponatremia?/?lactic acidosis? IV hydration, Kayexalate and IV insulin  -   baseline CKD3 with baseline Cr   1.5. today is 1.75  nephrology consulted - albumin infusion to increase   intravascular volume  Treatment:  IVF monitor BP closely  GI  cardiology  neurology  nephrology  Options provided:  -- Hypotension due to medication, please specify. -- Hypotension due to other cause, please specify.   -- Other - I will add my own diagnosis  -- Disagree - Not applicable / Not valid  -- Disagree - Clinically unable to determine / Unknown  -- Refer to Clinical Documentation Reviewer    PROVIDER RESPONSE TEXT:    This patient has hypotension due to hypoalbuminemia    Query created by: Renee Lees on 3/9/2021 1:09 PM      Electronically signed by:  Erum Gaspar DO 3/9/2021 1:12 PM

## 2021-03-10 NOTE — FLOWSHEET NOTE
Discharge instructions given to patient and . Both verbalize understanding. Electronically signed by Nhung Liz on 3/10/2021 at 4:35 PM

## 2021-03-10 NOTE — PROGRESS NOTES
Physical Therapy Med Surg Daily Treatment Note  Facility/Department: [facility]  [facility]       NAME: Chanel Merritt  : 1945 (17 y.o.)  MRN: [id number]  CODE STATUS: Full Code    Date of Service: 3/10/2021    Patient Diagnosis(es): Hypotension [I95.9]   Chief Complaint   Patient presents with    Hypotension     after decreased metropolol 2 weeks ago, home HD 2L off yesterday     Patient Active Problem List    Diagnosis Date Noted    Hypotension 2021    Other ascites 2020    Arterial hypotension     Shock ([address]     Alcoholic cirrhosis of liver with ascites ([address] 2020    Cirrhosis of liver with ascites ([address] 08/15/2020    Renal mass, left 08/15/2020    Abdominal distension 2020    FALLON (acute kidney injury) ([address] 2020    Rectal pain 2020    Cardiomyopathy ([address] 2020    Subconjunctival hemorrhage of left eye 2020    PVD (posterior vitreous detachment), both eyes 2019    Dry eye syndrome of bilateral lacrimal glands 2019    HARDEEP (obstructive sleep apnea) 2018    Bilateral carotid artery stenosis 2017    Right-sided carotid artery disease ([address] 2016    Pseudophakia of both eyes 2016    Regular astigmatism 2016    Presbyopia of both eyes 2016    Bell-rectal abscess 2013    Hypertension     Hypertrophic obstructive cardiomyopathy (HOCM) ([address] 2013    Type 2 diabetes mellitus without complication, without long-term current use of insulin ([address] 03/15/2013        Past Medical History:   Diagnosis Date    Arthritis     Cardiomyopathy [facility])     mother history.     Chronic kidney disease     Hypertension     Liver disease     Other disorders of kidney and ureter in diseases classified elsewhere     Pneumonia     Psychiatric problem     Sleep apnea, obstructive     Type II or unspecified type diabetes mellitus without mention of complication, not stated as uncontrolled      Past Surgical History:   Procedure Laterality Date    APPENDECTOMY      CHOLECYSTECTOMY      COLONOSCOPY  04/23/2013    CT BIOPSY RENAL  08/04/2020    CT BIOPSY RENAL 8/4/2020 [facility] CT SCAN    CT BIOPSY RENAL  09/04/2020    CT BIOPSY RENAL 9/4/2020 [facility] CT SCAN    FOOT FRACTURE SURGERY Bilateral     FRACTURE SURGERY      HYSTERECTOMY      IR TUNNELED INTRAPERITNL CATH W/IMAG  1/28/2021    IR TUNNELED INTRAPERITNL CATH W/IMAG 1/28/2021 [facility] SPECIAL PROCEDURE    OTHER SURGICAL HISTORY      removal of anal fistulotomy    PARACENTESIS Left 07/28/2020    9015 mls removed by Dr Craig Blair Left 09/04/2020    21733ic ascites removed by Dr Colletta Das 50g albumin given    PARACENTESIS Left 09/15/2020    10,400cc removed by Dr Bessy Mann [phone]    PARACENTESIS Left 10/09/2020    7000cc removed by Dr. Craig Blair Left 10/13/2020    7000ml removed by Dr Bessy Mann [phone]    PARACENTESIS Left 10/26/2020    7010cc removed by Dr. Pramod Ramos Left 11/06/2020    7020 mls removed ny Dr Craig Blair Left 11/19/2020    7000 ml removed by Dr. Colletta Das.  PARACENTESIS Left 11/30/2020    7000 ml removed by paracentesis by Dr. Craig Blair Left 12/08/2020    7100 ml removed    PARACENTESIS Left 01/08/2021    ultrasound guided paracentesis 7150 ml removed    PARACENTESIS Left 01/19/2021    7000ml removed by Dr Bessy Mann [phone]    PARACENTESIS Left 01/28/2021    by Dr. Colletta Das 8100 ml removed   [address] Left 12/17/2020    7000ml removed by Dr Bessy Mann       Restrictions  Restrictions/Precautions: Fall Risk(Medium per giron)    SUBJECTIVE   General  Chart Reviewed: Yes  Family / Caregiver Present: No  Subjective  Subjective: \"I'm supposed to be going home this afternoon. \"    Pre-Session Pain Report  Pre Treatment Pain Screening  Pain at present: 0  Scale Used: Numeric Score  Intervention List: Patient able to continue with treatment  Pain Screening  Patient Currently in Pain: No       Post-Session Pain Report  Pain Assessment  Pain Assessment: 0-10  Pain Level: 0         OBJECTIVE        Bed mobility  Supine to Sit: Stand by assistance  Sit to Supine: Stand by assistance  Comment: increased time and effort to complete, vc's for improved sequencing. Transfers  Sit to Stand: Stand by assistance  Stand to sit: Stand by assistance  Comment: slightly unsteady, vc's for safe approach to EOB. Ambulation  Ambulation?: Yes  Ambulation 1  Surface: level tile  Device: No Device;Hand-Held Assist  Assistance: Contact guard assistance  Quality of Gait: unsteady  Gait Deviations: Slow Samantha;Decreased step length;Decreased step height  Distance: 36'  Comments: pt reaching for furniture while in room, when in hallway pt using HHA for stability. education provided to pt on family being with her while she walks initially after going back home. Activity Tolerance  Activity Tolerance: Patient Tolerated treatment well          ASSESSMENT   Assessment: pt slightly unsteady during mobility, pt tending to reach out for furniture for stability. Discharge Recommendations:  Continue to assess pending progress    Goals  Long term goals  Long term goal 1: Patient will be independent with bed mobility. Long term goal 2: Patient will be independent with transfers. Long term goal 3: Patient will be independent with 150ft of gait using LRD. Long term goal 4: Patient will ascend/descend 4 steps using HR with SBA. PLAN    Times per week: 3-6  Safety Devices  Type of devices:  All fall risk precautions in place, Bed alarm in place, Call light within reach     [facility] (6 CLICK) Gerard 95 Raw Score : 19      Therapy Time   Individual   Time In 0905   Time Out 0920   Minutes 15      Gait: 10  BM/Trsf: 5        Peg Jacobs PTA, 03/10/21 at 9:28 AM         Definitions for assistance levels  Independent = pt does not require any physical supervision or assistance from another person for activity completion. Device may be needed.   Stand by assistance = pt requires verbal cues or instructions from another person, close to but not touching, to perform the activity  Minimal assistance= pt performs 75% or more of the activity; assistance is required to complete the activity  Moderate assistance= pt performs 50% of the activity; assistance is required to complete the activity  Maximal assistance = pt performs 25% of the activity; assistance is required to complete the activity  Dependent = pt requires total physical assistance to accomplish the task

## 2021-03-10 NOTE — DISCHARGE SUMMARY
Physician Discharge Summary     Patient ID:  Didier Sebastian  [id number]  47 y.o.  1945    Admit date: 3/7/2021    Discharge date : 03/10/21     Admitting Physician: Pablo Arceo MD     Discharge Physician: Melissa Queen DO     Admission Diagnoses: Hypotension [I95.9]    Discharge Diagnoses: Hypotension, FALLON    Admission Condition: fair    Discharged Condition: fair    Hospital Course: 70 y.o. female with PMH of HTN, HOCM, carotid stenosis s/p left CEA, decompensated nonalcoholic liver cirrhosis requiring frequent large volume paracentesis s/p peritoneal drain placement on 1/28, DM2, CKD, anxiety,depression who presented with the above CC, has been experiencing low BP at home, seen by cardiology on 2/22, her Toprol was decreased from 100 BID to 50 BID, SBP in the 70-80s on arrival, improved with IVFs to 110s, w/u showed evidence of FALLON, hyperkalemia, hyponatremia,lactic acidosis elevated troponin, anemia, Kayexalate and IV insulin were administered, patient was admitted for further management. Nephrology and cardiology were consulted. Fluid was restricted and albumin given as patient with hypoalbuminemia. BP improved and renal function continued to improve. Her abdominal drain for her recurrent ascites, which she drains 3x/week was drained and drained 2L of fluid. Renal function near her baseline. Hgb dropped to 6.9, due to anemia of chronic disease and she was transfused 1U and symptomatically much improved. Pt asking to go home on 3/10 and was discharged home with palliative care to follow up. Pt is a high readmission risk due to her cirrhosis and poor nutritional status. Pt to follow up with cardiology and PCP after discharge for continued management. Consults: cardiology, GI and nephrology      Discharge Exam:  Constitutional: Awake and alert in no acute distress.  Lying in bed comfortably  Head: Normocephalic, atraumatic  Eyes: EOMI, PERRLA  ENT: moist mucous membranes  Neck: neck supple, trachea midline  Lungs: Good inspiratory effort, no wheeze, no rhonchi, no rales  Heart: RRR, normal S1 and S2  GI: Soft, distended, non tender, no guarding, no rebound, +BS, drain in place  MSK: minimal bilateral pedal edema noted  Skin: warm, dry  Psych: appropriate affect    Labs:   Recent Labs     03/08/21  0539 03/09/21  0537 03/10/21  0219   WBC 6.4 4.7* 6.3   HGB 7.8* 6.9* 7.9*   HCT 23.8* 21.0* 23.5*    131 147     Recent Labs     03/08/21  0539 03/09/21  0537 03/10/21  0219 03/10/21  0602   * 129* 128*  --    K 4.2 4.3 5.4* 5.1*   CL 99 98 99  --    CO2 20 20 20  --    BUN 56* 50* 47*  --    CREATININE 1.92* 1.75* 1.62*  --    CALCIUM 8.2* 8.1* 7.7*  --    PHOS 4.6 4.0 3.4  --      Recent Labs     03/07/21  1545   AST 27   ALT 21   BILITOT 0.5   ALKPHOS 108     Recent Labs     03/07/21  1545   INR 1.4     Recent Labs     03/07/21  1545   TROPONINI 0.024*       Urinalysis:   Lab Results   Component Value Date    NITRU Negative 03/08/2021    WBCUA 10-20 03/08/2021    BACTERIA Negative 03/08/2021    RBCUA 3-5 03/08/2021    BLOODU Negative 03/08/2021    SPECGRAV 1.018 03/08/2021    GLUCOSEU Negative 03/08/2021       Radiology:   Most recent    Chest CT      WITH CONTRAST:No results found for this or any previous visit. WITHOUT CONTRAST:   Results for orders placed during the hospital encounter of 12/29/20   CT CHEST WO CONTRAST    Narrative CT of the chest, abdomen, and pelvis, with intravenous contrast medium. History:  Renal mass    Technical Factors:    CT imaging of the chest, abdomen, and pelvis were obtained and formatted as 5 mm contiguous axial images from the lung apices, to the symphysis pubis. Sagittal and coronal reconstructions were also obtained. Oral contrast medium:  Barium sulfate, 450 mL. Intravenous contrast medium:  None. Comparison:  CT abdomen pelvis, July 26, 2020. CT renal biopsy, August 4, 2020, and September 4, 2020.     CT OF THE CHEST WITHOUT INTRAVENOUS CONTRAST MEDIUM. FINDINGS:      Lung shows focal area of subsegmental stranding, posterior right upper lobe. Noncalcified nonpleural-based nodule, measuring 4 mm, lateral right upper lobe (series 2, image 19), with multiple nodules anterior and caudal to it measuring 2 to 3 mm (series   2, image 19 through 25), and right upper, right middle, and right lower lobe. No pleural effusion. Left lung shows 2.7 mm nodule left upper lobe (series 2, image 13), with multiple nodules, left lower lobe, largest measuring 3 mm (series 2, image 24). No consolidation and no pleural effusion. No hilar, mediastinal, or axillary lymph node enlargement. Thoracic aorta normal in course and caliber. No pericardial effusion. No osteoblastic, and no osteolytic lesions. Impression Patient with multiple bilateral noncalcified pulmonary nodules measuring up to 4 mm, with clinical history of renal mass. While findings may represent noncalcified granulomas, other etiologies including malignancy cannot be excluded. If clinical concern   warrants, follow-up CT imaging of the chest in 3 months may be obtained to assess stability of findings. AP OF THE ABDOMEN AND PELVIS WITHOUT INTRAVENOUS CONTRAST MEDIUM. FINDINGS:    Liver:  Small in size, nodular in contour. No focal masses identified    Bile Ducts:  Normal in caliber. Gallbladder:  Surgically absent. Pancreas:  Normal without masses, cysts, ductal dilatation or calcification. Spleen:  Normal in size without masses or calcifications. No splenules. Kidneys:  Right and left kidneys normal in size without calculi or hydronephrosis. 3.8 x 5.4 x 4.4 cm exophytic lower pole left renal mass (series 2, image 76, series 601, image 105). Finding unchanged in size from July, 2020. Adrenals:  Normal.     Small bowel:  Normal in caliber. Appendix:  Not visualized. Colon:  Normal in caliber. Peritoneum:  No free air.  Marked diffuse free fluid throughout abdomen and pelvis. Edematous change diffusely found throughout anterior abdominal wall, and anterior and posterior pelvic wall. Vessels: Aorta normal in course and caliber. Lymph nodes:      Retroperitoneal:  No enlarged retroperitoneal lymph nodes. Mesenteric:  No enlarged mesenteric lymph nodes. Pelvic: No enlarged pelvic lymph nodes. Ureters: Normal in course and caliber. No calcifications. Bladder: No wall thickening. Reproductive organs: Uterus surgically absent. Abdominal Wall:  No hernia identified. No diastasis of rectus musculature. No edema or masses. Bones:  No bone lesions. Diffuse disc space narrowing L4-L5. 5.7 mm anterolisthesis, L3 on L4. Unchanged. No post operative changes. IMPRESSION:    3.8 x 5.4 x 4.4 cm exophytic left lower pole renal mass, unchanged in size, from July 2020. Marked diffuse ascites. Abdominal and pelvic wall edema. Cirrhosis. Cholecystectomy. Hysterectomy. Grade one L3 spondylolisthesis, stable. All CT scans at this facility use dose modulation, iterative reconstruction, and/or weight based dosing when appropriate to reduce radiation dose to as low as reasonably achievable. CXR      2-view: No results found for this or any previous visit. Portable:   Results for orders placed during the hospital encounter of 03/07/21   XR CHEST PORTABLE    Narrative EXAMINATION: XR CHEST PORTABLE    CLINICAL HISTORY: HYPOTENSION    COMPARISONS: CT CHEST, DECEMBER 29, 2020    FINDINGS: Osseous structures are intact. Cardiopericardial silhouette is normal. Pulmonary vasculature is normal. Lungs are clear. Impression NO ACUTE CARDIOPULMONARY DISEASE. Echo No results found for this or any previous visit.     Disposition: home    In process/preliminary results:  Outstanding Order Results     Date and Time Order Name Status Description    3/9/2021 0647 PREPARE RBC (CROSSMATCH), 1 Units Preliminary Transparent Dressings (TEGADERM FIRST AID STYLE) MISC One box 50. Change daily one tegaderm to intraperitoneal site and PRN soilage. Qty: 1 each, Refills: 3    Associated Diagnoses: Cirrhosis of liver with ascites, unspecified hepatic cirrhosis type ([facility] Abdominal distension; Change or removal of drains         STOP taking these medications       lisinopril (PRINIVIL;ZESTRIL) 5 MG tablet Comments:   Reason for Stopping:         bumetanide (BUMEX) 1 MG tablet Comments:   Reason for Stopping:             Activity: activity as tolerated  Diet: cardiac diet  Wound Care: none needed    Follow-up with Felicity Curling, MD  in 1 week.     DC time 35 minutes    Signed:  Electronically signed by Caren Kim DO on 3/10/2021 at 12:58 PM

## 2021-03-10 NOTE — PROGRESS NOTES
Progress Note  Patient: Myra Palacios  Unit/Bed: H794/X706-19  YOB: 1945  MRN: [id number]  Acct: [de-identified]   Admitting Diagnosis: Hypotension [I95.9]  Date:  3/7/2021  Hospital Day: 3    Chief Complaint:  Hypotension/weakness/Hx HOCM    Subjective      3/10/21: Sitting up in bed in no acute distress. Denies chest pain or shortness of breath. Has peripheral muscle wasting. Has distended abdomen and significant bilateral lower extremity edema. States she drains approximately 2 L of peritoneal fluid via her peritoneal drain every 3 days. Hemodynamically stable. Blood pressure much improved since admission with most recent blood pressure 117/51. She received 2 separate 1 L fluid boluses on 3/7/2021 as well as 50 g of albumin IV on 3/8/2021. She has been maintained on home dose Toprol-XL 50 mg p.o. twice daily. On telemetry, she is sinus rhythm with heart rate in the 80s. Echocardiogram completed on 3/8/2021 revealed normal LV systolic function with EF of 65% and severely elevated LVOT gradient at 107 mmHg which is unchanged from prior echo in June 2020. Pending discharge home later today    3/9/21: Recurrent ascites with cirrhosis of the liver. HOCM. Normal LV ejection fraction. Very weak. Multiple paracentesis. Rhythm is stable    18/21: 72-year-old female who is a patient of Dr. Kimberly Bennett. Chronically ill person with showed cirrhosis of the liver and recurrent ascites tap primary care is Dr. Lala Gomez. Patient is independent. She has a peritoneal drain that is draining every 3 days by her daughter. An echo done last year showed HOCM, diastolic function was normal no significant hypertrophy. I am not sure if there was significant gradient from dehydration or HOCM. Apparently no left atrial enlargement. Was sent to Dr Juanpablo Patino. Was advised verapamil. And beta-blocker.   On her home list of medications she is currently only on Toprol XL 50 twice daily       Review of Systems:   Review of 03/10/2021    MCHC 33.5 03/10/2021    RDW 14.1 03/10/2021     03/10/2021    MPV 10.0 09/08/2015     CBC with Differential:   Lab Results   Component Value Date    WBC 6.3 03/10/2021    RBC 2.60 03/10/2021    HGB 7.9 03/10/2021    HCT 23.5 03/10/2021     03/10/2021    MCV 90.1 03/10/2021    MCH 30.2 03/10/2021    MCHC 33.5 03/10/2021    RDW 14.1 03/10/2021    NRBC 0.0 07/22/2020    LYMPHOPCT 7.7 03/07/2021    MONOPCT 12.7 03/07/2021    BASOPCT 0.4 03/07/2021    MONOSABS 1.1 03/07/2021    LYMPHSABS 0.7 03/07/2021    EOSABS 0.0 03/07/2021    BASOSABS 0.0 03/07/2021     CMP:    Lab Results   Component Value Date     03/10/2021    K 5.1 03/10/2021    K 5.0 09/19/2020    CL 99 03/10/2021    CO2 20 03/10/2021    BUN 47 03/10/2021    CREATININE 1.62 03/10/2021    GFRAA 37.6 03/10/2021    LABGLOM 31.1 03/10/2021    GLUCOSE 164 03/10/2021    GLUCOSE 84 12/04/2020    PROT 5.8 03/07/2021    LABALBU 2.4 03/10/2021    LABALBU 3.3 12/04/2020    CALCIUM 7.7 03/10/2021    BILITOT 0.5 03/07/2021    ALKPHOS 108 03/07/2021    AST 27 03/07/2021    ALT 21 03/07/2021     BMP:    Lab Results   Component Value Date     03/10/2021    K 5.1 03/10/2021    K 5.0 09/19/2020    CL 99 03/10/2021    CO2 20 03/10/2021    BUN 47 03/10/2021    LABALBU 2.4 03/10/2021    LABALBU 3.3 12/04/2020    CREATININE 1.62 03/10/2021    CALCIUM 7.7 03/10/2021    GFRAA 37.6 03/10/2021    LABGLOM 31.1 03/10/2021    GLUCOSE 164 03/10/2021    GLUCOSE 84 12/04/2020     Magnesium:    Lab Results   Component Value Date    MG 2.0 03/10/2021     Troponin:    Lab Results   Component Value Date    TROPONINI 0.024 03/07/2021       Radiology:    Echocardiogram 3/8/21:   Conclusions   Summary   Technically difficult examination. Left ventricular ejection fraction is visually estimated at 65%. Moderate concentric left ventricular hypertrophy with more pronounced   basal septal hypertrophy.    severely Increased LVOT gradient of 107mmHg unchanged as

## 2021-03-10 NOTE — PROGRESS NOTES
[facility] Occupational Therapy      Date: 3/10/2021  Patient Name: Rachel Thorne        MRN: [id number]  Account: [de-identified]   : 1945  (70 y.o.)  Room: [place] [address]    Chart reviewed, attempted OT at [phone] for tx session. Patient not seen 2° to:    Pt. declined, stating: \"I'm very tired and I just want to go back to sleep right now. \" Pt agreeable to tx this afternoon. Will attempt OT tx again when able.     Electronically signed by Jumana Pride OT on 3/10/2021 at 11:18 AM

## 2021-03-10 NOTE — PROGRESS NOTES
Palliative Care Consult Note  Patient: Rachel Thorne  Gender: female  YOB: 1945  Unit/Bed: T551/M095-50  Code Status: Full Code  Inpatient Treatment Team: Treatment Team: Attending Provider: Minnie Crawford DO; Consulting Physician: Manoj Rosario MD; Consulting Physician: Gene Garcia DO; Utilization Reviewer: Tete Zamora, RN; Consulting Physician: Taylor Strickland MD; Consulting Physician: Ankit Castro MD; Utilization Reviewer: Jaqueline Obrien, PERNELL; Registered Nurse: Hema Moreno. Antionette Medina, PERNELL; : Attila Baker, PERNELL; : Solange Mcnally  Admit Date:  3/7/2021    Chief Complaint: Goals of Care    History of Presenting Illness:      Rachel Thorne is a 70 y.o. female on hospital day 3 with a history of HTN, HOCM, carotid stenosis s/p left CEA, CHF, decompensated nonalcoholic liver cirrhosis requiring frequent large volume paracentesis s/p peritoneal drain placement on 1/28, DM2, CKD, anxiety,depression who presented with weakness, fatigue and hypotension, seen by cardiology on 2/22, her Toprol was decreased from 100 BID to 50 BID, SBP in the 70-80s on arrival, improved with IVFs to 110s, w/u showed evidence of FALLON, hyperkalemia, hyponatremia,lactic acidosis elevated troponin, anemia, Kayexalate and IV insulin were administered, patient was admitted for further management. She has normal platelets and minimally elevated INR, it is felt ascites and edema may also be a product of right sided heart failure, EF 65%. Was given albumin IV. Today she feels better, Renal function improved. Pt drained her abdominal drain with nursing and relieved 2L fluid Hgb dropped to 6.9 today. 1U blood ordered. Pt hoping to go home tomorrow. She is having no pain. No sob, cough, or sputum production. Ascites noted, but abdomen is soft. Negative abdominal pain or nausea. No GI or  complaints. Appetite is improving since her drain was placed.  Negative significant  emotional or sleep diseases classified elsewhere     Pneumonia     Psychiatric problem     Sleep apnea, obstructive     Type II or unspecified type diabetes mellitus without mention of complication, not stated as uncontrolled        PSHx:  Past Surgical History:   Procedure Laterality Date    APPENDECTOMY      CHOLECYSTECTOMY      COLONOSCOPY  04/23/2013    CT BIOPSY RENAL  08/04/2020    CT BIOPSY RENAL 8/4/2020 [facility] CT SCAN    CT BIOPSY RENAL  09/04/2020    CT BIOPSY RENAL 9/4/2020 [facility] CT SCAN    FOOT FRACTURE SURGERY Bilateral     FRACTURE SURGERY      HYSTERECTOMY      IR TUNNELED INTRAPERITNL CATH W/IMAG  1/28/2021    IR TUNNELED INTRAPERITNL CATH W/IMAG 1/28/2021 [facility] SPECIAL PROCEDURE    OTHER SURGICAL HISTORY      removal of anal fistulotomy    PARACENTESIS Left 07/28/2020    9015 mls removed by Dr Ronit Wilde Left 09/04/2020    74590fh ascites removed by Dr Gifty Lim 50g albumin given    PARACENTESIS Left 09/15/2020    10,400cc removed by Dr Yves Montalvo [phone]    PARACENTESIS Left 10/09/2020    7000cc removed by Dr. Ronit Wilde Left 10/13/2020    7000ml removed by Dr Yves Montalvo [phone]    PARACENTESIS Left 10/26/2020    7010cc removed by Dr. Yanet Oviedo Left 11/06/2020    7020 mls removed ny Dr Ronit Wilde Left 11/19/2020    7000 ml removed by Dr. Gifty Lim.     PARACENTESIS Left 11/30/2020    7000 ml removed by paracentesis by Dr. Ronit Wilde Left 12/08/2020    7100 ml removed    PARACENTESIS Left 01/08/2021    ultrasound guided paracentesis 7150 ml removed    PARACENTESIS Left 01/19/2021    7000ml removed by Dr Yves Montalvo [phone]    PARACENTESIS Left 01/28/2021    by Dr. Gifty Lim 8100 ml removed    [address] Left 12/17/2020    7000ml removed by Dr Félix Duarte Hx:  Social History     Socioeconomic History    Marital status:      Spouse name: None    Number of children: None    Years of education: None    Highest education level: None   Occupational History    None   Social Needs    Financial resource strain: Not hard at all    Food insecurity     Worry: Never true     Inability: Never true   [employer] needs     Medical: None     Non-medical: None   Tobacco Use    Smoking status: Never Smoker    Smokeless tobacco: Former User   Substance and Sexual Activity    Alcohol use: No    Drug use: No    Sexual activity: Yes   Lifestyle    Physical activity     Days per week: None     Minutes per session: None    Stress: None   Relationships    Social connections     Talks on phone: None     Gets together: None     Attends Hinduism service: None     Active member of club or organization: None     Attends meetings of clubs or organizations: None     Relationship status: None    Intimate partner violence     Fear of current or ex partner: None     Emotionally abused: None     Physically abused: None     Forced sexual activity: None   Other Topics Concern    None   Social History Narrative    None       Family Hx:  Family History   Problem Relation Age of Onset    Arrhythmia Mother     Diabetes Mother     High Blood Pressure Mother     Arrhythmia Father     Breast Cancer Sister     Diabetes Brother     High Blood Pressure Brother        LABS: Reviewed     CBC:  Lab Results   Component Value Date    WBC 6.3 03/10/2021    RBC 2.60 03/10/2021    HGB 7.9 03/10/2021    HCT 23.5 03/10/2021    MCV 90.1 03/10/2021    MCH 30.2 03/10/2021    MCHC 33.5 03/10/2021    RDW 14.1 03/10/2021     03/10/2021    MPV 10.0 09/08/2015     CBC with Differential:   Lab Results   Component Value Date    WBC 6.3 03/10/2021    RBC 2.60 03/10/2021    HGB 7.9 03/10/2021    HCT 23.5 03/10/2021     03/10/2021    MCV 90.1 03/10/2021    MCH 30.2 03/10/2021    MCHC 33.5 03/10/2021    RDW 14.1 03/10/2021    NRBC 0.0 07/22/2020    LYMPHOPCT 7.7 03/07/2021    MONOPCT 12.7 03/07/2021    BASOPCT 0.4 03/07/2021 MONOSABS 1.1 03/07/2021    LYMPHSABS 0.7 03/07/2021    EOSABS 0.0 03/07/2021    BASOSABS 0.0 03/07/2021     CMP:    Lab Results   Component Value Date     03/10/2021    K 5.1 03/10/2021    K 5.0 09/19/2020    CL 99 03/10/2021    CO2 20 03/10/2021    BUN 47 03/10/2021    CREATININE 1.62 03/10/2021    GFRAA 37.6 03/10/2021    LABGLOM 31.1 03/10/2021    GLUCOSE 164 03/10/2021    GLUCOSE 84 12/04/2020    PROT 5.8 03/07/2021    LABALBU 2.4 03/10/2021    LABALBU 3.3 12/04/2020    CALCIUM 7.7 03/10/2021    BILITOT 0.5 03/07/2021    ALKPHOS 108 03/07/2021    AST 27 03/07/2021    ALT 21 03/07/2021     BMP:    Lab Results   Component Value Date     03/10/2021    K 5.1 03/10/2021    K 5.0 09/19/2020    CL 99 03/10/2021    CO2 20 03/10/2021    BUN 47 03/10/2021    LABALBU 2.4 03/10/2021    LABALBU 3.3 12/04/2020    CREATININE 1.62 03/10/2021    CALCIUM 7.7 03/10/2021    GFRAA 37.6 03/10/2021    LABGLOM 31.1 03/10/2021    GLUCOSE 164 03/10/2021    GLUCOSE 84 12/04/2020     TSH:   Lab Results   Component Value Date    TSH 3.620 03/07/2021     Vitamin B12 and Folate: No components found for: FOLIC,  H42  Urinalysis:   Lab Results   Component Value Date    NITRU Negative 03/08/2021    WBCUA 10-20 03/08/2021    BACTERIA Negative 03/08/2021    RBCUA 3-5 03/08/2021    BLOODU Negative 03/08/2021    SPECGRAV 1.018 03/08/2021    GLUCOSEU Negative 03/08/2021           FUNCTIONAL ADL´S:     Independent: [ x] Eating, [ x  ] Dressing, [x   ] Transferring, [ x ] Toileting, [  x ] Bathing, [  x ] Continence  Dependent   : [  ] Eating, [   ] Dressing, [   ] Transferring, [   ] Erling Toñito, [   ] Bathing, [   ] Continence  W. assistant : [  ] Eating, [   ] Dressing, [   ] Transferring, [   ] Geovani Sibley, [   ] Jennett Mcardle, [   ] Continence    Radiology: Reviewed      Echocardiogram Limited    Result Date: 3/8/2021  Transthoracic Echocardiography Report (TTE)  Demographics  Patient Name    Marie 5 A Gender               Female  Patient Number  [id number]    Race                                               Ethnicity  Visit Number    [id number]   Room Number          W170  Corporate ID                Date of Study        03/08/2021  Accession       [id number]  Referring Physician  Pricila Gee  Number  Date of Birth   1945  Sonographer          Corrine Jones LPN  Age             70 year(s)  Interpreting         [facility]) Cardiology                              Physician            Ellyn Arthurs MD Leeland Curling Procedure Type of Study  TTE procedure:ECHOCARDIOGRAM LIMITED. Procedure Date: 03/08/2021 Start: 02:47 PM Study Location: Portable Technical Quality: Adequate visualization Indications:LVF. Patient Status: Routine Height: 67 inches Weight: 191 pounds BSA: 1.98 m^2 BMI: 29.91 kg/m^2 BP: 128/59 mmHg  Conclusions  Summary  Technically difficult examination. Left ventricular ejection fraction is visually estimated at 65%. Moderate concentric left ventricular hypertrophy with more pronounced  basal septal hypertrophy. severely Increased LVOT gradient of 107mmHg unchanged as compared to  previous echo on 6/2020, likely due to basal septal hypertrophy and KIKO of  anterior mitral valve leaflet  Normal right ventricle structure and function. Normal right ventricle systolic pressure. Recommend Cardiac MRI for further evaluation of LVOT/septal hypertrophy. Signature  ----------------------------------------------------------------  Electronically signed by Reny Armstrong(Interpreting  physician) on 03/08/2021 04:49 PM  ----------------------------------------------------------------  Findings Left Ventricle Left ventricular ejection fraction is visually estimated at 65%. Normal left ventricular size and function. Moderate concentric left ventricular hypertrophy with more pronounced basal septal hypertophy.  severely Increased LVOT gradient of 105mmHg unchanged as compared to previous echo on 6/2020, likely due to basal septal hypertrophy and KIKO of anterior mitral valve leaflet Right Ventricle Normal right ventricle structure and function. Normal right ventricle systolic pressure. Left Atrium The left atrium is Moderately dilated. Mitral Valve No evidence of mitral valve stenosis. Trace (+) mitral regurgitation is present. Aortic Valve Individual aortic valve leaflets are not clearly visualized. Pericardial Effusion No evidence of pericardial effusion. Pleural Effusion No evidence of pleural effusion. Doppler Measurements:  AV Peak Gradient: 105.67 mmHg                                               MV P1/2t: 46.7 msec                                               MVA by PHT4.71 cm^2 Valves  Mitral Valve  P1/2t: 46.7 msec  Area (PHT): 4.71 cm^2  Aortic Valve  Peak Velocity: 5.14 m/s  Peak Gradient: 105.67 mmHg    Xr Chest Portable    Result Date: 3/8/2021  EXAMINATION: XR CHEST PORTABLE CLINICAL HISTORY: HYPOTENSION COMPARISONS: CT CHEST, DECEMBER 29, 2020 FINDINGS: Osseous structures are intact. Cardiopericardial silhouette is normal. Pulmonary vasculature is normal. Lungs are clear. NO ACUTE CARDIOPULMONARY DISEASE. Assessment and plan:      -Advance Care Planning  Discussed goals of care with patient. Explained in extensive detail nuances between full code, DNR CCA and DNR CC. Patient has made the decision to be FULL CODE  NO MPOA or Living Will in place  Discussed importance of discussing advance directives when not in crisis with her  who would be her primary medical decision maker as it will be the only way she can control her health care if not able to speak for herself and offers her  the gift of guidance in a crisis. Offered assitance with completing paperwork    Palliative Performance Scale of 50%  MELD 26  Palliative care eligible      -Goals of Care Discussion:  Disease process and goals of treatment were discussed in basic terms.  Elise's goal is to optimize available comfort care measures to decrease hospitalizations and maximize comfort. We discussed the palliative care philosophy in light of those goals. We discussed all care options contingent on treatment response and QOL. Much active listening, presence, and emotional support were given.        In light of her multiple comorbidities and high risk of symptom burden Sabi Jackson may benefit form palliative care upon discharge     Thanks for allowing me to participate in her care    Electronically signed by TANNER Cruz CNP on 3/10/2021 at 12:44 PM

## 2021-03-10 NOTE — PROGRESS NOTES
[facility] TREATMENT NOTE     Date: 3/10/2021  Patient Name: Ruth Mcrae        MRN: [id number]  Account: [de-identified]   : 1945  (70 y.o.)  Room: [place] [address]    Chart Review:  Diagnosis:  The primary encounter diagnosis was FALLON (acute kidney injury) ([address] Diagnoses of Hypotension, unspecified hypotension type, Hyperkalemia, Lactic acidosis, Elevated troponin, and Hyponatremia were also pertinent to this visit. Restrictions:    Restrictions/Precautions  Restrictions/Precautions: Fall Risk       Subjective:  Patient states:  \"I already washed up today I don't want to get up. \" Pt declined standing/using restroom this date despite max encouragement. Pt agreed to wash up EOB with washcloth and brush hair. Pt declined changing gown and/or socks. Pt states \"I just want to go home I'm waiting for the doctor so I can go home. \" Pt states she has no concerns about returning home with her  who will assist her. Pain:  Start of tx:  Pre Treatment Pain Screening  Pain at present: 0  Scale Used: Numeric Score  Intervention List: Patient able to continue with treatment    End of tx:  Pain Assessment  Patient Currently in Pain: No  Pain Assessment: 0-10  Pain Level: 0    Objective: Pt washed up at EOB with washcloth this date and brushed hair. ADL:  ADL  Feeding: Independent  Grooming: Setup  UE Bathing: Setup  LE Bathing: Contact guard assistance  LE Dressing: Contact guard assistance  Toileting: Pt declined need to go.     Therapy key for assistance levels -   Independent = Pt. is able to perform task with no assistance but may require a device   Stand by assistance = Pt. does not perform task at an independent level but does not need physical assistance, requires verbal cues  Minimal, Moderate, Maximal Assistance = Pt. requires physical assistance (25%, 50%, 75% assist from helper) for task but is able to actively participate in task   Dependent = Pt. requires total assistance with task and is not able to actively participate with task completion    Functional Balance:  Balance  Sitting Balance: Supervision  Standing Balance: Contact guard assistance   Functional Mobility  Functional - Mobility Device: No device  Activity: To/from bathroom  Assist Level: Contact guard assistance  Functional Mobility Comments: Increased effort for mobility.  Reaching for walls at times    Cognition:  Cognition  Overall Cognitive Status: WFL    Bed Mobility  Bed mobility  Supine to Sit: Stand by assistance  Sit to Supine: Stand by assistance  Comment: Increased effort for transitions, requires occasional rest breaks      Treatment consisted of:  ADL Training    Assessment/Discharge Disposition:     Performance deficits / Impairments: Decreased functional mobility , Decreased ADL status, Decreased strength, Decreased endurance, Decreased balance, Decreased high-level IADLs  Prognosis: Good  Discharge Recommendations: Continue to assess pending progress  History: Pt's medical history is moderately complex  Exam: Pt. has 6 performance deficits  Assistance / Modification: Pt. requires min A    Plan:  Continue OT per POC    Goals/Plan:    Improve Balance  Improve Strength  Improve Functional Transfers  Improve ADL [place]    Minutes:    OT Individual Minutes  Time In: 3804  Time Out: 1412  Minutes: 9    ADL/IADL trainin minutes   Electronically signed by Layton Whitehead OT on 3/10/2021 at 2:20 PM

## 2021-03-10 NOTE — PROGRESS NOTES
Nephrology Progress Note    Assessment:  CKD-3  DM type-2  OHDX HCOM harsh murmur  Cirrhosis  Anemia  Hx Hypertension  Anemia STABLE      Plan: WATCH POTASSIUM  No follow needed quite weak to go out  Benefit EPO aranesp today than out-patient if family can handle    Patient Active Problem List:     Type 2 diabetes mellitus without complication, without long-term current use of insulin (HCC)     Hypertension     Bell-rectal abscess     Bilateral carotid artery stenosis     Hypertrophic obstructive cardiomyopathy (HOCM) (HCC)     HARDEEP (obstructive sleep apnea)     Pseudophakia of both eyes     Regular astigmatism     Cardiomyopathy ([address]     Rectal pain     Abdominal distension     FALLON (acute kidney injury) ([address]     Cirrhosis of liver with ascites (HCC)     Renal mass, left     Alcoholic cirrhosis of liver with ascites (HCC)     Shock (HCC)     Arterial hypotension     Subconjunctival hemorrhage of left eye     Right-sided carotid artery disease (HCC)     PVD (posterior vitreous detachment), both eyes     Presbyopia of both eyes     Dry eye syndrome of bilateral lacrimal glands     Other ascites     Hypotension      Subjective:  Admit Date: 3/7/2021    Interval History: pleasant  Ok wants home    Medications:  Scheduled Meds:   atorvastatin  10 mg Oral Daily    metoprolol succinate  50 mg Oral BID    sodium chloride flush  10 mL Intravenous 2 times per day    insulin lispro  0-6 Units Subcutaneous TID WC    insulin lispro  0-3 Units Subcutaneous Nightly     Continuous Infusions:   sodium chloride      dextrose         CBC:   Recent Labs     03/09/21  0537 03/10/21  0219   WBC 4.7* 6.3   HGB 6.9* 7.9*    147     CMP:    Recent Labs     03/08/21  0539 03/09/21  0537 03/10/21  0219 03/10/21  0602   * 129* 128*  --    K 4.2 4.3 5.4* 5.1*   CL 99 98 99  --    CO2 20 20 20  --    BUN 56* 50* 47*  --    CREATININE 1.92* 1.75* 1.62*  --    GLUCOSE 148* 118* 164*  --    CALCIUM 8.2* 8.1* 7.7*  --    LABGLOM 25.6* 28.5* 31.1*  --      Troponin:   Recent Labs     03/07/21  1545   TROPONINI 0.024*     BNP: No results for input(s): BNP in the last 72 hours. INR:   Recent Labs     03/07/21  1545   INR 1.4     Lipids: No results for input(s): CHOL, LDLDIRECT, TRIG, HDL, AMYLASE, LIPASE in the last 72 hours. Liver:   Recent Labs     03/07/21  1545 03/07/21  1545 03/10/21  0219   AST 27  --   --    ALT 21  --   --    ALKPHOS 108  --   --    PROT 5.8*  --   --    LABALBU 2.5*   < > 2.4*   BILITOT 0.5  --   --     < > = values in this interval not displayed. Iron:  No results for input(s): IRONS, FERRITIN in the last 72 hours. Invalid input(s): LABIRONS  Urinalysis: No results for input(s): UA in the last 72 hours.     Objective:  Vitals: BP (!) 117/51   Pulse 74   Temp 98.4 °F (36.9 °C) (Oral)   Resp 18   Ht 5' 7\" (1.702 m)   Wt 191 lb (86.6 kg)   SpO2 99%   BMI 29.91 kg/m²    Wt Readings from Last 3 Encounters:   03/07/21 191 lb (86.6 kg)   02/22/21 200 lb (90.7 kg)   11/09/20 200 lb (90.7 kg)      24HR INTAKE/OUTPUT:      Intake/Output Summary (Last 24 hours) at 3/10/2021 1113  Last data filed at 3/10/2021 0943  Gross per 24 hour   Intake 340 ml   Output --   Net 340 ml       General: alert, in no apparent distress  HEENT: normocephalic, atraumatic, anicteric  Neck: supple, no mass  Lungs: non-labored respirations, clear to auscultation bilaterally  Heart: regular rate and rhythm, 3/6 systoloc murmurs or rubs  Abdomen: soft, non-tender, non-distended  Ext: no cyanosis, 1-2 + peripheral edema  Neuro: alert and oriented, no gross abnormalities  Psych: normal mood and affect  Skin: no rash      Electronically signed by Yenni Blanton MD

## 2021-03-11 NOTE — TELEPHONE ENCOUNTER
Patient's daughter is requesting short Rx until Rx is delivered from [facility]. 10-15 days medication refill.  Please approve or deny this request.    Rx requested:  Requested Prescriptions      No prescriptions requested or ordered in this encounter         Last Office Visit:   2/22/2021      Next Visit Date:  Future Appointments   Date Time Provider Carlota Rodriguez   3/15/2021  1:30 PM Larissa Domingo MD [address] [place] [address]   3/16/2021 12:30 PM MD Carlota Pires

## 2021-03-11 NOTE — CARE COORDINATION
[facility] Transitions Initial Follow Up Call    Call within 2 business days of discharge: Yes    Patient: Rashaad Deutsch Patient : 1945   MRN: [id number]  Reason for Admission: 3/7-3/10/2021 [facility] Cirrhosis, Hypotension. Discharge Date: 3/10/21 RARS: Readmission Risk Score: 23  Med rec/1111F order completed. Care Transitions    Last Discharge [facility]       Complaint Diagnosis Description Type Department Provider    3/7/21 Hypotension FALLON (acute kidney injury) ([facility] . .. ED to Hosp-Admission (Discharged) (ADMITTED) Marily 26, DO; Wanda Scruggs, ... Spoke with: Lam Reymundo reports no acute abd pain but does admit abd pressure. Has peritoneal drain and currently in process of removing 2L. Pt states she will find relief after. Denies any HHC or DME needs. States she has her meds/taking as directed. Reports normal appetite and elimination patterns. Challenges to be reviewed by the provider   Additional needs identified to be addressed with provider No  none             Method of communication with provider : none    Advance Care Planning:   Does patient have an Advance Directive:  reviewed and current. Was this a readmission? No  Patient stated reason for admission: Cirrhosis, Hypotension  Patients top risk factors for readmission: medical condition    Care Transition Nurse (CTN) contacted the patient by telephone to perform post hospital discharge assessment. Verified name and  with patient as identifiers. Provided introduction to self, and explanation of the CTN role. CTN reviewed discharge instructions, medical action plan and red flags with patient who verbalized understanding. Patient given an opportunity to ask questions and does not have any further questions or concerns at this time. Were discharge instructions available to patient? Yes. Reviewed appropriate site of care based on symptoms and resources available to patient including: Reviewed diet precautions. . The patient agrees to contact the PCP office for questions related to their healthcare. Medication reconciliation was performed with patient, who verbalizes understanding of administration of home medications. Advised obtaining a 90-day supply of all daily and as-needed medications. Discussed follow-up appointments. If no appointment was previously scheduled, appointment scheduling offered: Edwina Echevarria 3/16 12:30, Dr Akin Birch 3/15 1:30. Is follow up appointment scheduled within 7 days of discharge? Yes  Non-[facility] follow up appointment(s):     Plan for follow-up call in 5-7 days based on severity of symptoms and risk factors. Plan for next call: symptom management-Cirrhosis, Paracentesis. CTN provided contact information for future needs.       Care Transitions 24 Hour Call    Do you have any ongoing symptoms?: Yes  Patient-reported symptoms: Weakness, Other (Comment: Abd bloat.)  Do you have a copy of your discharge instructions?: Yes  Do you have all of your prescriptions and are they filled?: Yes  Have you been contacted by a [address]?: No  Have you scheduled your follow up appointment?: Yes  Were you discharged with any Home Care or Post Acute Services: No  Do you feel like you have everything you need to keep you well at home?: Yes  Are you an active caregiver in your home?: No  Care Transitions Interventions         Follow Up  Future Appointments   Date Time Provider Carlota Rodriguez   3/15/2021  1:30 PM Ursula Sheffield  [facility]   3/16/2021 12:30 PM Aime Garner  [facility], [address] [place] [address]

## 2021-03-11 NOTE — PROGRESS NOTES
Physical Therapy  Facility/Department: [facility] S040/[id number]  Physical Therapy Discharge      NAME: Ruth Mcrae    : 1945 (10 y.o.)  MRN: [id number]    Account: [de-identified]  Gender: female      Patient has been discharged from acute care hospital. DC patient from current PT program.      Electronically signed by Mily Elmore PT on 3/11/21 at 4:51 PM EST

## 2021-03-15 NOTE — PROGRESS NOTES
OFFICE VISIT        Patient: Oziel Moralez  YOB: 1945  MRN: [id number]    Chief Complaint: Carotid SOB dizzy  Chief Complaint   Patient presents with    1 Month Follow-Up    Hypertension    Cardiomyopathy    Follow-Up from [facility] 3/7-3/10-hypotension, FALLON, dehydration       CV Data:  1/2018 [facility] PRISCILA   5/2020 DUANE 60-79% ccf  1/25/2006 Cath normal ccf    6/2020 ECHO EF 85% Severe LVOT Gr  Ascites    Subjective/HPI has been told she needs cardiac transplant years ago. Stamina is decreased. 7/10/2020 feels tired low energy no cp no sob no dizzy. 8/11/2020 still feels tired. No cp no sob no falls no bleed. occ dizzy but no falls     9/14/2020 Already had 2 Paracentesis. abd filling up again and uncomfortable. Last time took out 11 L- 2 weeks ago. No CP but SOB    12/14/2020 last Tuesday had 7L Paracentesis. Still SOB no cp no palp no LH    2/22/21 had 2L Paracentesis this am. Noted low BP but no new symptoms. No Dizzy no sob no cp. Always tired. Ate well this am. No fevers no falls no bleed    3/15/21 recent hosp for dehydration fallon and ascites. Has Paracentesis with 2L out.  meds adjusted in particular diuretics. She feels better. Nonsmoker  Lifelong rare ETOH. No ETOH  Retired-     EKG: SR    Past Medical History:   Diagnosis Date    Arthritis     Cardiomyopathy [facility])     mother history.     Chronic kidney disease     Hypertension     Liver disease     Other disorders of kidney and ureter in diseases classified elsewhere     Pneumonia     Psychiatric problem     Sleep apnea, obstructive     Type II or unspecified type diabetes mellitus without mention of complication, not stated as uncontrolled        Past Surgical History:   Procedure Laterality Date    APPENDECTOMY      CHOLECYSTECTOMY      COLONOSCOPY  04/23/2013    CT BIOPSY RENAL  08/04/2020    CT BIOPSY RENAL 8/4/2020 [facility] CT SCAN    CT BIOPSY RENAL  09/04/2020    CT BIOPSY RENAL 9/4/2020 [facility] CT SCAN    FOOT FRACTURE SURGERY Bilateral     FRACTURE SURGERY      HYSTERECTOMY      IR TUNNELED INTRAPERITNL CATH W/IMAG  1/28/2021    IR TUNNELED INTRAPERITNL CATH W/IMAG 1/28/2021 [facility] SPECIAL PROCEDURE    OTHER SURGICAL HISTORY      removal of anal fistulotomy    PARACENTESIS Left 07/28/2020    9015 mls removed by Dr Pao Burgess Left 09/04/2020    31841za ascites removed by Dr Bobby Cooney 50g albumin given    PARACENTESIS Left 09/15/2020    10,400cc removed by Dr Razo Patient [phone]    PARACENTESIS Left 10/09/2020    7000cc removed by Dr. Pao Burgess Left 10/13/2020    7000ml removed by Dr Razo Patient [phone]    PARACENTESIS Left 10/26/2020    7010cc removed by Dr. Court Dominguez Left 11/06/2020    7020 mls removed ny Dr Pao Burgess Left 11/19/2020    7000 ml removed by Dr. Bobby Cooney.     PARACENTESIS Left 11/30/2020    7000 ml removed by paracentesis by Dr. Pao Burgess Left 12/08/2020    7100 ml removed    PARACENTESIS Left 01/08/2021    ultrasound guided paracentesis 7150 ml removed    PARACENTESIS Left 01/19/2021    7000ml removed by Dr Razo Patient [phone]    PARACENTESIS Left 01/28/2021    by Dr. Bobby Cooney 8100 ml removed   [address] Left 12/17/2020    7000ml removed by Dr Razo Patient       Family History   Problem Relation Age of Onset    Arrhythmia Mother     Diabetes Mother     High Blood Pressure Mother     Arrhythmia Father     Breast Cancer Sister     Diabetes Brother     High Blood Pressure Brother        Social History     Socioeconomic History    Marital status:      Spouse name: None    Number of children: None    Years of education: None    Highest education level: None   Occupational History    None   Social Needs    Financial resource strain: Not hard at all   [employer] insecurity     Worry: Never true     Inability: Never true   [employer] needs     Medical: None     Non-medical: None   Tobacco Use    Smoking status: Never Smoker    Smokeless tobacco: Former User   Substance and Sexual Activity    Alcohol use: No    Drug use: No    Sexual activity: Yes   Lifestyle    Physical activity     Days per week: None     Minutes per session: None    Stress: None   Relationships    Social connections     Talks on phone: None     Gets together: None     Attends Congregation service: None     Active member of club or organization: None     Attends meetings of clubs or organizations: None     Relationship status: None    Intimate partner violence     Fear of current or ex partner: None     Emotionally abused: None     Physically abused: None     Forced sexual activity: None   Other Topics Concern    None   Social History Narrative    None       Allergies   Allergen Reactions    Codeine        Current Outpatient Medications   Medication Sig Dispense Refill    metoprolol succinate (TOPROL XL) 100 MG extended release tablet Take 0.5 tablets by mouth 2 times daily 90 tablet 2    metoprolol succinate (TOPROL XL) 100 MG extended release tablet Take 0.5 tablets by mouth 2 times daily 15 tablet 0    midodrine (PROAMATINE) 5 MG tablet Take 1 tablet by mouth 3 times daily 90 tablet 3    atorvastatin (LIPITOR) 10 MG tablet Take 1 tablet by mouth daily 30 tablet 3    Adhesive Tape (PAPER TAPE 1\"X12YD) TAPE 1 each by Does not apply route 4 times daily as needed (SOILAGE) 6 each 3    Gauze Pads & Dressings ([employer]S GAUZE PAD STERILE 4\"X4\") 4\"X4\" PADS Change intraperitoneal dressing - apply daily and PRN soilage (EXTRA ABSORBENT PADS) 5 each 3    Transparent Dressings (TEGADERM FIRST AID STYLE) MISC One box 50. Change daily one tegaderm to intraperitoneal site and PRN soilage.  1 each 3    glimepiride (AMARYL) 2 MG tablet TAKE 1 TABLET EVERY MORNING BEFORE BREAKFAST 90 tablet 1    raloxifene (EVISTA) 60 MG tablet TAKE 1 TABLET DAILY 90 tablet 3    Blood Pressure KIT Check your blood pressure daily 1 kit 0  metFORMIN (GLUCOPHAGE) 1000 MG tablet TAKE 1 TABLET TWICE A DAY WITH MEALS 180 tablet 3    glucose blood VI test strips (ACCU-CHEK MELANIE) strip Test 1-2 times a day. Dx: 250.00. Accu-check melanie strips 180 strip 3    Lancet Device MISC Test 1-2 times a day. Dx: 250.00. Lancets 180 each 3     No current facility-administered medications for this visit. Review of Systems:   Review of Systems   Constitutional: Positive for fatigue. Negative for diaphoresis. HENT: Negative. Eyes: Negative. Respiratory: Negative. Negative for cough, chest tightness, shortness of breath, wheezing and stridor. Cardiovascular: Negative. Negative for chest pain, palpitations and leg swelling. Gastrointestinal: Negative. Negative for blood in stool and nausea. Genitourinary: Negative. Musculoskeletal: Negative. Skin: Negative. Neurological: Positive for weakness. Negative for dizziness, syncope and light-headedness. Hematological: Negative. Psychiatric/Behavioral: Negative. Physical Examination:    BP (!) 94/50 (Site: Left Upper Arm, Position: Sitting, Cuff Size: Medium Adult)   Pulse 70   Resp 18   SpO2 98%    Physical Exam   Constitutional: No distress. She appears chronically ill. HENT:   Normal cephalic and Atraumatic   Eyes: Pupils are equal, round, and reactive to light. Neck: Normal range of motion and thyroid normal. Neck supple. No JVD present. No neck adenopathy. No thyromegaly present. Cardiovascular: Normal rate, regular rhythm, intact distal pulses and normal pulses. Murmur heard. Harsh holosystolic murmur is present at the lower left sternal border and lower right sternal border. The intensity increases with valsalva. Pulmonary/Chest: Effort normal and breath sounds normal. She has no wheezes. She has no rales. She exhibits no tenderness. Abdominal: Soft. Bowel sounds are normal. There is no abdominal tenderness. Musculoskeletal: Normal range of motion. General: Edema (right trace edema) present. No tenderness. Neurological: She is alert and oriented to person, place, and time. Skin: Skin is warm. No cyanosis. Nails show no clubbing.        LABS:  CBC:   Lab Results   Component Value Date    WBC 6.3 03/10/2021    RBC 2.60 03/10/2021    HGB 7.9 03/10/2021    HCT 23.5 03/10/2021    MCV 90.1 03/10/2021    MCH 30.2 03/10/2021    MCHC 33.5 03/10/2021    RDW 14.1 03/10/2021     03/10/2021    MPV 10.0 09/08/2015     Lipids:  Lab Results   Component Value Date    CHOL 89 01/03/2020    CHOL 81 10/21/2019    CHOL 87 10/02/2018     Lab Results   Component Value Date    TRIG 86 01/03/2020    TRIG 80 10/21/2019    TRIG 91 10/02/2018     Lab Results   Component Value Date    HDL 42 01/03/2020    HDL 40 10/21/2019    HDL 35 (L) 10/02/2018     Lab Results   Component Value Date    LDLCALC 30 01/03/2020    LDLCALC 25 10/21/2019    LDLCALC 34 10/02/2018     No results found for: LABVLDL, VLDL  Lab Results   Component Value Date    CHOLHDLRATIO 4.1 07/20/2011     CMP:    Lab Results   Component Value Date     03/10/2021    K 5.1 03/10/2021    K 5.0 09/19/2020    CL 99 03/10/2021    CO2 20 03/10/2021    BUN 47 03/10/2021    CREATININE 1.62 03/10/2021    GFRAA 37.6 03/10/2021    LABGLOM 31.1 03/10/2021    GLUCOSE 164 03/10/2021    GLUCOSE 84 12/04/2020    PROT 5.8 03/07/2021    LABALBU 2.4 03/10/2021    LABALBU 3.3 12/04/2020    CALCIUM 7.7 03/10/2021    BILITOT 0.5 03/07/2021    ALKPHOS 108 03/07/2021    AST 27 03/07/2021    ALT 21 03/07/2021     BMP:    Lab Results   Component Value Date     03/10/2021    K 5.1 03/10/2021    K 5.0 09/19/2020    CL 99 03/10/2021    CO2 20 03/10/2021    BUN 47 03/10/2021    LABALBU 2.4 03/10/2021    LABALBU 3.3 12/04/2020    CREATININE 1.62 03/10/2021    CALCIUM 7.7 03/10/2021    GFRAA 37.6 03/10/2021    LABGLOM 31.1 03/10/2021    GLUCOSE 164 03/10/2021    GLUCOSE 84 12/04/2020     Magnesium:    Lab Results   Component Value Date    MG 2.0 03/10/2021     TSH:  Lab Results   Component Value Date    TSH 3.620 03/07/2021             Patient Active Problem List   Diagnosis    Type 2 diabetes mellitus without complication, without long-term current use of insulin (HCC)    Hypertension    Bell-rectal abscess    Bilateral carotid artery stenosis    Hypertrophic obstructive cardiomyopathy (HOCM) (HCC)    HARDEEP (obstructive sleep apnea)    Pseudophakia of both eyes    Regular astigmatism    Cardiomyopathy ([address]    Rectal pain    Abdominal distension    FALLON (acute kidney injury) ([address]    Cirrhosis of liver with ascites (HCC)    Renal mass, left    Alcoholic cirrhosis of liver with ascites (HCC)    Shock ([address]    Arterial hypotension    Subconjunctival hemorrhage of left eye    Right-sided carotid artery disease (HCC)    PVD (posterior vitreous detachment), both eyes    Presbyopia of both eyes    Dry eye syndrome of bilateral lacrimal glands    Other ascites    Hypotension    Cirrhosis, liver, pigmentary (HCC)       Medications Discontinued During This Encounter   Medication Reason    metoprolol succinate (TOPROL XL) 100 MG extended release tablet REORDER       Modified Medications    Modified Medication Previous Medication    METOPROLOL SUCCINATE (TOPROL XL) 100 MG EXTENDED RELEASE TABLET metoprolol succinate (TOPROL XL) 100 MG extended release tablet       Take 0.5 tablets by mouth 2 times daily    Take 0.5 tablets by mouth 2 times daily       Orders Placed This Encounter   Medications    metoprolol succinate (TOPROL XL) 100 MG extended release tablet     Sig: Take 0.5 tablets by mouth 2 times daily     Dispense:  90 tablet     Refill:  2    metoprolol succinate (TOPROL XL) 100 MG extended release tablet     Sig: Take 0.5 tablets by mouth 2 times daily     Dispense:  15 tablet     Refill:  0     Awaiting mail order    midodrine (PROAMATINE) 5 MG tablet     Sig: Take 1 tablet by mouth 3 times daily     Dispense:  90 tablet     Refill:  3       Assessment/Plan:    1. Essential hypertension  Too low and symptomatic-decrease BB to 50 bid. If no improvement then will need Midodrine next visit    2. HOCM (hypertrophic obstructive cardiomyopathy) (HCC)   LVOT Gradient was 4 m/s prior Echo   Still with Gr but pt is doing well. No palp no cp no sob. - will [facility] to [facility]. - saw [facility]- they added Verapamil and Aldactone - and opted for med Rx. - will f/u this week. 3. Bilateral carotid artery stenosis  Stable. Will follow  4. Ascites form Liver Cirrhosis. - f/u GI.     5. LE edema -Diuretics stopped recent hospitalization. Start compression stockings and  Eat small frequent meals. Will add Midodrine. Counseling:  Heart Healthy Lifestyle, Low Salt Diet, Take Precautions to Prevent Falls and Walk Daily    Return in about 2 months (around 5/15/2021).     Electronically signed by Thierry Bedoya MD on 3/15/2021 at 1:52 PM

## 2021-03-16 NOTE — TELEPHONE ENCOUNTER
Daughter Sonja Alcantar ([phone]) calling in states her mom has appt with you at 1230pm today and they have sent you several [employer] messages that have never been read.   They have concerns on their mom and want you to be made aware before her appt

## 2021-03-16 NOTE — TELEPHONE ENCOUNTER
Thanks Vernell. The messages were never sent to me. They were sent to Dr. Barbie Burch and Court Reyna. Please ask Dr. New Rae office if they can get pt min sooner than 4/5. Otherwise please ask Dr. Justus Harris office if they can get her an appt ASAP.  I can place referral.

## 2021-03-16 NOTE — PROGRESS NOTES
Post-Discharge Transitional Care Management Services or Hospital Follow Up  [facility]   YOB: 1945  Date of Office Visit:  3/16/2021  Date of Hospital Admission: 3/7/21  Date of Hospital Discharge: 3/10/21  Readmission Risk Score(high >=14%.  Medium >=10%):Readmission Risk Score: 23  Care management risk score Rising risk (score 2-5) and Complex Care (Scores >=6): 5   Non face to face  following discharge, date last encounter closed (first attempt may have been earlier): 3/11/2021 11:08 AM 3/11/2021 11:08 AM  Call initiated 2 business days of discharge: Yes     Patient Active Problem List   Diagnosis    Type 2 diabetes mellitus without complication, without long-term current use of insulin ([address]    Hypertension    Bell-rectal abscess    Bilateral carotid artery stenosis    Hypertrophic obstructive cardiomyopathy (HOCM) (HCC)    HARDEEP (obstructive sleep apnea)    Pseudophakia of both eyes    Regular astigmatism    Cardiomyopathy ([address]    Rectal pain    Abdominal distension    FALLON (acute kidney injury) ([address]    Cirrhosis of liver with ascites (HCC)    Renal mass, left    Alcoholic cirrhosis of liver with ascites (HCC)    Shock ([address]    Arterial hypotension    Subconjunctival hemorrhage of left eye    Right-sided carotid artery disease ([address]    PVD (posterior vitreous detachment), both eyes    Presbyopia of both eyes    Dry eye syndrome of bilateral lacrimal glands    Other ascites    Hypotension    Cirrhosis, liver, pigmentary (HCC)     Allergies   Allergen Reactions    Codeine    Medications listed as ordered at the time of discharge from hospital   [address] Medication Instructions EDDIE:    Printed on:03/16/21 6638   Medication Information                      Adhesive Tape (PAPER TAPE 1\"X12YD) TAPE  1 each by Does not apply route 4 times daily as needed (SOILAGE)             atorvastatin (LIPITOR) 10 MG tablet  Take 1 tablet by mouth daily             Blood Pressure KIT  Check your blood pressure daily             Gauze Pads & Dressings ([employer]S GAUZE PAD STERILE 4\"X4\") 4\"X4\" PADS  Change intraperitoneal dressing - apply daily and PRN soilage (EXTRA ABSORBENT PADS)             glimepiride (AMARYL) 2 MG tablet  TAKE 1 TABLET EVERY MORNING BEFORE BREAKFAST             glucose blood VI test strips (ACCU-CHEK MELANIE) strip  Test 1-2 times a day. Dx: 250.00. Accu-check melanie strips             Lancet Device MISC  Test 1-2 times a day. Dx: 250.00. Lancets             metFORMIN (GLUCOPHAGE) 1000 MG tablet  TAKE 1 TABLET TWICE A DAY WITH MEALS             metoprolol succinate (TOPROL XL) 100 MG extended release tablet  Take 0.5 tablets by mouth 2 times daily             metoprolol succinate (TOPROL XL) 100 MG extended release tablet  Take 0.5 tablets by mouth 2 times daily             midodrine (PROAMATINE) 5 MG tablet  Take 1 tablet by mouth 3 times daily             Nutritional Supplements (ENSURE HIGH PROTEIN) LIQD  Take 1 Can by mouth 3 times daily             raloxifene (EVISTA) 60 MG tablet  TAKE 1 TABLET DAILY             Transparent Dressings (TEGADERM FIRST AID STYLE) MISC  One box 50. Change daily one tegaderm to intraperitoneal site and PRN soilage.              traZODone (DESYREL) 50 MG tablet  Take 0.5 tablets by mouth nightly as needed for Sleep               Medications marked \"taking\" at this time  Outpatient Medications Marked as Taking for the 3/16/21 encounter (Office Visit) with Yaritza Kapadia MD   Medication Sig Dispense Refill    Nutritional Supplements (ENSURE HIGH PROTEIN) LIQD Take 1 Can by mouth 3 times daily 270 Can 5    traZODone (DESYREL) 50 MG tablet Take 0.5 tablets by mouth nightly as needed for Sleep 30 tablet 1    metoprolol succinate (TOPROL XL) 100 MG extended release tablet Take 0.5 tablets by mouth 2 times daily 90 tablet 2    metoprolol succinate (TOPROL XL) 100 MG extended release tablet Take 0.5 tablets by mouth 2 times daily 15 tablet 0    midodrine (PROAMATINE) 5 MG tablet Take 1 tablet by mouth 3 times daily 90 tablet 3    atorvastatin (LIPITOR) 10 MG tablet Take 1 tablet by mouth daily 30 tablet 3    Adhesive Tape (PAPER TAPE 1\"X12YD) TAPE 1 each by Does not apply route 4 times daily as needed (SOILAGE) 6 each 3    Gauze Pads & Dressings (CVS GAUZE PAD STERILE 4\"X4\") 4\"X4\" PADS Change intraperitoneal dressing - apply daily and PRN soilage (EXTRA ABSORBENT PADS) 5 each 3    Transparent Dressings (TEGADERM FIRST AID STYLE) MISC One box 50. Change daily one tegaderm to intraperitoneal site and PRN soilage. 1 each 3    glimepiride (AMARYL) 2 MG tablet TAKE 1 TABLET EVERY MORNING BEFORE BREAKFAST 90 tablet 1    raloxifene (EVISTA) 60 MG tablet TAKE 1 TABLET DAILY 90 tablet 3    Blood Pressure KIT Check your blood pressure daily 1 kit 0    metFORMIN (GLUCOPHAGE) 1000 MG tablet TAKE 1 TABLET TWICE A DAY WITH MEALS 180 tablet 3    glucose blood VI test strips (ACCU-CHEK MELANIE) strip Test 1-2 times a day. Dx: 250.00. Accu-check melanie strips 180 strip 3    Lancet Device MISC Test 1-2 times a day. Dx: 250.00. Lancets 180 each 3        Medications patient taking as of now reconciled against medications ordered at time of hospital discharge: Yes    Chief Complaint   Patient presents with    Follow-Up from [facility]     from [facility] 3/7 and Dr. Mary La 3/15      Follow up post hospital  HPI  Pt presents for follow up post hospital admission for hypotension and FALLON on CKD. BP meds adjusted, IV fluid given. Anemia noted and placed on 1 unit PRBC and iron treatment. Cardio and nephro on board. Vitals 3/16/2021 3/15/2021 3/10/2021 3/10/2021 2/94/9114   SYSTOLIC [phone]    DIASTOLIC 60 50 65 65          Hb improved from 6.9 to 7.9. kidney function improved. Will folwo with Dr. Huey Bailey next month. Appetite improving- tolerating small meals through the day. Hx nonalcoholic liver cirrhosis with ascitis. Home paracentesis every 3 days. Difficulty falling and staying asleep, despite melatonin, Unisom. Inpatient course: Discharge summary reviewed- see chart. Interval history/Current status: stable    Review of Systems   Constitutional: Negative for chills, diaphoresis, fatigue and fever. HENT: Negative for congestion, ear discharge and ear pain. Respiratory: Negative for cough, shortness of breath and wheezing. Cardiovascular: Negative for chest pain. Gastrointestinal: Positive for abdominal distention. Negative for abdominal pain, diarrhea, nausea and vomiting. Endocrine: Negative for cold intolerance and heat intolerance. Genitourinary: Negative for dysuria and frequency. Neurological: Positive for weakness. Negative for dizziness and light-headedness. Psychiatric/Behavioral: Negative for dysphoric mood. The patient is not nervous/anxious. Vitals:    03/16/21 1259   BP: 101/60   Pulse: 94   Resp: 16   Temp: 97.4 °F (36.3 °C)   SpO2: 97%   Weight: 183 lb (83 kg)   Height: 5' 7\" (1.702 m)     Body mass index is 28.66 kg/m². Wt Readings from Last 3 Encounters:   03/16/21 183 lb (83 kg)   03/07/21 191 lb (86.6 kg)   02/22/21 200 lb (90.7 kg)     BP Readings from Last 3 Encounters:   03/16/21 101/60   03/15/21 (!) 94/50   03/10/21 102/65     Physical Exam  Constitutional:       General: She is not in acute distress. Appearance: She is not diaphoretic. Cardiovascular:      Rate and Rhythm: Normal rate and regular rhythm. Pulses: Normal pulses. Heart sounds: Normal heart sounds, S1 normal and S2 normal. No murmur. Pulmonary:      Effort: Pulmonary effort is normal. No respiratory distress. Breath sounds: Normal breath sounds. No wheezing or rales. Chest:      Chest wall: No tenderness. Abdominal:      General: Bowel sounds are normal. There is distension (paracentesis port dressing is clean and dry). Tenderness: There is abdominal tenderness (mild generalized TTP).    Neurological: General: No focal deficit present. Mental Status: She is alert. Cranial Nerves: No cranial nerve deficit. Psychiatric:         Mood and Affect: Mood normal.         Thought Content: Thought content normal.     Assessment/Plan:  1. Electrolyte abnormality  Improved but not resolved  - Nutritional Supplements (ENSURE HIGH PROTEIN) LIQD; Take 1 Can by mouth 3 times daily  Dispense: 270 Can; Refill: 5  - Comprehensive Metabolic Panel; Future  2. Cirrhosis of liver with ascites, unspecified hepatic cirrhosis type ([facility]  - Comprehensive Metabolic Panel; Future  - CBC With Auto Differential; Future  - PA SUPERVISION PT HOME HEALTH AGENCY MONTH 30 MIN/>  - Amb External Referral To [address] [place] [address] CURRENT OUTPATIENT MED LIST    3. Chronic kidney disease, unspecified CKD stage  -improved  -will follow with nephro  4. Screening for hyperlipidemia  - Lipid, Fasting; Future    5. Right-sided carotid artery disease, unspecified type ([facility]  -stable  6. Insomnia, unspecified type  - traZODone (DESYREL) 50 MG tablet; Take 0.5 tablets by mouth nightly as needed for Sleep  Dispense: 30 tablet; Refill: 1  - PA DISCHARGE MEDS RECONCILED W/ CURRENT OUTPATIENT MED LIST  7.  Loss of appetite  -- Nutritional Supplements (ENSURE HIGH PROTEIN) LIQD; Take 1 Can by mouth 3 times daily  Dispense: 270 Can; Refill: 5    Medical Decision Making: high complexity

## 2021-03-17 PROBLEM — R53.1 WEAKNESS: Status: ACTIVE | Noted: 2021-01-01

## 2021-03-17 PROBLEM — R53.83 FATIGUE: Status: ACTIVE | Noted: 2021-01-01

## 2021-03-17 NOTE — FLOWSHEET NOTE
Pt is awake in room and has been walked to the bathroom but did not void. Assisted pt to chair where she was assisted with  breakfast (set up.). Pt gait is very unsteady holds the wall and anything she can grab along the way. Alarm in chair and instructed pt to call before getting up. Electronically signed by Caitlin Goldberg LPN on 0/92/1406 at   Pt assisted to the bathroom where she voided and urine was obtained and sent to lab. Pt sugar this AM was 75 and metoprol was held due to B/P 96/42. Pt ate 50% of breakfast.Electronically signed by Caitlin Goldberg LPN on 3/57/9378 at 96:29 AM    visiting at this time and has been updated on pt's condition. Electronically signed by Caitlin Goldberg LPN on 3/71/7856 at 48:13 AM  Assisted pt to the bathroom due to the bed being wet thought it was her plurex cath but it was defiantly urine [place]orence care given. Electronically signed by Caitlin Goldberg LPN on 5/61/6651 at 6:04 PM

## 2021-03-17 NOTE — CARE COORDINATION
[facility] Case Management Initial Discharge Assessment    Met with Patient to discuss discharge plan. PCP: Abhinav Ching MD                                Date of Last Visit: 3/15    If no PCP, list provided? N/A    Discharge Planning    Living Arrangements: independently at home    Who do you live with? SPOUSE    Who helps you with your care:  self    If lives at home:     Do you have any barriers navigating in your home? no    Patient can perform ADL? Yes    Current Services (outpatient and in home) :  None    Dialysis: No    Is transportation available to get to your appointments? Yes    DME Equipment:  yes - [facility] IF NEEDED    Respiratory equipment: None    Respiratory provider:  no     Ph[facility]  [facility] [place]    Consult with Medication Assistance Program?  No      Patient agreeable to [place] [id number]? Declined    Patient agreeable to SNF/Rehab? Declined    Other discharge needs identified? N/A    Freedom of choice list provided with basic dialogue that supports the patient's individualized plan of care/goals and shares the quality data associated with the providers. Yes    Does Patient Have a High-Risk for Readmission Diagnosis (CHF, PN, MI, COPD)? No      The plan for Transition of Care is related to the following treatment goals:LABS, VITALS, MEDS    Initial Discharge Plan? (Note: please see concurrent daily documentation for any updates after initial note).     HOME WITH SPOUSE    The Patient and/or patient representative: Ulises Orr was provided with choice of any post-acute providers for care and equipment and agrees with discharge plan  Yes    Electronically signed by Gayathri Graves RN on 3/17/2021 at 1:23 PM

## 2021-03-17 NOTE — H&P
[facility]  MEDICINE    HISTORY AND PHYSICAL EXAM    PATIENT NAME:  Chanel Merritt    MRN:  [id number]  SERVICE DATE:  3/17/2021   SERVICE TIME:  5:25 AM    Primary Care Physician: Radha Varma MD     SUBJECTIVE  CHIEF COMPLAINT:  fatigue    HPI:  Chanel Merritt is a 70 y.o., , female who  has a past medical history of Arthritis, Cardiomyopathy ([facility] [address] Chronic kidney disease, Hypertension, Liver disease, Other disorders of kidney and ureter in diseases classified elsewhere, Pneumonia, Psychiatric problem, Sleep apnea, obstructive, and Type II or unspecified type diabetes mellitus without mention of complication, not stated as uncontrolled. that is hospitalized for fatigue, hyperkalemia. Fatigue  This is a chronic problem. The current episode started more than 1 year ago. The problem occurs constantly. The problem has been gradually worsening. Associated symptoms include arthralgias, fatigue and weakness. Pertinent negatives include no abdominal pain, chest pain, chills, congestion, coughing, diaphoresis, fever, headaches, nausea, numbness, sore throat, swollen glands or vomiting. Nothing aggravates the symptoms. She has tried nothing for the symptoms. The treatment provided no relief. Sent here by PCP for treatment of abnormal labs. She has a history of CKD, presented with elevated K of 6.7, and Scr 2.06. She has fatigue, weakness, and today had low blood pressure, no dizziness or headache. She drains 2 L of fluid from her abdominal cavity every M-W-F. PAST MEDICAL HISTORY:    Past Medical History:   Diagnosis Date    Arthritis     Cardiomyopathy [facility])     mother history.     Chronic kidney disease     Hypertension     Liver disease     Other disorders of kidney and ureter in diseases classified elsewhere     Pneumonia     Psychiatric problem     Sleep apnea, obstructive     Type II or unspecified type diabetes mellitus without mention of complication, not stated as uncontrolled PAST SURGICAL HISTORY:    Past Surgical History:   Procedure Laterality Date    APPENDECTOMY      CHOLECYSTECTOMY      COLONOSCOPY  04/23/2013    CT BIOPSY RENAL  08/04/2020    CT BIOPSY RENAL 8/4/2020 [facility] CT SCAN    CT BIOPSY RENAL  09/04/2020    CT BIOPSY RENAL 9/4/2020 [facility] CT SCAN    FOOT FRACTURE SURGERY Bilateral     FRACTURE SURGERY      HYSTERECTOMY      IR TUNNELED INTRAPERITNL CATH W/IMAG  1/28/2021    IR TUNNELED INTRAPERITNL CATH W/IMAG 1/28/2021 [facility] SPECIAL PROCEDURE    OTHER SURGICAL HISTORY      removal of anal fistulotomy    PARACENTESIS Left 07/28/2020    9015 mls removed by Dr Artemio Womack Left 09/04/2020    [id number] ascites removed by Dr Anthony Farmer 50g albumin given    PARACENTESIS Left 09/15/2020    10,400cc removed by Dr Grace Interiano [phone]    PARACENTESIS Left 10/09/2020    7000cc removed by Dr. Artemio Womack Left 10/13/2020    7000ml removed by Dr Grace Interiano [phone]    PARACENTESIS Left 10/26/2020    7010cc removed by Dr. Maude Giordano Left 11/06/2020    7020 mls removed ny Dr Artemio Womack Left 11/19/2020    7000 ml removed by Dr. Anthony Farmer.     PARACENTESIS Left 11/30/2020    7000 ml removed by paracentesis by Dr. Artemio Womack Left 12/08/2020    7100 ml removed    PARACENTESIS Left 01/08/2021    ultrasound guided paracentesis 7150 ml removed    PARACENTESIS Left 01/19/2021    7000ml removed by Dr Grace Interiano [phone]    PARACENTESIS Left 01/28/2021    by Dr. Anthony Farmer 8100 ml removed   [address] Left 12/17/2020    7000ml removed by Dr Loreto Greenberg:    Family History   Problem Relation Age of Onset    Arrhythmia Mother     Diabetes Mother     High Blood Pressure Mother     Arrhythmia Father     Breast Cancer Sister     Diabetes Brother     High Blood Pressure Brother      SOCIAL HISTORY:    Social History     Socioeconomic History    Marital status:      Spouse name: Not on file    Number of children: Not on file    Years of education: Not on file    Highest education level: Not on file   Occupational History    Not on file   Social Needs    Financial resource strain: Not hard at all    Food insecurity     Worry: Never true     Inability: Never true   [employer] needs     Medical: Not on file     Non-medical: Not on file   Tobacco Use    Smoking status: Never Smoker    Smokeless tobacco: Former User   Substance and Sexual Activity    Alcohol use: No    Drug use: No    Sexual activity: Yes   Lifestyle    Physical activity     Days per week: Not on file     Minutes per session: Not on file    Stress: Not on file   Relationships    Social connections     Talks on phone: Not on file     Gets together: Not on file     Attends Evangelical service: Not on file     Active member of club or organization: Not on file     Attends meetings of clubs or organizations: Not on file     Relationship status: Not on file    Intimate partner violence     Fear of current or ex partner: Not on file     Emotionally abused: Not on file     Physically abused: Not on file     Forced sexual activity: Not on file   Other Topics Concern    Not on file   Social History Narrative    Not on file     MEDICATIONS:   Prior to Admission medications    Medication Sig Start Date End Date Taking?  Authorizing Provider   Nutritional Supplements (ENSURE HIGH PROTEIN) LIQD Take 1 Can by mouth 3 times daily 3/16/21  Yes Yaritza Kapadia MD   traZODone (DESYREL) 50 MG tablet Take 0.5 tablets by mouth nightly as needed for Sleep 3/16/21  Yes Yaritza Kapadia MD   metoprolol succinate (TOPROL XL) 100 MG extended release tablet Take 0.5 tablets by mouth 2 times daily 3/15/21  Yes Tyler Luna MD   midodrine (PROAMATINE) 5 MG tablet Take 1 tablet by mouth 3 times daily 3/15/21  Yes Tyler Luna MD   atorvastatin (LIPITOR) 10 MG tablet Take 1 tablet by mouth daily 3/11/21  Yes Tyler Luna MD   Adhesive Tape (PAPER TAPE 1\"X12YD) TAPE 1 each by Does not apply route 4 times daily as needed (SOILAGE) 2/12/21  Yes TANNER Morales CNP   Gauze Pads & Dressings ([employer] GAUZE PAD STERILE 4\"X4\") 4\"X4\" PADS Change intraperitoneal dressing - apply daily and PRN soilage (EXTRA ABSORBENT PADS) 2/12/21  Yes TANNER Morales CNP   Transparent Dressings (TEGADERM FIRST AID STYLE) MISC One box 50. Change daily one tegaderm to intraperitoneal site and PRN soilage. 2/3/21  Yes TANNER Morales CNP   glimepiride (AMARYL) 2 MG tablet TAKE 1 TABLET EVERY MORNING BEFORE BREAKFAST 10/23/20  Yes Fany Shay MD   raloxifene (EVISTA) 60 MG tablet TAKE 1 TABLET DAILY 8/28/20  Yes Fany Shay MD   Blood Pressure KIT Check your blood pressure daily 7/31/20  Yes Ana M Romero MD   metFORMIN (GLUCOPHAGE) 1000 MG tablet TAKE 1 TABLET TWICE A DAY WITH MEALS 3/27/20  Yes TANNER Guerrero CNP   glucose blood VI test strips (ACCU-CHEK MELANIE) strip Test 1-2 times a day. Dx: 250.00. Accu-check melanie strips 11/28/12  Yes Fany Shay MD   Lancet Device MISC Test 1-2 times a day. Dx: 250.00. Lancets 10/10/12  Yes Fany Shay MD       ALLERGIES: Codeine    REVIEW OF SYSTEM:   Review of Systems   Constitutional: Positive for appetite change and fatigue. Negative for chills, diaphoresis and fever. HENT: Negative for congestion, sore throat and trouble swallowing. Eyes: Negative. Respiratory: Negative for cough, chest tightness, shortness of breath and wheezing. Cardiovascular: Positive for leg swelling. Negative for chest pain and palpitations. Gastrointestinal: Positive for abdominal distention. Negative for abdominal pain, constipation, diarrhea, nausea and vomiting. Endocrine: Negative. Genitourinary: Negative for dysuria and flank pain. Musculoskeletal: Positive for arthralgias. Skin: Negative. Neurological: Positive for weakness.  Negative for dizziness, syncope, speech difficulty, numbness and headaches. Psychiatric/Behavioral: Negative. OBJECTIVE  PHYSICAL EXAM:   Physical Exam  Vitals signs and nursing note reviewed. Constitutional:       General: She is awake. She is not in acute distress. Appearance: She is ill-appearing. She is not diaphoretic. HENT:      Head: Normocephalic and atraumatic. Nose: Nose normal.      Mouth/Throat:      Pharynx: Oropharynx is clear. Eyes:      Conjunctiva/sclera: Conjunctivae normal.   Neck:      Musculoskeletal: Normal range of motion and neck supple. No muscular tenderness. Vascular: No carotid bruit. Cardiovascular:      Rate and Rhythm: Normal rate and regular rhythm. Pulses: Normal pulses. Heart sounds: Murmur present. Pulmonary:      Effort: Pulmonary effort is normal.      Breath sounds: Normal breath sounds. No wheezing or rhonchi. Abdominal:      General: Abdomen is flat. Bowel sounds are normal. There is distension. Palpations: Abdomen is soft. Tenderness: There is no abdominal tenderness. Musculoskeletal: Normal range of motion. Right lower leg: Edema present. Left lower leg: Edema present. Lymphadenopathy:      Cervical: No cervical adenopathy. Skin:     General: Skin is warm and dry. Capillary Refill: Capillary refill takes less than 2 seconds. Coloration: Skin is pale. Neurological:      Mental Status: She is alert and oriented to person, place, and time. Motor: Weakness present. Psychiatric:         Mood and Affect: Mood normal.         Behavior: Behavior normal. Behavior is cooperative. BP (!) 110/51   Pulse 86   Temp 98.2 °F (36.8 °C) (Oral)   Resp 16   Ht 5' 7\" (1.702 m)   Wt 176 lb 4.8 oz (80 kg)   SpO2 100%   BMI 27.61 kg/m²     DATA:     Diagnostic tests reviewed for today's visit:    Most recent labs and imaging results reviewed.      LABS:    Recent Results (from the past 24 hour(s))   CBC With Auto Differential    Collection Time: 03/16/21 2:10 PM   Result Value Ref Range    WBC 7.2 4.8 - 10.8 K/uL    RBC 3.23 (L) 4.20 - 5.40 M/uL    Hemoglobin 9.4 (L) 12.0 - 16.0 g/dL    Hematocrit 28.9 (L) 37.0 - 47.0 %    MCV 89.5 82.0 - 100.0 fL    MCH 29.2 27.0 - 31.3 pg    MCHC 32.6 (L) 33.0 - 37.0 %    RDW 14.5 11.5 - 14.5 %    Platelets 554 643 - 198 K/uL    Neutrophils % 80.4 %    Lymphocytes % 7.4 %    Monocytes % 11.7 %    Eosinophils % 0.1 %    Basophils % 0.4 %    Neutrophils Absolute 5.8 1.4 - 6.5 K/uL    Lymphocytes Absolute 0.5 (L) 1.0 - 4.8 K/uL    Monocytes Absolute 0.8 0.2 - 0.8 K/uL    Eosinophils Absolute 0.0 0.0 - 0.7 K/uL    Basophils Absolute 0.0 0.0 - 0.2 K/uL   Comprehensive Metabolic Panel    Collection Time: 03/16/21  2:10 PM   Result Value Ref Range    Sodium 126 (L) 135 - 144 mEq/L    Potassium 6.7 (HH) 3.4 - 4.9 mEq/L    Chloride 98 95 - 107 mEq/L    CO2 19 (L) 20 - 31 mEq/L    Anion Gap 9 9 - 15 mEq/L    Glucose 90 70 - 99 mg/dL    BUN 52 (H) 8 - 23 mg/dL    CREATININE 2.06 (H) 0.50 - 0.90 mg/dL    GFR Non-African American 23.6 (L) >60    GFR  28.5 (L) >60    Calcium 8.5 8.5 - 9.9 mg/dL    Total Protein 5.9 (L) 6.3 - 8.0 g/dL    Albumin 2.5 (L) 3.5 - 4.6 g/dL    Total Bilirubin 0.8 (H) 0.2 - 0.7 mg/dL    Alkaline Phosphatase 163 (H) 40 - 130 U/L    ALT 33 0 - 33 U/L    AST 44 (H) 0 - 35 U/L    Globulin 3.4 2.3 - 3.5 g/dL   Lipid, Fasting    Collection Time: 03/16/21  2:10 PM   Result Value Ref Range    Cholesterol, Fasting 72 0 - 199 mg/dL    Triglyceride, Fasting 59 0 - 150 mg/dL    HDL 38 (L) 40 - 59 mg/dL    LDL Calculated 22 0 - 129 mg/dL   Basic Metabolic Panel    Collection Time: 03/17/21 12:30 AM   Result Value Ref Range    Sodium 122 (L) 135 - 144 mEq/L    Potassium 6.4 (HH) 3.4 - 4.9 mEq/L    Chloride 93 (L) 95 - 107 mEq/L    CO2 20 20 - 31 mEq/L    Anion Gap 9 9 - 15 mEq/L    Glucose 59 (L) 70 - 99 mg/dL    BUN 50 (H) 8 - 23 mg/dL    CREATININE 1.96 (H) 0.50 - 0.90 mg/dL    GFR Non-African American 25.0 (L) >60 GFR  30.2 (L) >60    Calcium 8.2 (L) 8.5 - 9.9 mg/dL   EKG 12 Lead - Chest Pain    Collection Time: 03/17/21 12:40 AM   Result Value Ref Range    Ventricular Rate 82 BPM    Atrial Rate 82 BPM    P-R Interval 152 ms    QRS Duration 86 ms    Q-T Interval 392 ms    QTc Calculation (Bazett) 457 ms    P Axis 3 degrees    R Axis 43 degrees    T Axis 100 degrees   COVID-19, Rapid    Collection Time: 03/17/21  1:06 AM    Specimen: Nasopharyngeal Swab   Result Value Ref Range    SARS-CoV-2, NAAT Not Detected Not Detected   POCT Glucose    Collection Time: 03/17/21  2:38 AM   Result Value Ref Range    Glucose 265 mg/dL    QC OK? ok    POCT Glucose    Collection Time: 03/17/21  2:38 AM   Result Value Ref Range    POC Glucose 265 (H) 60 - 115 mg/dl    Performed on ACCU-CHEK    Comprehensive Metabolic Panel    Collection Time: 03/17/21  3:15 AM   Result Value Ref Range    Sodium 127 (L) 135 - 144 mEq/L    Potassium 5.8 (H) 3.4 - 4.9 mEq/L    Chloride 98 95 - 107 mEq/L    CO2 21 20 - 31 mEq/L    Anion Gap 8 (L) 9 - 15 mEq/L    Glucose 75 70 - 99 mg/dL    BUN 49 (H) 8 - 23 mg/dL    CREATININE 1.91 (H) 0.50 - 0.90 mg/dL    GFR Non-African American 25.7 (L) >60    GFR  31.1 (L) >60    Calcium 8.2 (L) 8.5 - 9.9 mg/dL    Total Protein 5.1 (L) 6.3 - 8.0 g/dL    Albumin 2.1 (L) 3.5 - 4.6 g/dL    Total Bilirubin 0.5 0.2 - 0.7 mg/dL    Alkaline Phosphatase 135 (H) 40 - 130 U/L    ALT 27 0 - 33 U/L    AST 36 (H) 0 - 35 U/L    Globulin 3.0 2.3 - 3.5 g/dL   CBC Auto Differential    Collection Time: 03/17/21  3:15 AM   Result Value Ref Range    WBC 7.2 4.8 - 10.8 K/uL    RBC 2.96 (L) 4.20 - 5.40 M/uL    Hemoglobin 8.4 (L) 12.0 - 16.0 g/dL    Hematocrit 26.1 (L) 37.0 - 47.0 %    MCV 88.2 82.0 - 100.0 fL    MCH 28.4 27.0 - 31.3 pg    MCHC 32.2 (L) 33.0 - 37.0 %    RDW 14.3 11.5 - 14.5 %    Platelets 115 694 - 134 K/uL    Neutrophils % 71.3 %    Lymphocytes % 10.4 %    Monocytes % 17.1 %    Eosinophils % 0.6 % Basophils % 0.6 %    Neutrophils Absolute 5.1 1.4 - 6.5 K/uL    Lymphocytes Absolute 0.7 (L) 1.0 - 4.8 K/uL    Monocytes Absolute 1.2 (H) 0.2 - 0.8 K/uL    Eosinophils Absolute 0.0 0.0 - 0.7 K/uL    Basophils Absolute 0.0 0.0 - 0.2 K/uL   Protime-INR    Collection Time: 03/17/21  3:15 AM   Result Value Ref Range    Protime 18.1 (H) 12.3 - 14.9 sec    INR 1.5    APTT    Collection Time: 03/17/21  3:15 AM   Result Value Ref Range    aPTT 43.5 (H) 24.4 - 36.8 sec       IMAGING:  No results found. VTE Prophylaxis: low molecular weight heparin -  start    ASSESSMENT AND PLAN  Principal Problem:    Fatigue - chronic but worsening, remains ambulatory, had low BP after draining peritoneal cavity yesterday. Low albumin, she does take a protein supplement, her BP is borderline, no HA, dizziness, syncope, chest pain, or generalized pain. Plan: admit, monitor labs, correct electrolytes, PT/OT, consider rehab. Active Problems:    Hyperkalemia - K 6.7, received K cocktail, and Kayexalate, currenlty 5.8. no cramping, tetany, or EKG changes. Plan: monitor, treat for level 3.5-5.5. Hyponatremia - , corrected sodium 129, ascites from chronic liver disease. Appears to be hypervolemic hyponatremia. Plan: monitor, restrict fluids. Type 2 diabetes mellitus without complication, without long-term current use of insulin - , HgB A1C 5.4 (3/8/2021) on oral meds. Plan: ssi resume meds on discharge. Hypertension - /51, she usually takes toprol at home, her BP is borderline. Plan: hold toprol for now resume as BP improves. 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 Plan: continue draining schedule, follow up with PCP on DC. Chronic kidney disease - B/SCr 49/1.91, she has good urine output, her baseline appears to be about 1.5, she is intravascularly dry. Plan: monitor, consider albumin, avoid nephrotoxins.      Plan of care discussed with: patient    SIGNATURE: TANNER Rea - CNP  DATE: March 17, 2021  TIME: 5:25 AM   Scarlett Guido DO  - supervising physician

## 2021-03-17 NOTE — ED NOTES
Patient c/o pain in abdomen with movement and light touch, medicated with morphine prior to moving to floor     Grecia Nicholson RN  03/17/21 5150

## 2021-03-17 NOTE — PROGRESS NOTES
Physician Progress Note    3/17/2021   11:43 AM    Name:  Angle Grajeda  MRN:    [id number]     IP Day: 0     Admit Date: 3/17/2021 12:23 AM  PCP: Mauricio Mcdaniel MD    Code Status:  Full Code      Assessment and Plan: Active Problems/ diagnosis:     Decompensated liver cirrhosis with recurrent ascites status post catheter placement  Hyperkalemia  Hyponatremia- baseline is normal. Pt has decompensated cirrhosis with ascites  Moderate protein calorie malnutrition  Hyponatremia  Diabetes      Plan  3/17/2021  -Start the patient midodrine.  -Recheck her potassium this afternoon, she received potassium cocktail/Kayexalate.  -We will plan on draining her ascites tomorrow through the catheter and ordering albumin   -Monitor blood pressure  - check urine Osm, serum osm, urine Na  -PT/OT no-discussed with patient RN and . DVT PPx     Subjective:     He is tired overall. Denies fever or chills. No dysuria, polyuria, urinary urgency or hesitancy.     Physical Examination:      Vitals:  BP (!) 96/42   Pulse 83   Temp 97.3 °F (36.3 °C) (Oral)   Resp 16   Ht 5' 7\" (1.702 m)   Wt 176 lb (79.8 kg)   SpO2 100%   BMI 27.57 kg/m²   Temp (24hrs), Av.7 °F (36.5 °C), Min:97.3 °F (36.3 °C), Max:98.2 °F (36.8 °C)      General appearance: alert, cooperative and no distress  Mental Status: oriented to person, place and time and normal affect  Lungs: clear to auscultation bilaterally, normal effort  Heart: regular rate and rhythm, no murmur  Abdomen: soft, no tenderness but distended, bowel sounds present, no masses  Extremities: no edema, redness, tenderness in the calves  Skin: no gross lesions, rashes    Data:     Labs:  Recent Labs     21  1410 21  0315   WBC 7.2 7.2   HGB 9.4* 8.4*    168     Recent Labs     21  0030 21  0238 21  0315   *  --  127*   K 6.4*  --  5.8*   CL 93*  --  98   CO2 20  --  21   BUN 50*  --  49*   CREATININE 1.96*  --  1.91*   GLUCOSE 59* 265 75 Recent Labs     03/16/21  1410 03/17/21  0315   AST 44* 36*   ALT 33 27   BILITOT 0.8* 0.5   ALKPHOS 163* 135*       Current Facility-Administered Medications   Medication Dose Route Frequency Provider Last Rate Last Admin    morphine (PF) injection 4 mg  4 mg Intravenous Q15 Min PRN David Hernandez PA-C   4 mg at 03/17/21 0401    glucose (GLUTOSE) 40 % oral gel 15 g  15 g Oral PRN Jacques Kamara, TANNER - CNP        dextrose 50 % IV solution  12.5 g Intravenous PRN Jacques Kamara, TANNER - CNP        glucagon (rDNA) injection 1 mg  1 mg Intramuscular PRN Jacques Kamara, TANNER - CNP        dextrose 5 % solution  100 mL/hr Intravenous PRN Jacques Kamara, TANNER - CNP        insulin lispro (HUMALOG) injection vial 0-12 Units  0-12 Units Subcutaneous TID  TANNER Cortez - CNP        insulin lispro (HUMALOG) injection vial 0-6 Units  0-6 Units Subcutaneous Nightly TANNER Cortez - CNP        sodium chloride flush 0.9 % injection 10 mL  10 mL Intravenous 2 times per day TANNER Cortez - CNP        sodium chloride flush 0.9 % injection 10 mL  10 mL Intravenous PRN TANNER Cortez - CNP        enoxaparin (LOVENOX) injection 30 mg  30 mg Subcutaneous Daily Marysol Vaca, APRN - CNP   30 mg at 03/17/21 0851    promethazine (PHENERGAN) tablet 12.5 mg  12.5 mg Oral Q6H PRN TANNER Cortez - CNP        Or    ondansetron [facility]) injection 4 mg  4 mg Intravenous Q6H PRN Jacques Kamara, APRN - CNP        acetaminophen (TYLENOL) tablet 650 mg  650 mg Oral Q6H PRN Jacques Kamara, APRN - CNP   650 mg at 03/17/21 0850    Or    acetaminophen (TYLENOL) suppository 650 mg  650 mg Rectal Q6H PRN Jacques Kamara, APRN - CNP        midodrine (PROAMATINE) tablet 5 mg  5 mg Oral TID  Yazid R Quan, DO   5 mg at 03/17/21 0850    atorvastatin (LIPITOR) tablet 10 mg  10 mg Oral Daily Rah Alvarenga DO   10 mg at 03/17/21 6394    metoprolol succinate (TOPROL XL) extended release tablet 25 mg  25 mg Oral BID Rah Alvarenga DO        traZODone (DESYREL) tablet 25 mg  25 mg Oral Nightly PRN Rah Alvarenga DO           Additional work up or/and treatment plan may be added today or then after based on clinical progression. I am managing a portion of pt care. Some medical issues are handled by other specialists. Additional work up and treatment should be done in out pt setting by pt PCP and other out pt providers. In addition to examining and evaluating pt, I spent additional time explaining care, normaland abnormal findings, and treatment plan. All of pt questions were answered. Counseling, diet and education were provided. Case will be discussed with nursing staff when appropriate. Family will be updated if and when appropriate.        Electronically signed by Rah Alvarenga DO on 3/17/2021 at 11:43 AM

## 2021-03-17 NOTE — ED TRIAGE NOTES
Patient was seen by her PCP today and had labs drawn, potassium was reported to family at 6.1, and they were instructed to bring patient to ED for evaluation. Patient states this has been a recent problem for her. Patient denies complaints, but states she's had some recent fatigue lately. No distress noted on arrival. Patient alert and oriented in triage. Skin is pink, warm, and dry. Respirations equal and unlabored.

## 2021-03-17 NOTE — ED NOTES
Report called to floor, tele box on, repeat labs done, ready to transport to floor     PERNELL Garcia  03/17/21 0352

## 2021-03-17 NOTE — PROGRESS NOTES
[facility] Occupational Therapy      Date: 3/17/2021  Patient Name: Rashaad Deutsch        MRN: [id number]  Account: [de-identified]   : 1945  (70 y.o.)  Room: [place] [id number]    Pt. is of observation status. To comply with medicare and insurance regulations, to see patient we require updated orders including a valid OT treatment diagnosis. Please reorder as appropriate.      Electronically signed by AISHA Watson on 3/17/2021 at 8:06 AM

## 2021-03-17 NOTE — TELEPHONE ENCOUNTER
Palliative care was also discussed in hospital and we would like to pursue this but not sure how that process works or how to kick start it.       Can you provide more details how to get that started? She could also probably benefit from PT as she is very weak and unstable.        has Parkinsons and isnt real stable himself when it comes to pulling her up. He does try to do everything he can but we worry about him as well with falls etc.                              Sleeping at night has been an ongoing issue as well for a few months now. She is up all night and just naps throughout the day. Can some meds be given to aid in sleep at and through the night? We tried Unisom for a few nights with zero results.     Really need to address this one - For the last 1-2 months she has been chewing up her food and then spitting it out and not consuming it. This is getting progressively worse and resumed the first day home from hospital again. She is down to only getting down maybe 500 thomas a day if we are yanet and is starving to death. You can see this just by looking at her. A few things for the hospital follow-up visit     There is mention on the mychart of getting EPO Aranesp injection by Dr. Maddie Iyer for anemia. We were to make an asap follow-up apt with him after discharge but he cant see here until 4/5 1045 am.  Can you try to get her in sooner - Im not sure if this apt is for another injection or not. Or are you going to be giving them out?     We need another Psyc  recommendation for her as Dr. Rashmi Lee has left the practice. Are there geriatric ones that can prescribe anxiety meds as I think this is necessary at this point.

## 2021-03-17 NOTE — ED PROVIDER NOTES
[address] [facility] ED  eMERGENCY dEPARTMENT eNCOUnter      Pt Name: Bogdan Forrest  MRN: [id number]  [place] 1945  Date of evaluation: 3/16/2021  Provider: Marcia Pacheco PA-C    CHIEF COMPLAINT       Chief Complaint   Patient presents with    Other     high potassium, sent by PCP    Fatigue         HISTORY OF PRESENT ILLNESS   (Location/Symptom, Timing/Onset,Context/Setting, Quality, Duration, Modifying Factors, Severity)  Note limiting factors. Bogdan Forrest is a 70 y.o. female who presents to the emergency department sent in for fatigue and high potassium 6.7 potassium today. Patient was in recently for similar symptoms patient denies fever chills nausea vomiting chest pain and. She does have some abdominal discomfort and does have paracentesis. She does have some swelling in her feet and ankles. Symptoms moderate severity nothing improves or worsen symptoms. HPI    NursingNotes were reviewed. REVIEW OF SYSTEMS    (2-9 systems for level 4, 10 or more for level 5)     Review of Systems   Constitutional: Positive for fatigue. Negative for activity change, appetite change, fever and unexpected weight change. HENT: Negative for ear discharge, nosebleeds and voice change. Eyes: Negative for photophobia and discharge. Respiratory: Negative for apnea, cough and shortness of breath. Cardiovascular: Positive for leg swelling. Negative for chest pain. Gastrointestinal: Positive for abdominal pain. Negative for abdominal distention, anal bleeding, nausea and vomiting. Endocrine: Negative for cold intolerance, heat intolerance and polyphagia. Genitourinary: Negative for genital sores. Musculoskeletal: Negative for back pain, joint swelling, neck pain and neck stiffness. Skin: Negative for pallor. Allergic/Immunologic: Negative for immunocompromised state. Neurological: Negative for seizures and facial asymmetry. Hematological: Does not bruise/bleed easily. Psychiatric/Behavioral: Negative for behavioral problems, self-injury and sleep disturbance. All other systems reviewed and are negative. Except as noted above the remainder of the review of systems was reviewed and negative. PAST MEDICAL HISTORY     Past Medical History:   Diagnosis Date    Arthritis     Cardiomyopathy [facility])     mother history.  Chronic kidney disease     Hypertension     Liver disease     Other disorders of kidney and ureter in diseases classified elsewhere     Pneumonia     Psychiatric problem     Sleep apnea, obstructive     Type II or unspecified type diabetes mellitus without mention of complication, not stated as uncontrolled          SURGICALHISTORY       Past Surgical History:   Procedure Laterality Date    APPENDECTOMY      CHOLECYSTECTOMY      COLONOSCOPY  04/23/2013    CT BIOPSY RENAL  08/04/2020    CT BIOPSY RENAL 8/4/2020 [facility] CT SCAN    CT BIOPSY RENAL  09/04/2020    CT BIOPSY RENAL 9/4/2020 [facility] CT SCAN    FOOT FRACTURE SURGERY Bilateral     FRACTURE SURGERY      HYSTERECTOMY      IR TUNNELED INTRAPERITNL CATH W/IMAG  1/28/2021    IR TUNNELED INTRAPERITNL CATH W/IMAG 1/28/2021 [facility] SPECIAL PROCEDURE    OTHER SURGICAL HISTORY      removal of anal fistulotomy    PARACENTESIS Left 07/28/2020    9015 mls removed by Dr Bethany Maldonado Left 09/04/2020    02903tg ascites removed by Dr Berna Garzon 50g albumin given    PARACENTESIS Left 09/15/2020    10,400cc removed by Dr Gustavo Reyna [phone]    PARACENTESIS Left 10/09/2020    7000cc removed by Dr. Bethany Maldonado Left 10/13/2020    7000ml removed by Dr Gustavo Reyna [phone]    PARACENTESIS Left 10/26/2020    7010cc removed by Dr. Esau Platt Left 11/06/2020    7020 mls removed ny Dr Bethany Maldonado Left 11/19/2020    7000 ml removed by Dr. Berna Garzon.     PARACENTESIS Left 11/30/2020    7000 ml removed by paracentesis by Dr. Bethany Maldonado Left 12/08/2020 7100 ml removed    PARACENTESIS Left 01/08/2021    ultrasound guided paracentesis 7150 ml removed    PARACENTESIS Left 01/19/2021    7000ml removed by Dr Marbin Gramajo [phone]    PARACENTESIS Left 01/28/2021    by Dr. Fatou Landa 8100 ml removed   [address] Left 12/17/2020    7000ml removed by Dr Sue Sanchez       Previous Medications    ADHESIVE TAPE (PAPER TAPE 1\"X12YD) TAPE    1 each by Does not apply route 4 times daily as needed (SOILAGE)    ATORVASTATIN (LIPITOR) 10 MG TABLET    Take 1 tablet by mouth daily    BLOOD PRESSURE KIT    Check your blood pressure daily    GAUZE PADS & DRESSINGS ([employer] GAUZE PAD STERILE 4\"X4\") 4\"X4\" PADS    Change intraperitoneal dressing - apply daily and PRN soilage (EXTRA ABSORBENT PADS)    GLIMEPIRIDE (AMARYL) 2 MG TABLET    TAKE 1 TABLET EVERY MORNING BEFORE BREAKFAST    GLUCOSE BLOOD VI TEST STRIPS (ACCU-CHEK MELANIE) STRIP    Test 1-2 times a day. Dx: 250.00. Accu-check melanie strips    LANCET DEVICE MISC    Test 1-2 times a day. Dx: 250.00. Lancets    METFORMIN (GLUCOPHAGE) 1000 MG TABLET    TAKE 1 TABLET TWICE A DAY WITH MEALS    METOPROLOL SUCCINATE (TOPROL XL) 100 MG EXTENDED RELEASE TABLET    Take 0.5 tablets by mouth 2 times daily    METOPROLOL SUCCINATE (TOPROL XL) 100 MG EXTENDED RELEASE TABLET    Take 0.5 tablets by mouth 2 times daily    MIDODRINE (PROAMATINE) 5 MG TABLET    Take 1 tablet by mouth 3 times daily    NUTRITIONAL SUPPLEMENTS (ENSURE HIGH PROTEIN) LIQD    Take 1 Can by mouth 3 times daily    RALOXIFENE (EVISTA) 60 MG TABLET    TAKE 1 TABLET DAILY    TRANSPARENT DRESSINGS (TEGADERM FIRST AID STYLE) MISC    One box 50. Change daily one tegaderm to intraperitoneal site and PRN soilage.     TRAZODONE (DESYREL) 50 MG TABLET    Take 0.5 tablets by mouth nightly as needed for Sleep       ALLERGIES     Codeine    FAMILY HISTORY       Family History   Problem Relation Age of Onset    Arrhythmia Mother     Diabetes Mother  High Blood Pressure Mother     Arrhythmia Father     Breast Cancer Sister     Diabetes Brother     High Blood Pressure Brother           SOCIAL HISTORY       Social History     Socioeconomic History    Marital status:      Spouse name: Not on file    Number of children: Not on file    Years of education: Not on file    Highest education level: Not on file   Occupational History    Not on file   Social Needs    Financial resource strain: Not hard at all   Cristino-Sukhdeep insecurity     Worry: Never true     Inability: Never true   [employer] needs     Medical: Not on file     Non-medical: Not on file   Tobacco Use    Smoking status: Never Smoker    Smokeless tobacco: Former User   Substance and Sexual Activity    Alcohol use: No    Drug use: No    Sexual activity: Yes   Lifestyle    Physical activity     Days per week: Not on file     Minutes per session: Not on file    Stress: Not on file   Relationships    Social connections     Talks on phone: Not on file     Gets together: Not on file     Attends Denominational service: Not on file     Active member of club or organization: Not on file     Attends meetings of clubs or organizations: Not on file     Relationship status: Not on file    Intimate partner violence     Fear of current or ex partner: Not on file     Emotionally abused: Not on file     Physically abused: Not on file     Forced sexual activity: Not on file   Other Topics Concern    Not on file   Social History Narrative    Not on file       SCREENINGS      @[facility]([id number])@      PHYSICAL EXAM    (up to 7 for level 4, 8 or more for level 5)     ED Triage Vitals [03/16/21 2344]   BP Temp Temp Source Pulse Resp SpO2 Height Weight   108/62 98 °F (36.7 °C) Oral 79 20 100 % 5' 7\" (1.702 m) 183 lb (83 kg)       Physical Exam  Vitals signs and nursing note reviewed. Constitutional:       General: She is not in acute distress. Appearance: She is well-developed. She is not ill-appearing. HENT:      Head: Normocephalic and atraumatic. Right Ear: Tympanic membrane normal. There is no impacted cerumen. Left Ear: Tympanic membrane normal.      Nose: Nose normal.      Mouth/Throat:      Mouth: Mucous membranes are moist.      Pharynx: No oropharyngeal exudate. Eyes:      General:         Right eye: No discharge. Left eye: No discharge. Extraocular Movements: Extraocular movements intact. Pupils: Pupils are equal, round, and reactive to light. Neck:      Musculoskeletal: Normal range of motion and neck supple. Cardiovascular:      Rate and Rhythm: Normal rate and regular rhythm. Pulses: Normal pulses. Heart sounds: Normal heart sounds. Pulmonary:      Effort: Pulmonary effort is normal. No respiratory distress. Breath sounds: Normal breath sounds. No stridor. No wheezing or rales. Abdominal:      General: Bowel sounds are normal. There is no distension. Palpations: Abdomen is soft. Tenderness: There is abdominal tenderness. Musculoskeletal: Normal range of motion. Right lower leg: Edema present. Left lower leg: Edema present. Skin:     General: Skin is warm. Findings: No erythema. Neurological:      Mental Status: She is alert and oriented to person, place, and time. Motor: No weakness. Psychiatric:         Mood and Affect: Mood normal.         DIAGNOSTIC RESULTS     EKG: All EKG's are interpreted by the Emergency Department Physician who either signs or Co-signsthis chart in the absence of a cardiologist.    EKG normal sinus rhythm ventricular rate 82 FL interval 152 ms QRS 86 ms  ms.     RADIOLOGY:   Non-plain filmimages such as CT, Ultrasound and MRI are read by the radiologist. Plain radiographic images are visualized and preliminarily interpreted by the emergency physician with the below findings:         Interpretation per the Radiologist below, if available at the time ofthis note:    No orders to display         ED BEDSIDE ULTRASOUND:   Performed by ED Physician - none    LABS:  Labs Reviewed   BASIC METABOLIC PANEL - Abnormal; Notable for the following components:       Result Value    Sodium 122 (*)     Potassium 6.4 (*)     Chloride 93 (*)     Glucose 59 (*)     BUN 50 (*)     CREATININE 1.96 (*)     GFR Non- 25.0 (*)     GFR  30.2 (*)     Calcium 8.2 (*)     All other components within normal limits    Narrative:     Franny Bryan  [facility] tel. [phone],  POTASSIUM results called to and read back by HAILEY ZAPATA RN, 03/17/2021  01:27, by Maude Denton   COVID-19, RAPID   POCT GLUCOSE   POCT GLUCOSE   POCT GLUCOSE   POCT GLUCOSE   POCT GLUCOSE   POCT GLUCOSE   POCT GLUCOSE       All other labs were within normal range or not returned as of this dictation. EMERGENCY DEPARTMENT COURSE and DIFFERENTIAL DIAGNOSIS/MDM:   Vitals:    Vitals:    03/16/21 2344 03/17/21 0210   BP: 108/62 (!) 102/55   Pulse: 79 84   Resp: 20 20   Temp: 98 °F (36.7 °C)    TempSrc: Oral    SpO2: 100% 100%   Weight: 183 lb (83 kg)    Height: 5' 7\" (1.702 m)             MDM  Number of Diagnoses or Management Options  Diagnosis management comments: Patient started ProAmatine yesterday for low blood pressure sodium 122 potassium on redraw 6.4 will admit will sign out to Marcos Burgess for admission while we await hospitalist return phone call       Amount and/or Complexity of Data Reviewed  Clinical lab tests: reviewed    I received this patient at 0200 from Lucero Nieto PA-C. Dr. Jimenez Gomez was consulted prior to me taking over the patient he returned phone call agreed to admit the patient. Repeat CMP was obtained along with additional work-up that was not collected prior to me taking over the patient. Patient remained hemodynamically stable throughout the rest of her entire ED course and was admitted in stable condition.     CRITICAL CARE TIME     CONSULTS:  None    PROCEDURES:  Unless otherwise noted below, none Procedures    FINAL IMPRESSION      1. Hyperkalemia    2. Hyponatremia    3. Chronic kidney disease, unspecified CKD stage    4. Peripheral edema          DISPOSITION/PLAN   DISPOSITION Decision To Admit 03/17/2021 01:41:50 AM      PATIENT REFERRED TO:  No follow-up provider specified.     DISCHARGE MEDICATIONS:  New Prescriptions    No medications on file          (Please note that portions of this note were completed with a voice recognition program.  Efforts were made to edit the dictations but occasionally words are mis-transcribed.)    Mildred Elmore PA-C (electronically signed)                Abdullahi Blackburn PA-C  03/17/21 0400

## 2021-03-17 NOTE — CARE COORDINATION
MET WITH PATIENT, ENCOURAGED HHC, OR AT LEAST F/U WITH PALLIATIVE CARE.  PATIENT DECLINES FURTHER ASSISTANCE/NEEDS AT DC.

## 2021-03-17 NOTE — PROGRESS NOTES
Pt alert and oriented X4 calm and cooperative. Pt wears glasses, no dentures or hearing aids. Pt has her wedding ring, a watch and a necklace on. Lung sounds clear in the upper fields and diminished in the bases. Harsh/loud murmur heard, NSR on telemetry. Pt has bruising to left and right elbow region and 3+ pitting edema to BLE. Pt's abdomen is distended, bowel sounds audible in all four quadrants. Pt ambulated with a one person assist to the bathroom. Gait was steady but pt states \" I feel weak\". Pt states she does not use a walker at home. Admission completed; bed alarm on, call light in reach, will continue to monitor Electronically signed by Aris Cruz RN on 3/17/2021 at 5:54 AM     [phone]: Perfected serve physician about patient's blood pressure, awaiting response.  Electronically signed by Aris Cruz RN on 3/17/2021 at 6:50 AM

## 2021-03-18 NOTE — PROGRESS NOTES
Pt continues with gravity drain to left lower quadrant; denies lightheadedness denies dizzy- aprox 1000mL removed at this time, will continue to monitor

## 2021-03-18 NOTE — PROGRESS NOTES
[facility] Occupational Therapy      Date: 3/18/2021  Patient Name: Nay Thapa        MRN: [id number]  Account: [de-identified]   : 1945  (70 y.o.)  Room: [place] [address]    Chart reviewed, attempted OT at 21 [phone] for eval. Patient not seen 2° to:    Pt. having bedside procedure. Spoke to Florentin Sanders RN aware. Will attempt again when able.     Electronically signed by AISHA Thompson on 3/18/2021 at 10:33 AM

## 2021-03-18 NOTE — CARE COORDINATION
Met with patient at the bedside. Readmit documentation completed. Patient states she will return home with her spouse, family provides her care including plurex drainage. She has declined [facility] and Palliative Care. Patient stated she does not feel there is anything else Laura could have provided to prevent the readmit. Her pharmacy was not able to provide one of her meds but she does not feel that was a factor. Again she stated she feels safe returning home with her family support and care as needed.  Electronically signed by Peter Mejia RN on 3/18/2021 at 2:51 PM

## 2021-03-18 NOTE — PROGRESS NOTES
Assessment as charted. Abdomen distended with ascites noted. She is due for Pleurex drain tomorrow. +2 BLE pitting edema. Lungs clear. Murmur heard. Hyperactive BS d/t kayexalate causing pt to have diarrhea. 0130  Dr Kang Howard notified of pt's BP 82/46.  500mg bolus/2hr ordered and started. Pt instructed to use bedside commode, call before getting up and avoid standing too quickly.

## 2021-03-18 NOTE — PROGRESS NOTES
.Renal consult dictated  Thank you  Poor prognosis long term  Pre-renal state  Will give albumin and vasopressin also lokemla for recurrent K+ elevation   Avoid hypovolemia

## 2021-03-18 NOTE — PROGRESS NOTES
Drain complete- total of 2050mL aprox clear tan/yellow thin liquid- pt eating lunch at this time- dressing to drain changed to LLQ - will continue to monitor

## 2021-03-18 NOTE — PROGRESS NOTES
Physician Progress Note    3/18/2021   10:34 AM    Name:  Rose Arriaga  MRN:    [id number]     IP Day: 1     Admit Date: 3/17/2021 12:23 AM  PCP: [facility], MD    Code Status:  Full Code      Assessment and Plan: Active Problems/ diagnosis:     Decompensated liver cirrhosis with recurrent ascites status post catheter placement  Hyperkalemia  Hyponatremia- baseline is normal. Pt has decompensated cirrhosis with ascites  Moderate protein calorie malnutrition  Hyponatremia  Diabetes      Plan  3/18/2020  -Her blood pressure remained in the high 54R systolic, will give albumin 25 g. Will drain 2 L from her abdominal catheter today. Monitor blood pressure before and after.  -Nephrology to see the patient for hyperkalemia and hyponatremia. Hyperkalemia is better today. Please obtain urine studies for hyponatremia.  -Resume midodrine  - check urine Osm, urine Na  -PT/OT no-discussed with patient RN and . DVT PPx     Subjective:     No new symptoms today.     Physical Examination:      Vitals:  BP (!) 100/45   Pulse 86   Temp 97.9 °F (36.6 °C) (Oral)   Resp 18   Ht 5' 7\" (1.702 m)   Wt 176 lb (79.8 kg)   SpO2 100%   BMI 27.57 kg/m²   Temp (24hrs), Av.7 °F (36.5 °C), Min:97.2 °F (36.2 °C), Max:98.1 °F (36.7 °C)      General appearance: alert, cooperative and no distress  Mental Status: oriented to person, place and time and normal affect  Lungs: clear to auscultation bilaterally, normal effort  Heart: regular rate and rhythm, no murmur  Abdomen: soft, no tenderness but distended, bowel sounds present, no masses  Extremities: no edema, redness, tenderness in the calves  Skin: no gross lesions, rashes    Data:     Labs:  Recent Labs     21  0539   WBC 7.2 7.0   HGB 8.4* 9.1*    138     Recent Labs     21  0539   * 127*   K 4.8 4.1   CL 94* 95   CO2 21 19*   BUN 49* 51*   CREATININE 2.00* 1.98*   GLUCOSE 215* 183*     Recent Labs 03/17/21  0315 03/18/21  0539   AST 36* 26   ALT 27 25   BILITOT 0.5 0.6   ALKPHOS 135* 125       Current Facility-Administered Medications   Medication Dose Route Frequency Provider Last Rate Last Admin    morphine (PF) injection 4 mg  4 mg Intravenous Q15 Min PRN Darvin Oconnell PA-C   4 mg at 03/17/21 0401    glucose (GLUTOSE) 40 % oral gel 15 g  15 g Oral PRN Patricia Sky, APRN - CNP        dextrose 50 % IV solution  12.5 g Intravenous PRN Patricia Juniper, APRN - CNP        glucagon (rDNA) injection 1 mg  1 mg Intramuscular PRN Patricia Juniper, APRN - CNP        dextrose 5 % solution  100 mL/hr Intravenous PRN Patricia Juniper, APRN - CNP        insulin lispro (HUMALOG) injection vial 0-12 Units  0-12 Units Subcutaneous TID  Patricia Joeiper, APRN - CNP   4 Units at 03/18/21 0835    insulin lispro (HUMALOG) injection vial 0-6 Units  0-6 Units Subcutaneous Nightly Patricia Diaziper, APRN - CNP   1 Units at 03/17/21 2221    sodium chloride flush 0.9 % injection 10 mL  10 mL Intravenous 2 times per day Patricia Juniper, APRN - CNP   10 mL at 03/18/21 8828    sodium chloride flush 0.9 % injection 10 mL  10 mL Intravenous PRN Patricia Juniper, APRN - CNP        enoxaparin (LOVENOX) injection 30 mg  30 mg Subcutaneous Daily Kenneth Vaca APRN - CNP   30 mg at 03/17/21 0851    promethazine (PHENERGAN) tablet 12.5 mg  12.5 mg Oral Q6H PRN Patricia Juniper, APRN - CNP        Or    [facility] injection 4 mg  4 mg Intravenous Q6H PRN Patricia Juniper, APRN - CNP        acetaminophen (TYLENOL) tablet 650 mg  650 mg Oral Q6H PRN Patricia Juniper, APRN - CNP   650 mg at 03/17/21 2221    Or    acetaminophen (TYLENOL) suppository 650 mg  650 mg Rectal Q6H PRN Patricia Juniper, APRN - CNP        midodrine (PROAMATINE) tablet 5 mg  5 mg Oral TID  Geovani Glass DO   5 mg at 03/17/21 1829    atorvastatin (LIPITOR) tablet 10 mg  10 mg Oral Daily [facility] McD[facility]ie, DO   10 mg at 03/17/21 0850    metoprolol succinate (TOPROL XL) extended release tablet 25 mg  25 mg Oral BID [facility] McDuffie, DO        traZODone (DESYREL) tablet 25 mg  25 mg Oral Nightly PRN [facility] McDuffie, DO           Additional work up or/and treatment plan may be added today or then after based on clinical progression. I am managing a portion of pt care. Some medical issues are handled by other specialists. Additional work up and treatment should be done in out pt setting by pt PCP and other out pt providers. In addition to examining and evaluating pt, I spent additional time explaining care, normaland abnormal findings, and treatment plan. All of pt questions were answered. Counseling, diet and education were provided. Case will be discussed with nursing staff when appropriate. Family will be updated if and when appropriate.        Electronically signed by P[facility]mont McDuffie on 3/18/2021 at 10:34 AM

## 2021-03-18 NOTE — PROGRESS NOTES
Consult received  Full consult in am  Was seen by us on recent admission  Had hyperkalemia that improved after tx

## 2021-03-18 NOTE — PROGRESS NOTES
Physical Therapy Missed Treatment   Facility/Department: [facility] MED SURG [id number]    NAME: Mere Bae    : 1945 (33 y.o.)  MRN: [id number]    Account: [de-identified]  Gender: female      Pt currently having procedure for removing ascites fluid. Nurse requests no PT at this time      Will follow and attempt PT evaluation again at earliest availability.        Quinten Renee, PT, 21 at 10:33 AM

## 2021-03-19 NOTE — FLOWSHEET NOTE
Spiritual Care Services     Summary of Visit:  Nurse requested a  for her patient, patient is in a lot of pain, patient's  was present in the room during the visit,  asked if I was [facility], I mentioned that I was Mandaen, I can pray for your wife, I did let him know that his wife was anointed on 3-17-21 by by Father Zo Garcia from [employer], he found comfort in knowing that his wife was anointed, prayed with the patient before leaving the room     Spiritual Assessment/Intervention/Outcomes:    Encounter Summary  Services provided to[de-identified] (P) Patient, Family  Referral/Consult From[de-identified] (P) Nurse  Support System: (P) Spouse, Children, Family members  Place of [facility]ai: (P) [facility] Visiting: (P) No  Volunteer Visit: Yes  Complexity of Encounter: (P) Moderate  Length of Encounter: (P) 15 minutes  Spiritual Assessment Completed: (P) Yes  Routine  Type: (P) Follow up     Spiritual/Yazidi  Type: Spiritual support  Assessment: Approachable, Calm, Concerns with suffering  Intervention: Sustaining presence/ Ministry of presence, Active listening  Outcome: Comfort, Receptive  Sacraments  Sacrament of Sick-Anointing: Anointed  Grief and Life Adjustment  Type: (P) Adjustment to illness  Assessment: (P) Tearful, Concerns with suffering  Intervention: (P) Active listening, Prayer, Sustaining presence/ Ministry of presence  Outcome: (P) Comfort, Receptive  Advance Directives (For Healthcare)  Pre-existing DNR Comfort Care/DNR Arrest/DNI Order: No  Healthcare Directive: No, patient does not have an advance directive for healthcare treatment  Information on Healthcare Directives Requested: No  Patient Requests Assistance: No  Advance Directives: Pt. not interested at this time           Values / Beliefs  Do you have any ethnic, cultural, sacramental, or spiritual Temple needs you would like us to be aware of while you are in the hospital?: No    Care Plan:    Follow up as requested    Spiritual Care Services   Electronically signed by Deno Schilder on 3/19/21 at 4:59 PM EDT     To reach a  for emotional and spiritual support, place an [facility] consult request.   If a  is needed immediately, dial 0 and ask to page the on-call .

## 2021-03-19 NOTE — PROGRESS NOTES
Nephrology Progress Note    Assessment:  CKD-4  OHDX HOCM  Ascites  Cirrhosis  DM type-2  Anemia  Hyponatremia poor prognosis related to liver and heart        Plan:manage cardiac/hepatic issues  Keep from hypotension  May need albumin Prn consider vaprisol dose    Patient Active Problem List:     Type 2 diabetes mellitus without complication, without long-term current use of insulin (HCC)     Hypertension     Bell-rectal abscess     Bilateral carotid artery stenosis     Hypertrophic obstructive cardiomyopathy (HOCM) (HCC)     HARDEEP (obstructive sleep apnea)     Pseudophakia of both eyes     Regular astigmatism     Cardiomyopathy ([address]     Rectal pain     Abdominal distension     FALLON (acute kidney injury) ([address]     Cirrhosis of liver with ascites (HCC)     Renal mass, left     Alcoholic cirrhosis of liver with ascites (HCC)     Shock (HCC)     Arterial hypotension     Subconjunctival hemorrhage of left eye     Right-sided carotid artery disease (HCC)     PVD (posterior vitreous detachment), both eyes     Presbyopia of both eyes     Dry eye syndrome of bilateral lacrimal glands     Other ascites     Hypotension     Cirrhosis, liver, pigmentary (HCC)     Fatigue     Chronic kidney disease     Hyperkalemia     Hyponatremia     Weakness      Subjective:  Admit Date: 3/17/2021    Interval History: up in chair breathing fine she says dont need fluid off abdomen    Medications:  Scheduled Meds:   midodrine  10 mg Oral TID WC    metoprolol succinate  12.5 mg Oral BID    sodium zirconium cyclosilicate  10 g Oral Daily    insulin lispro  0-12 Units Subcutaneous TID WC    insulin lispro  0-6 Units Subcutaneous Nightly    sodium chloride flush  10 mL Intravenous 2 times per day    enoxaparin  30 mg Subcutaneous Daily    atorvastatin  10 mg Oral Daily     Continuous Infusions:   dextrose         CBC:   Recent Labs     03/18/21  0539 03/19/21  0558   WBC 7.0 5.5   HGB 9.1* 7.7*    121*     CMP:    Recent Labs 03/17/21 1955 03/18/21  0539 03/19/21  0558   * 127* 123*   K 4.8 4.1 3.5  3.5   CL 94* 95 91*   CO2 21 19* 21   BUN 49* 51* 47*   CREATININE 2.00* 1.98* 1.76*   GLUCOSE 215* 183* 124*   CALCIUM 8.4* 7.8* 7.9*   LABGLOM 24.4* 24.7* 28.3*     Troponin: No results for input(s): TROPONINI in the last 72 hours. BNP: No results for input(s): BNP in the last 72 hours. INR:   Recent Labs     03/17/21  0315   INR 1.5     Lipids:   Recent Labs     03/16/21  1410   HDL 38*     Liver:   Recent Labs     03/19/21  0558   AST 19   ALT 17   ALKPHOS 97   PROT 4.8*   LABALBU 2.6*   BILITOT 0.8*     Iron:  No results for input(s): IRONS, FERRITIN in the last 72 hours. Invalid input(s): LABIRONS  Urinalysis: No results for input(s): UA in the last 72 hours.     Objective:  Vitals: BP (!) 88/46   Pulse 83   Temp 98.1 °F (36.7 °C) (Oral)   Resp 18   Ht 5' 7\" (1.702 m)   Wt 176 lb (79.8 kg)   SpO2 97%   BMI 27.57 kg/m²    Wt Readings from Last 3 Encounters:   03/17/21 176 lb (79.8 kg)   03/16/21 183 lb (83 kg)   03/07/21 191 lb (86.6 kg)      24HR INTAKE/OUTPUT:      Intake/Output Summary (Last 24 hours) at 3/19/2021 1041  Last data filed at 3/19/2021 0345  Gross per 24 hour   Intake 400 ml   Output 2250 ml   Net -1850 ml       General: alert, in no apparent distress  HEENT: normocephalic, atraumatic, anicteric  Neck: supple, no mass  Lungs: non-labored respirations, clear to auscultation bilaterally  Heart: regular rate and rhythm, 3/6 systolic murmurs or rubs  Abdomen: soft, non-tender, quite-distended  Ext: no cyanosis, no peripheral edema  Neuro: alert and oriented, no gross abnormalities  Ms atrophy  Psych: normal mood and affect  Skin: no rash      Electronically signed by Carolyn Pacheco MD

## 2021-03-19 NOTE — CONSULTS
[address]                      [address], [address] [facility]                                  CONSULTATION    PATIENT NAME: Carolina Gomes                        :        1945  MED REC NO:   [id number]                            ROOM:       H835  ACCOUNT NO:   [de-identified]                           ADMIT DATE: 2021  PROVIDER:     Maira Perry DO    CONSULT DATE:  2021    RENAL CONSULT    HISTORY OF PRESENT ILLNESS:  A 77-year-old significantly malnourished  female admitted to the hospital for weakness. The patient on admission  was noted to be hyponatremic with hyperkalemia with a potassium of 6.7  mEq. The patient also is hypotensive with blood pressure of 96/40. The  patient is alert, weak at this time. She is well-known to us for CKD  IV. She has multiple medical problems _____, which would include  hypertrophic cardiomyopathy, nonalcoholic cirrhosis with recurrent  ascites. The patient does get paracentesis with removal of fluids  frequently. The patient appears to be in no distress. PAST MEDICAL HISTORY:  Organic heart disease, hypertrophic  cardiomyopathy, bilateral carotid artery disease, liver disease related  per chart to alcohol, left renal mass. PAST SURGICAL HISTORY:  Paracentesis too many times to be accurate,  colonoscopy, appendectomy, cholecystectomy, bilateral foot surgery,  hysterectomy. FAMILY HISTORY:  Noncontributory. MEDICATIONS:  At time of her admission, Glucophage was stopped prior to  hospital discharge, Evista, Amaryl, ProAmatine, Lipitor, Toprol,  Desyrel. ALLERGIES TO MEDICATIONS:  CODEINE. REVIEW OF SYSTEMS:  Noncontributory. PHYSICAL EXAMINATION:  VITAL SIGNS:  Height 5 feet 7 inches, 176 pounds. Blood pressure 95/40,  heart rate 80, respirations 18, afebrile. HEENT:  Normocephalic. Muscle wasting of the face is noted. NECK:  Shows bruits bilaterally. CHEST:  Lungs are relatively clear.   No wheezing or accessory muscle  use. CARDIOVASCULAR:  Heart is regular, 1-8/8 systolic murmur. ABDOMEN:  Distended. Ascites is felt to be present. Bowel sounds are  decreased. EXTREMITIES:  Lower limbs show edema of the thighs, calves, and feet  bilaterally. No cyanosis. IMPRESSION:  1.  CKD IV, stable related to cardiorenal and prerenal state with  albumin low. 2.  Hyperkalemia, multifactorial related to CKD IV, renal perfusion,  prerenal.  3.  Organic heart disease. 4.  Hypertrophic cardiomyopathy. 5.  Alcoholic cirrhosis. 6.  Cachexia. PLAN:  The patient will have albumin reduced to improve intravascular  volume and perfusion of the kidneys. Fluid restrictions for the  hyponatremia. Follow hemoglobin and blood sugars. Avoid medications  that they accumulate due to declining GFR, i.e., metformin. The patient  may need to be on potassium binder upon discharge this hospitalization.         Fernanda Garcia DO    D: 03/18/2021 17:19:40       T: 03/18/2021 17:29:48     [id number]  Job#: [id number]     Doc#: [id number]    CC:

## 2021-03-19 NOTE — PROGRESS NOTES
Discussed patient with Dr Alisha Syed. Will move to ICU. She will receive dig and amiodarone boluses. Pain med changed to dilaudid. Pt very high risk for cardiac procedure given advanced cirrhosis. Goals of care discussed by Dr Alisha Syed with family. Family don't want aggressive measures however pt wants to remain full code for now. Pt unfortunately has very poor prognosis even in the near term. I explained this to her and she verbalized understanding. Palliative care consulted for further discussion.

## 2021-03-19 NOTE — PROGRESS NOTES
Evaluated the pt for chest pain and afib with RVR, . Chest pain is nonreproducible, 8/10 on scale of severity, non radiating, no alleviating facotrs, not associated with nausea or vomiting, or SOB. EKG shows A. fib with a rate of 140. Patient is mildly hypotensive. We will transfer patient to cardiac floor start a Cardizem drip after giving patient IV Cardizem push. Will give bolus to 250 and patient already ordered albumin for now. Patient is on dry side at this time and will get some IV fluids. EF is 65%. We will trend cardiac enzymes for now and call cardiology has been consulted . nitro as needed for chest pain.  at bed side , explained to the  about the plan.   HEENT: AT/NC, PERRLA, no JVD  HEART: irregular irregular , tachy  LUNG: clear  ABD: soft, NT  EXT: + trace edema  SKin : no rash  Neuro:no focal deficits    CCT 45 min

## 2021-03-19 NOTE — PROGRESS NOTES
Physical Therapy Missed Treatment   Facility/Department: [facility] MED SURG L180/K778-41    NAME: Ruth Mcrae    : 1945 (86 y.o.)  MRN: [id number]    Account: [de-identified]  Gender: female      Pt declined PT eval due to fatigue Nursing staff unavailable to inform. Will follow and attempt PT evaluation again at earliest availability.        Vu Galindo, PT, 21 at 9:40 AM

## 2021-03-19 NOTE — PROGRESS NOTES
Physician Progress Note    3/19/2021   12:07 PM    Name:  Robert Lepe  MRN:    [id number]     IP Day: 2     Admit Date: 3/17/2021 12:23 AM  PCP: Tayler Forbes MD    Code Status:  Full Code      Assessment and Plan: Active Problems/ diagnosis:     Decompensated liver cirrhosis with recurrent ascites status post catheter placement  Hyperkalemia  Hyponatremia- baseline is normal. Pt has decompensated cirrhosis with ascites  Moderate protein calorie malnutrition  Hyponatremia  Diabetes      Plan  3/19/2021  -Monitor PP, I will give more albumin today.  -Her sodium is 123 today, nephrology is following, nephrology is concerned [address]. Encouraged to increase p.o. intake.  -We will drain 2 more liters from her catheter today and assess for further albumin with the drainage.  -Increase midodrine to 10mg  3 times daily  - check urine Osm, urine Na  -PT/OT   -discussed with patient RN and . Discussed plan of care with patient and her . DVT PPx     Subjective:     No new symptoms today.     Physical Examination:      Vitals:  BP (!) 88/46   Pulse 83   Temp 98.1 °F (36.7 °C) (Oral)   Resp 18   Ht 5' 7\" (1.702 m)   Wt 176 lb (79.8 kg)   SpO2 97%   BMI 27.57 kg/m²   Temp (24hrs), Av.1 °F (36.7 °C), Min:98.1 °F (36.7 °C), Max:98.1 °F (36.7 °C)      General appearance: alert, cooperative and no distress  Mental Status: oriented to person, place and time and normal affect  Lungs: clear to auscultation bilaterally, normal effort  Heart: regular rate and rhythm, no murmur  Abdomen: soft, no tenderness but distended, bowel sounds present, no masses  Extremities: no edema, redness, tenderness in the calves  Skin: no gross lesions, rashes    Data:     Labs:  Recent Labs     21  0539 21  0558   WBC 7.0 5.5   HGB 9.1* 7.7*    121*     Recent Labs     21  0539 21  0558   * 123*   K 4.1 3.5  3.5   CL 95 91*   CO2 * 21   BUN 51* 47*   CREATININE 1.98* 1.76* GLUCOSE 183* 124*     Recent Labs     03/18/21  0539 03/19/21  0558   AST 26 19   ALT 25 17   BILITOT 0.6 0.8*   ALKPHOS 125 97       Current Facility-Administered Medications   Medication Dose Route Frequency Provider Last Rate Last Admin    midodrine (PROAMATINE) tablet 10 mg  10 mg Oral TID  Yazid R Quan, DO   10 mg at 03/19/21 0840    conivaptan (VAPRISOL) 20 mg in dextrose 5% 100 mL infusion  20 mg Intravenous Once Pennye Shave, DO        metoprolol succinate (TOPROL XL) extended release tablet 12.5 mg  12.5 mg Oral BID Pennye Shave, DO   Stopped at 03/18/21 1939    sodium zirconium cyclosilicate (LOKELMA) oral suspension 10 g  10 g Oral Daily Amanda Dorado MD        morphine (PF) injection 4 mg  4 mg Intravenous Q15 Min PRN Darvin Oconnell PA-C   4 mg at 03/17/21 0401    glucose (GLUTOSE) 40 % oral gel 15 g  15 g Oral PRN Patricia Sky, APRN - CNP        dextrose 50 % IV solution  12.5 g Intravenous PRN Patricia Sky, APRN - CNP        glucagon (rDNA) injection 1 mg  1 mg Intramuscular PRN Patricia Sky, APRN - CNP        dextrose 5 % solution  100 mL/hr Intravenous PRN Patricia Sky, APRN - CNP        insulin lispro (HUMALOG) injection vial 0-12 Units  0-12 Units Subcutaneous TID  Patricia Sky, APRN - CNP   2 Units at 03/19/21 0932    insulin lispro (HUMALOG) injection vial 0-6 Units  0-6 Units Subcutaneous Nightly Patricia Diaziper, APRN - CNP   Stopped at 03/18/21 1937    sodium chloride flush 0.9 % injection 10 mL  10 mL Intravenous 2 times per day Patricia Sky, APRN - CNP   10 mL at 03/19/21 0839    sodium chloride flush 0.9 % injection 10 mL  10 mL Intravenous PRN Patricia Asya, APRN - CNP   10 mL at 03/19/21 0059    enoxaparin (LOVENOX) injection 30 mg  30 mg Subcutaneous Daily Patricia Asya, APRN - CNP   30 mg at 03/19/21 0840    promethazine (PHENERGAN) tablet 12.5 mg  12.5 mg Oral Q6H PRN Dewanda Ales, APRN - CNP        Or    ondansetron [facility]) injection 4 mg  4 mg Intravenous Q6H PRN Dewanda Ales, APRN - CNP        acetaminophen (TYLENOL) tablet 650 mg  650 mg Oral Q6H PRN Dewanda Ales, APRN - CNP   650 mg at 03/19/21 0348    Or    acetaminophen (TYLENOL) suppository 650 mg  650 mg Rectal Q6H PRN Dewanda Ales, APRN - CNP        atorvastatin (LIPITOR) tablet 10 mg  10 mg Oral Daily Rah Alvarenga DO   10 mg at 03/19/21 0840    traZODone (DESYREL) tablet 25 mg  25 mg Oral Nightly PRN Rah Alvarenga DO           Additional work up or/and treatment plan may be added today or then after based on clinical progression. I am managing a portion of pt care. Some medical issues are handled by other specialists. Additional work up and treatment should be done in out pt setting by pt PCP and other out pt providers. In addition to examining and evaluating pt, I spent additional time explaining care, normaland abnormal findings, and treatment plan. All of pt questions were answered. Counseling, diet and education were provided. Case will be discussed with nursing staff when appropriate. Family will be updated if and when appropriate.        Electronically signed by Rah Alvarenga DO on 3/19/2021 at 12:07 PM

## 2021-03-19 NOTE — PLAN OF CARE
Problem: Falls - Risk of:  Goal: Will remain free from falls  Outcome: Ongoing  Goal: Absence of physical injury  Outcome: Ongoing     Problem: Fluid Volume - Imbalance:  Goal: Absence of imbalanced fluid volume signs and symptoms  Outcome: Ongoing     Problem: Falls - Risk of:  Goal: Absence of physical injury  Outcome: Ongoing     Problem: Fluid Volume - Imbalance:  Goal: Absence of imbalanced fluid volume signs and symptoms  Outcome: Ongoing

## 2021-03-19 NOTE — PROGRESS NOTES
Left adominal  drain site draining a small amount of serous drainage. Old dressing removed. No odor detected. No foul drainage, no redness or edema to site. ABD and paper tape to site. Drain clamped and intact. Pt tolerated dressing change well.

## 2021-03-19 NOTE — PROGRESS NOTES
[facility] Occupational Therapy      Date: 3/19/2021  Patient Name: Wesley Youssef        MRN: [id number]  Account: [de-identified]   : 1945  (70 y.o.)  Room: [place] [address]    Chart reviewed, attempted OT at [id number] for eval. Patient not seen 2° to:    Pt. declined, stating: \"I'm just so tired. \" Encouraged pt. to attempt, pt. amenable to later this date possibly. Spoke to Florentin Sanders RN aware. Will attempt again when able.     Electronically signed by AISHA Ballesteros on 3/19/2021 at 10:58 AM

## 2021-03-20 NOTE — PROGRESS NOTES
Physician Progress Note    3/20/2021   1:42 PM    Name:  Brea Hernandez  MRN:    [id number]      Day: 3     Admit Date: 3/17/2021 12:23 AM  PCP: Latrice Ramos MD    Code Status:  Limited      Assessment and Plan: Active Problems/ diagnosis:     Decompensated liver cirrhosis with recurrent ascites status post catheter placement  Hyperkalemia  NSTEMI  Hyponatremia- baseline is normal. Pt has decompensated cirrhosis with ascites  Moderate protein calorie malnutrition  Hyponatremia  Diabetes      Plan  3/20/21  -Patient was moved to the ICU yesterday for ongoing chest pain and elevated cardiac enzymes. She was seen by cardiologist.  Patient is high risk for any cardiac procedure as per cardiology given her decompensated cirrhosis overall status. Her troponin peaked at 2.85. She is currently chest pain-free.  -Continue telemetry monitoring, continue cardiac medication as per cardiologist.  Bryanna Elias cardiology's help. -Nephrology is following, appreciate help. Sodium is 123. Creatinine is 1.69.  -Resume midodrine to 10mg  3 times daily  -Okay to move to the medical floor with telemetry, 1 W. DVT PPx     Subjective:     No new symptoms today.     Physical Examination:      Vitals:  BP (!) 98/33   Pulse 79   Temp 98.3 °F (36.8 °C) (Oral)   Resp 21   Ht 5' 7\" (1.702 m)   Wt 176 lb (79.8 kg)   SpO2 96%   BMI 27.57 kg/m²   Temp (24hrs), Av.1 °F (36.7 °C), Min:97.8 °F (36.6 °C), Max:98.3 °F (36.8 °C)      General appearance: alert, cooperative and no distress  Mental Status: oriented to person, place and time and normal affect  Lungs: clear to auscultation bilaterally, normal effort  Heart: regular rate and rhythm, no murmur  Abdomen: soft, no tenderness but distended, bowel sounds present, no masses  Extremities: no edema, redness, tenderness in the calves  Skin: no gross lesions, rashes    Data:     Labs:  Recent Labs     21  0558 21  0525   WBC 5.5 8.8   HGB 7.7* 7.7*   * 124* Recent Labs     03/19/21  0558 03/20/21  0525   * 123*   K 3.5  3.5 3.6   CL 91* 94*   CO2 21 19*   BUN 47* 44*   CREATININE 1.76* 1.69*   GLUCOSE 124* 150*     Recent Labs     03/19/21  0558 03/20/21  0525   AST 19 94*   ALT 17 18   BILITOT 0.8* 0.9*   ALKPHOS 97 84       Current Facility-Administered Medications   Medication Dose Route Frequency Provider Last Rate Last Admin    digoxin (LANOXIN) tablet 125 mcg  125 mcg Oral Daily Mariano J Holiday, DO        ranolazine (RANEXA) extended release tablet 500 mg  500 mg Oral BID Mariano SEBASTIAN Holiday, DO        midodrine (PROAMATINE) tablet 10 mg  10 mg Oral TID  Araseli MONTERO Quan, DO   10 mg at 03/20/21 1156    metoprolol (LOPRESSOR) injection 5 mg  5 mg Intravenous Once Graciela Leaf, DO        nitroGLYCERIN (NITROSTAT) SL tablet 0.4 mg  0.4 mg Sublingual Q5 Min PRN Coral Shepherd MD   0.4 mg at 03/19/21 1626    dilTIAZem 125 mg in dextrose 5 % 125 mL infusion  5-15 mg/hr Intravenous Continuous Coral Shepherd MD   Stopped at 03/20/21 1002    HYDROmorphone (DILAUDID) injection 1 mg  1 mg Intravenous Q3H PRN Graciela Sibley, DO   1 mg at 03/19/21 1823    metoprolol succinate (TOPROL XL) extended release tablet 12.5 mg  12.5 mg Oral BID [facility], DO   12.5 mg at 03/19/21 2034    sodium zirconium cyclosilicate (LOKELMA) oral suspension 10 g  10 g Oral Daily Tom Rosas MD   10 g at 03/19/21 1301    glucose (GLUTOSE) 40 % oral gel 15 g  15 g Oral PRN TANNER Rea CNP        dextrose 50 % IV solution  12.5 g Intravenous PRN TANNER Rea CNP        glucagon (rDNA) injection 1 mg  1 mg Intramuscular PRN TANNER Rea CNP        dextrose 5 % solution  100 mL/hr Intravenous PRN TANNER Rea CNP        insulin lispro (HUMALOG) injection vial 0-12 Units  0-12 Units Subcutaneous TID  TANNER Burt CNP   2 Units at 03/20/21 1153    insulin lispro (HUMALOG) injection

## 2021-03-20 NOTE — PROGRESS NOTES
Monitor Room phone nurse r/t patient HR and Rhythm changed - Patient Assessed- Doctors Notified- new orders implemented-

## 2021-03-20 NOTE — PROGRESS NOTES
Assessment completed as documented. Pt is awake, alert and oriented. Resting in no distress. Denies having chest pain. AFIB RVR on monitor. Amio bolus given as ordered. Cardizem gtt infusing. See MAR. Call light within reach.

## 2021-03-20 NOTE — PROGRESS NOTES
Physical Therapy   Facility/Department: [facility] MED SURG IC07/IC07-01     Due to an acute change in this patient's medical status (transfered to ICU), New physical therapy Eval and Treat orders are required to resume PT when pt is stable and appropriate for out of bed activity. Sticky note written to physicians.     [address] [place] [address]) Physical Therapy Department    Electronically signed by Dayday Palomino PT on 3/20/21 at 2:13 PM EDT

## 2021-03-20 NOTE — FLOWSHEET NOTE
0800- am assessment completed, cardizem gtt titrated down for low b/p, patient awake and eating breakfast took am meds, no chest pain or shortness of breath reported by the patient.     0930- patient nsr and out of afib    1520- patient transferred to [address] [place] [address], report given to ammy,  and son aware of patients transfer

## 2021-03-20 NOTE — PROGRESS NOTES
[facility] Occupational Therapy Department  Change in Status Communication Form      To the referring physician:    Due to an acute change in this patient's medical status, new Occupational Therapy Eval and Treatment orders are required. Pt. has transferred to ICU. Please reorder when pt. is medically stable to resume OOB activity, as appropriate. We thank you for your referral.     Jorge Luis Mcclelland,   [facility] EMERGENCY [facility] [place] OT Department.      Electronically signed by AISHA Branham on 3/20/21 at 3:19 PM EDT

## 2021-03-20 NOTE — PROGRESS NOTES
Spiritual Care Services     Summary of Visit:  Pt tired this morning, grateful for some sleep, desiring more. Grateful for increased comfort, pain from yesterday completely gone she said. Declined communion, prayer, pt and spouse processing recent events, seeking meaning. Spiritual Assessment/Intervention/Outcomes:    Encounter Summary  Services provided to[de-identified] Patient and family together  Referral/Consult From[de-identified] Rounding  Support System: Spouse, Children  Place of Gnosticist: [facility] Visiting: Yes  Volunteer Visit: Yes  Complexity of Encounter: Low  Length of Encounter: 15 minutes  Spiritual Assessment Completed: Yes  Routine  Type: Follow up  Assessment: Calm, Approachable  Intervention: Explored coping resources, Nurtured hope, Explored feelings, thoughts, concerns  Outcome: Coping, Engaged in conversation     Spiritual/Jain  Type: Spiritual support  Assessment: Concerns with suffering, Approachable, Hopeful  Intervention: Active listening, Explored feelings, thoughts, concerns, Sustaining presence/ Ministry of presence  Outcome: Expressed gratitude, Receptive  Sacraments  Sacrament of Sick-Anointing: Anointed  Grief and Life Adjustment  Type: Adjustment to illness  Assessment: Tearful, Concerns with suffering  Intervention: Active listening, Prayer, Sustaining presence/ Ministry of presence  Outcome: Comfort, Receptive  Advance Directives (For Healthcare)  Pre-existing DNR Comfort Care/DNR Arrest/DNI Order: No  Healthcare Directive: No, patient does not have an advance directive for healthcare treatment  Information on Healthcare Directives Requested: No  Patient Requests Assistance: No  Advance Directives: Pt. not interested at this time           Values / Beliefs  Do you have any ethnic, cultural, sacramental, or spiritual Taoist needs you would like us to be aware of while you are in the hospital?: No    Care Plan:    Coping well this day, continued emotional support welcomed. Spiritual Care Services   Electronically signed by Roland Barrett on 3/20/21 at 11:20 AM EDT     To reach a  for emotional and spiritual support, place an [facility] consult request.   If a  is needed immediately, dial 0 and ask to page the on-call .

## 2021-03-20 NOTE — PROGRESS NOTES
Patient is a 70 y.o. female who presents with a chief complaint of weakness and. Patient is followed on a regular basis by Dr. Aristeo Machado MD. Patient with past medical history of diabetes, hypertension, hyperlipidemia, cirrhosis, peripheral vascular disease. Was called to evaluate patient for sudden onset of midsternal chest discomfort/pain. Patient is writhing in pain during the evaluation and moaning. She complains of \"elephant sitting on her chest \"but no associated shortness of breath nausea vomiting or diaphoresis. There is no radiation of the pain. Pain is reproducible with palpation to the anterior chest wall. Cardiac enzymes are very mildly elevated. Patient with new onset atrial fibrillation with rapid ventricular response as well. She is hypotensive with a hemoglobin of 7.7. Patient had paracentesis yesterday as well as today. Patient with a chronic kidney disease creatinine of 1.76 with a GFR of 28. Has sodium of 123 and nephrology is managing. Patient with history of cardiac catheterization 2006 likely the clinic which was negative. Has echo on June 2020 ejection fraction of 85%, severe LVOT obstruction. 3/20/2021: Patient is doing much better today. She converted from atrial fibrillation to normal sinus rhythm overnight. She is hemodynamically stable now. Her cardiac enzyme was significant elevated to 2.8.  present at the bedside. Had a long discussion again regarding CODE STATUS and they would like to take the patient a DNR CCA. Past Medical History        Past Medical History:   Diagnosis Date    Arthritis     Cardiomyopathy [facility])     mother history.     Chronic kidney disease     Hypertension     Liver disease     Other disorders of kidney and ureter in diseases classified elsewhere     Pneumonia     Psychiatric problem     Sleep apnea, obstructive     Type II or unspecified type diabetes mellitus without mention of complication, not stated as uncontrolled Patient Active Problem List   Diagnosis    Type 2 diabetes mellitus without complication, without long-term current use of insulin (HCC)    Hypertension    Bell-rectal abscess    Bilateral carotid artery stenosis    Hypertrophic obstructive cardiomyopathy (HOCM) (HCC)    HARDEEP (obstructive sleep apnea)    Pseudophakia of both eyes    Regular astigmatism    Cardiomyopathy ([address]    Rectal pain    Abdominal distension    FALLON (acute kidney injury) ([address]    Cirrhosis of liver with ascites ([address]    Renal mass, left    Alcoholic cirrhosis of liver with ascites (HCC)    Shock ([address]    Arterial hypotension    Subconjunctival hemorrhage of left eye    Right-sided carotid artery disease (HCC)    PVD (posterior vitreous detachment), both eyes    Presbyopia of both eyes    Dry eye syndrome of bilateral lacrimal glands    Other ascites    Hypotension    Cirrhosis, liver, pigmentary (HCC)    Fatigue    Chronic kidney disease    Hyperkalemia    Hyponatremia    Weakness     Past Surgical History         Past Surgical History:   Procedure Laterality Date    APPENDECTOMY      CHOLECYSTECTOMY      COLONOSCOPY  04/23/2013    CT BIOPSY RENAL  08/04/2020    CT BIOPSY RENAL 8/4/2020 [facility] CT SCAN    CT BIOPSY RENAL  09/04/2020    CT BIOPSY RENAL 9/4/2020 [facility] CT SCAN    FOOT FRACTURE SURGERY Bilateral     FRACTURE SURGERY      HYSTERECTOMY      IR TUNNELED INTRAPERITNL CATH W/IMAG  1/28/2021    IR TUNNELED INTRAPERITNL CATH W/IMAG 1/28/2021 [facility] SPECIAL PROCEDURE    OTHER SURGICAL HISTORY      removal of anal fistulotomy    PARACENTESIS Left 07/28/2020    9015 mls removed by Dr Gerlean Koyanagi Left 09/04/2020    33612iy ascites removed by Dr Unique Paredes 50g albumin given    PARACENTESIS Left 09/15/2020    10,400cc removed by Dr Shravan Morgan [phone]    PARACENTESIS Left 10/09/2020    7000cc removed by Dr. Gerlean Koyanagi Left 10/13/2020    7000ml removed by Dr Shravan Morgan [phone]  PARACENTESIS Left 10/26/2020    7010cc removed by Dr. Smith Mustafa Left 11/06/2020    7020 mls removed ny Dr Taqueria Jack Left 11/19/2020    7000 ml removed by Dr. Renee Crocker.     PARACENTESIS Left 11/30/2020    7000 ml removed by paracentesis by Dr. Taqueria Jack Left 12/08/2020    7100 ml removed    PARACENTESIS Left 01/08/2021    ultrasound guided paracentesis 7150 ml removed    PARACENTESIS Left 01/19/2021    7000ml removed by Dr Rich Meza [phone]    PARACENTESIS Left 01/28/2021    by Dr. Renee Crocker 8100 ml removed   [address] Left 12/17/2020    7000ml removed by Dr Marta Lawrence History    Marital status:      Spouse name: Not on file    Number of children: Not on file    Years of education: Not on file    Highest education level: Not on file   Occupational History    Not on file   Social Needs    Financial resource strain: Not hard at all   [place] insecurity     Worry: Never true     Inability: Never true   [employer] needs     Medical: Not on file     Non-medical: Not on file   Tobacco Use    Smoking status: Never Smoker    Smokeless tobacco: Former User   Substance and Sexual Activity    Alcohol use: No    Drug use: No    Sexual activity: Yes   Lifestyle    Physical activity     Days per week: Not on file     Minutes per session: Not on file    Stress: Not on file   Relationships    Social connections     Talks on phone: Not on file     Gets together: Not on file     Attends Nondenominational service: Not on file     Active member of club or organization: Not on file     Attends meetings of clubs or organizations: Not on file     Relationship status: Not on file    Intimate partner violence     Fear of current or ex partner: Not on file     Emotionally abused: Not on file     Physically abused: Not on file     Forced sexual activity: Not on file   Other Topics Concern    Not on file Social History Narrative    Not on file     Family History         Family History   Problem Relation Age of Onset    Arrhythmia Mother     Diabetes Mother     High Blood Pressure Mother     Arrhythmia Father     Breast Cancer Sister     Diabetes Brother     High Blood Pressure Brother      Current Facility-Administered Medications             Current Facility-Administered Medications   Medication Dose Route Frequency Provider Last Rate Last Admin    midodrine (PROAMATINE) tablet 10 mg 10 mg Oral TID WC Yazid R Quan, DO  10 mg at 03/19/21 1301    conivaptan (VAPRISOL) 20 mg in dextrose 5% 100 mL infusion 20 mg Intravenous Once Olga Yus, DO 4.2 mL/hr at 03/19/21 1356 20 mg at 03/19/21 1356    metoprolol (LOPRESSOR) injection 5 mg 5 mg Intravenous Once Temi Osei DO      nitroGLYCERIN (NITROSTAT) SL tablet 0.4 mg 0.4 mg Sublingual Q5 Min PRN Ahmet Sim MD  0.4 mg at 03/19/21 1626    0.9 % sodium chloride infusion  Intravenous Continuous Ahmet Sim MD      dilTIAZem 125 mg in dextrose 5 % 125 mL infusion 5-15 mg/hr Intravenous Continuous Ahmet Sim MD      0.9 % sodium chloride bolus 250 mL Intravenous Once Ahmet Sim  mL/hr at 03/19/21 1628 250 mL at 03/19/21 1628    morphine (PF) injection 2 mg 2 mg Intravenous Q4H PRN Mariano France,       metoprolol succinate (TOPROL XL) extended release tablet 12.5 mg 12.5 mg Oral BID Edie Livings, DO  Stopped at 03/18/21 1939    sodium zirconium cyclosilicate (LOKELMA) oral suspension 10 g 10 g Oral Daily Roxanne Alvarado MD  10 g at 03/19/21 1301    glucose (GLUTOSE) 40 % oral gel 15 g 15 g Oral PRN Robyne Clipper, APRN - CNP      dextrose 50 % IV solution 12.5 g Intravenous PRN Robyne Clipper, APRN - CNP      glucagon (rDNA) injection 1 mg 1 mg Intramuscular PRN Robyne Clipper, APRN - CNP      dextrose 5 % solution 100 mL/hr Intravenous PRN Robyne Clipper, APRN - CNP      insulin lispro (HUMALOG) injection vial 0-12 Units 0-12 Units Subcutaneous TID WC Ursula Nails APRN - CNP  2 Units at 03/19/21 1302    insulin lispro (HUMALOG) injection vial 0-6 Units 0-6 Units Subcutaneous Nightly Ursula Nails APRN - CNP  Stopped at 03/18/21 1937    sodium chloride flush 0.9 % injection 10 mL 10 mL Intravenous 2 times per day Ursula Nails APRN - CNP  10 mL at 03/19/21 5109    sodium chloride flush 0.9 % injection 10 mL 10 mL Intravenous PRN Ursula Nails, APRN - CNP  10 mL at 03/19/21 0059    enoxaparin (LOVENOX) injection 30 mg 30 mg Subcutaneous Daily Dianechantelle Nails, APRN - CNP  30 mg at 03/19/21 0840    promethazine (PHENERGAN) tablet 12.5 mg 12.5 mg Oral Q6H PRN Dianeeda Kail, APRN - CNP      Or    [facility]dans[facility]ron [facility] injection 4 mg 4 mg Intravenous Q6H PRN Dianeeda Natail, APRN - CNP      acetaminophen (TYLENOL) tablet 650 mg 650 mg Oral Q6H PRN Loreda Kail, APRN - CNP  650 mg at 03/19/21 4344    Or    acetaminophen (TYLENOL) suppository 650 mg 650 mg Rectal Q6H PRN Loreda Natail, APRN - CNP      atorvastatin (LIPITOR) tablet 10 mg 10 mg Oral Daily Lynita Aureliano, DO  10 mg at 03/19/21 0840    traZODone (DESYREL) tablet 25 mg 25 mg Oral Nightly PRN Lynita Altona, DO     ALLERGIES: Codeine   Review of Systems   Constitutional: Negative. Negative for chills and fever. HENT: Negative. Eyes: Negative. Respiratory: Negative for shortness of breath and wheezing. Cardiovascular: Positive for chest pain. Negative for palpitations and leg swelling. Gastrointestinal: Negative. Negative for abdominal pain, nausea and vomiting. Endocrine: Negative. Genitourinary: Negative. Musculoskeletal: Negative. Skin: Negative. Negative for rash. Allergic/Immunologic: Negative. Neurological: Negative for dizziness, weakness and headaches. Hematological: Negative.    Psychiatric/Behavioral: Negative. VITALS:   Blood pressure (!) 104/53, pulse 92, temperature 98.1 °F (36.7 °C), temperature source Oral, resp. rate 18, height 5' 7\" (1.702 m), weight 176 lb (79.8 kg), SpO2 99 %, not currently breastfeeding. Body mass index is 27.57 kg/m². Physical Exam   Constitutional: She is oriented to person, place, and time. She appears well-developed and well-nourished. HENT:   Head: Normocephalic and atraumatic. Eyes: Pupils are equal, round, and reactive to light. Neck: Normal range of motion. Neck supple. No JVD present. No tracheal deviation present. No thyromegaly present. Cardiovascular: Intact distal pulses. An irregularly irregular rhythm present. Tachycardia present. PMI is not displaced. Exam reveals no gallop, no S3, no distant heart sounds and no friction rub. Murmur heard. Pulmonary/Chest: No respiratory distress. She has no wheezes. She has no rales. She exhibits tenderness. Abdominal: Soft. Bowel sounds are normal. She exhibits distension. She exhibits no mass. There is abdominal tenderness. There is rebound and guarding. Musculoskeletal:   General: No edema. Neurological: She is alert and oriented to person, place, and time. No cranial nerve deficit. Skin: Skin is warm and dry. No rash noted. She is not diaphoretic. No erythema. No pallor. Psychiatric: She has a normal mood and affect.  Her behavior is normal. Judgment and thought content normal.     LABS:   Recent Results         Recent Results (from the past 24 hour(s))   POCT Glucose    Collection Time: 03/18/21 7:36 PM   Result Value Ref Range    POC Glucose 149 (H) 60 - 115 mg/dl    Performed on ACCU-CHEK    Comprehensive Metabolic Panel w/ Reflex to MG    Collection Time: 03/19/21 5:58 AM   Result Value Ref Range    Sodium 123 (L) 135 - 144 mEq/L    Potassium reflex Magnesium 3.5 3.4 - 4.9 mEq/L    Chloride 91 (L) 95 - 107 mEq/L    CO2 21 20 - 31 mEq/L    Anion Gap 11 9 - 15 mEq/L    Glucose 124 (H) 70 - 99 mg/dL BUN 47 (H) 8 - 23 mg/dL    CREATININE 1.76 (H) 0.50 - 0.90 mg/dL    GFR Non-African American 28.3 (L) >60    GFR  34.2 (L) >60    Calcium 7.9 (L) 8.5 - 9.9 mg/dL    Total Protein 4.8 (L) 6.3 - 8.0 g/dL    Albumin 2.6 (L) 3.5 - 4.6 g/dL    Total Bilirubin 0.8 (H) 0.2 - 0.7 mg/dL    Alkaline Phosphatase 97 40 - 130 U/L    ALT 17 0 - 33 U/L    AST 19 0 - 35 U/L    Globulin 2.2 (L) 2.3 - 3.5 g/dL   CBC auto differential    Collection Time: 03/19/21 5:58 AM   Result Value Ref Range    WBC 5.5 4.8 - 10.8 K/uL    RBC 2.65 (L) 4.20 - 5.40 M/uL    Hemoglobin 7.7 (L) 12.0 - 16.0 g/dL    Hematocrit 23.1 (L) 37.0 - 47.0 %    MCV 87.1 82.0 - 100.0 fL    MCH 29.1 27.0 - 31.3 pg    MCHC 33.4 33.0 - 37.0 %    RDW 14.3 11.5 - 14.5 %    Platelets 162 (L) 739 - 400 K/uL    Neutrophils % 76.6 %    Lymphocytes % 9.9 %    Monocytes % 12.0 %    Eosinophils % 1.0 %    Basophils % 0.5 %    Neutrophils Absolute 4.2 1.4 - 6.5 K/uL    Lymphocytes Absolute 0.5 (L) 1.0 - 4.8 K/uL    Monocytes Absolute 0.7 0.2 - 0.8 K/uL    Eosinophils Absolute 0.1 0.0 - 0.7 K/uL    Basophils Absolute 0.0 0.0 - 0.2 K/uL   Comprehensive Metabolic Panel    Collection Time: 03/19/21 5:58 AM   Result Value Ref Range    Potassium 3.5 3.4 - 4.9 mEq/L   Magnesium    Collection Time: 03/19/21 5:58 AM   Result Value Ref Range    Magnesium 1.9 1.7 - 2.4 mg/dL   POCT Glucose    Collection Time: 03/19/21 8:38 AM   Result Value Ref Range    POC Glucose 158 (H) 60 - 115 mg/dl    Performed on ACCU-CHEK    POCT Glucose    Collection Time: 03/19/21 11:43 AM   Result Value Ref Range    POC Glucose 173 (H) 60 - 115 mg/dl    Performed on ACCU-CHEK    EKG 12 Lead    Collection Time: 03/19/21 4:08 PM   Result Value Ref Range    Ventricular Rate 141 BPM    Atrial Rate 107 BPM    QRS Duration 88 ms    Q-T Interval 312 ms    QTc Calculation (Bazett) 477 ms    R Axis 64 degrees    T Axis 214 degrees   Troponin    Collection Time: 03/19/21 4:20 PM   Result Value Ref Range Troponin 0.028 (HH) 0.000 - 0.010 ng/mL   POCT Glucose    Collection Time: 03/19/21 4:43 PM   Result Value Ref Range    POC Glucose 195 (H) 60 - 115 mg/dl    Performed on ACCU-CHEK    Troponin:         Lab Results   Component Value Date    TROPONINI 0.028 03/19/2021     EKG: atrial fibrillation, ST depressions in the lateral leads as well as T wave version in 1 and aVL. ASSESSMENT:         Active Hospital Problems    Diagnosis Date Noted    Fatigue [R53.83] 03/17/2021     Priority: Low    Weakness [R53.1] 03/17/2021     Priority: Low    Chronic kidney disease [N18.9]      Priority: Low    Hyperkalemia [E87.5]      Priority: Low    Hyponatremia [E87.1]      Priority: Low    Cirrhosis of liver with ascites (HCC) [K74.60, R18.8] 08/15/2020     Priority: Low    Hypertension [I10]      Priority: Low    Type 2 diabetes mellitus without complication, without long-term current use of insulin ([facility] [facility] [E11.9] 03/15/2013     Priority: Low         Chest pain questionable secondary to cardiac ischemia versus musculoskeletal as pain is reproducible in the anterior chest wall. Patient with multiple risk factors for CAD   Mildly elevated cardiac enzymes likely secondary to demand ischemia. New onset A. fib with rapid ventricle response   Liver cirrhosis   History of diabetes   Severe anemia   Severe hyponatremia   History of hypertension, currently hypotensive, on midodrine   History of hyperlipidemia   History of tobacco abuse   History of peripheral vascular disease, status post bilateral carotid endarterectomy   Severe LVOT obstruction       PLAN:   1. Okay to transfer to general medical floor. 2. Conservative medical therapy from cardiology standpoint. Patient is a poor PCI candidate secondary to comorbidities as well as quality of life. Patient and  agreed on changing CODE STATUS to DNR CCA. 3. Monitor on telemetry  4. Check EKG  5. Control heart rate-we will initiate p.o. digoxin.   6. Poor anticoagulation candidate at this time secondary to severe anemia  7. Further recommendations to follow      Thank you for allowing me to participate in the care of your patient, please don't hesitate to contact me if you have any further questions.     Electronically signed by Shaquille Cabrera DO on 3/20/2021 at 11:58 AM

## 2021-03-20 NOTE — PROGRESS NOTES
Nephrology Progress Note    Assessment:  Hyponatremia related to cirrhosis/HF/CKD4  Cadiomyopathy HOCM  DM type-2  Hypertension  Chest pain cardiac  *  present    Plan: monitor in unit follow GFR  Poor prognosis     Patient Active Problem List:     Type 2 diabetes mellitus without complication, without long-term current use of insulin (HCC)     Hypertension     Bell-rectal abscess     Bilateral carotid artery stenosis     Hypertrophic obstructive cardiomyopathy (HOCM) (HCC)     HARDEEP (obstructive sleep apnea)     Pseudophakia of both eyes     Regular astigmatism     Cardiomyopathy ([address]     Rectal pain     Abdominal distension     FALLON (acute kidney injury) ([address]     Cirrhosis of liver with ascites (HCC)     Renal mass, left     Alcoholic cirrhosis of liver with ascites (HCC)     Shock (HCC)     Arterial hypotension     Subconjunctival hemorrhage of left eye     Right-sided carotid artery disease (HCC)     PVD (posterior vitreous detachment), both eyes     Presbyopia of both eyes     Dry eye syndrome of bilateral lacrimal glands     Other ascites     Hypotension     Cirrhosis, liver, pigmentary (HCC)     Fatigue     Chronic kidney disease     Hyperkalemia     Hyponatremia     Weakness      Subjective:  Admit Date: 3/17/2021    Interval History: no distress vitals good no further CP    Medications:  Scheduled Meds:   midodrine  10 mg Oral TID WC    metoprolol  5 mg Intravenous Once    metoprolol succinate  12.5 mg Oral BID    sodium zirconium cyclosilicate  10 g Oral Daily    insulin lispro  0-12 Units Subcutaneous TID WC    insulin lispro  0-6 Units Subcutaneous Nightly    sodium chloride flush  10 mL Intravenous 2 times per day    enoxaparin  30 mg Subcutaneous Daily    atorvastatin  10 mg Oral Daily     Continuous Infusions:   dilTIAZem (CARDIZEM) 125 mg in dextrose 5% 125 mL infusion Stopped (03/20/21 1002)    dextrose         CBC:   Recent Labs     03/19/21  0558 03/20/21  0525   WBC 5.5 8.8   HGB

## 2021-03-21 NOTE — PROGRESS NOTES
Patient is a 70 y.o. female who presents with a chief complaint of weakness and. Patient is followed on a regular basis by Dr. Jimi Oliver MD. Patient with past medical history of diabetes, hypertension, hyperlipidemia, cirrhosis, peripheral vascular disease. Was called to evaluate patient for sudden onset of midsternal chest discomfort/pain. Patient is writhing in pain during the evaluation and moaning. She complains of \"elephant sitting on her chest \"but no associated shortness of breath nausea vomiting or diaphoresis. There is no radiation of the pain. Pain is reproducible with palpation to the anterior chest wall. Cardiac enzymes are very mildly elevated. Patient with new onset atrial fibrillation with rapid ventricular response as well. She is hypotensive with a hemoglobin of 7.7. Patient had paracentesis yesterday as well as today. Patient with a chronic kidney disease creatinine of 1.76 with a GFR of 28. Has sodium of 123 and nephrology is managing. Patient with history of cardiac catheterization 2006 likely the clinic which was negative. Has echo on June 2020 ejection fraction of 85%, severe LVOT obstruction. 3/20/2021: Patient is doing much better today. She converted from atrial fibrillation to normal sinus rhythm overnight. She is hemodynamically stable now. Her cardiac enzyme was significant elevated to 2.8.  present at the bedside. Had a long discussion again regarding CODE STATUS and they would like to take the patient a DNR CCA.    3/21/2021: Patient  eating breakfast.  She denies any chest pain or shortness of breath. Hemodynamically stable. Past Medical History        Past Medical History:   Diagnosis Date    Arthritis     Cardiomyopathy [facility])     mother history.     Chronic kidney disease     Hypertension     Liver disease     Other disorders of kidney and ureter in diseases classified elsewhere     Pneumonia     Psychiatric problem     Sleep apnea, obstructive     7000cc removed by Dr. Swartz Bile Left 10/13/2020    7000ml removed by Dr Bertin Duong [phone]    PARACENTESIS Left 10/26/2020    7010cc removed by Dr. Hayley Calderón Left 11/06/2020    7020 mls removed ny Dr Maxime Arredondo Left 11/19/2020    7000 ml removed by Dr. Dustin Redding.     PARACENTESIS Left 11/30/2020    7000 ml removed by paracentesis by Dr. Maxime Arredondo Left 12/08/2020    7100 ml removed    PARACENTESIS Left 01/08/2021    ultrasound guided paracentesis 7150 ml removed    PARACENTESIS Left 01/19/2021    7000ml removed by Dr Bertin Duong [phone]    PARACENTESIS Left 01/28/2021    by Dr. Dustin Redding 8100 ml removed   [address] Left 12/17/2020    7000ml removed by Dr Igor Benedict History    Marital status:      Spouse name: Not on file    Number of children: Not on file    Years of education: Not on file    Highest education level: Not on file   Occupational History    Not on file   Social Needs    Financial resource strain: Not hard at all   [employer] insecurity     Worry: Never true     Inability: Never true   [employer] needs     Medical: Not on file     Non-medical: Not on file   Tobacco Use    Smoking status: Never Smoker    Smokeless tobacco: Former User   Substance and Sexual Activity    Alcohol use: No    Drug use: No    Sexual activity: Yes   Lifestyle    Physical activity     Days per week: Not on file     Minutes per session: Not on file    Stress: Not on file   Relationships    Social connections     Talks on phone: Not on file     Gets together: Not on file     Attends Holiness service: Not on file     Active member of club or organization: Not on file     Attends meetings of clubs or organizations: Not on file     Relationship status: Not on file    Intimate partner violence     Fear of current or ex partner: Not on file     Emotionally abused: Not on file     Physically abused: Not on file     Forced sexual activity: Not on file   Other Topics Concern    Not on file   Social History Narrative    Not on file     Family History         Family History   Problem Relation Age of Onset    Arrhythmia Mother     Diabetes Mother     High Blood Pressure Mother     Arrhythmia Father     Breast Cancer Sister     Diabetes Brother     High Blood Pressure Brother      Current Facility-Administered Medications             Current Facility-Administered Medications   Medication Dose Route Frequency Provider Last Rate Last Admin    midodrine (PROAMATINE) tablet 10 mg 10 mg Oral TID WC Yazid R Quan, DO  10 mg at 03/19/21 1301    conivaptan (VAPRISOL) 20 mg in dextrose 5% 100 mL infusion 20 mg Intravenous Once Olimpia Phillips DO 4.2 mL/hr at 03/19/21 1356 20 mg at 03/19/21 1356    metoprolol (LOPRESSOR) injection 5 mg 5 mg Intravenous Once Rah Alvarenga DO      nitroGLYCERIN (NITROSTAT) SL tablet 0.4 mg 0.4 mg Sublingual Q5 Min PRN Alison Ovalle MD  0.4 mg at 03/19/21 1626    0.9 % sodium chloride infusion  Intravenous Continuous Alison Ovalle MD      dilTIAZem 125 mg in dextrose 5 % 125 mL infusion 5-15 mg/hr Intravenous Continuous Alison Ovalle MD      0.9 % sodium chloride bolus 250 mL Intravenous Once Alison Ovalle  mL/hr at 03/19/21 1628 250 mL at 03/19/21 1628    morphine (PF) injection 2 mg 2 mg Intravenous Q4H PRN Mariano France,       metoprolol succinate (TOPROL XL) extended release tablet 12.5 mg 12.5 mg Oral BID Olimpia Phillips DO  Stopped at 03/18/21 1939    sodium zirconium cyclosilicate (LOKELMA) oral suspension 10 g 10 g Oral Daily Grabiel Potts MD  10 g at 03/19/21 1301    glucose (GLUTOSE) 40 % oral gel 15 g 15 g Oral PRN Reola Lio, APRN - CNP      dextrose 50 % IV solution 12.5 g Intravenous PRN Reola Lio, APRN - CNP      glucagon (rDNA) injection 1 mg 1 mg Intramuscular PRN Lodema Esau mEq/L    Chloride 91 (L) 95 - 107 mEq/L    CO2 21 20 - 31 mEq/L    Anion Gap 11 9 - 15 mEq/L    Glucose 124 (H) 70 - 99 mg/dL    BUN 47 (H) 8 - 23 mg/dL    CREATININE 1.76 (H) 0.50 - 0.90 mg/dL    GFR Non-African American 28.3 (L) >60    GFR  34.2 (L) >60    Calcium 7.9 (L) 8.5 - 9.9 mg/dL    Total Protein 4.8 (L) 6.3 - 8.0 g/dL    Albumin 2.6 (L) 3.5 - 4.6 g/dL    Total Bilirubin 0.8 (H) 0.2 - 0.7 mg/dL    Alkaline Phosphatase 97 40 - 130 U/L    ALT 17 0 - 33 U/L    AST 19 0 - 35 U/L    Globulin 2.2 (L) 2.3 - 3.5 g/dL   CBC auto differential    Collection Time: 03/19/21 5:58 AM   Result Value Ref Range    WBC 5.5 4.8 - 10.8 K/uL    RBC 2.65 (L) 4.20 - 5.40 M/uL    Hemoglobin 7.7 (L) 12.0 - 16.0 g/dL    Hematocrit 23.1 (L) 37.0 - 47.0 %    MCV 87.1 82.0 - 100.0 fL    MCH 29.1 27.0 - 31.3 pg    MCHC 33.4 33.0 - 37.0 %    RDW 14.3 11.5 - 14.5 %    Platelets 495 (L) 504 - 400 K/uL    Neutrophils % 76.6 %    Lymphocytes % 9.9 %    Monocytes % 12.0 %    Eosinophils % 1.0 %    Basophils % 0.5 %    Neutrophils Absolute 4.2 1.4 - 6.5 K/uL    Lymphocytes Absolute 0.5 (L) 1.0 - 4.8 K/uL    Monocytes Absolute 0.7 0.2 - 0.8 K/uL    Eosinophils Absolute 0.1 0.0 - 0.7 K/uL    Basophils Absolute 0.0 0.0 - 0.2 K/uL   Comprehensive Metabolic Panel    Collection Time: 03/19/21 5:58 AM   Result Value Ref Range    Potassium 3.5 3.4 - 4.9 mEq/L   Magnesium    Collection Time: 03/19/21 5:58 AM   Result Value Ref Range    Magnesium 1.9 1.7 - 2.4 mg/dL   POCT Glucose    Collection Time: 03/19/21 8:38 AM   Result Value Ref Range    POC Glucose 158 (H) 60 - 115 mg/dl    Performed on ACCU-CHEK    POCT Glucose    Collection Time: 03/19/21 11:43 AM   Result Value Ref Range    POC Glucose 173 (H) 60 - 115 mg/dl    Performed on ACCU-CHEK    EKG 12 Lead    Collection Time: 03/19/21 4:08 PM   Result Value Ref Range    Ventricular Rate 141 BPM    Atrial Rate 107 BPM    QRS Duration 88 ms    Q-T Interval 312 ms    QTc Calculation (Jimmy) 477 ms    R Axis 64 degrees    T Axis 214 degrees   Troponin    Collection Time: 03/19/21 4:20 PM   Result Value Ref Range    Troponin 0.028 (HH) 0.000 - 0.010 ng/mL   POCT Glucose    Collection Time: 03/19/21 4:43 PM   Result Value Ref Range    POC Glucose 195 (H) 60 - 115 mg/dl    Performed on ACCU-CHEK    Troponin:         Lab Results   Component Value Date    TROPONINI 0.028 03/19/2021     EKG: atrial fibrillation, ST depressions in the lateral leads as well as T wave version in 1 and aVL. ASSESSMENT:         Active Hospital Problems    Diagnosis Date Noted    Fatigue [R53.83] 03/17/2021     Priority: Low    Weakness [R53.1] 03/17/2021     Priority: Low    Chronic kidney disease [N18.9]      Priority: Low    Hyperkalemia [E87.5]      Priority: Low    Hyponatremia [E87.1]      Priority: Low    Cirrhosis of liver with ascites (HCC) [K74.60, R18.8] 08/15/2020     Priority: Low    Hypertension [I10]      Priority: Low    Type 2 diabetes mellitus without complication, without long-term current use of insulin ([facility] [address] [E11.9] 03/15/2013     Priority: Low         Chest pain questionable secondary to cardiac ischemia versus musculoskeletal as pain is reproducible in the anterior chest wall. Patient with multiple risk factors for CAD   Mildly elevated cardiac enzymes likely secondary to demand ischemia. New onset A. fib with rapid ventricle response   Liver cirrhosis   History of diabetes   Severe anemia   Severe hyponatremia   History of hypertension, currently hypotensive, on midodrine   History of hyperlipidemia   History of tobacco abuse   History of peripheral vascular disease, status post bilateral carotid endarterectomy   Severe LVOT obstruction       PLAN:     1. Conservative medical therapy from cardiology standpoint. Patient is a poor PCI candidate secondary to comorbidities as well as quality of life. Patient and  agreed on changing CODE STATUS to DNR CCA.   2. Monitor on telemetry 3. Check EKG  4. Control heart rate-we will initiate p.o. digoxin. 5. Poor anticoagulation candidate at this time secondary to severe anemia  6. Further recommendations to follow      Thank you for allowing me to participate in the care of your patient, please don't hesitate to contact me if you have any further questions.     Electronically signed by PerSay, DO on 3/21/2021 at 2:13 PM

## 2021-03-21 NOTE — FLOWSHEET NOTE
Pt's pleurx drain dressing changed three times during shift due to leaking at insertion site. Pt's call light within reach and bed alarm active.      Pt weaned down from 3L NC to 2L NC.

## 2021-03-21 NOTE — PROGRESS NOTES
Tolvaptan Duration of Therapy    Per hospital policy, the duration of tolvaptan therapy was changed from DAILY to 24 hours (ONE dose). Please evaluate serum sodium level and appropriateness of continuation before ordering additional dose(s). YON Hughes Ph.  3/21/2021  8:42 AM

## 2021-03-21 NOTE — PROGRESS NOTES
1.69* 1.78*   GLUCOSE 150* 143*     Recent Labs     03/20/21  0525 03/21/21  0608   AST 94* 45*   ALT 18 17   BILITOT 0.9* 0.8*   ALKPHOS 84 87       Current Facility-Administered Medications   Medication Dose Route Frequency Provider Last Rate Last Admin    digoxin (LANOXIN) tablet 125 mcg  125 mcg Oral Daily Mariano J Holiday, DO   125 mcg at 03/21/21 0842    ranolazine (RANEXA) extended release tablet 500 mg  500 mg Oral BID [facility] Holiday, DO   500 mg at 03/21/21 0842    midodrine (PROAMATINE) tablet 10 mg  10 mg Oral TID  Yazid R Quan, DO   10 mg at 03/21/21 1118    metoprolol (LOPRESSOR) injection 5 mg  5 mg Intravenous Once Rah Alvarenga DO        nitroGLYCERIN (NITROSTAT) SL tablet 0.4 mg  0.4 mg Sublingual Q5 Min PRN Demetrius Deutsch MD   0.4 mg at 03/19/21 1626    dilTIAZem 125 mg in dextrose 5 % 125 mL infusion  5-15 mg/hr Intravenous Continuous Demetrius Deutsch MD   Stopped at 03/20/21 1002    HYDROmorphone (DILAUDID) injection 1 mg  1 mg Intravenous Q3H PRN Rah Alvarenga, DO   1 mg at 03/19/21 1823    metoprolol succinate (TOPROL XL) extended release tablet 12.5 mg  12.5 mg Oral BID Kip John Bescak, DO   12.5 mg at 03/21/21 7724    sodium zirconium cyclosilicate (LOKELMA) oral suspension 10 g  10 g Oral Daily Madison Nur MD   10 g at 03/21/21 0843    glucose (GLUTOSE) 40 % oral gel 15 g  15 g Oral PRN TANNER Cortez CNP        dextrose 50 % IV solution  12.5 g Intravenous PRN TANNER Cortez CNP        glucagon (rDNA) injection 1 mg  1 mg Intramuscular PRN TANNER Cortez CNP        dextrose 5 % solution  100 mL/hr Intravenous PRN TANNER Cortez CNP        insulin lispro (HUMALOG) injection vial 0-12 Units  0-12 Units Subcutaneous TID  TANNER Cortez CNP   Stopped at 03/21/21 1128    insulin lispro (HUMALOG) injection vial 0-6 Units  0-6 Units Subcutaneous Nightly Jacques Kamara, APRN - CNP 2 Units at 03/20/21 2003    sodium chloride flush 0.9 % injection 10 mL  10 mL Intravenous 2 times per day TANNER Cornejo - CNP   10 mL at 03/21/21 0848    sodium chloride flush 0.9 % injection 10 mL  10 mL Intravenous PRN Joey Ramon APRN - CNP   10 mL at 03/19/21 0059    enoxaparin (LOVENOX) injection 30 mg  30 mg Subcutaneous Daily Shazia Vaca APRN - CNP   30 mg at 03/21/21 0843    promethazine (PHENERGAN) tablet 12.5 mg  12.5 mg Oral Q6H PRN Joey Ramon APRN - CNP        Or    ondansetron [facility]) injection 4 mg  4 mg Intravenous Q6H PRN Joey Ramon, APRN - CNP        acetaminophen (TYLENOL) tablet 650 mg  650 mg Oral Q6H PRN Joey Ramon APRN - CNP   650 mg at 03/19/21 2817    Or    acetaminophen (TYLENOL) suppository 650 mg  650 mg Rectal Q6H PRN Joey Ramon, APRN - CNP        atorvastatin (LIPITOR) tablet 10 mg  10 mg Oral Daily Rah Alvarenga DO   10 mg at 03/21/21 4970    traZODone (DESYREL) tablet 25 mg  25 mg Oral Nightly PRN Rah Alvarenga DO           Additional work up or/and treatment plan may be added today or then after based on clinical progression. I am managing a portion of pt care. Some medical issues are handled by other specialists. Additional work up and treatment should be done in out pt setting by pt PCP and other out pt providers. In addition to examining and evaluating pt, I spent additional time explaining care, normaland abnormal findings, and treatment plan. All of pt questions were answered. Counseling, diet and education were provided. Case will be discussed with nursing staff when appropriate. Family will be updated if and when appropriate.        Electronically signed by Rah Alvarenga DO on 3/21/2021 at 2:47 PM

## 2021-03-21 NOTE — PROGRESS NOTES
Nephrology Progress Note    Assessment:  CKD-4  [facility] [place]  Hyponatremia  Cirrhosis ascites  DM type-2  COPD HARDEEP    Plan: restrict fluids with hyponatremia  One dose samsca    Patient Active Problem List:     Type 2 diabetes mellitus without complication, without long-term current use of insulin (HCC)     Hypertension     Bell-rectal abscess     Bilateral carotid artery stenosis     Hypertrophic obstructive cardiomyopathy (HOCM) (HCC)     HARDEEP (obstructive sleep apnea)     Pseudophakia of both eyes     Regular astigmatism     Cardiomyopathy ([address]     Rectal pain     Abdominal distension     FALLON (acute kidney injury) ([address]     Cirrhosis of liver with ascites (HCC)     Renal mass, left     Alcoholic cirrhosis of liver with ascites (HCC)     Shock (HCC)     Arterial hypotension     Subconjunctival hemorrhage of left eye     Right-sided carotid artery disease (HCC)     PVD (posterior vitreous detachment), both eyes     Presbyopia of both eyes     Dry eye syndrome of bilateral lacrimal glands     Other ascites     Hypotension     Cirrhosis, liver, pigmentary (HCC)     Fatigue     Chronic kidney disease     Hyperkalemia     Hyponatremia     Weakness      Subjective:  Admit Date: 3/17/2021    Interval History: weakness persists  Breathing fine T REST    Medications:  Scheduled Meds:   digoxin  125 mcg Oral Daily    ranolazine  500 mg Oral BID    midodrine  10 mg Oral TID WC    metoprolol  5 mg Intravenous Once    metoprolol succinate  12.5 mg Oral BID    sodium zirconium cyclosilicate  10 g Oral Daily    insulin lispro  0-12 Units Subcutaneous TID WC    insulin lispro  0-6 Units Subcutaneous Nightly    sodium chloride flush  10 mL Intravenous 2 times per day    enoxaparin  30 mg Subcutaneous Daily    atorvastatin  10 mg Oral Daily     Continuous Infusions:   dilTIAZem (CARDIZEM) 125 mg in dextrose 5% 125 mL infusion Stopped (03/20/21 1002)    dextrose         CBC:   Recent Labs     03/20/21  0525 03/21/21  1905 WBC 8.8 7.1   HGB 7.7* 7.9*   * 130     CMP:    Recent Labs     03/19/21  0558 03/20/21  0525 03/21/21  0608   * 123* 123*   K 3.5  3.5 3.6 3.7   CL 91* 94* 91*   CO2 21 19* 20   BUN 47* 44* 44*   CREATININE 1.76* 1.69* 1.78*   GLUCOSE 124* 150* 143*   CALCIUM 7.9* 7.9* 8.0*   LABGLOM 28.3* 29.6* 27.9*     Troponin:   Recent Labs     03/20/21  0525   TROPONINI 2.850*     BNP: No results for input(s): BNP in the last 72 hours. INR: No results for input(s): INR in the last 72 hours. Lipids: No results for input(s): CHOL, LDLDIRECT, TRIG, HDL, AMYLASE, LIPASE in the last 72 hours. Liver:   Recent Labs     03/21/21  0608   AST 45*   ALT 17   ALKPHOS 87   PROT 4.7*   LABALBU 2.3*   BILITOT 0.8*     Iron:  No results for input(s): IRONS, FERRITIN in the last 72 hours. Invalid input(s): LABIRONS  Urinalysis: No results for input(s): UA in the last 72 hours.     Objective:  Vitals: BP (!) 112/45   Pulse 76   Temp 98.1 °F (36.7 °C) (Oral)   Resp 16   Ht 5' 7\" (1.702 m)   Wt 192 lb 9.6 oz (87.4 kg)   SpO2 98%   BMI 30.17 kg/m²    Wt Readings from Last 3 Encounters:   03/21/21 192 lb 9.6 oz (87.4 kg)   03/16/21 183 lb (83 kg)   03/07/21 191 lb (86.6 kg)      24HR INTAKE/OUTPUT:      Intake/Output Summary (Last 24 hours) at 3/21/2021 0510  Last data filed at 3/20/2021 0900  Gross per 24 hour   Intake    Output 100 ml   Net -100 ml       General: alert, in no apparent distress  HEENT: normocephalic, atraumatic, anicteric  Neck: supple, no mass  Lungs: non-labored respirations, clear to auscultation bilaterally but decreased  Heart: regular rate and rhythm, 3/6 systolic murmurs or rubs  Abdomen: soft, non-tender, non-distended  Ext: no cyanosis, 2+ peripheral edema chronic  Neuro: alert and oriented, no gross abnormalities  Psych: normal mood and affect  Skin: no rash      Electronically signed by Ernestine Thompson MD

## 2021-03-22 NOTE — DISCHARGE SUMMARY
Hospital Medicine Discharge Summary    Anjana Olvera  :  1945  MRN:  [id number]    Admit date:  3/17/2021  Discharge date:  3/22/2021    Admitting Physician:  Oralia Doyle DO  Primary Care Physician:  Franki Monteiro MD      Discharge Diagnoses:      Decompensated liver cirrhosis with recurrent ascites status post catheter placement  Hyperkalemia  NSTEMI  Hyponatremia- baseline is normal. Pt has decompensated cirrhosis with ascites  Moderate protein calorie malnutrition  Hyponatremia  Diabetes    Chief Complaint   Patient presents with    Other     high potassium, sent by PCP   [facility] Course:     82-year-old female with a past medical history of cirrhosis, diabetes who presented to the hospital with high potassium, she was sent in by her PCP. She was also complaining of fatigue. She was treated for decompensated cirrhosis. Patient was also treated for NSTEMI. Cardiology was consulted. She was treated only medically. Cardiology thought the patient is too high risk to perform any procedures. She developed A. fib with RVR. This was managed with beta-blockers. Patient also had hyponatremia which was managed by nephrology team.  Patient was monitored in ICU during this admission for NSTEMI and ongoing chest pain. She was in ICU for 2 days. She was moved out to medical hold was monitored for 2 more days. She had PT/OT. She declined home care. She was discharged home in stable condition. Home oxygen evaluation was performed prior to discharge. [facility] from day of discharge. Patient is high risk for readmission. She is also high risk for bleeding with anticoagulation. Exam on discharge:   BP (!) 121/52   Pulse 71   Temp 97.3 °F (36.3 °C) (Oral)   Resp 16   Ht 5' 7\" (1.702 m)   Wt 192 lb 6.4 oz (87.3 kg)   SpO2 100%   BMI 30.13 kg/m²   General appearance: No apparent distress, appears stated age and cooperative.   HEENT: Pupils equal, round, and reactive to a day. Dx: 250.00. Lancets             metFORMIN (GLUCOPHAGE) 1000 MG tablet  TAKE 1 TABLET TWICE A DAY WITH MEALS             metoprolol succinate (TOPROL XL) 25 MG extended release tablet  Take 0.5 tablets by mouth 2 times daily             midodrine (PROAMATINE) 10 MG tablet  Take 1 tablet by mouth 3 times daily (with meals)             nitroGLYCERIN (NITROSTAT) 0.4 MG SL tablet  up to max of 3 total doses. If no relief after 1 dose, call 911. Nutritional Supplements (ENSURE HIGH PROTEIN) LIQD  Take 1 Can by mouth 3 times daily             raloxifene (EVISTA) 60 MG tablet  TAKE 1 TABLET DAILY             ranolazine (RANEXA) 500 MG extended release tablet  Take 1 tablet by mouth 2 times daily             Transparent Dressings (TEGADERM FIRST AID STYLE) MISC  One box 50. Change daily one tegaderm to intraperitoneal site and PRN soilage. traZODone (DESYREL) 50 MG tablet  Take 0.5 tablets by mouth nightly as needed for Sleep                 Disposition:   Home    Condition at discharge: good     Activity: activity as tolerated    Total time taken for discharging this patient: 40 minutes. Greater than 70% of time was spent focused exclusively on this patient. Time was taken to review chart, discuss plans with consultants, reconciling medications, discussing plan answering questions with patient.      Yolanda Luke  3/22/2021, 1:32 PM  ---------------------------------------------------------------------------------------------------------------------Rachelle Moralez

## 2021-03-22 NOTE — CARE COORDINATION
This CM met with patient and her spouse regarding the need for post-acute services. Dr. Marcos Mcfarland was also at bedside during this discussion. Patient/spouse report that there is currently no HHC in place for care of the Pleurex catheter as daughter provides this care. Patient had previously declined [address] during last admission. Patient continues to decline a HHC referral at this time and spouse is in agreement with no HHC services needed. Per RN report, patient is not on home O2 but has been on supplemental O2 at [facility] while in the hospital. RN to trial patient on room air at rest and during ambulation to determine if a home O2 eval is needed. CM will initiate referral to O2 provider of choice if O2 eval done and patient qualifies.

## 2021-03-22 NOTE — DISCHARGE INSTR - COC
Continuity of Care Form    Patient Name: Cheri Tamayo   :  1945  MRN:  [id number]    Admit date:  3/17/2021  Discharge date:  ***    Code Status Order: DNR-CCA   Advance Directives:   [facility] Documentation     Date/Time Healthcare Directive Type of Healthcare Directive Copy in [address] Agent's Name Healthcare Agent's Phone Number    21 0434  No, patient does not have an advance directive for healthcare treatment -- -- -- -- --          Admitting Physician:  Michael Escalera DO  PCP: Margret Rendon MD    Discharging Nurse: [facility] Unit/Room#: L145/U535-37  Discharging Unit Phone Number: ***    Emergency Contact:   Extended Emergency Contact Information  Primary Emergency Contact: Bryon Guaman  Address: [address] [place] [address] & [place]           [address] Phone: [phone]  Relation: Spouse  Secondary Emergency Contact: Yenni Aguirre  Mobile Phone: [phone]  Relation: Child    Past Surgical History:  Past Surgical History:   Procedure Laterality Date    APPENDECTOMY      CHOLECYSTECTOMY      COLONOSCOPY  2013    CT BIOPSY RENAL  2020    CT BIOPSY RENAL 2020 [facility] CT SCAN    CT BIOPSY RENAL  2020    CT BIOPSY RENAL 2020 [facility] CT SCAN    FOOT FRACTURE SURGERY Bilateral     FRACTURE SURGERY      HYSTERECTOMY      IR TUNNELED INTRAPERITNL CATH W/IMAG  2021    IR TUNNELED INTRAPERITNL CATH W/IMAG 2021 [facility] SPECIAL PROCEDURE    OTHER SURGICAL HISTORY      removal of anal fistulotomy    PARACENTESIS Left 2020    9015 mls removed by Dr Wong Davenport Left 2020    [id number] ascites removed by Dr Yasmin Maire 50g albumin given    PARACENTESIS Left 09/15/2020    10,400cc removed by Dr Jesus Stallworth [phone]    PARACENTESIS Left 10/09/2020    7000cc removed by Dr. Wong Davenport Left 10/13/2020    7000ml removed by Dr Chad Kaufman Left 10/26/2020    7010cc removed by Dr. Eduardo Degroot Left 11/06/2020    7020 mls removed ny Dr Lissett Astorga Left 11/19/2020    7000 ml removed by Dr. Odin Tavera.     PARACENTESIS Left 11/30/2020    7000 ml removed by paracentesis by Dr. Lissett Astorga Left 12/08/2020    7100 ml removed    PARACENTESIS Left 01/08/2021    ultrasound guided paracentesis 7150 ml removed    PARACENTESIS Left 01/19/2021    7000ml removed by Dr Hollie Villalta [phone]    PARACENTESIS Left 01/28/2021    by Dr. Odin Tavera 8100 ml removed   [address] Left 12/17/2020    7000ml removed by Dr Hollie Villalta       Immunization History:   Immunization History   Administered Date(s) Administered    Hepatitis A/Hepatitis B (Twinrix) 01/28/2021    Influenza Vaccine, unspecified formulation 11/08/2007, 10/07/2014, 10/30/2015, 10/01/2016    Influenza Virus Vaccine 10/01/2016    Influenza, High Dose (Fluzone 65 yrs and older) 11/30/2017, 10/19/2018    Influenza, High-dose, Leanord Muck, 72 yrs +, IM (Fluzone) 10/29/2020    Influenza, Triv, inactivated, subunit, adjuvanted, IM (Fluad 65 yrs and older) 10/01/2019       Active Problems:  Patient Active Problem List   Diagnosis Code    Type 2 diabetes mellitus without complication, without long-term current use of insulin ([address] E11.9    Hypertension I10    Bell-rectal abscess K61.1    Bilateral carotid artery stenosis I65.23    Hypertrophic obstructive cardiomyopathy (HOCM) (HCC) I42.1    HARDEEP (obstructive sleep apnea) G47.33    Pseudophakia of both eyes Z96.1    Regular astigmatism H52.229    Cardiomyopathy ([address] I42.9    Rectal pain K62.89    Abdominal distension R14.0    FALLON (acute kidney injury) ([address] N17.9    Cirrhosis of liver with ascites ([address] K74.60, R18.8    Renal mass, left N53.21    Alcoholic cirrhosis of liver with ascites ([address] K70.31    Shock ([address] R57.9    Arterial hypotension I95.9    Subconjunctival hemorrhage of left eye H11.32    Intake 1080 ml   Output 200 ml   Net 880 ml     I/O last 3 completed shifts:   In: 240 [P.O.:240]  Out: -     Safety Concerns:     [address] [facility] Safety Concerns:257320618}    Impairments/Disabilities:      [address] [facility] Impairments/Disabilities:172137274}    Nutrition Therapy:  Current Nutrition Therapy:   [address] [facility] Diet List:614938188}    Routes of Feeding: {CHP DME Other Feedings:868031839}  Liquids: {Slp liquid thickness:54521}  Daily Fluid Restriction: {CHP DME Yes amt example:398803195}  Last Modified Barium Swallow with Video (Video Swallowing Test): {Done Not Done ZDRA:268096789}    Treatments at the Time of Hospital Discharge:   Respiratory Treatments: ***  Oxygen Therapy:  {Therapy; copd oxygen:45021}  Ventilator:    {MH CC Vent OQJB:473175504}    Rehab Therapies: {THERAPEUTIC INTERVENTION:4932748969}  Weight Bearing Status/Restrictions: [address] [place] [address] Weight Bearin}  Other Medical Equipment (for information only, NOT a DME order):  {EQUIPMENT:308716509}  Other Treatments: ***    Patient's personal belongings (please select all that are sent with patient):  {[facility] DME Belongings:303794837}    RN SIGNATURE:  {Esignature:674125450}    CASE MANAGEMENT/SOCIAL WORK SECTION    Inpatient Status Date: ***    Readmission Risk Assessment Score:  Readmission Risk              Risk of Unplanned Readmission:        29           Discharging to Facility/ Agency   · Name:   · Address:  · Phone:  · Fax:    Dialysis Facility (if applicable)   · Name:  · Address:  · Dialysis Schedule:  · Phone:  · Fax:    / signature: {Esignature:310298622}    PHYSICIAN SECTION    Prognosis: {Prognosis:8324510032}    Condition at Discharge: [address] Patient Condition:267286177}    Rehab Potential (if transferring to Rehab): {Prognosis:4603830079}    Recommended Labs or Other Treatments After Discharge: ***    Physician Certification: I certify the above information and transfer of Robert Gathers  is necessary for the continuing treatment of the diagnosis listed and that she requires {Admit to Appropriate Level of Care:91387} for {GREATER/LESS:664722579} 30 days.      Update Admission H&P: {CHP DME Changes in RZWLP:839195815}    PHYSICIAN SIGNATURE:  {Esignature:179071822}

## 2021-03-22 NOTE — PROGRESS NOTES
[facility] OCCUPATIONAL THERAPY EVALUATION - ACUTE     NAME: Chon Alves  : 1945 (27 y.o.)  MRN: [id number]  CODE STATUS: Limited  Room: W182/W182-01    Date of Service: 3/22/2021    Patient Diagnosis(es): Fatigue, unspecified type [R53.83]  Weakness [R53.1]   Chief Complaint   Patient presents with    Other     high potassium, sent by PCP    Fatigue     Patient Active Problem List    Diagnosis Date Noted    Fatigue 2021    Weakness 2021    Chronic kidney disease     Hyperkalemia     Hyponatremia     Cirrhosis, liver, pigmentary ([address]     Hypotension 2021    Other ascites 2020    Arterial hypotension     Shock ([address]     Alcoholic cirrhosis of liver with ascites ([address] 2020    Cirrhosis of liver with ascites ([address] 08/15/2020    Renal mass, left 08/15/2020    Abdominal distension 2020    FALLON (acute kidney injury) ([address] 2020    Rectal pain 2020    Cardiomyopathy ([address] 2020    Subconjunctival hemorrhage of left eye 2020    PVD (posterior vitreous detachment), both eyes 2019    Dry eye syndrome of bilateral lacrimal glands 2019    HARDEEP (obstructive sleep apnea) 2018    Bilateral carotid artery stenosis 2017    Right-sided carotid artery disease ([address] 2016    Pseudophakia of both eyes 2016    Regular astigmatism 2016    Presbyopia of both eyes 2016    Bell-rectal abscess 2013    Hypertension     Hypertrophic obstructive cardiomyopathy (HOCM) ([address] 2013    Type 2 diabetes mellitus without complication, without long-term current use of insulin ([address] 03/15/2013        Past Medical History:   Diagnosis Date    Arthritis     Cardiomyopathy [facility])     mother history.     Chronic kidney disease     Hypertension     Liver disease     Other disorders of kidney and ureter in diseases classified elsewhere     Pneumonia     Psychiatric problem     Sleep apnea, obstructive     Type II or unspecified type diabetes mellitus without mention of complication, not stated as uncontrolled      Past Surgical History:   Procedure Laterality Date    APPENDECTOMY      CHOLECYSTECTOMY      COLONOSCOPY  04/23/2013    CT BIOPSY RENAL  08/04/2020    CT BIOPSY RENAL 8/4/2020 [facility] CT SCAN    CT BIOPSY RENAL  09/04/2020    CT BIOPSY RENAL 9/4/2020 [facility] CT SCAN    FOOT FRACTURE SURGERY Bilateral     FRACTURE SURGERY      HYSTERECTOMY      IR TUNNELED INTRAPERITNL CATH W/IMAG  1/28/2021    IR TUNNELED INTRAPERITNL CATH W/IMAG 1/28/2021 [facility] SPECIAL PROCEDURE    OTHER SURGICAL HISTORY      removal of anal fistulotomy    PARACENTESIS Left 07/28/2020    9015 mls removed by Dr Gerlean Koyanagi Left 09/04/2020    91079rn ascites removed by Dr Unique Paredes 50g albumin given    PARACENTESIS Left 09/15/2020    10,400cc removed by Dr Shravan Morgan [phone]    PARACENTESIS Left 10/09/2020    7000cc removed by Dr. Gerlean Koyanagi Left 10/13/2020    7000ml removed by Dr Shravan Morgan [phone]    PARACENTESIS Left 10/26/2020    7010cc removed by Dr. Neisha Scott Left 11/06/2020    7020 mls removed ny Dr Gerlean Koyanagi Left 11/19/2020    7000 ml removed by Dr. Unique Paredes.  PARACENTESIS Left 11/30/2020    7000 ml removed by paracentesis by Dr. Gerlean Koyanagi Left 12/08/2020    7100 ml removed    PARACENTESIS Left 01/08/2021    ultrasound guided paracentesis 7150 ml removed    PARACENTESIS Left 01/19/2021    7000ml removed by Dr Shravan Morgan [phone]    PARACENTESIS Left 01/28/2021    by Dr. Unique Paredes 8100 ml removed   [address] Left 12/17/2020    7000ml removed by Dr Shravan Morgan        Restrictions  Restrictions/Precautions: Fall Risk(High per giron)     Safety Devices: Safety Devices  Safety Devices in place: Yes  Type of devices:  All fall risk precautions in place        Subjective  Pre Treatment Pain Screening  Pain at present: 0 Scale Used: Numeric Score  Intervention List: Patient able to continue with treatment    Pain Reassessment:   Pain Assessment  Patient Currently in Pain: Denies  Pain Assessment: 0-10  Pain Level: 0       Prior Level of Function:  Social/Functional History  Lives With: Spouse  Type of Home: House  Home Layout: One level  Home Access: Stairs to enter with rails  Entrance Stairs - Number of Steps: 2  Bathroom Shower/Tub: Tub/Shower unit  Bathroom Toilet: Standard  Bathroom Equipment: Shower chair, Hand-held shower, Grab bars in shower  Home Equipment: Cane, Rolling walker  ADL Assistance: Independent  Homemaking Assistance: Needs assistance(Her children are doing it)  Ambulation Assistance: Independent(No AD)  Transfer Assistance: Independent  Active :  Yes  Additional Comments: Spouse has Parkinson's but could help if needed    OBJECTIVE:     Orientation Status:  Orientation  Overall Orientation Status: Within Functional Limits    Observation:  Observation/Palpation  Posture: Good  Observation: Pt alert and attentive, sitting half at EOB    Cognition Status:  Cognition  Overall Cognitive Status: [facility]  Cognition Comment: Increased processing time and F problem solving awareness but overall WFL    Perception Status:  Perception  Overall Perceptual Status: WFL    Sensation Status:  Sensation  Overall Sensation Status: [facility]    Vision and Hearing Status:  Vision  Vision: Impaired  Vision Exceptions: Wears glasses at all times  Hearing  Hearing: Within functional limits     ROM:   LUE AROM (degrees)  LUE AROM : WFL  Left Hand AROM (degrees)  Left Hand AROM: WFL  Left Hand General AROM: Arthritic changes  RUE AROM (degrees)  RUE AROM : WFL  Right Hand AROM (degrees)  Right Hand AROM: WFL  Right Hand General AROM: Arthritic changes    Strength:  LUE Strength  Gross LUE Strength: Exceptions to [facility]  L Hand General: 3+/5  LUE Strength Comment: 3+/5 all planes, pt. reports feeling this is baseline  RUE Strength  Gross RUE Strength: Exceptions to [facility]  R Hand General: 3+/5  RUE Strength Comment: 3+/5 all planes, pt. reports feeling this is baseline    Coordination, Tone, Quality of Movement: Tone RUE  RUE Tone: Normotonic  Tone LUE  LUE Tone: Normotonic  Coordination  Movements Are Fluid And Coordinated: Yes  Quality of Movement Other  Comment: Min arthritic changes    Hand Dominance:  Hand Dominance  Hand Dominance: Right    ADL Status:  ADL  Feeding: Independent  Grooming: Modified independent   UE Bathing: Modified independent   LE Bathing: Modified independent   UE Dressing: Modified independent   LE Dressing: Minimal assistance  Toileting: Modified independent   Additional Comments: Simulated ADLs as above. Pt. demonstrates decreased balance and overall coordination however states she is at her baseline. Required assist to don sock on R foot, states family can assist if needed  Toilet Transfers  Toilet Transfer: Unable to assess  Toilet Transfers Comments: Pt. declined need, anticipate SBA       Therapy key for assistance levels    Independent = Pt. is able to perform task with no assistance but may require a device   Stand by assistance = Pt. does not perform task at an independent level but does not need physical assistance, requires verbal cues  Minimal, Moderate, Maximal Assistance = Pt. requires physical assistance (25%, 50%, 75% assist from helper) for task but is able to actively participate in task   Dependent = Pt. requires total assistance with task and is not able to actively participate with task completion     Functional Mobility:  Functional Mobility  Functional - Mobility Device: No device  Activity: To/from bathroom  Assist Level: Supervision  Functional Mobility Comments: Slow pace, reaching for walls/furniture. States this is her baseline ambulation.  Recommended pt. use cane for balance support  Transfers  Sit to stand: Modified independent  Stand to sit: Modified independent    Bed Mobility  Bed mobility Rolling to Left: Independent  Rolling to Right: Independent  Supine to Sit: Modified independent  Sit to Supine: Modified independent  Comment: HOB flat, did use rails, states she has at home bedside table to grab    Seated and Standing Balance:  Balance  Sitting Balance: Supervision  Standing Balance: Contact guard assistance    Functional Endurance:  Activity Tolerance  Activity Tolerance: Patient Tolerated treatment well    D/C Recommendations:  OT D/C RECOMMENDATIONS  REQUIRES OT FOLLOW UP: No    Equipment Recommendations:  OT Equipment Recommendations  Equipment Needed: No    OT Education:   OT Education  OT Education: OT Role, Plan of Care  Patient Education: Educated pt. on role of acute care OT  Barriers to Learning: None    OT Follow Up:  OT D/C RECOMMENDATIONS  REQUIRES OT FOLLOW UP: No       Assessment/Discharge Disposition:  Assessment: Pt is a 70year old woman who presents to [facility] with weakness. Pt. demonstrates decreased overall balance and requires increased time for mobility however states this is her baseline and she has family support. Pt. states she feels she has returned to her PLOF and declines further therapy intervention.   Prognosis: Good  No Skilled OT: At baseline function  Decision Making: Low Complexity  History: Pt's medical history is moderately complex  Exam: Pt. has no new performance deficits  Assistance / Modification: Pt. requires occasional assist    Six Click Score   How much help for putting on and taking off regular lower body clothing?: A Little  How much help for Bathing?: None  How much help for Toileting?: None  How much help for putting on and taking off regular upper body clothing?: None  How much help for taking care of personal grooming?: None  How much help for eating meals?: None  AM-[facility] Inpatient Daily Activity Raw Score: 23  AM-[facility] Inpatient ADL T-Scale Score : 51.12  ADL Inpatient CMS 0-100% Score: 15.86    Plan:  Plan  Times per week: No acute OT    Goals: Patient Goal: Patient goals : \"I want to get home\"     Discussed and agreed upon: Yes Comments:     Therapy Time:   OT Individual Minutes  Time In: 6371  Time Out: 1703  Minutes: 11    Eval: 11 minutes     Electronically signed by:     AISHA Pedroza  3/22/2021, 9:20 AM

## 2021-03-22 NOTE — CONSULTS
Palliative Care Consult Note  Patient: Suman Godinez  Gender: female  YOB: 1945  Unit/Bed: O530/Y281-56  Code Status: DNR-CCA  Inpatient Treatment Team: Treatment Team: Attending Provider: Elvin Rodríguez DO; Utilization Reviewer: Ashly Martinez RN; Consulting Physician: Claude Krabbe, MD; Consulting Physician: [address] Jama DO; Utilization Reviewer: Christiano Smith RN; : Harsha Herrera RN; Patient Care Tech: [employer]; Tech: [employer]; Registered Nurse: Nanda Ny RN; : Kadie Green  Admit Date:  3/17/2021    Chief Complaint: Goals of Care    History of Presenting Illness:      Suman Godinez is a 70 y.o. female on hospital day 5 with a history of HTN, HOCM, carotid stenosis s/p left CEA, CHF, decompensated nonalcoholic liver cirrhosis requiring frequent large volume paracentesis s/p peritoneal drain placement on 1/28, DM2, CKD, anxiety,depression who presented with weakness, fatigue, K of 6.7 and Creatinine 2.06, . Today , Creatinine, 1.96, H&H 8.6 and 26.1, Albumim 2.7, INR 1.5          She is having no pain. No sob, cough, or sputum production. Ascites noted, but abdomen is soft. Negative abdominal pain or nausea. No GI or  complaints. Appetite is improving since her drain was placed. Negative significant  emotional or sleep disturbance. She performs all of her own ADLS and ambulates without assitance.            Review of Systems:       Review of Systems   Constitutional: Positive for fatigue. Negative for activity change, appetite change, chills, diaphoresis, fever and unexpected weight change. HENT: Negative for drooling, hearing loss, mouth sores, sore throat, trouble swallowing and voice change. Eyes: Negative for discharge and visual disturbance. Respiratory: Negative for apnea, cough, choking, chest tightness, shortness of breath, wheezing and stridor. Cardiovascular: Positive for leg swelling.  Negative for chest pain and palpitations. Gastrointestinal: Positive for abdominal distention. Negative for abdominal pain, anal bleeding, blood in stool, constipation, diarrhea, nausea, rectal pain and vomiting. Genitourinary: Negative for difficulty urinating, dysuria, enuresis, frequency and hematuria. Musculoskeletal: Negative for arthralgias, back pain, gait problem, joint swelling and myalgias. Skin: Negative for color change, pallor, rash and wound. Allergic/Immunologic: Negative for food allergies and immunocompromised state. Neurological: Negative for dizziness, tremors, seizures, syncope, facial asymmetry, speech difficulty, weakness, light-headedness, numbness and headaches. Hematological: Negative for adenopathy. Does not bruise/bleed easily. Psychiatric/Behavioral: Negative for agitation, behavioral problems, confusion, decreased concentration, dysphoric mood, hallucinations, self-injury, sleep disturbance and suicidal ideas. The patient is not nervous/anxious and is not hyperactive. Physical Examination:       BP (!) 121/52   Pulse 71   Temp 97.3 °F (36.3 °C) (Oral)   Resp 16   Ht 5' 7\" (1.702 m)   Wt 192 lb 6.4 oz (87.3 kg)   SpO2 100%   BMI 30.13 kg/m²    Physical Exam  Constitutional:       General: She is not in acute distress. Appearance: She is well-developed. She is not diaphoretic. HENT:      Head: Normocephalic and atraumatic. Right Ear: External ear normal.      Left Ear: External ear normal.      Nose: Nose normal.      Mouth/Throat:      Pharynx: No oropharyngeal exudate. Eyes:      General: No scleral icterus. Right eye: No discharge. Left eye: No discharge. Conjunctiva/sclera: Conjunctivae normal.      Pupils: Pupils are equal, round, and reactive to light. Neck:      Musculoskeletal: Normal range of motion and neck supple. Thyroid: No thyromegaly. Vascular: No JVD. Trachea: No tracheal deviation.    Cardiovascular:      Rate and Rhythm: Normal rate and regular rhythm. Heart sounds: Normal heart sounds. Pulmonary:      Effort: Pulmonary effort is normal. No respiratory distress. Breath sounds: Normal breath sounds. No stridor. No wheezing or rales. Chest:      Chest wall: No tenderness. Abdominal:      General: Bowel sounds are decreased. There is distension. Palpations: Abdomen is soft. There is no mass. Tenderness: There is no abdominal tenderness. There is no guarding or rebound. Comments:  catheter in the left side of the abdomen draining peritoneal fluid   Musculoskeletal: Normal range of motion. General: No tenderness or deformity. Lymphadenopathy:      Cervical: No cervical adenopathy. Skin:     General: Skin is warm and dry. Findings: No erythema or rash. Neurological:      Mental Status: She is alert and oriented to person, place, and time. Psychiatric:         Behavior: Behavior normal.         Thought Content: Thought content normal.         Allergies:       Allergies   Allergen Reactions    Codeine        Medications:      Current Facility-Administered Medications   Medication Dose Route Frequency Provider Last Rate Last Admin    digoxin (LANOXIN) tablet 125 mcg  125 mcg Oral Daily [facility] Edilma, DO   125 mcg at 03/22/21 0808    ranolazine (RANEXA) extended release tablet 500 mg  500 mg Oral BID [facility] Holtesha, DO   500 mg at 03/22/21 1165    midodrine (PROAMATINE) tablet 10 mg  10 mg Oral TID  Yazid R Quan, DO   10 mg at 03/22/21 1121    metoprolol (LOPRESSOR) injection 5 mg  5 mg Intravenous Once Lili Slack, DO        nitroGLYCERIN (NITROSTAT) SL tablet 0.4 mg  0.4 mg Sublingual Q5 Min PRN Karthik Colin MD   0.4 mg at 03/19/21 1626    HYDROmorphone (DILAUDID) injection 1 mg  1 mg Intravenous Q3H PRN Lili Jackson, DO   1 mg at 03/19/21 1823    metoprolol succinate (TOPROL XL) extended release tablet 12.5 mg  12.5 mg Oral BID Junior Mckinney, DO   12.5 mg at 03/22/21 0808    sodium zirconium cyclosilicate (LOKELMA) oral suspension 10 g  10 g Oral Daily Poonam Hurst MD   10 g at 03/22/21 0808    glucose (GLUTOSE) 40 % oral gel 15 g  15 g Oral PRN Emili Salvage, APRN - CNP        dextrose 50 % IV solution  12.5 g Intravenous PRN Emili Salvage, APRN - CNP        glucagon (rDNA) injection 1 mg  1 mg Intramuscular PRN Emili Salvage, APRN - CNP        dextrose 5 % solution  100 mL/hr Intravenous PRN Emili Salvage, APRN - CNP        insulin lispro (HUMALOG) injection vial 0-12 Units  0-12 Units Subcutaneous TID WC Emili Salvage, APRN - CNP   Stopped at 03/21/21 1128    insulin lispro (HUMALOG) injection vial 0-6 Units  0-6 Units Subcutaneous Nightly Emili Salvage, APRN - CNP   2 Units at 03/20/21 2003    sodium chloride flush 0.9 % injection 10 mL  10 mL Intravenous 2 times per day Emili Salvage, APRN - CNP   10 mL at 03/22/21 3155    sodium chloride flush 0.9 % injection 10 mL  10 mL Intravenous PRN Emili Salvage, APRN - CNP   10 mL at 03/19/21 0059    enoxaparin (LOVENOX) injection 30 mg  30 mg Subcutaneous Daily Luther James Vaca, APRN - CNP   30 mg at 03/22/21 0809    promethazine (PHENERGAN) tablet 12.5 mg  12.5 mg Oral Q6H PRN Emili Salvage, APRN - CNP        Or    [facility] injection 4 mg  4 mg Intravenous Q6H PRN Emili Salvage, APRN - CNP        acetaminophen (TYLENOL) tablet 650 mg  650 mg Oral Q6H PRN Emili Salvage, APRN - CNP   650 mg at 03/19/21 0348    Or    acetaminophen (TYLENOL) suppository 650 mg  650 mg Rectal Q6H PRN Emili Salvage, APRN - CNP        atorvastatin (LIPITOR) tablet 10 mg  10 mg Oral Daily Pitney Lyle, DO   10 mg at 03/22/21 1287    traZODone (DESYREL) tablet 25 mg  25 mg Oral Nightly PRN Pitney Lyle, DO           History:       PMHx:  Past Medical History:   Diagnosis Date    Arthritis     Cardiomyopathy [facility])     mother history.  Chronic kidney disease     Hypertension     Liver disease     Other disorders of kidney and ureter in diseases classified elsewhere     Pneumonia     Psychiatric problem     Sleep apnea, obstructive     Type II or unspecified type diabetes mellitus without mention of complication, not stated as uncontrolled        PSHx:  Past Surgical History:   Procedure Laterality Date    APPENDECTOMY      CHOLECYSTECTOMY      COLONOSCOPY  04/23/2013    CT BIOPSY RENAL  08/04/2020    CT BIOPSY RENAL 8/4/2020 [facility] CT SCAN    CT BIOPSY RENAL  09/04/2020    CT BIOPSY RENAL 9/4/2020 [facility] CT SCAN    FOOT FRACTURE SURGERY Bilateral     FRACTURE SURGERY      HYSTERECTOMY      IR TUNNELED INTRAPERITNL CATH W/IMAG  1/28/2021    IR TUNNELED INTRAPERITNL CATH W/IMAG 1/28/2021 [facility] SPECIAL PROCEDURE    OTHER SURGICAL HISTORY      removal of anal fistulotomy    PARACENTESIS Left 07/28/2020    9015 mls removed by Dr Sandra Browne Left 09/04/2020    91712tz ascites removed by Dr Lillian Ferrer 50g albumin given    PARACENTESIS Left 09/15/2020    10,400cc removed by Dr Nae Hernandez [phone]    PARACENTESIS Left 10/09/2020    7000cc removed by Dr. Sandra Browne Left 10/13/2020    7000ml removed by Dr Nae Hernandez [phone]    PARACENTESIS Left 10/26/2020    7010cc removed by Dr. Liseth Tran Left 11/06/2020    7020 mls removed ny Dr Sandra Browne Left 11/19/2020    7000 ml removed by Dr. Lillian Ferrer.     PARACENTESIS Left 11/30/2020    7000 ml removed by paracentesis by Dr. Sandra Browne Left 12/08/2020    7100 ml removed    PARACENTESIS Left 01/08/2021    ultrasound guided paracentesis 7150 ml removed    PARACENTESIS Left 01/19/2021    7000ml removed by Dr Nae Hernandez [phone]    PARACENTESIS Left 01/28/2021    by Dr. Lillian Ferrer 8100 ml removed   [address] Left 12/17/2020    7000ml removed by Dr Nae Hernandez Social Hx:  Social History     Socioeconomic History    Marital status:      Spouse name: None    Number of children: None    Years of education: None    Highest education level: None   Occupational History    None   Social Needs    Financial resource strain: Not hard at all   Cristino-Sukhdeep insecurity     Worry: Never true     Inability: Never true   Dabble needs     Medical: None     Non-medical: None   Tobacco Use    Smoking status: Never Smoker    Smokeless tobacco: Former User   Substance and Sexual Activity    Alcohol use: No    Drug use: No    Sexual activity: Yes   Lifestyle    Physical activity     Days per week: None     Minutes per session: None    Stress: None   Relationships    Social connections     Talks on phone: None     Gets together: None     Attends [employer] service: None     Active member of club or organization: None     Attends meetings of clubs or organizations: None     Relationship status: None    Intimate partner violence     Fear of current or ex partner: None     Emotionally abused: None     Physically abused: None     Forced sexual activity: None   Other Topics Concern    None   Social History Narrative    None       Family Hx:  Family History   Problem Relation Age of Onset    Arrhythmia Mother     Diabetes Mother     High Blood Pressure Mother     Arrhythmia Father     Breast Cancer Sister     Diabetes Brother     High Blood Pressure Brother        LABS: Reviewed     CBC:  Lab Results   Component Value Date    WBC 6.3 03/22/2021    RBC 3.03 03/22/2021    HGB 8.6 03/22/2021    HCT 26.1 03/22/2021    MCV 86.0 03/22/2021    MCH 28.4 03/22/2021    MCHC 33.0 03/22/2021    RDW 14.7 03/22/2021     03/22/2021    MPV 10.0 09/08/2015     CBC with Differential:   Lab Results   Component Value Date    WBC 6.3 03/22/2021    RBC 3.03 03/22/2021    HGB 8.6 03/22/2021    HCT 26.1 03/22/2021     03/22/2021    MCV 86.0 03/22/2021    MCH 28.4 03/22/2021    MCHC 33.0 03/22/2021    RDW 14.7 03/22/2021    NRBC 0.0 07/22/2020    LYMPHOPCT 9.0 03/22/2021    MONOPCT 10.8 03/22/2021    BASOPCT 0.3 03/22/2021    MONOSABS 0.7 03/22/2021    LYMPHSABS 0.6 03/22/2021    EOSABS 0.0 03/22/2021    BASOSABS 0.0 03/22/2021     CMP:    Lab Results   Component Value Date     03/22/2021    K 4.2 03/22/2021    CL 90 03/22/2021    CO2 20 03/22/2021    BUN 46 03/22/2021    CREATININE 1.96 03/22/2021    GFRAA 30.2 03/22/2021    LABGLOM 25.0 03/22/2021    GLUCOSE 123 03/22/2021    GLUCOSE 84 12/04/2020    PROT 5.1 03/22/2021    LABALBU 2.7 03/22/2021    LABALBU 3.3 12/04/2020    CALCIUM 8.1 03/22/2021    BILITOT 0.9 03/22/2021    ALKPHOS 101 03/22/2021    AST 36 03/22/2021    ALT 17 03/22/2021     BMP:    Lab Results   Component Value Date     03/22/2021    K 4.2 03/22/2021    CL 90 03/22/2021    CO2 20 03/22/2021    BUN 46 03/22/2021    LABALBU 2.7 03/22/2021    LABALBU 3.3 12/04/2020    CREATININE 1.96 03/22/2021    CALCIUM 8.1 03/22/2021    GFRAA 30.2 03/22/2021    LABGLOM 25.0 03/22/2021    GLUCOSE 123 03/22/2021    GLUCOSE 84 12/04/2020     TSH:   Lab Results   Component Value Date    TSH 3.620 03/07/2021     Vitamin B12 and Folate: No components found for: FOLIC,  C81  Urinalysis:   Lab Results   Component Value Date    NITRU Negative 03/08/2021    WBCUA 10-20 03/08/2021    BACTERIA Negative 03/08/2021    RBCUA 3-5 03/08/2021    BLOODU Negative 03/08/2021    SPECGRAV 1.018 03/08/2021    GLUCOSEU Negative 03/08/2021           FUNCTIONAL ADL´S:     Independent: [ x] Eating, [ x  ] Dressing, [x   ] Transferring, [ x ] Toileting, [  x ] Bathing, [  x ] Continence  Dependent   : [  ] Eating, [   ] Dressing, [   ] Transferring, [   ] Federico Snide, [   ] Bathing, [   ] Continence  W. assistant : [  ] Eating, [   ] Dressing, [   ] Transferring, [   ] Federico Snide, [   ] Sintia Fam, [   ] Continence    Radiology: Reviewed      Echocardiogram Limited    Result Date: 3/8/2021 Transthoracic Echocardiography Report (TTE)  Demographics  Patient Name    Cassy Rea Gender               Female  Patient Number  [id number]    Race                                               Ethnicity  Visit Number    [id number]   Room Number          W170  Corporate ID                Date of Study        03/08/2021  Accession       [id number]  Referring Physician  Murray Desir  Number  Date of Birth   1945  Sonographer          Prince Winston LPN  Age             70 year(s)  Interpreting         [facility]) Cardiology                              Physician            Martha Mckinnon Procedure Type of Study  TTE procedure:ECHOCARDIOGRAM LIMITED. Procedure Date: 03/08/2021 Start: 02:47 PM Study Location: Portable Technical Quality: Adequate visualization Indications:LVF. Patient Status: Routine Height: 67 inches Weight: 191 pounds BSA: 1.98 m^2 BMI: 29.91 kg/m^2 BP: 128/59 mmHg  Conclusions  Summary  Technically difficult examination. Left ventricular ejection fraction is visually estimated at 65%. Moderate concentric left ventricular hypertrophy with more pronounced  basal septal hypertrophy. severely Increased LVOT gradient of 107mmHg unchanged as compared to  previous echo on 6/2020, likely due to basal septal hypertrophy and KIKO of  anterior mitral valve leaflet  Normal right ventricle structure and function. Normal right ventricle systolic pressure. Recommend Cardiac MRI for further evaluation of LVOT/septal hypertrophy. Signature  ----------------------------------------------------------------  Electronically signed by Martha Armstrong(Interpreting  physician) on 03/08/2021 04:49 PM  ----------------------------------------------------------------  Findings Left Ventricle Left ventricular ejection fraction is visually estimated at 65%. Normal left ventricular size and function.  Moderate concentric left ventricular hypertrophy with more pronounced basal septal hypertophy. severely Increased LVOT gradient of 105mmHg unchanged as compared to previous echo on 6/2020, likely due to basal septal hypertrophy and KIKO of anterior mitral valve leaflet Right Ventricle Normal right ventricle structure and function. Normal right ventricle systolic pressure. Left Atrium The left atrium is Moderately dilated. Mitral Valve No evidence of mitral valve stenosis. Trace (+) mitral regurgitation is present. Aortic Valve Individual aortic valve leaflets are not clearly visualized. Pericardial Effusion No evidence of pericardial effusion. Pleural Effusion No evidence of pleural effusion. Doppler Measurements:  AV Peak Gradient: 105.67 mmHg                                               MV P1/2t: 46.7 msec                                               MVA by PHT4.71 cm^2 Valves  Mitral Valve  P1/2t: 46.7 msec  Area (PHT): 4.71 cm^2  Aortic Valve  Peak Velocity: 5.14 m/s  Peak Gradient: 105.67 mmHg    Xr Chest Portable    Result Date: 3/8/2021  EXAMINATION: XR CHEST PORTABLE CLINICAL HISTORY: HYPOTENSION COMPARISONS: CT CHEST, DECEMBER 29, 2020 FINDINGS: Osseous structures are intact. Cardiopericardial silhouette is normal. Pulmonary vasculature is normal. Lungs are clear. NO ACUTE CARDIOPULMONARY DISEASE. Assessment and plan:      -Advance Care Planning  Discussed goals of care with patient. Explained in extensive detail nuances between full code, DNR CCA and DNR CC. Patient has made the decision to be:    DNR CCA  NO MPOA or Living Will in place, offered assitance, declined  Pallitive Performance Scale of 50%  MELD 26  Poor PCI Candidate per cardiology  Poor anticoagulation candidate due to anemia  Hyponatremia continues, fluid restriction and samsca  May benefit from community based palliative care due to need for close monitoring as she is high risk for readmission, advanced disease and symptom burden.       -Goals of Care Discussion: Disease process and goals of treatment were discussed in basic terms. Elise's goal is to optimize available comfort care measures to decrease hospitalizations and maximize comfort. We discussed the palliative care philosophy in light of those goals. We discussed typical progression and prognosis of end stage cirrhosis, renal disease, and cardiomyopathy, she is not an organ transplant candidate due to her fragile state. We discussed all care options contingent on treatment response and QOL. Much active listening, presence, and emotional support were given.        While hospitalized she is being treated by other specialists for;  Decompensated liver cirrhosis with recurrent ascites status post catheter placement  Hyperkalemia  NSTEMI  Hyponatremia- baseline is normal. Pt has decompensated cirrhosis with ascites  Moderate protein calorie malnutrition  Hyponatremia  Diabetes    Electronically signed by TANNER Frost CNP on 3/22/2021 at 1:18 PM

## 2021-03-22 NOTE — PROGRESS NOTES
Pt spo2 96% on room air at rest  Pts spo2 99% on room air walking. Pt does not qualify at this time.   yaima finley rrt  X [phone]

## 2021-03-22 NOTE — LETTER
3/22/2021    Jesika Copeland  [address]  [facility] [place] [facility] [place] [facility] [address]      Dear Jesika Copeland,    My name is Shawna Martinez and I am a registered nurse who partners with [facility] to improve patients' health. [facility] believes you would benefit from working with me. As a member of your health care team, I would work with other providers involved in your care, offer education for your specific health conditions, and connect you with additional resources as needed. I will collaborate with [facility] to support you in following your treatment plan. The additional support I provide is no additional cost to you. My primary focus is to help you achieve specific goals and improve your health. We are committed to walk with you on this journey and look forward to working with you. Please call me to further discuss your healthcare needs. I am available by phone or for appointments at the office. You can reach me at [phone].     In good health,     Shawna Martinez RN

## 2021-03-22 NOTE — FLOWSHEET NOTE
Pt's pleurx dressing changed once during shift due to leaking at the site. Pt's call light within reach and bed alarm active. Pt up to bathroom twice during night.

## 2021-03-22 NOTE — PROGRESS NOTES
Physical Therapy Med Surg Initial Assessment  Facility/Department: [address] [facility]  Room: [place] [address]       NAME: Bibi Emery  : 1945 (41 y.o.)  MRN: [id number]  CODE STATUS: Limited    Date of Service: 3/22/2021    Patient Diagnosis(es): Fatigue, unspecified type [R53.83]  Weakness [R53.1]   Chief Complaint   Patient presents with    Other     high potassium, sent by PCP    Fatigue     Patient Active Problem List    Diagnosis Date Noted    Fatigue 2021    Weakness 2021    Chronic kidney disease     Hyperkalemia     Hyponatremia     Cirrhosis, liver, pigmentary ([address]     Hypotension 2021    Other ascites 2020    Arterial hypotension     Shock ([address]     Alcoholic cirrhosis of liver with ascites ([address] 2020    Cirrhosis of liver with ascites ([address] 08/15/2020    Renal mass, left 08/15/2020    Abdominal distension 2020    FALLON (acute kidney injury) ([address] 2020    Rectal pain 2020    Cardiomyopathy ([address] 2020    Subconjunctival hemorrhage of left eye 2020    PVD (posterior vitreous detachment), both eyes 2019    Dry eye syndrome of bilateral lacrimal glands 2019    HARDEEP (obstructive sleep apnea) 2018    Bilateral carotid artery stenosis 2017    Right-sided carotid artery disease ([address] 2016    Pseudophakia of both eyes 2016    Regular astigmatism 2016    Presbyopia of both eyes 2016    Bell-rectal abscess 2013    Hypertension     Hypertrophic obstructive cardiomyopathy (HOCM) ([address] 2013    Type 2 diabetes mellitus without complication, without long-term current use of insulin ([address] 03/15/2013        Past Medical History:   Diagnosis Date    Arthritis     Cardiomyopathy [facility])     mother history.     Chronic kidney disease     Hypertension     Liver disease     Other disorders of kidney and ureter in diseases classified elsewhere     Pneumonia     Psychiatric problem     Sleep apnea, obstructive     Type II or unspecified type diabetes mellitus without mention of complication, not stated as uncontrolled      Past Surgical History:   Procedure Laterality Date    APPENDECTOMY      CHOLECYSTECTOMY      COLONOSCOPY  04/23/2013    CT BIOPSY RENAL  08/04/2020    CT BIOPSY RENAL 8/4/2020 [facility] CT SCAN    CT BIOPSY RENAL  09/04/2020    CT BIOPSY RENAL 9/4/2020 [facility] CT SCAN    FOOT FRACTURE SURGERY Bilateral     FRACTURE SURGERY      HYSTERECTOMY      IR TUNNELED INTRAPERITNL CATH W/IMAG  1/28/2021    IR TUNNELED INTRAPERITNL CATH W/IMAG 1/28/2021 [facility] SPECIAL PROCEDURE    OTHER SURGICAL HISTORY      removal of anal fistulotomy    PARACENTESIS Left 07/28/2020    9015 mls removed by Dr Lopez Solomon Left 09/04/2020    15249hm ascites removed by Dr Damaris Pride 50g albumin given    PARACENTESIS Left 09/15/2020    10,400cc removed by Dr Ziggy Sidhu [phone]    PARACENTESIS Left 10/09/2020    7000cc removed by Dr. Lopez Solomon Left 10/13/2020    7000ml removed by Dr Ziggy Sidhu [phone]    PARACENTESIS Left 10/26/2020    7010cc removed by Dr. Jamin Chakraborty Left 11/06/2020    7020 mls removed ny Dr Lopez Solomon Left 11/19/2020    7000 ml removed by Dr. Damaris Pride.     PARACENTESIS Left 11/30/2020    7000 ml removed by paracentesis by Dr. Lopez Solomon Left 12/08/2020    7100 ml removed    PARACENTESIS Left 01/08/2021    ultrasound guided paracentesis 7150 ml removed    PARACENTESIS Left 01/19/2021    7000ml removed by Dr Trinh [facility] [phone]    PARACENTESIS Left 01/28/2021    by Dr. Damaris Pride 8100 ml removed   [address] Left 12/17/2020    7000ml removed by Dr Ziggy Sidhu       Chart Reviewed: Yes  Patient assessed for rehabilitation services?: Yes  Family / Caregiver Present: No    Restrictions:  Restrictions/Precautions: Fall Risk, Up as Tolerated     SUBJECTIVE:      Pain  Pre Treatment Pain Screening  Pain at present: 0    Post Treatment Pain Screening:   Pain Assessment  Pain Level: 0    Prior Level of Function:  Social/Functional History  Lives With: Spouse  Type of Home: House  Home Layout: One level  Home Access: Stairs to enter with rails  Entrance Stairs - Number of Steps: 2  Bathroom Shower/Tub: Tub/Shower unit  Bathroom Toilet: Standard  Bathroom Equipment: Shower chair, Hand-held shower, Grab bars in shower  Home Equipment: Cane, Rolling walker  ADL Assistance: Independent  Homemaking Assistance: Needs assistance(Her children are doing it)  Ambulation Assistance: Independent(No AD)  Transfer Assistance: Independent  Active : Yes  Additional Comments: Spouse has Parkinson's but could help if needed    OBJECTIVE:        Cognition:  Follows Commands: Within Functional Limits            Neuro:  Balance  Sitting - Static: Good  Sitting - Dynamic: Good  Standing - Static: Good;-  Standing - Dynamic: Good;-(pt reaches for support from furniture and walls for light balance assistance.  She reaches safely and utilizes support safely)             Bed mobility  Rolling to Left: Independent  Rolling to Right: Independent  Supine to Sit: Modified independent  Sit to Supine: Modified independent    Transfers  Sit to Stand: Modified independent  Stand to sit: Modified independent    Ambulation  Ambulation?: Yes  Ambulation 1  Surface: level tile  Device: No Device  Assistance: Modified Independent  Quality of Gait: mild lat LOB with pt using furniture and wall for light support appropriately  Distance: 30 ft  Comments: pt has access to cane and therapist recommended use of cane for consistent support at home              Activity Tolerance  Activity Tolerance: Patient Tolerated treatment well          PT Education  PT Education: PT Role;Plan of Care    ASSESSMENT:   Body structures, Functions, Activity limitations: Decreased balance  Decision Making: Low Complexity  History: med  Exam: low Clinical Presentation: low  No Skilled PT: At baseline function    Barriers to Learning: none    DISCHARGE RECOMMENDATIONS:  No Skilled PT: At baseline function    Assessment: Pt demonstrates mild balance deficits. She reports she is at her baseline level of function. Therapist and pt agree that no PT at this level of care is necessary. Recommend to pt that she utilize st cane  REQUIRES PT FOLLOW UP: No      PLAN OF CARE:  Safety Devices  Type of devices: Left in chair, Chair alarm in place, Call light within reach         [facility] (6 CLICK) BASIC MOBILITY  AM-PAC Inpatient Mobility Raw Score : 23     Therapy Time:   Individual   Time In 0859   Time Out 0910   Minutes [address], 03/22/21 at 9:17 AM         Definitions for assistance levels  Independent = pt does not require any physical supervision or assistance from another person for activity completion. Device may be needed.   Stand by assistance = pt requires verbal cues or instructions from another person, close to but not touching, to perform the activity  Minimal assistance= pt performs 75% or more of the activity; assistance is required to complete the activity  Moderate assistance= pt performs 50% of the activity; assistance is required to complete the activity  Maximal assistance = pt performs 25% of the activity; assistance is required to complete the activity  Dependent = pt requires total physical assistance to accomplish the task

## 2021-03-22 NOTE — FLOWSHEET NOTE
Nurse was assisting pts nurse Feliciano Gonzalez with her dc. Pt voices understanding of dc instructions. Abdominal dsg changed prior to dc. Pt trying to get a hold of her  to come pick her up. Pts nurse did speak with the pts dtr about some lab work that the pt was supposed to have tomorrow. Pt sent home with a copy attached to the order. [id number] left message on pts husbands voicemail that she was dcd and paperwork was done. This nurse did attempt to call Danika Zaman but her voicemail was full.

## 2021-03-22 NOTE — PROGRESS NOTES
Patient is a 70 y.o. female who presents with a chief complaint of weakness and. Patient is followed on a regular basis by Dr. Travis Olsen MD. Patient with past medical history of diabetes, hypertension, hyperlipidemia, cirrhosis, peripheral vascular disease. Was called to evaluate patient for sudden onset of midsternal chest discomfort/pain. Patient is writhing in pain during the evaluation and moaning. She complains of \"elephant sitting on her chest \"but no associated shortness of breath nausea vomiting or diaphoresis. There is no radiation of the pain. Pain is reproducible with palpation to the anterior chest wall. Cardiac enzymes are very mildly elevated. Patient with new onset atrial fibrillation with rapid ventricular response as well. She is hypotensive with a hemoglobin of 7.7. Patient had paracentesis yesterday as well as today. Patient with a chronic kidney disease creatinine of 1.76 with a GFR of 28. Has sodium of 123 and nephrology is managing. Patient with history of cardiac catheterization 2006 likely the clinic which was negative. Has echo on June 2020 ejection fraction of 85%, severe LVOT obstruction. 3/20/2021: Patient is doing much better today. She converted from atrial fibrillation to normal sinus rhythm overnight. She is hemodynamically stable now. Her cardiac enzyme was significant elevated to 2.8.  present at the bedside. Had a long discussion again regarding CODE STATUS and they would like to take the patient a DNR CCA.    3/21/2021: Patient  eating breakfast.  She denies any chest pain or shortness of breath. Hemodynamically stable. 3-22-21: up in chair. No CP or SOB. HD stable. Hgb is stable. Past Medical History        Past Medical History:   Diagnosis Date    Arthritis     Cardiomyopathy [facility])     mother history.     Chronic kidney disease     Hypertension     Liver disease     Other disorders of kidney and ureter in diseases classified elsewhere     Pneumonia     Psychiatric problem     Sleep apnea, obstructive     Type II or unspecified type diabetes mellitus without mention of complication, not stated as uncontrolled          Patient Active Problem List   Diagnosis    Type 2 diabetes mellitus without complication, without long-term current use of insulin (HCC)    Hypertension    Bell-rectal abscess    Bilateral carotid artery stenosis    Hypertrophic obstructive cardiomyopathy (HOCM) (HCC)    HARDEEP (obstructive sleep apnea)    Pseudophakia of both eyes    Regular astigmatism    Cardiomyopathy ([address]    Rectal pain    Abdominal distension    FALLON (acute kidney injury) ([address]    Cirrhosis of liver with ascites (HCC)    Renal mass, left    Alcoholic cirrhosis of liver with ascites (HCC)    Shock ([address]    Arterial hypotension    Subconjunctival hemorrhage of left eye    Right-sided carotid artery disease (HCC)    PVD (posterior vitreous detachment), both eyes    Presbyopia of both eyes    Dry eye syndrome of bilateral lacrimal glands    Other ascites    Hypotension    Cirrhosis, liver, pigmentary (HCC)    Fatigue    Chronic kidney disease    Hyperkalemia    Hyponatremia    Weakness     Past Surgical History         Past Surgical History:   Procedure Laterality Date    APPENDECTOMY      CHOLECYSTECTOMY      COLONOSCOPY  04/23/2013    CT BIOPSY RENAL  08/04/2020    CT BIOPSY RENAL 8/4/2020 [facility] CT SCAN    CT BIOPSY RENAL  09/04/2020    CT BIOPSY RENAL 9/4/2020 [facility] CT SCAN    FOOT FRACTURE SURGERY Bilateral     FRACTURE SURGERY      HYSTERECTOMY      IR TUNNELED INTRAPERITNL CATH W/IMAG  1/28/2021    IR TUNNELED INTRAPERITNL CATH W/IMAG 1/28/2021 [facility] SPECIAL PROCEDURE    OTHER SURGICAL HISTORY      removal of anal fistulotomy    PARACENTESIS Left 07/28/2020    9015 mls removed by Dr Arlene Watson Left 09/04/2020    85643sv ascites removed by Dr Liz Schultz 50g albumin given    PARACENTESIS Left 09/15/2020    10,400cc removed by Dr Hollie Villalta [phone]    PARACENTESIS Left 10/09/2020    7000cc removed by Dr. Lissett Astorga Left 10/13/2020    7000ml removed by Dr Hollie Villalta [phone]    PARACENTESIS Left 10/26/2020    7010cc removed by Dr. Eduardo Degroot Left 11/06/2020    7020 mls removed ny Dr Lissett Astorga Left 11/19/2020    7000 ml removed by Dr. Odin Tavera.     PARACENTESIS Left 11/30/2020    7000 ml removed by paracentesis by Dr. Lissett Astorga Left 12/08/2020    7100 ml removed    PARACENTESIS Left 01/08/2021    ultrasound guided paracentesis 7150 ml removed    PARACENTESIS Left 01/19/2021    7000ml removed by Dr Hollie Villalta [phone]    PARACENTESIS Left 01/28/2021    by Dr. Odin Tavera 8100 ml removed   [address] Left 12/17/2020    7000ml removed by Dr Stephens Solum History    Marital status:      Spouse name: Not on file    Number of children: Not on file    Years of education: Not on file    Highest education level: Not on file   Occupational History    Not on file   Social Needs    Financial resource strain: Not hard at all   Cristino-Sukhdeep insecurity     Worry: Never true     Inability: Never true   [employer] needs     Medical: Not on file     Non-medical: Not on file   Tobacco Use    Smoking status: Never Smoker    Smokeless tobacco: Former User   Substance and Sexual Activity    Alcohol use: No    Drug use: No    Sexual activity: Yes   Lifestyle    Physical activity     Days per week: Not on file     Minutes per session: Not on file    Stress: Not on file   Relationships    Social connections     Talks on phone: Not on file     Gets together: Not on file     Attends Muslim service: Not on file     Active member of club or organization: Not on file     Attends meetings of clubs or organizations: Not on file     Relationship status: Not on file    Intimate partner violence Fear of current or ex partner: Not on file     Emotionally abused: Not on file     Physically abused: Not on file     Forced sexual activity: Not on file   Other Topics Concern    Not on file   Social History Narrative    Not on file     Family History         Family History   Problem Relation Age of Onset    Arrhythmia Mother     Diabetes Mother     High Blood Pressure Mother     Arrhythmia Father     Breast Cancer Sister     Diabetes Brother     High Blood Pressure Brother      Current Facility-Administered Medications             Current Facility-Administered Medications   Medication Dose Route Frequency Provider Last Rate Last Admin    midodrine (PROAMATINE) tablet 10 mg 10 mg Oral TID WC Yazid R Quan, DO  10 mg at 03/19/21 1301    conivaptan (VAPRISOL) 20 mg in dextrose 5% 100 mL infusion 20 mg Intravenous Once Maira Orlando, DO 4.2 mL/hr at 03/19/21 1356 20 mg at 03/19/21 1356    metoprolol (LOPRESSOR) injection 5 mg 5 mg Intravenous Once Lindsey Lorenz, DO      nitroGLYCERIN (NITROSTAT) SL tablet 0.4 mg 0.4 mg Sublingual Q5 Min PRN Kerry Moe MD  0.4 mg at 03/19/21 1626    0.9 % sodium chloride infusion  Intravenous Continuous Kerry Moe MD      dilTIAZem 125 mg in dextrose 5 % 125 mL infusion 5-15 mg/hr Intravenous Continuous Kerry Moe MD      0.9 % sodium chloride bolus 250 mL Intravenous Once Kerry Moe  mL/hr at 03/19/21 1628 250 mL at 03/19/21 1628    morphine (PF) injection 2 mg 2 mg Intravenous Q4H PRN Mariano France,       metoprolol succinate (TOPROL XL) extended release tablet 12.5 mg 12.5 mg Oral BID Maira Orlando, DO  Stopped at 03/18/21 1939    sodium zirconium cyclosilicate (LOKELMA) oral suspension 10 g 10 g Oral Daily Roland Johnson MD  10 g at 03/19/21 1301    glucose (GLUTOSE) 40 % oral gel 15 g 15 g Oral PRN TANNER Oneill - CNP      dextrose 50 % IV solution 12.5 g Intravenous PRN Jimmy Tucker APRN - CNP     Solomon Banks glucagon (rDNA) injection 1 mg 1 mg Intramuscular PRN Michelle Maker, APRN - CNP      dextrose 5 % solution 100 mL/hr Intravenous PRN Michelle Maker, APRN - CNP      insulin lispro (HUMALOG) injection vial 0-12 Units 0-12 Units Subcutaneous TID WC Michelle Maker, APRN - CNP  2 Units at 03/19/21 1302    insulin lispro (HUMALOG) injection vial 0-6 Units 0-6 Units Subcutaneous Nightly Michelle Maker, APRN - CNP  Stopped at 03/18/21 1937    sodium chloride flush 0.9 % injection 10 mL 10 mL Intravenous 2 times per day Michelle Maker, APRN - CNP  10 mL at 03/19/21 3394    sodium chloride flush 0.9 % injection 10 mL 10 mL Intravenous PRN Michelle Maker, APRN - CNP  10 mL at 03/19/21 0059    enoxaparin (LOVENOX) injection 30 mg 30 mg Subcutaneous Daily Michelle Maker, APRN - CNP  30 mg at 03/19/21 0840    promethazine (PHENERGAN) tablet 12.5 mg 12.5 mg Oral Q6H PRN Michelle Maker, APRN - CNP      Or    ondansetron [facility]) injection 4 mg 4 mg Intravenous Q6H PRN Michelle Maker, APRN - CNP      acetaminophen (TYLENOL) tablet 650 mg 650 mg Oral Q6H PRN Michelle Maker, APRN - CNP  650 mg at 03/19/21 5515    Or    acetaminophen (TYLENOL) suppository 650 mg 650 mg Rectal Q6H PRN Michelle Maker, APRN - CNP      atorvastatin (LIPITOR) tablet 10 mg 10 mg Oral Daily Pitney Lyle, DO  10 mg at 03/19/21 0840    traZODone (DESYREL) tablet 25 mg 25 mg Oral Nightly PRN Pitney Lyle, DO     ALLERGIES: Codeine   Review of Systems   Constitutional: Negative. Negative for chills and fever. HENT: Negative. Eyes: Negative. Respiratory: Negative for shortness of breath and wheezing. Cardiovascular: Positive for chest pain. Negative for palpitations and leg swelling. Gastrointestinal: Negative. Negative for abdominal pain, nausea and vomiting. Endocrine: Negative. Genitourinary: Negative. Musculoskeletal: Negative. Skin: Negative. Negative for rash. Allergic/Immunologic: Negative. Neurological: Negative for dizziness, weakness and headaches. Hematological: Negative. Psychiatric/Behavioral: Negative. VITALS:   Blood pressure (!) 104/53, pulse 92, temperature 98.1 °F (36.7 °C), temperature source Oral, resp. rate 18, height 5' 7\" (1.702 m), weight 176 lb (79.8 kg), SpO2 99 %, not currently breastfeeding. Body mass index is 27.57 kg/m². Physical Exam   Constitutional: She is oriented to person, place, and time. She appears well-developed and well-nourished. HENT:   Head: Normocephalic and atraumatic. Eyes: Pupils are equal, round, and reactive to light. Neck: Normal range of motion. Neck supple. No JVD present. No tracheal deviation present. No thyromegaly present. Cardiovascular: Intact distal pulses. An irregularly irregular rhythm present. Tachycardia present. PMI is not displaced. Exam reveals no gallop, no S3, no distant heart sounds and no friction rub. Murmur heard. Pulmonary/Chest: No respiratory distress. She has no wheezes. She has no rales. She exhibits tenderness. Abdominal: Soft. Bowel sounds are normal. She exhibits distension. She exhibits no mass. There is abdominal tenderness. There is rebound and guarding. Musculoskeletal:   General: No edema. Neurological: She is alert and oriented to person, place, and time. No cranial nerve deficit. Skin: Skin is warm and dry. No rash noted. She is not diaphoretic. No erythema. No pallor. Psychiatric: She has a normal mood and affect.  Her behavior is normal. Judgment and thought content normal.     LABS:   Recent Results         Recent Results (from the past 24 hour(s))   POCT Glucose    Collection Time: 03/18/21 7:36 PM   Result Value Ref Range    POC Glucose 149 (H) 60 - 115 mg/dl    Performed on ACCU-CHEK    Comprehensive Metabolic Panel w/ Reflex to MG    Collection Time: 03/19/21 5:58 AM   Result Value Ref Range    Sodium 123 (L) 135 - 144 mEq/L    Potassium reflex Magnesium 3.5 3.4 - 4.9 mEq/L    Chloride 91 (L) 95 - 107 mEq/L    CO2 21 20 - 31 mEq/L    Anion Gap 11 9 - 15 mEq/L    Glucose 124 (H) 70 - 99 mg/dL    BUN 47 (H) 8 - 23 mg/dL    CREATININE 1.76 (H) 0.50 - 0.90 mg/dL    GFR Non-African American 28.3 (L) >60    GFR  34.2 (L) >60    Calcium 7.9 (L) 8.5 - 9.9 mg/dL    Total Protein 4.8 (L) 6.3 - 8.0 g/dL    Albumin 2.6 (L) 3.5 - 4.6 g/dL    Total Bilirubin 0.8 (H) 0.2 - 0.7 mg/dL    Alkaline Phosphatase 97 40 - 130 U/L    ALT 17 0 - 33 U/L    AST 19 0 - 35 U/L    Globulin 2.2 (L) 2.3 - 3.5 g/dL   CBC auto differential    Collection Time: 03/19/21 5:58 AM   Result Value Ref Range    WBC 5.5 4.8 - 10.8 K/uL    RBC 2.65 (L) 4.20 - 5.40 M/uL    Hemoglobin 7.7 (L) 12.0 - 16.0 g/dL    Hematocrit 23.1 (L) 37.0 - 47.0 %    MCV 87.1 82.0 - 100.0 fL    MCH 29.1 27.0 - 31.3 pg    MCHC 33.4 33.0 - 37.0 %    RDW 14.3 11.5 - 14.5 %    Platelets 008 (L) 445 - 400 K/uL    Neutrophils % 76.6 %    Lymphocytes % 9.9 %    Monocytes % 12.0 %    Eosinophils % 1.0 %    Basophils % 0.5 %    Neutrophils Absolute 4.2 1.4 - 6.5 K/uL    Lymphocytes Absolute 0.5 (L) 1.0 - 4.8 K/uL    Monocytes Absolute 0.7 0.2 - 0.8 K/uL    Eosinophils Absolute 0.1 0.0 - 0.7 K/uL    Basophils Absolute 0.0 0.0 - 0.2 K/uL   Comprehensive Metabolic Panel    Collection Time: 03/19/21 5:58 AM   Result Value Ref Range    Potassium 3.5 3.4 - 4.9 mEq/L   Magnesium    Collection Time: 03/19/21 5:58 AM   Result Value Ref Range    Magnesium 1.9 1.7 - 2.4 mg/dL   POCT Glucose    Collection Time: 03/19/21 8:38 AM   Result Value Ref Range    POC Glucose 158 (H) 60 - 115 mg/dl    Performed on ACCU-CHEK    POCT Glucose    Collection Time: 03/19/21 11:43 AM   Result Value Ref Range    POC Glucose 173 (H) 60 - 115 mg/dl    Performed on ACCU-CHEK    EKG 12 Lead    Collection Time: 03/19/21 4:08 PM   Result Value Ref Range    Ventricular Rate 141 BPM    Atrial Rate 107 BPM    QRS Duration 88 ms    Q-T Interval 312 ms    QTc Calculation (Bazett) 477 ms    R Axis 64 degrees    T Axis 214 degrees   Troponin    Collection Time: 03/19/21 4:20 PM   Result Value Ref Range    Troponin 0.028 (HH) 0.000 - 0.010 ng/mL   POCT Glucose    Collection Time: 03/19/21 4:43 PM   Result Value Ref Range    POC Glucose 195 (H) 60 - 115 mg/dl    Performed on ACCU-CHEK    Troponin:         Lab Results   Component Value Date    TROPONINI 0.028 03/19/2021     EKG: atrial fibrillation, ST depressions in the lateral leads as well as T wave version in 1 and aVL. ASSESSMENT:         Active Hospital Problems    Diagnosis Date Noted    Fatigue [R53.83] 03/17/2021     Priority: Low    Weakness [R53.1] 03/17/2021     Priority: Low    Chronic kidney disease [N18.9]      Priority: Low    Hyperkalemia [E87.5]      Priority: Low    Hyponatremia [E87.1]      Priority: Low    Cirrhosis of liver with ascites (HCC) [K74.60, R18.8] 08/15/2020     Priority: Low    Hypertension [I10]      Priority: Low    Type 2 diabetes mellitus without complication, without long-term current use of insulin ([facility] [E11.9] 03/15/2013     Priority: Low         Chest pain resolved. NSTEMI  New onset A. fib with rapid ventricle response   Liver cirrhosis   History of diabetes   Severe anemia   Severe hyponatremia   History of hypertension, currently hypotensive, on midodrine   History of hyperlipidemia   History of tobacco abuse   History of peripheral vascular disease, status post bilateral carotid endarterectomy   Severe LVOT obstruction       PLAN:     1. Conservative medical therapy from cardiology standpoint. Patient is a poor PCI candidate secondary to comorbidities as well as quality of life. Patient and  agreed on changing CODE STATUS to DNR CCA. 2. Ranexa  3. Digoxin, lopressor. 4. No ASA due to anemia. 5. Monitor on telemetry  6. Control heart rate  7.  Poor anticoagulation candidate at this time secondary to severe anemia

## 2021-03-22 NOTE — PROGRESS NOTES
Patient without any questions or concerns. Ron Cardona daughter here to  per patient; patient taken out via wc to front entrance. No acute resp distress or discomfort. No acute changes from above. Will d/c as ordered.

## 2021-03-23 NOTE — CARE COORDINATION
Tika [facility] Transitions Initial Follow Up Call    Call within 2 business days of discharge: Yes    Patient: Mere Bae Patient : 1945   MRN: [id number]  Reason for Admission: 3/17-3/22/2021 ED [facility] Decompensated liver cirrhosis with recurrent ascites status post catheter placement, Hyperkalemia, Hyponatremia, NSTEMI. Discharge Date: 3/22/21 RARS: Readmission Risk Score: 29  Readmission  Care Transitions    Last Discharge [facility]       Complaint Diagnosis Description Type Department Provider    3/17/21 Other; Fatigue Hyperkalemia . .. ED to Hosp-Admission (Discharged) (ADMITTED) [facility], ; Julian Ramírez. .. Spoke with: Leslee Mckeon and dtr report pt drain blocked and no output since return home. She is urinating. Going for imaging for fluid measurement today and to see why drain is blocked. Pt feels bloated. No acute abd pain, nausea, or vomiting. Reports fatigue and weakness. Requires a lot of assist today. Drinking high protein Ensure. No acute chest pain/pressure, palpitation, worsened SOB, or dizziness at time of this call. Reviewed HHC and Palliative Care services/benefits to pt. Dtr will discuss further w/ PCP at [facility] appt. Pt currently declines. Did not have time to review meds, as heading to appt. [facility] routed for fu. Was this an external facility discharge? No Discharge Facility:     Challenges to be reviewed by the provider   Additional needs identified to be addressed with provider No  none             Method of communication with provider : none    Advance Care Planning:   Does patient have an Advance Directive:  reviewed and current. Was this a readmission? Yes  Patient stated reason for admission: See above  Patients top risk factors for readmission: medical condition    Care Transition Nurse (CTN) contacted the patient by telephone to perform post hospital discharge assessment. Verified name and  with patient as identifiers.  Provided introduction to self, and explanation of the CTN role. CTN reviewed discharge instructions, medical action plan and red flags with patient who verbalized understanding. Patient given an opportunity to ask questions and does not have any further questions or concerns at this time. Were discharge instructions available to patient? Yes. Reviewed appropriate site of care based on symptoms and resources available to patient including: When to call 911 and Reviewed s/s F.O. to report/go to ED. . The patient agrees to contact the PCP office for questions related to their healthcare. Discussed follow-up appointments. If no appointment was previously scheduled, appointment scheduling offered: PCP 3/29 11:00, Dr Momo Hand 5/17 12:45. Is follow up appointment scheduled within 7 days of discharge? Yes  Non-[facility] follow up appointment(s):     Plan for follow-up call in 5-7 days based on severity of symptoms and risk factors. Plan for next call: symptom management-Cirrhosis  CTN provided contact information for future needs. Care Transitions 24 Hour Call    Do you have any ongoing symptoms?: Yes  Patient-reported symptoms: Fatigue, Weakness, Other (Comment: Blocked cath.  Abd distention.)  Do you have a copy of your discharge instructions?: Yes  Do you have all of your prescriptions and are they filled?: Yes  Have you been contacted by a [facility] [address]?: No  Have you scheduled your follow up appointment?: Yes  Were you discharged with any Home Care or Post Acute Services: No  Do you feel like you have everything you need to keep you well at home?: Yes  Are you an active caregiver in your home?: No  Care Transitions Interventions    Physical Therapy: Declined     Occupational Therapy: Declined      Palliative Care: Declined            Follow Up  Future Appointments   Date Time Provider Carlota Rodriguez   3/29/2021 11:00 AM MD Carlota Leung   4/20/2021  2:00 PM MD Carlota Leung   5/17/2021 12:45 PM Tony Olvera MD [place] [id number], [facility]

## 2021-03-23 NOTE — SEDATION DOCUMENTATION
NO SEDATION      1540 pt brought from CT dept via w/c, assisted to cart for evaluation of Aspira drain. Attempt to drain with Aspira drain kit. No fluid draining at this time. Support offered to pt.     1551 Dr Elie Childs here to evaluate drain, attempted to flush with NS. No return of asitic fluid. Dr Elie Childs obtained images prior to start of procedure using U/S. Pt  VSS. 1553  Procedure explained, consent signed. Timeout completed for Ultrasound Guided Paracentesis. Using U/S, Dr. Elie Childs marked, prepped LLQ with Chloraprep and draped with sterile drape and towels. 1600  Site numbed with lidocaine. [id number] Centesis 5F catheter placed using Ultrasound guidance. Fluid appears clear yellow. Catheter tubing connected to [facility] machine to remove fluid. Per Dr. Elie Childs , patient will drain up to 7,000 ml today . She will need an appointment to have Aspira drain replaced by Dr. Elie Childs in the upcoming week. 1630 - peripheral IV started. Patient given 25 g IV albumin. 1640 Pt tolerated well. Total 6420 ml removed. Catheter removed, pressure held. Sterile dressing applied to paracentesis and aspira drain site. Verbal and tactile reassurance given throughout.

## 2021-03-24 NOTE — TELEPHONE ENCOUNTER
Pharmacy requesting medication refill.  Please approve or deny this request.    Rx requested:  Requested Prescriptions     Pending Prescriptions Disp Refills    metFORMIN (GLUCOPHAGE) 1000 MG tablet [Pharmacy Med Name: METFORMIN HCL TABS 1000MG] 180 tablet 3     Sig: TAKE 1 TABLET TWICE A DAY WITH MEALS         Last Office Visit:   9/23/2020      Next Visit Date:  Future Appointments   Date Time Provider Carlota Rodriguez   3/29/2021 11:00 AM MD Carlota Cortes   4/20/2021  2:00 PM MD Carlota Cortes   5/17/2021 12:45 PM Madhu Garcia MD [address] [place] [address]

## 2021-03-26 NOTE — FLOWSHEET NOTE
Patient arrived and brought back to CT holding room via wheelchair. Patient is alert and oriented, able to follow commands and answer questions appropriately. Patient's allergies, history, and home medications reviewed. Vital signs taken and recorded. Consent obtained for ultrasound guided aspira drain exchange or replacement and paracentesis. Patient taken to specials for procedure via cart.

## 2021-03-26 NOTE — SEDATION DOCUMENTATION
NO SEDATION     1342  Pt assisted onto specials table. Procedure explained. Consent verified. .  VSS. Support offered to pt.      1343 Dr. Naty Martínez here speaking with pt.       1343 Time out performed for Ultrasound guided Aspira drain exchange or replacement and paracentesis. 1348 Left existing dressing removed from abdomen and left abdomen cleansed generously with chloroprep and draped area in a sterile fashion. 1357 Existing cap removed and new sterile cap placed onto existing tubing. Dr. Naty Martínez attached a large syringe and aspirated large amount of ascitic fluid. Existing tubing with new cap attached to [facility]upe machine and now draining clear aaron fluid currently. 1413 Fluid continues to drain, yellow in color. Patient tolerating well. 1426 8210 ml removed. Dr. Naty Martínez aware of amount of fluid removed. Maria Guadalupe disconnected and patient aspira drain  Dressed and and covered with drain sponge and tegaderm. Patient tolerated procedure well. Patient to receive Albumin. To be taken back to CT holding room for Albumin administration.

## 2021-03-29 NOTE — PROGRESS NOTES
Post-Discharge Transitional Care Management Services or Hospital Follow Up      [facility]   YOB: 1945    Date of Office Visit:  3/29/2021  Date of Hospital Admission: 3/17/21  Date of Hospital Discharge: 3/22/21  Readmission Risk Score(high >=14%.  Medium >=10%):Readmission Risk Score: 29  Care management risk score Rising risk (score 2-5) and Complex Care (Scores >=6): 1   Non face to face  following discharge, date last encounter closed (first attempt may have been earlier): 3/23/2021  2:12 PM 3/23/2021  2:12 PM  Call initiated 2 business days of discharge: Yes   Patient Active Problem List   Diagnosis    Type 2 diabetes mellitus without complication, without long-term current use of insulin ([address]    Hypertension    Bell-rectal abscess    Bilateral carotid artery stenosis    Hypertrophic obstructive cardiomyopathy (HOCM) (HCC)    HARDEEP (obstructive sleep apnea)    Pseudophakia of both eyes    Regular astigmatism    Cardiomyopathy ([address]    Rectal pain    Abdominal distension    FALLON (acute kidney injury) ([address]    Cirrhosis of liver with ascites ([address]    Renal mass, left    Alcoholic cirrhosis of liver with ascites (HCC)    Shock ([address]    Arterial hypotension    Subconjunctival hemorrhage of left eye    Right-sided carotid artery disease ([address]    PVD (posterior vitreous detachment), both eyes    Presbyopia of both eyes    Dry eye syndrome of bilateral lacrimal glands    Other ascites    Hypotension    Cirrhosis, liver, pigmentary (HCC)    Fatigue    Chronic kidney disease    Hyperkalemia    Hyponatremia    Weakness     Allergies   Allergen Reactions    Codeine      Medications listed as ordered at the time of discharge from hospital   [address] Medication Instructions EDDIE:    Printed on:03/29/21 2021   Medication Information                      Adhesive Tape (PAPER TAPE 1\"X12YD) TAPE  1 each by Does not apply route 4 times daily as needed (SOILAGE) atorvastatin (LIPITOR) 10 MG tablet  Take 1 tablet by mouth daily             Blood Pressure KIT  Check your blood pressure daily             digoxin (LANOXIN) 125 MCG tablet  Take 1 tablet by mouth daily             Gauze Pads & Dressings ([employer] GAUZE PAD STERILE 4\"X4\") 4\"X4\" PADS  Change intraperitoneal dressing - apply daily and PRN soilage (EXTRA ABSORBENT PADS)             glucose blood VI test strips (ACCU-CHEK MELANIE) strip  Test 1-2 times a day. Dx: 250.00. Accu-check melanie strips             Lancet Device MISC  Test 1-2 times a day. Dx: 250.00. Lancets             metFORMIN (GLUCOPHAGE) 1000 MG tablet  TAKE 1 TABLET TWICE A DAY WITH MEALS             metoprolol succinate (TOPROL XL) 25 MG extended release tablet  Take 0.5 tablets by mouth 2 times daily             midodrine (PROAMATINE) 10 MG tablet  Take 1 tablet by mouth 3 times daily (with meals)             nitroGLYCERIN (NITROSTAT) 0.4 MG SL tablet  up to max of 3 total doses. If no relief after 1 dose, call 911. Nutritional Supplements (ENSURE HIGH PROTEIN) LIQD  Take 1 Can by mouth 3 times daily             raloxifene (EVISTA) 60 MG tablet  TAKE 1 TABLET DAILY             ranolazine (RANEXA) 500 MG extended release tablet  Take 1 tablet by mouth 2 times daily             Transparent Dressings (TEGADERM FIRST AID STYLE) MISC  One box 50. Change daily one tegaderm to intraperitoneal site and PRN soilage.              traZODone (DESYREL) 50 MG tablet  Take 0.5 tablets by mouth nightly as needed for Sleep                   Medications marked \"taking\" at this time  Outpatient Medications Marked as Taking for the 3/29/21 encounter (Office Visit) with Maribell Vaca MD   Medication Sig Dispense Refill    metFORMIN (GLUCOPHAGE) 1000 MG tablet TAKE 1 TABLET TWICE A DAY WITH MEALS 180 tablet 3    ranolazine (RANEXA) 500 MG extended release tablet Take 1 tablet by mouth 2 times daily 60 tablet 3    nitroGLYCERIN (NITROSTAT) 0.4 MG SL tablet up to max of 3 total doses. If no relief after 1 dose, call 911. 25 tablet 3    metoprolol succinate (TOPROL XL) 25 MG extended release tablet Take 0.5 tablets by mouth 2 times daily 30 tablet 3    digoxin (LANOXIN) 125 MCG tablet Take 1 tablet by mouth daily 30 tablet 3    midodrine (PROAMATINE) 10 MG tablet Take 1 tablet by mouth 3 times daily (with meals) 90 tablet 3    Nutritional Supplements (ENSURE HIGH PROTEIN) LIQD Take 1 Can by mouth 3 times daily 270 Can 5    traZODone (DESYREL) 50 MG tablet Take 0.5 tablets by mouth nightly as needed for Sleep 30 tablet 1    atorvastatin (LIPITOR) 10 MG tablet Take 1 tablet by mouth daily 30 tablet 3    Adhesive Tape (PAPER TAPE 1\"X12YD) TAPE 1 each by Does not apply route 4 times daily as needed (SOILAGE) 6 each 3    Gauze Pads & Dressings ([employer] GAUZE PAD STERILE 4\"X4\") 4\"X4\" PADS Change intraperitoneal dressing - apply daily and PRN soilage (EXTRA ABSORBENT PADS) 5 each 3    Transparent Dressings (TEGADERM FIRST AID STYLE) MISC One box 50. Change daily one tegaderm to intraperitoneal site and PRN soilage. 1 each 3    raloxifene (EVISTA) 60 MG tablet TAKE 1 TABLET DAILY 90 tablet 3    Blood Pressure KIT Check your blood pressure daily 1 kit 0    glucose blood VI test strips (ACCU-CHEK MELANIE) strip Test 1-2 times a day. Dx: 250.00. Accu-check melanie strips 180 strip 3    Lancet Device MISC Test 1-2 times a day. Dx: 250.00. Lancets 180 each 3        Medications patient taking as of now reconciled against medications ordered at time of hospital discharge: Yes    Chief Complaint   Patient presents with   [address] from [facility] Maintenance     went over      [address] [facility] eval   HPI  Pt presents for follow up from hospital admission for hyperkalemia. Developed NSTEMI in-hospital. LHC not performed due to hx CKD and decompensated liver cirrhosis with ascites. Weekly paracentesis on-going. Hb remained stable at 8.6. Potassium normalized at discharge. Inpatient course: Discharge summary reviewed- see chart. Interval history/Current status: uncontrolled    Review of Systems   Constitutional: Negative for chills, diaphoresis, fatigue and fever. HENT: Negative for congestion, ear discharge, ear pain and sinus pressure. Respiratory: Negative for cough, shortness of breath and wheezing. Cardiovascular: Negative for chest pain. Gastrointestinal: Negative for abdominal pain, diarrhea, nausea and vomiting. Endocrine: Negative for cold intolerance and heat intolerance. Genitourinary: Negative for dysuria and frequency. Neurological: Positive for weakness. Negative for dizziness and light-headedness. Psychiatric/Behavioral: Positive for sleep disturbance. Vitals:    03/29/21 1057   BP: 122/60   Site: Left Upper Arm   Position: Sitting   Cuff Size: Medium Adult   Pulse: 64   Resp: 16   Temp: 98.7 °F (37.1 °C)   TempSrc: Oral   SpO2: 100%   Weight: 183 lb (83 kg)   Height: 5' 6\" (1.676 m)     Body mass index is 29.54 kg/m². Wt Readings from Last 3 Encounters:   03/29/21 183 lb (83 kg)   03/22/21 192 lb 6.4 oz (87.3 kg)   03/16/21 183 lb (83 kg)     BP Readings from Last 3 Encounters:   03/29/21 122/60   03/26/21 133/61   03/23/21 (!) 115/53       Physical Exam  Constitutional:       General: She is not in acute distress. Appearance: She is ill-appearing. She is not diaphoretic. Comments: Very somnolent but arousable  Bitemporal wasting   Cardiovascular:      Rate and Rhythm: Normal rate and regular rhythm. Pulses: Normal pulses. Heart sounds: S1 normal and S2 normal. Murmur present. Pulmonary:      Effort: Pulmonary effort is normal. No respiratory distress. Breath sounds: Normal breath sounds. No wheezing or rales. Chest:      Chest wall: No tenderness. Abdominal:      General: Bowel sounds are normal.      Tenderness: There is no abdominal tenderness. Neurological:      Motor: Weakness present.      Assessment/Plan: 1. Cirrhosis of liver with ascites, unspecified hepatic cirrhosis type ([facility]  - Colton Resendez MD, Palliative Care, Jose Miguel    2. Chronic kidney disease, unspecified CKD stage  -will monitor CKD  - NH DISCHARGE MEDS RECONCILED W/ CURRENT OUTPATIENT MED LIST    3. Electrolyte abnormality  -will monitor  -??potassium and creatinine   - CBC With Auto Differential; Future  - Comprehensive Metabolic Panel; Future  - NH DISCHARGE MEDS RECONCILED W/ CURRENT OUTPATIENT MED LIST  4.  Malnutrition, unspecified type ([facility]  tolerating Ensure        Medical Decision Making: high complexity

## 2021-03-30 PROBLEM — E43 SEVERE MALNUTRITION (HCC): Status: ACTIVE | Noted: 2021-01-01

## 2021-03-30 PROBLEM — E78.5 HLD (HYPERLIPIDEMIA): Status: ACTIVE | Noted: 2021-01-01

## 2021-03-30 NOTE — PROGRESS NOTES
Physician Progress Note    3/30/2021   10:36 AM    Name:  Stacy Gracia  MRN:    [id number]      Day: 1     Admit Date: 3/29/2021  9:23 PM  PCP: Tono Hearn MD    Code Status:  DNR-CCA      Assessment and Plan: Active Problems/ diagnosis:     Severe hyperkalemia  Hypovolemic hyponatremia-in the setting of decompensated cirrhosis, likely also due to poor p.o. intake  FALLON-likely prerenal but need to rule out hepatorenal also  Acute encephalopathy-likely multifactorial, due to hyponatremia versus hepatic, rule out infection, rule out adrenal insufficiency  Decompensated liver cirrhosis with recurrent ascites status post catheter placement  Hyperkalemia  Moderate protein calorie malnutrition  Diabetes      Plan  3/30/2021  -Patient is critically ill, she is very lethargic this morning. Renal evaluated the patient on admission last night and no need for urgent dialysis. -Rule out adrenal insufficiency, cosyntropin test ordered. Cortisol level was 21 this morning. She is ordered for dexamethasone by intensivist.  Discussed with intensivist.  -Agree with sodium bicarb infusion, Kayexalate, monitor potassium level closely. Continue telemetry monitoring.  -Resume home midodrine 10 mg 3 times daily.  -Resume insulin, monitor on hypoglycemia protocol.  -We will reach out to family for goals of care discussion, meanwhile we will consult palliative care. I discussed goals of care with the patient last admission and she is DNR CCA. At last admission, patient declined home care and SNF. -Drained 2 L from the abdomen and drain, send sample to rule out SBP although less likely    DVT PPx     Subjective:     Poor historian due to lethargy.     Physical Examination:      Vitals:  BP (!) 128/30   Pulse 69   Temp 98.2 °F (36.8 °C) (Bladder)   Resp 16   Ht 5' 7\" (1.702 m)   Wt 154 lb 8.7 oz (70.1 kg)   SpO2 100%   BMI 24.20 kg/m²   Temp (24hrs), Av.3 °F (36.3 °C), Min:95.5 °F (35.3 °C), Max:98.7 °F (37.1 °C) General appearance: Lethargic. Protecting her airway. Mental Status: Oriented x0. Lungs: clear to auscultation bilaterally, normal effort  Heart: regular rate   Abdomen: soft, no tenderness but distended, bowel sounds present, no masses  Extremities: no edema, redness, tenderness in the calves  Skin: no gross lesions, rashes  Neurologically: Oriented x0, nonfocal, encephalopathic.     Data:     Labs:  Recent Labs     03/29/21  1304 03/29/21  2145   WBC 11.0* 19.0*   HGB 11.1* 10.1*    254     Recent Labs     03/30/21  0145 03/30/21  0517   * 120*   K 7.1* 6.4*  6.4*   CL 85* 87*   CO2 14* 17*   BUN 81* 86*   CREATININE 3.50* 3.53*   GLUCOSE 176* 170*     Recent Labs     03/29/21  2145 03/30/21  0517   AST 33 24   ALT 25 19   BILITOT 1.4* 1.3*   ALKPHOS 82 59       Current Facility-Administered Medications   Medication Dose Route Frequency Provider Last Rate Last Admin    sodium bicarbonate 150 mEq in dextrose 5 % 1,000 mL infusion   Intravenous Continuous Cesar Fitzpatrick  mL/hr at 03/30/21 0258 New Bag at 03/30/21 0258    sodium chloride flush 0.9 % injection 10 mL  10 mL Intravenous 2 times per day Bakari Mehta RN, NP        sodium chloride flush 0.9 % injection 10 mL  10 mL Intravenous PRN Bakari Mehta RN, NP        0.9 % sodium chloride infusion  25 mL Intravenous PRN Bakari Mehta RN, NP        enoxaparin (LOVENOX) injection 30 mg  30 mg Subcutaneous Daily Bakari Mehta RN, NP   30 mg at 03/30/21 1029    promethazine (PHENERGAN) tablet 12.5 mg  12.5 mg Oral Q6H PRN Bakari Mehta RN, NP        Or    ondansetron (ZOFRAN) injection 4 mg  4 mg Intravenous Q6H PRN Bakari Mehta RN, NP        polyethylene glycol (GLYCOLAX) packet 17 g  17 g Oral Daily PRN Bakari Mehta RN, NP        acetaminophen (TYLENOL) tablet 650 mg  650 mg Oral Q6H PRN Bakari Mehta RN, NP        Or    acetaminophen (TYLENOL)

## 2021-03-30 NOTE — CONSULTS
Palliative Care Consult Note  Patient: Sophia Metz  Gender: female  YOB: 1945  Unit/Bed: IC10/IC10-01  Code Status: DNR-CCA  Inpatient Treatment Team: Treatment Team: Attending Provider: Merrick Martinez DO; Consulting Physician: Romana Mayo, DO; Registered Nurse: Ari Nicole, PERNELL; : Joel Aguilar, RN; Respiratory Therapist (Day): Yousif Santos RCP; : LEON Contreras; Utilization Reviewer: Jearld Najjar, RN; Consulting Physician: Lonny Castillo MD  Admit Date:  3/29/2021    Chief Complaint: shortness of breath, fatigue    History of Presenting Illness:      Sophia Metz is a 70 y.o. female on hospital day 1 with hyperkalemia. PMH is significant for HTN, HOCM, carotid stenosis s/p left CEA, CHF, decompensated nonalcoholic liver cirrhosis requiring frequent large volume paracentesis s/p peritoneal drain placement on 1/28, DM2, CKD, anxiety,and depression. Patient seen and examined at bedside for goals of care discussion. She presented to the ER with complaints of  increased shortness of breath and fatigue. This is her third hospitalization in the past 30 days. Labs in the ER showed potassium of 7.5, Creatinine 3.25, , BUN 81, chloride 81, CO2, 14, Albumin 3.0, total bilirubin 1.3, hemoglobin 10.1, and hematocrit 31.2. Ammonia is normal at 38. Rapid COVID 19 test is negative. Lactic acid 5.2. Procalcitonin 0.41. Last PT/INR on 3/17/21 was 18.5 and 1.5 respectively. Patient observed laying in bed. She is alert and oriented x3. She reports that she is SOB and weak. Abdomen is large and soft to palpation. Paracentesis done yesterday and 8210 mL of fluid aspirated. Her skin is dry and she is lethargic. She is having no pain. No cough or sputum prod uction. Negative abdominal pain or nausea. No GI or  complaints. Negative significant  emotional or sleep disturbance. States that she usually performs all of her own ADLS and ambulates without assitance. Informed patient that I was there to discuss her goals of care. Stated that she was tired of being in and out of the hospital and that she wanted to go to hospice. It is possible that patient is overwhelmed from her prolong illness and is requesting hospice out of frustration. I believe that it would be appropriate to revisit the goals of care discussion with patient when her symptoms improve.            Review of Systems:       Review of Systems   Constitutional: Positive for fatigue. Negative for activity change, appetite change, chills, diaphoresis, fever and unexpected weight change. HENT: Negative for drooling, hearing loss, mouth sores, sore throat, trouble swallowing and voice change. Eyes: Negative for discharge and visual disturbance. Respiratory: Negative for apnea, cough, choking, chest tightness, shortness of breath, wheezing and stridor. Cardiovascular: Positive for leg swelling. Negative for chest pain and palpitations. Gastrointestinal: Positive for abdominal distention. Negative for abdominal pain, anal bleeding, blood in stool, constipation, diarrhea, nausea, rectal pain and vomiting. Genitourinary: Negative for difficulty urinating, dysuria, enuresis, frequency and hematuria. Musculoskeletal: Negative for arthralgias, back pain, gait problem, joint swelling and myalgias. Skin: Negative for color change, pallor, rash and wound. Allergic/Immunologic: Negative for food allergies and immunocompromised state. Neurological: Negative for dizziness, tremors, seizures, syncope, facial asymmetry, speech difficulty, weakness, light-headedness, numbness and headaches. Hematological: Negative for adenopathy. Does not bruise/bleed easily. Psychiatric/Behavioral: Negative for agitation, behavioral problems, confusion, decreased concentration, dysphoric mood, hallucinations, self-injury, sleep disturbance and suicidal ideas. The patient is not nervous/anxious and is not hyperactive. Physical Examination:       BP (!) 144/33   Pulse 64   Temp 98.2 °F (36.8 °C) (Bladder)   Resp 16   Ht 5' 7\" (1.702 m)   Wt 154 lb 8.7 oz (70.1 kg)   SpO2 100%   BMI 24.20 kg/m²    Physical Exam  Constitutional:       General: She is not in acute distress. Appearance: She is well-developed. She is not diaphoretic. HENT:      Head: Normocephalic and atraumatic. Right Ear: External ear normal.      Left Ear: External ear normal.      Nose: Nose normal.      Mouth/Throat:      Pharynx: No oropharyngeal exudate. Eyes:      General: No scleral icterus. Right eye: No discharge. Left eye: No discharge. Conjunctiva/sclera: Conjunctivae normal.      Pupils: Pupils are equal, round, and reactive to light. Neck:      Musculoskeletal: Normal range of motion and neck supple. Thyroid: No thyromegaly. Vascular: No JVD. Trachea: No tracheal deviation. Cardiovascular:      Rate and Rhythm: Normal rate and regular rhythm. Heart sounds: Normal heart sounds. Pulmonary:      Effort: Pulmonary effort is normal. No respiratory distress. Breath sounds: Normal breath sounds. No stridor. No wheezing or rales. Chest:      Chest wall: No tenderness. Abdominal:      General: Bowel sounds are decreased. There is distension. Palpations: Abdomen is soft. There is no mass. Tenderness: There is no abdominal tenderness. There is no guarding or rebound. Comments:     Musculoskeletal: Normal range of motion. General: No tenderness or deformity. Lymphadenopathy:      Cervical: No cervical adenopathy. Skin:     General: Skin is warm and dry. Findings: No erythema or rash. Neurological:      Mental Status: She is alert and oriented to person, place, and time. Psychiatric:         Behavior: Behavior normal.         Thought Content: Thought content normal.         Allergies:       Allergies   Allergen Reactions    Codeine        Medications: Current Facility-Administered Medications   Medication Dose Route Frequency Provider Last Rate Last Admin    sodium bicarbonate 150 mEq in dextrose 5 % 1,000 mL infusion   Intravenous Continuous Marleny Merida  mL/hr at 03/30/21 1326 New Bag at 03/30/21 1326    sodium chloride flush 0.9 % injection 10 mL  10 mL Intravenous 2 times per day Bakari Mehta RN, NP   10 mL at 03/30/21 1102    sodium chloride flush 0.9 % injection 10 mL  10 mL Intravenous PRN Bakari Mehta RN, NP        0.9 % sodium chloride infusion  25 mL Intravenous PRN Bakari Mehta RN, NP        enoxaparin (LOVENOX) injection 30 mg  30 mg Subcutaneous Daily Bakari Mehta RN, NP   30 mg at 03/30/21 1029    promethazine (PHENERGAN) tablet 12.5 mg  12.5 mg Oral Q6H PRN Bakari Mehta RN, NP        Or    ondansetron (ZOFRAN) injection 4 mg  4 mg Intravenous Q6H PRN Bakari Mehta RN, NP        polyethylene glycol (GLYCOLAX) packet 17 g  17 g Oral Daily PRN Bakari Mehta RN, NP        acetaminophen (TYLENOL) tablet 650 mg  650 mg Oral Q6H PRN Bakari Mehta RN, NP        Or    acetaminophen (TYLENOL) suppository 650 mg  650 mg Rectal Q6H PRN Bakari Mehta RN, NP        glucose (GLUTOSE) 40 % oral gel 15 g  15 g Oral PRN Darion Melissa MD        dextrose 50 % IV solution  12.5 g Intravenous PRN Darion Melissa MD        glucagon (rDNA) injection 1 mg  1 mg Intramuscular PRN Darion Melissa MD        dextrose 5 % solution  100 mL/hr Intravenous PRN Darion Melissa MD        atorvastatin (LIPITOR) tablet 10 mg  10 mg Oral Daily Bakari Mehta RN, NP        digoxin (LANOXIN) tablet 125 mcg  125 mcg Oral Daily Bakari Mehta RN, NP   125 mcg at 03/30/21 1029    metoprolol succinate (TOPROL XL) extended release tablet 12.5 mg  12.5 mg Oral BID Bakari Mehta, RN, NP   12.5 mg at 03/30/21 1029    midodrine (PROAMATINE) tablet 10 Prosper Gonzales    PARACENTESIS Left 10/13/2020    7000ml removed by Dr Chaya Ga [phone]    PARACENTESIS Left 10/26/2020    7010cc removed by Dr. Ryann Baum Left 11/06/2020    7020 mls removed ny Dr Heather Strong Left 11/19/2020    7000 ml removed by Dr. Prosper Gonzales.  PARACENTESIS Left 11/30/2020    7000 ml removed by paracentesis by Dr. Heather Strong Left 12/08/2020    7100 ml removed    PARACENTESIS Left 01/08/2021    ultrasound guided paracentesis 7150 ml removed    PARACENTESIS Left 01/19/2021    7000ml removed by Dr Chaya Ga [phone]    PARACENTESIS Left 01/28/2021    by Dr. Prosper Gonzales 8100 ml removed    PARACENTESIS Left 03/23/2021    6420 ml removed by Dr. Prosper Gonzales [phone]    PARACENTESIS Left 03/26/2021    8210 ml removed by Dr. Prosper Gonzales.     US GUIDE PARACENTESIS Left 12/17/2020    7000ml removed by Dr Hanna Sandy Hx:  Social History     Socioeconomic History    Marital status:      Spouse name: Not on file    Number of children: Not on file    Years of education: Not on file    Highest education level: Not on file   Occupational History    Not on file   Social Needs    Financial resource strain: Not hard at all   [place] insecurity     Worry: Never true     Inability: Never true   [employer] needs     Medical: Not on file     Non-medical: Not on file   Tobacco Use    Smoking status: Never Smoker    Smokeless tobacco: Former User   Substance and Sexual Activity    Alcohol use: No    Drug use: No    Sexual activity: Yes   Lifestyle    Physical activity     Days per week: Not on file     Minutes per session: Not on file    Stress: Not on file   Relationships    Social connections     Talks on phone: Not on file     Gets together: Not on file     Attends Islam service: Not on file     Active member of club or organization: Not on file     Attends meetings of clubs or organizations: Not on file     Relationship status: Not on file    Intimate partner violence     Fear of current or ex partner: Not on file     Emotionally abused: Not on file     Physically abused: Not on file     Forced sexual activity: Not on file   Other Topics Concern    Not on file   Social History Narrative    Not on file       Family Hx:  Family History   Problem Relation Age of Onset    Arrhythmia Mother     Diabetes Mother     High Blood Pressure Mother     Arrhythmia Father     Breast Cancer Sister     Diabetes Brother     High Blood Pressure Brother        LABS: Reviewed     CBC:  Lab Results   Component Value Date    WBC 19.0 03/29/2021    RBC 3.64 03/29/2021    HGB 10.1 03/29/2021    HCT 31.2 03/29/2021    MCV 85.7 03/29/2021    MCH 27.8 03/29/2021    MCHC 32.5 03/29/2021    RDW 15.6 03/29/2021     03/29/2021    MPV 10.0 09/08/2015     CBC with Differential:   Lab Results   Component Value Date    WBC 19.0 03/29/2021    RBC 3.64 03/29/2021    HGB 10.1 03/29/2021    HCT 31.2 03/29/2021     03/29/2021    MCV 85.7 03/29/2021    MCH 27.8 03/29/2021    MCHC 32.5 03/29/2021    RDW 15.6 03/29/2021    NRBC 0.0 07/22/2020    LYMPHOPCT 3.8 03/29/2021    MONOPCT 6.6 03/29/2021    BASOPCT 0.4 03/29/2021    MONOSABS 1.3 03/29/2021    LYMPHSABS 0.7 03/29/2021    EOSABS 0.0 03/29/2021    BASOSABS 0.1 03/29/2021     CMP:    Lab Results   Component Value Date     03/30/2021    K 6.0 03/30/2021    K 6.4 03/30/2021    CL 86 03/30/2021    CO2 17 03/30/2021    BUN 85 03/30/2021    CREATININE 3.29 03/30/2021    GFRAA 16.6 03/30/2021    LABGLOM 13.7 03/30/2021    GLUCOSE 156 03/30/2021    GLUCOSE 84 12/04/2020    PROT 4.8 03/30/2021    LABALBU 3.0 03/30/2021    LABALBU 3.3 12/04/2020    CALCIUM 9.2 03/30/2021    BILITOT 1.3 03/30/2021    ALKPHOS 59 03/30/2021    AST 24 03/30/2021    ALT 19 03/30/2021     BMP:    Lab Results   Component Value Date     03/30/2021    K 6.0 03/30/2021    K 6.4 03/30/2021    CL 86 03/30/2021    CO2 17 03/30/2021    BUN permission to discuss her care with her daughter because her  has severe hearing impairment.     -Considering her Liver cirrhosis with hepatic insufficiency, hepatic encephalopathy, FALLON on CKD rule out hepatic renal syndrome, hyponatremia, hypercoagulability state, paracentesis, 3 hospitalizations within the past 30 days, PPS of 30, MELD 30, and INR is 1.5 she is appropriate for hospice service.    -Phone call placed to patient's daughter, Yaneth Bean. I recapped my conversation with the patient. Her daughter was surprised at her request for hospice. She is in agreement to revisit the goals of care discussion when the patient medical condition has improved. -Will follow up with patient tomorrow. While hospitalized she is being treated by other specialists for:  1. Severe hyperkalemia  2. Hypovolemic hyponatremia-in the setting of decompensated     cirrhosis, likely also due to poor p.o. intake  3. FALLON-likely prerenal but need to rule out hepatorenal also  4. Acute encephalopathy-likely multifactorial, due to hyponatremia versus hepatic, rule out infection, rule out adrenal insufficiency  5. Decompensated liver cirrhosis with recurrent ascites status post catheter placement  6. Hyperkalemia  7. Moderate protein calorie malnutrition  8.  Diabetes    Electronically signed by TANNER Ramires CNP on 3/30/2021 at 2:22 PM

## 2021-03-30 NOTE — ED NOTES
Patient was given enema of kayexalate per dr villanueva. Patient tolerated well.  No bm noted     Chavez Mace RN  03/30/21 0009

## 2021-03-30 NOTE — PROGRESS NOTES
Comprehensive Nutrition Assessment    Type and Reason for Visit:  Initial(cachectic/malnutrition)    Nutrition Recommendations/Plan:   Discontinue Cardiac restriction, add Carb Control 4, low potassium diet. Start Clear liquid ONS TID. Consider changing IVF to PPN     Nutrition Assessment:  Pt admitted with hyperkalemia. Severely malnourished on admission evidenced by wt loss, (> 20% in one month) and visible fat and muscle wasting. Will modify diet, start a nutrition supplement and continue to monitor    Malnutrition Assessment:  Malnutrition Status:  Severe malnutrition    Context:  Chronic Illness     Findings of the 6 clinical characteristics of malnutrition:  Energy Intake:  Unable to assess  Weight Loss:  7 - Greater than 5% over 1 month     Body Fat Loss:  7 - Severe body fat loss Fat Overlying Ribs, Buccal region   Muscle Mass Loss:  7 - Severe muscle mass loss Clavicles (pectoralis & deltoids), Scapula (trapezius)  Fluid Accumulation:    Ascites(cirrhosis)   Strength:  Not Performed    Estimated Daily Nutrient Needs:  Energy (kcal):  5402-9722-(kg x 25-28); Weight Used for Energy Requirements:  Current(70.1 kg)     Protein (g):  (kg x 1.0-1.3)70-91 gm; Weight Used for Protein Requirements:  Current(70.1 kg)        Fluid (ml/day):  ~1800 ml (or as per MD); Method Used for Fluid Requirements:  1 ml/kcal      Nutrition Related Findings:  PMH: DM2, renal failure, liver failure, cardiomyopathy, cachectic with visible fat and muscle wasting, abdomen distended/gross ascites, per review of EMR requires frequent paracentesis, BM (3/30), abdoulaye IVF: Sodium bicarbonate in D5 @ 100 ml/hr via peripheral line, labs: Na+ 120, K+ 6.4, BUN 86, Cr 3.53. Unable to interview, sleeping soundly x 2 visits.       Wounds:  None       Current Nutrition Therapies:    DIET CARDIAC; (UTD po intake)    Anthropometric Measures:  · Height: 5' 7\" (170.2 cm)  · Current Body Weight: 154 lb (69.9 kg)(3/30)   · Admission Body Weight: 200 lb (90.7 kg)(stated)    · Usual Body Weight: 200 lb (90.7 kg)(2/22/21 bedscale)     · Ideal Body Weight: 135 lbs; % Ideal Body Weight  > 100%   · BMI: 24.1  · BMI Categories: Normal Weight (BMI 22.0 to 24.9) age over 72       Nutrition Diagnosis:   · In context of chronic illness related to catabolic illness as evidenced by weight loss greater than or equal to 5% in 1 month, severe muscle loss, severe loss of subcutaneous fat    Nutrition Interventions:   Food and/or Nutrient Delivery:  Modify Current Diet(Discontinue Cardiac restriction, add Carb Control 4, low potassium. Start Clear liquid ONS TID.   Consider changing IVF to PPN)  Nutrition Education/Counseling:  Education not indicated   Coordination of Nutrition Care:  Continue to monitor while inpatient    Goals:  po intake > 75% of meals and supplements, anticipate wt fluctuations with edema/diuresis       Nutrition Monitoring and Evaluation:   Food/Nutrient Intake Outcomes:  Food and Nutrient Intake, Supplement Intake  Physical Signs/Symptoms Outcomes:  Biochemical Data, GI Status, Fluid Status or Edema, Weight     Electronically signed by Brooklyn Childs RD, LD on 3/30/21 at 10:13 AM EDT

## 2021-03-30 NOTE — CONSULTS
[address]                      [address], [address] [facility]                                  CONSULTATION    PATIENT NAME: Adam Lehman                        :        1945  MED REC NO:   [id number]                            ROOM:       IC10  ACCOUNT NO:   [de-identified]                           ADMIT DATE: 2021  PROVIDER:     Harry Madsen DO    CONSULT DATE:  2021    RENAL CONSULTATION    HISTORY OF PRESENT ILLNESS:  A 31-year-old malnourished female admitted  to the hospital through the emergency room with hyperkalemia and  increasing shortness of breath. The patient has significant past  medical history with hypertrophic cardiomyopathy and known cirrhosis of  the liver from alcohol, requiring frequent paracentesis. The patient's  intake has been poor since her discharge approximately 7-10 days ago at  [facility].  She has known diabetes, hypertension and COPD. On  admission to the hospital, the patient has serum potassium of 7.5 mEq  with a serum creatinine of 3.25 and GFR of 14 mL per minute. The  patient was also hyponatremic on admission to the hospital.  The patient  is a poor historian, quite weak at this time. The patient is known to  me from previous admissions to the hospital.    PAST MEDICAL HISTORY:  Cardiomyopathy, hypertrophic; hyperlipidemia;  diabetes mellitus type 2; malnutrition; cirrhosis; COPD with HARDEEP. PAST SURGICAL HISTORY:  Appendectomy, multiple paracenteses, biopsy of  the kidney 1 year ago, cholecystectomy, hysterectomy. FAMILY HISTORY:  Unable to be obtained. HABITS:  Quit smoking in . History of alcohol use in the past.    MEDICATIONS:  At the time of her admission to the hospital?  Glucophage,  Ranexa, Toprol, Lanoxin, nitroglycerin, Evista, Lipitor, ProAmatine. ALLERGIES TO MEDICATIONS:  CODEINE. REVIEW OF SYSTEMS:  Unable to be obtained.     PHYSICAL EXAMINATION:  VITAL SIGNS:  5 feet 7 inches, 154 pounds. Blood pressure 137/40, heart  rate 70, respirations 18, afebrile. HEENT:  Normocephalic. The patient has muscle wasting noted. NECK:  Supple. No JVD or adenopathy. CHEST:  The lungs show decreased sounds at the bases, especially with no  accessory muscle use. No wheezing. CARDIOVASCULAR:  Heart is regular with a 3/6 systolic murmur. ABDOMEN:  Distended, mild guarding on palpation due to tenderness. Bowel sounds are decreased. EXTREMITIES:  Show 1 to 2+ edema of the ankles and feet bilaterally. SKIN:  Warm. IMPRESSION:  1. FALLON on CKD IV, prerenal azotemia and hyperkalemia related to FALLON and  CKD. 2.  Hypertrophic cardiomyopathy. 3.  Hyponatremia related to cirrhosis and renal failure. 4.  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 5.  Malnourished. 6.  Left Pleurx noted to be in place. PLAN:  _____ to be repeated, one dose of _____ for hyponatremia, to have  labs daily. Poor prognosis overall due to multiple medical issues. We  will follow along with you. Try to avoid hypotension and further nephrotoxic exposure of  medications.         Judy Gonsales DO    D: 03/30/2021 14:47:56       T: 03/30/2021 14:55:24     [email or web address]  Job#: [id number]     Doc#: [id number]    CC:

## 2021-03-30 NOTE — CONSULTS
Pulmonary and Critical Care Medicine  Consult Note  Encounter Date: 3/30/2021 10:25 AM    Ms. Karin Lund is a 70 y.o. female  : 1945  Requesting Provider: [address]     Reason for request: ICU management            HISTORY OF PRESENT ILLNESS:    Patient is 70 y.o. presents with 1 month history of generalized weakness, she was at her PCP office, she was found to have hyperkalemia and sent to emergency room. She denies chest pain, no fever, no nausea no vomiting, and no abdominal pain. She denies diarrhea or sick contact. She is laying in bed complains of generalized discomfort but denies pain. Patient has history of liver cirrhosis with recurrent ascites, status post peritoneal tunneled drainage catheter placement. She has history of liver cirrhosis ascites and portal hypertension, etiology of cirrhosis is not clear no clear history of alcoholism per notes, she was seen by GI in September last year. Past Medical History:        Diagnosis Date    Arthritis     Ascites due to alcoholic cirrhosis ([address]     Cardiomyopathy [facility])     mother history.     Chronic kidney disease     Cirrhosis ([address]     Encephalopathy     HLD (hyperlipidemia) 3/30/2021    Hypertension     Hypotension     Liver disease     Liver failure (HCC)     HARDEEP (obstructive sleep apnea)     Other disorders of kidney and ureter in diseases classified elsewhere     Pneumonia     Psychiatric problem     Renal failure     Severe malnutrition ([address] 3/30/2021    Shock ([address]     Sleep apnea, obstructive     Type II or unspecified type diabetes mellitus without mention of complication, not stated as uncontrolled        Past Surgical History:        Procedure Laterality Date    APPENDECTOMY      CHOLECYSTECTOMY      COLONOSCOPY  2013    CT BIOPSY RENAL  2020    CT BIOPSY RENAL 2020 [facility] CT SCAN    CT BIOPSY RENAL  2020    CT BIOPSY RENAL 2020 [facility] CT SCAN    FOOT FRACTURE SURGERY Bilateral     FRACTURE SURGERY      HYSTERECTOMY      IR TUNNELED INTRAPERITNL CATH W/IMAG  01/28/2021    IR TUNNELED INTRAPERITNL CATH W/IMAG 1/28/2021 [facility] SPECIAL PROCEDURE    OTHER SURGICAL HISTORY      removal of anal fistulotomy    PARACENTESIS Left 07/28/2020    9015 mls removed by Dr Saumya Partida Left 09/04/2020    [id number] ascites removed by Dr Steffi Ravi 50g albumin given    PARACENTESIS Left 09/15/2020    10,400cc removed by Dr Trinidad Rios [phone]    PARACENTESIS Left 10/09/2020    7000cc removed by Dr. Saumya Partida Left 10/13/2020    7000ml removed by Dr Trinidad Rios [phone]    PARACENTESIS Left 10/26/2020    7010cc removed by Dr. Deepa Saleem Left 11/06/2020    7020 mls removed ny Dr Saumya Partida Left 11/19/2020    7000 ml removed by Dr. Steffi Ravi.  PARACENTESIS Left 11/30/2020    7000 ml removed by paracentesis by Dr. Saumya Partida Left 12/08/2020    7100 ml removed    PARACENTESIS Left 01/08/2021    ultrasound guided paracentesis 7150 ml removed    PARACENTESIS Left 01/19/2021    7000ml removed by Dr Trinidad Rios [phone]    PARACENTESIS Left 01/28/2021    by Dr. Steffi Ravi 8100 ml removed    PARACENTESIS Left 03/23/2021    6420 ml removed by Dr. Steffi Ravi [phone]    PARACENTESIS Left 03/26/2021    8210 ml removed by Dr. Steffi Ravi.  US GUIDE PARACENTESIS Left 12/17/2020    7000ml removed by Dr Trinidad Rios       Social History:     reports that she has never smoked. She quit smokeless tobacco use about 37 years ago. She reports that she does not drink alcohol or use drugs.     Family History:       Problem Relation Age of Onset    Arrhythmia Mother     Diabetes Mother     High Blood Pressure Mother     Arrhythmia Father     Breast Cancer Sister     Diabetes Brother     High Blood Pressure Brother        Allergies:  Codeine        MEDICATIONS during current hospitalization:    Continuous Infusions:   sodium bicarbonate infusion 100 mL/hr at 03/30/21 0258    sodium chloride      dextrose         Scheduled Meds:   sodium chloride flush  10 mL Intravenous 2 times per day    enoxaparin  30 mg Subcutaneous Daily    sodium zirconium cyclosilicate  10 g Oral TID    atorvastatin  10 mg Oral Daily    digoxin  125 mcg Oral Daily    metoprolol succinate  12.5 mg Oral BID    midodrine  10 mg Oral TID WC    ranolazine  500 mg Oral BID    conivaptan  20 mg Intravenous Once    cosyntropin  250 mcg Intravenous Once       PRN Meds:sodium chloride flush, sodium chloride, promethazine **OR** ondansetron, polyethylene glycol, acetaminophen **OR** acetaminophen, glucose, dextrose, glucagon (rDNA), dextrose        REVIEW OF SYSTEMS:  ROS: 10 organs review of system is done including general, psychological, ENT, hematological, endocrine, respiratory, cardiovascular, gastrointestinal, musculoskeletal, neurological,  allergy and Immunology is done and is otherwise negative. PHYSICAL EXAM:    Vitals:  BP (!) 128/30   Pulse 69   Temp 98.2 °F (36.8 °C) (Bladder)   Resp 16   Ht 5' 7\" (1.702 m)   Wt 154 lb 8.7 oz (70.1 kg)   SpO2 100%   BMI 24.20 kg/m²     General: Appears uncomfortable, no pain distress, no respiratory distress  Head: normocephalic, atraumatic  Eyes:No gross abnormalities. ENT:  MMM no lesions  Neck:  supple and no masses  Chest : Few basal rales, otherwise clear no wheezing, nontender, tympanic  Heart[de-identified] Heart sounds are normal.  Regular rate and rhythm without murmur, gallop or rub. ABD:  symmetric, soft, non-tender, distended, no guarding or rebound   Musculoskeletal : no cyanosis, no clubbing and 1 + edema-  bilateral lower extremity  Neuro: Moves all 4 extremities  Skin: No rashes or nodules noted.   Lymph node:  no cervical nodes  Urology: Yes Zheng   Psychiatric: appropriate        Data Review  Recent Labs     03/29/21  1304 03/29/21  2145   WBC 11.0* 19.0*   HGB 11.1* 10.1*   HCT 34.3* 31.2*    254 Recent Labs     03/29/21  2145 03/30/21  0145 03/30/21  0517   * 118* 120*   K 7.4* 7.1* 6.4*  6.4*   CL 84* 85* 87*   CO2 15* 14* 17*   BUN 83* 81* 86*   CREATININE 3.62* 3.50* 3.53*   GLUCOSE 152* 176* 170*       MV Settings: ABGs: No results for input(s): PHART, EVG1ION, PO2ART, WSM7DIV, BEART, O4SXZBPV, QXR2XSJ in the last 72 hours.   O2 Device: None (Room air)  Lab Results   Component Value Date    LACTA 5.2 03/30/2021    LACTA 4.8 03/30/2021    LACTA 3.5 03/29/2021       Radiology  I personally reviewed imaging studies and chest x-ray no infiltrate        Assessment, plan:   Patient is at risk due to    · Severe electrolyte imbalance, hyponatremia and hyperkalemia  · Likely decreased active intravascular volume with acute kidney injury   acute on chronic renal failure  · Liver cirrhosis secondary to [place]  · Increased troponin, likely demand ischemia and trended down  · Recurrent ascites  · Worsening leukocytosis, evaluate for sepsis  · History of HARDEEP     Recommendations  · Continue IV fluid and appreciate nephrology  · Monitor electrolytes  · Target sodium change 6 to 8 mEq a day  · Start dexamethasone  · Cosyntropin stim test  · Watch volume status and urine output  · Empiric antibiotics  · Culture peritoneal fluid  · BiPAP while asleep and as needed       DW Dr Pili Solomon   thank you for consultation    Electronically signed by Adalid Rider MD, [facility],  on 3/30/2021 at 10:25 AM

## 2021-03-30 NOTE — FLOWSHEET NOTE
1800--Patient arrived to unit room 463, assessment complete, patient drowsy but arousable; oriented to person, place, and time, patient denies pain, no nausea and no vomiting, no cp or sob, oriented to room and call system, call light in reach, bed in lowest position, bed alarm engaged, will continue to monitor.

## 2021-03-30 NOTE — ED TRIAGE NOTES
Patient was at dr office today had labwork tested and hyperkalemic. Heart rate 60s, denies chest pain or sob, patient appears to be very weakand tired. Has a distended abdomen and peg tube on arrival to er.  A/ox4

## 2021-03-30 NOTE — PROGRESS NOTES
PHARMACY NOTE  Sherif Wilkins was ordered raloxifene (Evista). Per the [address], this medication is non-formulary and not stocked by pharmacy. Risk outweighs benefit to continue while in hospital setting. The medication can be reordered at discharge. WINSTON Coulter. Ph.  3/30/2021  6:18 AM

## 2021-03-30 NOTE — H&P
Bilateral     FRACTURE SURGERY      HYSTERECTOMY      IR TUNNELED INTRAPERITNL CATH W/IMAG  01/28/2021    IR TUNNELED INTRAPERITNL CATH W/IMAG 1/28/2021 [facility] SPECIAL PROCEDURE    OTHER SURGICAL HISTORY      removal of anal fistulotomy    PARACENTESIS Left 07/28/2020    9015 mls removed by Dr Saumya Partida Left 09/04/2020    [id number] ascites removed by Dr Steffi Ravi 50g albumin given    PARACENTESIS Left 09/15/2020    10,400cc removed by Dr Trinidad Rios [phone]    PARACENTESIS Left 10/09/2020    7000cc removed by Dr. Saumya Partida Left 10/13/2020    7000ml removed by Dr Trinidad Rios [phone]    PARACENTESIS Left 10/26/2020    7010cc removed by Dr. Deepa Saleem Left 11/06/2020    7020 mls removed ny Dr Saumya Partida Left 11/19/2020    7000 ml removed by Dr. Steffi Ravi.  PARACENTESIS Left 11/30/2020    7000 ml removed by paracentesis by Dr. Saumya Partida Left 12/08/2020    7100 ml removed    PARACENTESIS Left 01/08/2021    ultrasound guided paracentesis 7150 ml removed    PARACENTESIS Left 01/19/2021    7000ml removed by Dr Trinidad Rios [phone]    PARACENTESIS Left 01/28/2021    by Dr. Steffi Ravi 8100 ml removed    PARACENTESIS Left 03/23/2021    6420 ml removed by Dr. Steffi Ravi [phone]    PARACENTESIS Left 03/26/2021    8210 ml removed by Dr. Steffi Ravi.     US GUIDE PARACENTESIS Left 12/17/2020    7000ml removed by Dr Trisha Latham:    Family History   Problem Relation Age of Onset    Arrhythmia Mother     Diabetes Mother     High Blood Pressure Mother     Arrhythmia Father     Breast Cancer Sister     Diabetes Brother     High Blood Pressure Brother      SOCIAL HISTORY:    Social History     Socioeconomic History    Marital status:      Spouse name: Not on file    Number of children: Not on file    Years of education: Not on file    Highest education level: Not on file   Occupational History    Not on file   Social Needs    Financial resource strain: Not hard at all   FMS [place] insecurity     Worry: Never true     Inability: Never true   [employer] needs     Medical: Not on file     Non-medical: Not on file   Tobacco Use    Smoking status: Never Smoker    Smokeless tobacco: Former User   Substance and Sexual Activity    Alcohol use: No    Drug use: No    Sexual activity: Yes   Lifestyle    Physical activity     Days per week: Not on file     Minutes per session: Not on file    Stress: Not on file   Relationships    Social connections     Talks on phone: Not on file     Gets together: Not on file     Attends Jewish service: Not on file     Active member of club or organization: Not on file     Attends meetings of clubs or organizations: Not on file     Relationship status: Not on file    Intimate partner violence     Fear of current or ex partner: Not on file     Emotionally abused: Not on file     Physically abused: Not on file     Forced sexual activity: Not on file   Other Topics Concern    Not on file   Social History Narrative    Not on file     MEDICATIONS:   Prior to Admission medications    Medication Sig Start Date End Date Taking? Authorizing Provider   metFORMIN (GLUCOPHAGE) 1000 MG tablet TAKE 1 TABLET TWICE A DAY WITH MEALS 3/24/21   Dre Perez MD   ranolazine (RANEXA) 500 MG extended release tablet Take 1 tablet by mouth 2 times daily 3/22/21   Gi Diaz, DO   nitroGLYCERIN (NITROSTAT) 0.4 MG SL tablet up to max of 3 total doses.  If no relief after 1 dose, call 911. 3/22/21   Gi Diaz, DO   metoprolol succinate (TOPROL XL) 25 MG extended release tablet Take 0.5 tablets by mouth 2 times daily 3/22/21   Gi Diaz DO   digoxin [facility]) 125 MCG tablet Take 1 tablet by mouth daily 3/23/21   Gi Diaz DO   midodrine (PROAMATINE) 10 MG tablet Take 1 tablet by mouth 3 times daily (with meals) 3/22/21   Jose Glass,    Nutritional Supplements (ENSURE HIGH PROTEIN) LIQD Take 1 Can by mouth 3 times daily 3/16/21   Len Lambert MD   traZODone (DESYREL) 50 MG tablet Take 0.5 tablets by mouth nightly as needed for Sleep 3/16/21   Len Lambert MD   atorvastatin (LIPITOR) 10 MG tablet Take 1 tablet by mouth daily 3/11/21   Eriberto Marie MD   Adhesive Tape (PAPER TAPE 1\"X12YD) TAPE 1 each by Does not apply route 4 times daily as needed (SOILAGE) 2/12/21   TANNER Morales CNP   Gauze Pads & Dressings ([employer] GAUZE PAD STERILE 4\"X4\") 4\"X4\" PADS Change intraperitoneal dressing - apply daily and PRN soilage (EXTRA ABSORBENT PADS) 2/12/21   TANNER Morales CNP   Transparent Dressings (TEGADERM FIRST AID STYLE) MISC One box 50. Change daily one tegaderm to intraperitoneal site and PRN soilage. 2/3/21   TANNER Morales CNP   raloxifene (EVISTA) 60 MG tablet TAKE 1 TABLET DAILY 8/28/20   Berenice Boas, MD   Blood Pressure KIT Check your blood pressure daily 7/31/20   Brian Yoon MD   glucose blood VI test strips (ACCU-CHEK MELANIE) strip Test 1-2 times a day. Dx: 250.00. Accu-check melanie strips 11/28/12   Berenice Boas, MD   Lancet Device MISC Test 1-2 times a day. Dx: 250.00. Lancets 10/10/12   Berenice Boas, MD       ALLERGIES: Codeine    REVIEW OF SYSTEM:   Review of Systems   Unable to perform ROS: Acuity of condition         OBJECTIVE  PHYSICAL EXAM: BP (!) 147/70   Pulse 77   Temp 97.5 °F (36.4 °C) (Oral)   Resp 20   Ht 5' 7\" (1.702 m)   Wt 200 lb (90.7 kg)   SpO2 98%   BMI 31.32 kg/m²     Physical Exam  Vitals signs and nursing note reviewed. Constitutional:       General: She is not in acute distress. Appearance: She is ill-appearing and toxic-appearing. She is not diaphoretic. HENT:      Head: Normocephalic and atraumatic. Nose: Nose normal.      Mouth/Throat:      Mouth: Mucous membranes are moist.   Eyes:      Pupils: Pupils are equal, round, and reactive to light. Neck:      Musculoskeletal: Normal range of motion.    Cardiovascular:      Rate and Rhythm: Normal rate and regular rhythm. Pulses: Normal pulses. Heart sounds: Murmur present. Pulmonary:      Effort: Pulmonary effort is normal. No respiratory distress. Breath sounds: Normal breath sounds. No wheezing or rales. Abdominal:      General: Abdomen is protuberant. Bowel sounds are normal. There is distension. Palpations: Abdomen is soft. There is no mass. Tenderness: There is no abdominal tenderness. There is no guarding or rebound. Hernia: No hernia is present. Musculoskeletal: Normal range of motion. Right lower leg: 3+ Pitting Edema present. Left lower leg: 3+ Pitting Edema present. Skin:     General: Skin is warm and dry. Capillary Refill: Capillary refill takes less than 2 seconds. Neurological:      Mental Status: She is oriented to person, place, and time and easily aroused. She is lethargic. Psychiatric:         Behavior: Behavior is cooperative. DATA:     Diagnostic tests reviewed for today's visit:    Most recent labs and imaging results reviewed.      LABS:    Recent Results (from the past 24 hour(s))   Comprehensive Metabolic Panel    Collection Time: 03/29/21  1:04 PM   Result Value Ref Range    Sodium 115 (LL) 135 - 144 mEq/L    Potassium 7.5 (HH) 3.4 - 4.9 mEq/L    Chloride 85 (L) 95 - 107 mEq/L    CO2 14 (L) 20 - 31 mEq/L    Anion Gap 16 (H) 9 - 15 mEq/L    Glucose 137 (H) 70 - 99 mg/dL    BUN 81 (HH) 8 - 23 mg/dL    CREATININE 3.25 (H) 0.50 - 0.90 mg/dL    GFR Non-African American 13.9 (L) >60    GFR  16.9 (L) >60    Calcium 9.3 8.5 - 9.9 mg/dL    Total Protein 5.7 (L) 6.3 - 8.0 g/dL    Albumin 3.0 (L) 3.5 - 4.6 g/dL    Total Bilirubin 1.3 (H) 0.2 - 0.7 mg/dL    Alkaline Phosphatase 86 40 - 130 U/L    ALT 20 0 - 33 U/L    AST 39 (H) 0 - 35 U/L    Globulin 2.7 2.3 - 3.5 g/dL   CBC With Auto Differential    Collection Time: 03/29/21  1:04 PM   Result Value Ref Range    WBC 11.0 (H) 4.8 - 10.8 K/uL    RBC troponin: Troponin 0.548. We will cycle troponin. We will repeat EKG in the morning. Patient on telemetry monitoring. 6) Elevated lactic acid: Lactic acid 3.5. Patient received 1 L normal saline per nephrology. We will repeat lactic acid now every 4 hours. 7) DM2, without long-term current use of insulin: Patient currently on Metformin. Will monitor and cover with medium sliding scale insulin. 8) Hypertension: Patient on oral medications to control. Will monitor and cover with IV medications that are not nephrotoxic. 9) HLD: Patient on statin. Resume statin. Plan of care discussed with: patient and adult child    SIGNATURE: Елена Elizabeth RN, NP  DATE: March 30, 2021  TIME: 12:53 AM     YVONNE Teran MD - supervising physician

## 2021-03-30 NOTE — CONSULTS
Renal consult dictated    FALLON on CKD-4 pre renal azotemia  Hyperkalemia Related to FALLON   Hypertrophic cardiomyopathy   Hyponatremia related to cirrhosis  Cirrhosis d/t ETOH  Malnourished  Left Pleurix      Plan lokemla to repeat  One dose one vaprisol  Intensavit to follow  Avoid hypotension  Labs daily  Poor prognosis

## 2021-03-30 NOTE — TELEPHONE ENCOUNTER
Call received from [id number] [facility] lab re: critical lab values for patient. Critical sodium (115), potassium (7.5) and BUN (81). Called and spoke with daughter, listed as a contact. Patient to present immediately to ED for evaluation. Daughter verbally confirmed and they are bringing her mom straight to ED.

## 2021-03-30 NOTE — PROGRESS NOTES
Full consult in am  Pt with yaneli and k 7.5  Treated medically and k 7.1 now  More importantly pierre placed and 1200 cc urine output    - for now, will hold off on hd  - lokelma 10 mg x 2 doses  - calcium, d50/insulin  - hco3 drip

## 2021-03-30 NOTE — ED PROVIDER NOTES
[address] [facility] ED  eMERGENCY dEPARTMENT eNCOUnter      Pt Name: Minerva Garzon  MRN: [id number]  Alcidesgfmohan 1945  Date of evaluation: 3/29/2021  Provider: Mitra Delgado DO    CHIEF COMPLAINT       Chief Complaint   Patient presents with    Abnormal Lab     was at the dr office today and potassium was 7 per squad         HISTORY OF PRESENT ILLNESS   (Location/Symptom, Timing/Onset,Context/Setting, Quality, Duration, Modifying Factors, Severity)  Note limiting factors. Minerva Garzon is a 70 y.o. female who presents to the emergency department complaining of feeling weak tired she denies any chest pain shortness of breath. I had a call from her doctor in [place] who just saw her and she had a potassium of 7.5 she has a history of encephalopathy liver failure she has a port for regular paracentesis it was blocked but opened up by her doctor her abdomen is not distended now. The patient has renal failure. The patient really will not give me any more history she nods yes and no but she does not really speak. HPI    NursingNotes were reviewed. REVIEW OF SYSTEMS    (2-9 systems for level 4, 10 or more for level 5)     Review of Systems   Unable to perform ROS: Mental status change       Except as noted above the remainder of the review of systems was reviewed and negative. PAST MEDICAL HISTORY     Past Medical History:   Diagnosis Date    Arthritis     Cardiomyopathy [facility])     mother history.     Chronic kidney disease     Hypertension     Liver disease     Other disorders of kidney and ureter in diseases classified elsewhere     Pneumonia     Psychiatric problem     Sleep apnea, obstructive     Type II or unspecified type diabetes mellitus without mention of complication, not stated as uncontrolled          SURGICALHISTORY       Past Surgical History:   Procedure Laterality Date    APPENDECTOMY      CHOLECYSTECTOMY      COLONOSCOPY  04/23/2013    CT BIOPSY RENAL  08/04/2020    CT BIOPSY RENAL 8/4/2020 [facility] CT SCAN    CT BIOPSY RENAL  09/04/2020    CT BIOPSY RENAL 9/4/2020 [facility] CT SCAN    FOOT FRACTURE SURGERY Bilateral     FRACTURE SURGERY      HYSTERECTOMY      IR TUNNELED INTRAPERITNL CATH W/IMAG  01/28/2021    IR TUNNELED INTRAPERITNL CATH W/IMAG 1/28/2021 [facility] SPECIAL PROCEDURE    OTHER SURGICAL HISTORY      removal of anal fistulotomy    PARACENTESIS Left 07/28/2020    9015 mls removed by Dr Mariely Rai Left 09/04/2020    [id number] ascites removed by Dr Margaret Maldonado 50g albumin given    PARACENTESIS Left 09/15/2020    10,400cc removed by Dr Sharon Butt [phone]    PARACENTESIS Left 10/09/2020    7000cc removed by Dr. Mariely Rai Left 10/13/2020    7000ml removed by Dr Sharon Butt [phone]    PARACENTESIS Left 10/26/2020    7010cc removed by Dr. Jayla Rosa Left 11/06/2020    7020 mls removed ny Dr Mariely Rai Left 11/19/2020    7000 ml removed by Dr. Margaret Maldonado.  PARACENTESIS Left 11/30/2020    7000 ml removed by paracentesis by Dr. Mariely Rai Left 12/08/2020    7100 ml removed    PARACENTESIS Left 01/08/2021    ultrasound guided paracentesis 7150 ml removed    PARACENTESIS Left 01/19/2021    7000ml removed by Dr Sharon Butt [phone]    PARACENTESIS Left 01/28/2021    by Dr. Margaret Maldonado 8100 ml removed    PARACENTESIS Left 03/23/2021    6420 ml removed by Dr. Margaret Maldonado [phone]    PARACENTESIS Left 03/26/2021    8210 ml removed by Dr. Margaret Maldonado.     US GUIDE PARACENTESIS Left 12/17/2020    7000ml removed by Dr America Gant       Previous Medications    ADHESIVE TAPE (PAPER TAPE 1\"X12YD) TAPE    1 each by Does not apply route 4 times daily as needed (SOILAGE)    ATORVASTATIN (LIPITOR) 10 MG TABLET    Take 1 tablet by mouth daily    BLOOD PRESSURE KIT    Check your blood pressure daily    DIGOXIN (LANOXIN) 125 MCG TABLET    Take 1 tablet by mouth daily    GAUZE PADS & DRESSINGS ([employer] GAUZE PAD STERILE 4\"X4\") 4\"X4\" PADS    Change intraperitoneal dressing - apply daily and PRN soilage (EXTRA ABSORBENT PADS)    GLUCOSE BLOOD VI TEST STRIPS (ACCU-CHEK MELANIE) STRIP    Test 1-2 times a day. Dx: 250.00. Accu-check melanie strips    LANCET DEVICE MISC    Test 1-2 times a day. Dx: 250.00. Lancets    METFORMIN (GLUCOPHAGE) 1000 MG TABLET    TAKE 1 TABLET TWICE A DAY WITH MEALS    METOPROLOL SUCCINATE (TOPROL XL) 25 MG EXTENDED RELEASE TABLET    Take 0.5 tablets by mouth 2 times daily    MIDODRINE (PROAMATINE) 10 MG TABLET    Take 1 tablet by mouth 3 times daily (with meals)    NITROGLYCERIN (NITROSTAT) 0.4 MG SL TABLET    up to max of 3 total doses. If no relief after 1 dose, call 911. NUTRITIONAL SUPPLEMENTS (ENSURE HIGH PROTEIN) LIQD    Take 1 Can by mouth 3 times daily    RALOXIFENE (EVISTA) 60 MG TABLET    TAKE 1 TABLET DAILY    RANOLAZINE (RANEXA) 500 MG EXTENDED RELEASE TABLET    Take 1 tablet by mouth 2 times daily    TRANSPARENT DRESSINGS (TEGADERM FIRST AID STYLE) MISC    One box 50. Change daily one tegaderm to intraperitoneal site and PRN soilage.     TRAZODONE (DESYREL) 50 MG TABLET    Take 0.5 tablets by mouth nightly as needed for Sleep       ALLERGIES     Codeine    FAMILY HISTORY       Family History   Problem Relation Age of Onset    Arrhythmia Mother     Diabetes Mother     High Blood Pressure Mother     Arrhythmia Father     Breast Cancer Sister     Diabetes Brother     High Blood Pressure Brother           SOCIAL HISTORY       Social History     Socioeconomic History    Marital status:      Spouse name: Not on file    Number of children: Not on file    Years of education: Not on file    Highest education level: Not on file   Occupational History    Not on file   Social Needs    Financial resource strain: Not hard at all   [employer] insecurity     Worry: Never true     Inability: Never true   [employer] needs     Medical: Not on file     Non-medical: Not on file   Tobacco Use    Smoking status: Never Smoker    Smokeless tobacco: Former User   Substance and Sexual Activity    Alcohol use: No    Drug use: No    Sexual activity: Yes   Lifestyle    Physical activity     Days per week: Not on file     Minutes per session: Not on file    Stress: Not on file   Relationships    Social connections     Talks on phone: Not on file     Gets together: Not on file     Attends Congregation service: Not on file     Active member of club or organization: Not on file     Attends meetings of clubs or organizations: Not on file     Relationship status: Not on file    Intimate partner violence     Fear of current or ex partner: Not on file     Emotionally abused: Not on file     Physically abused: Not on file     Forced sexual activity: Not on file   Other Topics Concern    Not on file   Social History Narrative    Not on file       SCREENINGS      @[id number]([id number])@      PHYSICAL EXAM    (up to 7 for level 4, 8 or more for level 5)     ED Triage Vitals [03/29/21 2124]   BP Temp Temp Source Pulse Resp SpO2 Height Weight   (!) 147/70 97.5 °F (36.4 °C) Oral 63 20 98 % 5' 7\" (1.702 m) 200 lb (90.7 kg)       Physical Exam  Vitals signs and nursing note reviewed. Constitutional:       General: She is not in acute distress. Appearance: She is well-developed. She is not ill-appearing, toxic-appearing or diaphoretic. HENT:      Head: Normocephalic and atraumatic. Nose: No congestion or rhinorrhea. Mouth/Throat:      Pharynx: No oropharyngeal exudate or posterior oropharyngeal erythema. Eyes:      General: No scleral icterus. Right eye: No discharge. Left eye: No discharge. Conjunctiva/sclera: Conjunctivae normal.      Pupils: Pupils are equal, round, and reactive to light. Neck:      Musculoskeletal: Normal range of motion and neck supple. Cardiovascular:      Rate and Rhythm: Normal rate and regular rhythm. Heart sounds: Normal heart sounds. No murmur.  No friction rub. Pulmonary:      Effort: Pulmonary effort is normal. No respiratory distress. Breath sounds: Normal breath sounds. Abdominal:      General: Bowel sounds are normal. There is no distension. Palpations: Abdomen is soft. Tenderness: There is no abdominal tenderness. There is no guarding. Musculoskeletal: Normal range of motion. General: No swelling or tenderness. Skin:     General: Skin is warm and dry. Coloration: Skin is not jaundiced or pale. Findings: No bruising or erythema. Neurological:      Mental Status: She is alert and oriented to person, place, and time. Psychiatric:         Behavior: Behavior normal.         DIAGNOSTIC RESULTS     EKG: All EKG's are interpreted by the Emergency Department Physician who either signs or Co-signsthis chart in the absence of a cardiologist.    EKG interpreted by myself at 2210 when it was brought to me shows a widened QRS complex and QT all consistent and bradycardia consistent with hyperkalemia that is leading to probable arrest.  I immediately notified nursing and put in the orders and the patient actually did stretch out almost to a flat line as the medicines were being pushed. She was brought back she is a DNR Comfort Care arrest cording to the . The patient's QRS duration was 140 ms and QT corrected was 394 ms A. fib was there with a slow ventricular response she will get her old EKG that I compared it with at the time March 20, 2021 is dramatically different. RADIOLOGY:   Non-plain filmimages such as CT, Ultrasound and MRI are read by the radiologist. Plain radiographic images are visualized and preliminarily interpreted by the emergency physician with the below findings:    Chest x-ray raise right diaphragm hilum similar to the previous hilum as well as the apices of the lungs.     Interpretation per the Radiologist below, if available at the time ofthis note:    XR CHEST PORTABLE    (Results Pending) ED BEDSIDE ULTRASOUND:   Performed by ED Physician - none    LABS:  Labs Reviewed   BASIC METABOLIC PANEL - Abnormal; Notable for the following components:       Result Value    Sodium 115 (*)     Potassium 7.4 (*)     Chloride 84 (*)     CO2 15 (*)     Anion Gap 16 (*)     Glucose 152 (*)     BUN 83 (*)     CREATININE 3.62 (*)     GFR Non- 12.3 (*)     GFR  14.9 (*)     All other components within normal limits    Narrative:     CALL  [facility] tel. [phone],  Sodium  results called to and read back by Dr Pablo Nicole, 03/29/2021 23:27,  by Marlyn Roman  BUN & Potassium results called to and read back by Rod Gordon RN, 03/29/2021  23:18, by Taco Lambert results called to and read back by Rod Gordon RN, 03/29/2021 23:17, by  ELLEN   CBC WITH AUTO DIFFERENTIAL - Abnormal; Notable for the following components:    WBC 19.0 (*)     RBC 3.64 (*)     Hemoglobin 10.1 (*)     Hematocrit 31.2 (*)     MCHC 32.5 (*)     RDW 15.6 (*)     Neutrophils Absolute 16.9 (*)     Lymphocytes Absolute 0.7 (*)     Monocytes Absolute 1.3 (*)     All other components within normal limits   HEPATIC FUNCTION PANEL - Abnormal; Notable for the following components:     Total Protein 5.3 (*)     Albumin 3.0 (*)     Total Bilirubin 1.4 (*)     Bilirubin, Direct 0.6 (*)     Bilirubin, Indirect 0.8 (*)     All other components within normal limits    Narrative:     Mairamonae Gabe  [facility] tel. [phone],  BUN & Potassium results called to and read back by Rod Gordon RN, 03/29/2021  23:18, by Taco Lambert results called to and read back by Rod Gordon RN, 03/29/2021 23:17, by  Marlyn Roman   LACTIC ACID, PLASMA - Abnormal; Notable for the following components:    Lactic Acid 3.5 (*)     All other components within normal limits    Narrative:     [phone],  Lactic Acid results called to and read back by Rod Gordon RN, 03/29/2021  23:17, by ELLEN   TROPONIN - Abnormal; Notable for the following components: Troponin 0.548 (*)     All other components within normal limits    Narrative:     Sascha Lizarraga tel. [phone],  Trop results called to and read back by Masha Au RN, 2021 23:17, by  Edger Romberg   LIPASE    Narrative:     Sascha Lizarraga tel. [phone],  BUN & Potassium results called to and read back by Masha Au RN, 2021  23:18, by Jennifer Bee results called to and read back by Masha Au RN, 2021 23:17, by  Harlow Apgar   COVID-19   COVID-19       All other labs were within normal range or not returned as of this dictation. EMERGENCY DEPARTMENT COURSE and DIFFERENTIAL DIAGNOSIS/MDM:   Vitals:    Vitals:    21   BP: (!) 147/70   Pulse: 63   Resp: 20   Temp: 97.5 °F (36.4 °C)   TempSrc: Oral   SpO2: 98%   Weight: 200 lb (90.7 kg)   Height: 5' 7\" (1.702 m)       Patient was about to be in extremis nursing reacted quickly gave the medications of the line obtain and just before she arrested we were able to recover her. Patient's going to need dialysis with hyperkalemia she has a history of renal failure history of liver failure and she is a DNR Comfort Care arrest.  Patient's sodium is 115 similar to today's earlier draw. Potassium similar as well 7.4. Patient's chloride 84 CO2 was 15 BUN was 83 creatinine 3.62. Patient's gap was 16. Patient had lactic acidosis of 3.5 patient's troponin was elevated at 0.54 patient's alk phos was elevated at 82 the patient's white count was elevated at 19 hemoglobin was 10.1. Patient was discussed with the Dr Barb Muñoz and will be admitted to ICU DNR for care arrest.  I discussed the case with the patient and she does want to be dialyzed. Dr. Josephine Richards who was covered by Dr Carlotta Palomino asked that I give a liter of fluid normal saline. I discussed with Dr. Maxine Reyna again and the patient's going ICU is a DNR Comfort Care arrest.      MDM    CRITICAL CARE TIME   Total Critical Care time was 95 minutes, excluding separately reportableprocedures.   There was a high probability of clinicallysignificant/life threatening deterioration in the patient's condition which required my urgent intervention. Immediate care on this patient with vigilant watching and treatment of hyperkalemia. CONSULTS:  None    PROCEDURES:  Unless otherwise noted below, none     Procedures    FINAL IMPRESSION      1. Acute kidney injury ([facility]    2. Acute hyperkalemia    3. Troponin level elevated    4. Liver failure with hepatic coma, unspecified chronicity ([facility]    5. Electrolyte imbalance          DISPOSITION/PLAN   DISPOSITION Decision To Admit 03/29/2021 11:36:52 PM      PATIENT REFERRED TO:  No follow-up provider specified.     DISCHARGE MEDICATIONS:  New Prescriptions    No medications on file          (Please note that portions of this note were completed with a voice recognition program.  Efforts were made to edit the dictations but occasionally words are mis-transcribed.)    Ama Hall DO (electronically signed)  Attending Emergency Physician          Ama Hall DO  03/30/21 0003

## 2021-03-31 NOTE — PROGRESS NOTES
Physical Therapy Med Surg Initial Assessment  Facility/Department: [address] [place] [address] UNIT  Room: [facility]34-       NAME: Christoph Hernandez  : 1945 (17 y.o.)  MRN: [id number]  CODE STATUS: DNR-CCA    Date of Service: 3/31/2021    Patient Diagnosis(es): Hyperkalemia [E87.5]   Chief Complaint   Patient presents with    Abnormal Lab     was at the dr office today and potassium was 7 per squad     Patient Active Problem List    Diagnosis Date Noted    HLD (hyperlipidemia) 2021    Severe malnutrition ([address] 2021    Goals of care, counseling/discussion     Troponin level elevated     Liver failure with hepatic coma ([address]     Electrolyte imbalance     Fatigue 2021    Weakness 2021    Chronic kidney disease     Hyperkalemia     Hyponatremia     Cirrhosis, liver, pigmentary ([address]     Hypotension 2021    Other ascites 2020    Arterial hypotension     Shock ([address]     Alcoholic cirrhosis of liver with ascites ([address] 2020    Cirrhosis of liver with ascites ([address] 08/15/2020    Renal mass, left 08/15/2020    Abdominal distension 2020    FALLON (acute kidney injury) ([address] 2020    Rectal pain 2020    Cardiomyopathy ([address] 2020    Subconjunctival hemorrhage of left eye 2020    PVD (posterior vitreous detachment), both eyes 2019    Dry eye syndrome of bilateral lacrimal glands 2019    HARDEEP (obstructive sleep apnea) 2018    Bilateral carotid artery stenosis 2017    Right-sided carotid artery disease ([address] 2016    Pseudophakia of both eyes 2016    Regular astigmatism 2016    Presbyopia of both eyes 2016    Bell-rectal abscess 2013    Hypertension     Hypertrophic obstructive cardiomyopathy (HOCM) ([address] 2013    Type 2 diabetes mellitus without complication, without long-term current use of insulin ([address] 03/15/2013        Past Medical History:   Diagnosis Date    Arthritis     Ascites due to alcoholic cirrhosis (HCC)     Cardiomyopathy [facility])     mother history.  Chronic kidney disease     Cirrhosis ([address]     Encephalopathy     HLD (hyperlipidemia) 3/30/2021    Hypertension     Hypotension     Liver disease     Liver failure (HCC)     HARDEEP (obstructive sleep apnea)     Other disorders of kidney and ureter in diseases classified elsewhere     Pneumonia     Psychiatric problem     Renal failure     Severe malnutrition ([facility] 3/30/2021    Shock ([facility]     Sleep apnea, obstructive     Type II or unspecified type diabetes mellitus without mention of complication, not stated as uncontrolled      Past Surgical History:   Procedure Laterality Date    APPENDECTOMY      CHOLECYSTECTOMY      COLONOSCOPY  04/23/2013    CT BIOPSY RENAL  08/04/2020    CT BIOPSY RENAL 8/4/2020 [facility] CT SCAN    CT BIOPSY RENAL  09/04/2020    CT BIOPSY RENAL 9/4/2020 [facility] CT SCAN    FOOT FRACTURE SURGERY Bilateral     FRACTURE SURGERY      HYSTERECTOMY      IR TUNNELED INTRAPERITNL CATH W/IMAG  01/28/2021    IR TUNNELED INTRAPERITNL CATH W/IMAG 1/28/2021 [facility] SPECIAL PROCEDURE    OTHER SURGICAL HISTORY      removal of anal fistulotomy    PARACENTESIS Left 07/28/2020    9015 mls removed by Dr Cheri Munguia Left 09/04/2020    [id number] ascites removed by Dr Tash Brito 50g albumin given    PARACENTESIS Left 09/15/2020    10,400cc removed by Dr Delvis Horta [phone]    PARACENTESIS Left 10/09/2020    7000cc removed by Dr. Cheri Munguia Left 10/13/2020    7000ml removed by Dr Delvis Horta [phone]    PARACENTESIS Left 10/26/2020    7010cc removed by Dr. Wesley Santos Left 11/06/2020    7020 mls removed ny Dr Cheri Munguia Left 11/19/2020    7000 ml removed by Dr. Tash Brito.     PARACENTESIS Left 11/30/2020    7000 ml removed by paracentesis by Dr. Cheri Munguia Left 12/08/2020    7100 ml removed    PARACENTESIS Left 01/08/2021 ultrasound guided paracentesis 7150 ml removed    PARACENTESIS Left 01/19/2021    7000ml removed by Dr Lalo Loera [phone]    PARACENTESIS Left 01/28/2021    by Dr. Mia Vasquez 8100 ml removed    PARACENTESIS Left 03/23/2021    6420 ml removed by Dr. Mia Vasquez [phone]    PARACENTESIS Left 03/26/2021    8210 ml removed by Dr. Mia Vasquez.  US GUIDE PARACENTESIS Left 12/17/2020    7000ml removed by Dr Lalo Loera       Chart Reviewed: Yes  Patient assessed for rehabilitation services?: Yes  Family / Caregiver Present: No    Restrictions:  Restrictions/Precautions: Fall Risk     SUBJECTIVE:      Pain  Pre Treatment Pain Screening  Pain at present: 0    Post Treatment Pain Screening:   Pain Screening  Patient Currently in Pain: No  Pain Assessment  Pain Level: 0    Prior Level of Function:  Social/Functional History  Lives With: Spouse  Type of Home: House  Home Layout: One level  Home Access: Stairs to enter with rails  Entrance Stairs - Number of Steps: 2  Entrance Stairs - Rails: Both  Bathroom Shower/Tub: Walk-in shower  Bathroom Equipment: Grab bars in shower  Home Equipment: Rolling walker, Daphne Michelle, 4 wheeled walker  Receives Help From: Family  ADL Assistance: [facility]: Independent  Homemaking Responsibilities: Yes  Ambulation Assistance: Independent  Transfer Assistance: Independent  Occupation: Retired  Type of occupation:   Leisure & Hobbies: visiting with grandchildren    OBJECTIVE:        Cognition:  Follows Commands: Impaired(delayed responses)         ROM:  RLE PROM: WFL  LLE PROM: WFL    Strength:  Strength RLE  Comment: 2/5  Strength LLE  Comment: 2/5    Neuro:  Balance  Posture: Poor(pt unable to maintain head in upright position)  Sitting - Static: Poor  Sitting - Dynamic: Poor  Standing - Static: Poor  Standing - Dynamic: Poor  Comments: pt unable to maintain sitting without max assist - including once in midline position.  Unable to maintain balance in standing with LOB in all directions             Bed mobility  Bridging: Maximum assistance  Rolling to Left: Maximum assistance  Rolling to Right: Maximum assistance  Supine to Sit: Maximum assistance  Sit to Supine: Maximum assistance  Scooting: Maximal assistance    Transfers  Sit to Stand: Maximum Assistance  Stand to sit: Maximum Assistance  Comment: Pt required max assist to attain standing, only able to maintain standing for 10 sec. Multiple trials    Ambulation  Ambulation?: No(pt unable to take steps with ma assist for stability and for weight shifting provided)              Activity Tolerance  Activity Tolerance: Patient limited by fatigue;Patient limited by endurance               ASSESSMENT:   Body structures, Functions, Activity limitations: Decreased functional mobility ; Decreased safe awareness;Decreased balance;Decreased endurance;Decreased strength;Decreased posture  Decision Making: Medium Complexity  History: high  Exam: med  Clinical Presentation: med    Barriers to Learning: slow processing    DISCHARGE RECOMMENDATIONS:  Discharge Recommendations: Continue to assess pending progress    Assessment: Pt demonstrates signficant deficits. She is unable to fully participate due to fatigue.  She is in need of PT at this time  REQUIRES PT FOLLOW UP: Yes      PLAN OF CARE:  Plan  Times per week: 3-6  Current Treatment Recommendations: Strengthening, Transfer Training, Endurance Training, Neuromuscular Re-education, Patient/Caregiver Education & Training, Equipment Evaluation, Education, & procurement, Gait Training, Balance Training, Functional Mobility Training, Safety Education & Training, Home Exercise Program  Safety Devices  Type of devices: Bed alarm in place, Call light within reach, Left in bed    Goals:  Short term goals  Short term goal 1: mod assist bed mobility  Short term goal 2: mod assist sit to stand  Short term goal 3: pt able to ambulate 10 feet with ww +2 mod assist  Short term goal 4: pt able to sit EOB with SBA and participate in 10 min of physical ex program/activity    [facility] (6 CLICK) [address] Inpatient Mobility Raw Score : 9     Therapy Time:   Individual   Time In 1330   Time Out 1350   Minutes Aia [address] [place] [address], 03/31/21 at 4:44 PM         Definitions for assistance levels  Independent = pt does not require any physical supervision or assistance from another person for activity completion. Device may be needed.   Stand by assistance = pt requires verbal cues or instructions from another person, close to but not touching, to perform the activity  Minimal assistance= pt performs 75% or more of the activity; assistance is required to complete the activity  Moderate assistance= pt performs 50% of the activity; assistance is required to complete the activity  Maximal assistance = pt performs 25% of the activity; assistance is required to complete the activity  Dependent = pt requires total physical assistance to accomplish the task

## 2021-03-31 NOTE — PROGRESS NOTES
Pt is alert and oriented x4. She has had several critical labs today which were communicated to Dr. Rigo Ortiz and Dr. Ismael Lopez. Pt and  are having hospice meeting tomorrow at 0930. Pt had no urine output this shift. Dr. Ismael Lopez notified. Very poor appetite.      Electronically signed by Edmundo Hills RN on 3/31/2021 at 6:14 PM

## 2021-03-31 NOTE — PROGRESS NOTES
Renal Progress Note    Assessment and Plan:    1. FALLON on CKD IV, prerenal azotemia and hyperkalemia related to FALLON and  CKD. 2.  Hypertrophic cardiomyopathy. 3.  hyponatremia and hyperkalemia  4. 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 5.  Malnourished. 6.  Left Pleurx noted to be in place.     PLAN:    May need hd if not better. Will f[facility]u [facility] meeting  [facility]  Repeat labs  Hold dig and check dig level        Patient Active Problem List:     Type 2 diabetes mellitus without complication, without long-term current use of insulin (HCC)     Hypertension     Bell-rectal abscess     Bilateral carotid artery stenosis     Hypertrophic obstructive cardiomyopathy (HOCM) (HCC)     HARDEEP (obstructive sleep apnea)     Pseudophakia of both eyes     Regular astigmatism     Cardiomyopathy ([address]     Rectal pain     Abdominal distension     FALLON (acute kidney injury) ([address]     Cirrhosis of liver with ascites (HCC)     Renal mass, left     Alcoholic cirrhosis of liver with ascites (HCC)     Shock (HCC)     Arterial hypotension     Subconjunctival hemorrhage of left eye     Right-sided carotid artery disease (HCC)     PVD (posterior vitreous detachment), both eyes     Presbyopia of both eyes     Dry eye syndrome of bilateral lacrimal glands     Other ascites     Hypotension     Cirrhosis, liver, pigmentary (HCC)     Fatigue     Chronic kidney disease     Hyperkalemia     Hyponatremia     Weakness     HLD (hyperlipidemia)     Severe malnutrition (HCC)     Goals of care, counseling/discussion     Troponin level elevated     Liver failure with hepatic coma (HCC)     Electrolyte imbalance      Subjective:   Admit Date: 3/29/2021    Interval History: potassium has been high. Has some uo. On midodrine and florinef. Hospice meeting today. Weak. Not eating much.   Got vaprisol      Medications:   Scheduled Meds:   sodium zirconium cyclosilicate  10 g Oral TID    sodium chloride flush  10 mL Intravenous 2 times per day    enoxaparin  30 mg Subcutaneous Daily    atorvastatin  10 mg Oral Daily    digoxin  125 mcg Oral Daily    metoprolol succinate  12.5 mg Oral BID    midodrine  10 mg Oral TID WC    ranolazine  500 mg Oral BID    fludrocortisone  0.1 mg Oral Daily     Continuous Infusions:   sodium bicarbonate infusion 100 mL/hr at 03/30/21 1326    sodium chloride      dextrose         CBC:   Recent Labs     03/29/21  1304 03/29/21  2145   WBC 11.0* 19.0*   HGB 11.1* 10.1*    254     CMP:    Recent Labs     03/30/21  2248 03/31/21  0252 03/31/21  0619   * 121* 120*   K 6.1* 6.1* 6.2*   CL 84* 86* 86*   CO2 20 19* 19*   BUN 81* 87* 88*   CREATININE 3.69* 3.79* 3.73*   GLUCOSE 242* 211* 192*   CALCIUM 9.2 9.2 9.1   LABGLOM 12.0* 11.7* 11.9*     Troponin:   Recent Labs     03/30/21  0946   TROPONINI 0.478*     BNP: No results for input(s): BNP in the last 72 hours. INR: No results for input(s): INR in the last 72 hours. Lipids:   Recent Labs     03/29/21  2145   LIPASE 84     Liver:   Recent Labs     03/31/21  0619   AST 28   ALT 20   ALKPHOS 62   PROT 4.9*   LABALBU 3.0*   BILITOT 1.4*     Iron:  No results for input(s): IRONS, FERRITIN in the last 72 hours. Invalid input(s): LABIRONS  Urinalysis: No results for input(s): UA in the last 72 hours. Objective:   Vitals: BP (!) 123/37   Pulse 64   Temp 97.5 °F (36.4 °C) (Oral)   Resp 11   Ht 5' 7\" (1.702 m)   Wt 154 lb 8.7 oz (70.1 kg)   SpO2 100%   BMI 24.20 kg/m²    Wt Readings from Last 3 Encounters:   03/30/21 154 lb 8.7 oz (70.1 kg)   03/29/21 183 lb (83 kg)   03/22/21 192 lb 6.4 oz (87.3 kg)      24HR INTAKE/OUTPUT:      Intake/Output Summary (Last 24 hours) at 3/31/2021 0852  Last data filed at 3/31/2021 0517  Gross per 24 hour   Intake 3158.33 ml   Output 3300 ml   Net -141.67 ml       Constitutional:  Alert, awake, no apparent distress   Skin:normal, no rash  HEENT:sclera anicteric.   Head atraumatic normocephalic  Neck:supple with no

## 2021-03-31 NOTE — PROGRESS NOTES
Occupational Therapy  [facility] OCCUPATIONAL THERAPY EVALUATION - ACUTE     NAME: Zander Lewis  : 1945 (13 y.o.)  MRN: [id number]  CODE STATUS: DNR-CCA  Room: J853/O912-93    Date of Service: 3/31/2021    Patient Diagnosis(es): Hyperkalemia [E87.5]   Chief Complaint   Patient presents with    Abnormal Lab     was at the dr office today and potassium was 7 per squad     Patient Active Problem List    Diagnosis Date Noted    HLD (hyperlipidemia) 2021    Severe malnutrition ([address] 2021    Goals of care, counseling/discussion     Troponin level elevated     Liver failure with hepatic coma ([address]     Electrolyte imbalance     Fatigue 2021    Weakness 2021    Chronic kidney disease     Hyperkalemia     Hyponatremia     Cirrhosis, liver, pigmentary ([address]     Hypotension 2021    Other ascites 2020    Arterial hypotension     Shock ([address]     Alcoholic cirrhosis of liver with ascites ([address] 2020    Cirrhosis of liver with ascites ([address] 08/15/2020    Renal mass, left 08/15/2020    Abdominal distension 2020    FALLON (acute kidney injury) ([address] 2020    Rectal pain 2020    Cardiomyopathy ([address] 2020    Subconjunctival hemorrhage of left eye 2020    PVD (posterior vitreous detachment), both eyes 2019    Dry eye syndrome of bilateral lacrimal glands 2019    HARDEEP (obstructive sleep apnea) 2018    Bilateral carotid artery stenosis 2017    Right-sided carotid artery disease ([address] 2016    Pseudophakia of both eyes 2016    Regular astigmatism 2016    Presbyopia of both eyes 2016    Bell-rectal abscess 2013    Hypertension     Hypertrophic obstructive cardiomyopathy (HOCM) ([address] 2013    Type 2 diabetes mellitus without complication, without long-term current use of insulin ([address] 03/15/2013        Past Medical History:   Diagnosis Date    Arthritis     Ascites due to alcoholic cirrhosis (HCC)     Cardiomyopathy [facility])     mother history.  Chronic kidney disease     [facility]     Encephalopathy     HLD (hyperlipidemia) 3/30/2021    Hypertension     Hypotension     Liver disease     Liver failure (HCC)     HARDEEP (obstructive sleep apnea)     Other disorders of kidney and ureter in diseases classified elsewhere     Pneumonia     Psychiatric problem     Renal failure     Severe malnutrition ([facility] 3/30/2021    Shock ([facility]     Sleep apnea, obstructive     Type II or unspecified type diabetes mellitus without mention of complication, not stated as uncontrolled      Past Surgical History:   Procedure Laterality Date    APPENDECTOMY      CHOLECYSTECTOMY      COLONOSCOPY  04/23/2013    CT BIOPSY RENAL  08/04/2020    CT BIOPSY RENAL 8/4/2020 [facility] CT SCAN    CT BIOPSY RENAL  09/04/2020    CT BIOPSY RENAL 9/4/2020 [facility] CT SCAN    FOOT FRACTURE SURGERY Bilateral     FRACTURE SURGERY      HYSTERECTOMY      IR TUNNELED INTRAPERITNL CATH W/IMAG  01/28/2021    IR TUNNELED INTRAPERITNL CATH W/IMAG 1/28/2021 [facility] SPECIAL PROCEDURE    OTHER SURGICAL HISTORY      removal of anal fistulotomy    PARACENTESIS Left 07/28/2020    9015 mls removed by Dr Colby Salmeron Left 09/04/2020    65946pp ascites removed by Dr Lakeisha Black 50g albumin given    PARACENTESIS Left 09/15/2020    10,400cc removed by Dr Pankaj Cooper [phone]    PARACENTESIS Left 10/09/2020    7000cc removed by Dr. Colby Salmeron Left 10/13/2020    7000ml removed by Dr Pankaj Cooper [phone]    PARACENTESIS Left 10/26/2020    7010cc removed by Dr. Sujey Baldwin Left 11/06/2020    7020 mls removed ny Dr Colby Salmeron Left 11/19/2020    7000 ml removed by Dr. Lakeisha Black.     PARACENTESIS Left 11/30/2020    7000 ml removed by paracentesis by Dr. Colby Salmeron Left 12/08/2020    7100 ml removed    PARACENTESIS Left 01/08/2021    ultrasound guided paracentesis 7150 ml removed    PARACENTESIS Left 01/19/2021    7000ml removed by Dr Hermelindo Rome [phone]    PARACENTESIS Left 01/28/2021    by Dr. Emma Atwood 8100 ml removed    PARACENTESIS Left 03/23/2021    6420 ml removed by Dr. Emma Atwood [phone]    PARACENTESIS Left 03/26/2021    8210 ml removed by Dr. Emma Atwood.  US GUIDE PARACENTESIS Left 12/17/2020    7000ml removed by Dr Hermelindo Rome        Restrictions  Restrictions/Precautions: Fall Risk     Safety Devices: Safety Devices  Safety Devices in place: Yes  Type of devices: All fall risk precautions in place   Initially in place: No    Subjective  Pre Treatment Pain Screening  Pain at present: 0  Scale Used: Numeric Score  Intervention List: Patient able to continue with treatment    Pain Reassessment:   Pain Assessment  Patient Currently in Pain: Denies  Pain Assessment: 0-10  Pain Level: 0       Prior Level of Function:  Social/Functional History  Lives With: Spouse  Type of Home: House  Home Layout: One level  Home Access: Stairs to enter with rails  Entrance Stairs - Number of Steps: 2  Entrance Stairs - Rails: Both  Bathroom Shower/Tub: Walk-in shower  Bathroom Equipment: Grab bars in shower  Home Equipment: Rolling walker, Andi Goodman, 4 wheeled walker  Receives Help From: Family  ADL Assistance: [address] [place] [address] Avenue: Independent  Homemaking Responsibilities: Yes  Ambulation Assistance: Independent  Transfer Assistance: Independent  Occupation: Retired  Type of occupation:   Leisure & Hobbies: visiting with grandchildren    OBJECTIVE:     Orientation Status:  Orientation  Overall Orientation Status: Within Functional Limits    Observation:  Observation/Palpation  Posture: Poor  Observation: lethargic, cooperative, no acute distress, on RA, frail    Cognition Status:  Cognition  Overall Cognitive Status: Exceptions  Arousal/Alertness: Delayed responses to stimuli  Following Commands:  Follows one step commands with repetition Attention Span: Attends with cues to redirect  Memory: Decreased recall of recent events  Safety Judgement: Decreased awareness of need for safety, Decreased awareness of need for assistance  Problem Solving: Assistance required to generate solutions, Assistance required to correct errors made, Assistance required to identify errors made, Assistance required to implement solutions  Insights: Fully aware of deficits  Initiation: Requires cues for all  Sequencing: Requires cues for all    Perception Status:  Perception  Overall Perceptual Status: WFL    Sensation Status:  Sensation  Overall Sensation Status: WFL    Vision and Hearing Status:  Vision  Vision: Impaired  Vision Exceptions: Wears glasses at all times  Hearing  Hearing: Within functional limits     ROM:   LUE AROM (degrees)  LUE AROM : WFL  Left Hand AROM (degrees)  Left Hand AROM: WFL  RUE AROM (degrees)  RUE AROM : WFL  Right Hand AROM (degrees)  Right Hand AROM: WFL    Strength:  LUE Strength  Gross LUE Strength: Exceptions to [facility]  L Hand General: 3-/5  LUE Strength Comment: 3-/5 all planes  RUE Strength  Gross RUE Strength: Exceptions to [facility]  R Hand General: 3-/5  RUE Strength Comment: 3-/5 all planes    Coordination, Tone, Quality of Movement: Tone RUE  RUE Tone: Normotonic  Tone LUE  LUE Tone: Normotonic  Coordination  Movements Are Fluid And Coordinated: No  Coordination and Movement description: Fine motor impairments, Decreased speed, Decreased accuracy, Right UE, Left UE    Hand Dominance:  Hand Dominance  Hand Dominance: Right    ADL Status:  ADL  Feeding: Setup  Grooming: Setup  UE Bathing: Minimal assistance  LE Bathing: Maximum assistance  UE Dressing: Minimal assistance  LE Dressing: Maximum assistance  Toileting:  Moderate assistance  Additional Comments: Simulated all ADLs as above  Toilet Transfers  Toilet Transfer: Unable to assess  Toilet Transfers Comments: Anticipate Mod A       Therapy key for assistance levels    Independent = Pt. is able to perform task with no assistance but may require a device   Stand by assistance = Pt. does not perform task at an independent level but does not need physical assistance, requires verbal cues  Minimal, Moderate, Maximal Assistance = Pt. requires physical assistance (25%, 50%, 75% assist from helper) for task but is able to actively participate in task   Dependent = Pt. requires total assistance with task and is not able to actively participate with task completion     Functional Mobility:  Functional Mobility  Functional - Mobility Device: No device  Activity: Other(HHA)  Assist Level: Contact guard assistance  Functional Mobility Comments: Pt took 3 side steps towards [facility]  Transfers  Sit to stand: Moderate assistance  Stand to sit: Minimal assistance    Bed Mobility  Bed mobility  Supine to Sit: Maximum assistance  Sit to Supine: Dependent/Total    Seated and Standing Balance:  Balance  Sitting Balance: Contact guard assistance  Standing Balance: Contact guard assistance    Functional Endurance:  Activity Tolerance  Activity Tolerance: Patient limited by fatigue  Activity Tolerance: fair    D/C Recommendations:  OT D/C RECOMMENDATIONS  REQUIRES OT FOLLOW UP: Yes    Equipment Recommendations:  OT Equipment Recommendations  Equipment Needed: Yes  Mobility Devices: ADL Assistive Devices  ADL Assistive Devices: Reacher, Sock-Aid Hard, Long-handled [employer], Long-handled Sponge, Dressing Stick    OT Education:   OT Education  OT Education: OT Role, Plan of Care    OT Follow Up:  OT D/C RECOMMENDATIONS  REQUIRES OT FOLLOW UP: Yes       Assessment/Discharge Disposition:  Assessment: Pt is a 69 y/o female from home with spouse who presents to [facility] with the above deficits which impact her independence and safety during ADLs and IADLs. Pt would benefit from OT services to maximize independence and safety during ADLs.   Performance deficits / Impairments: Decreased functional mobility , Decreased strength, Decreased endurance, Decreased safe awareness, Decreased ADL status, Decreased high-level IADLs, Decreased posture, Decreased balance, Decreased cognition, Decreased fine motor control  Prognosis: Fair  Discharge Recommendations: Continue to assess pending progress  Decision Making: High Complexity  History: multiple comorbidities  Exam: 10 perf deficits  Assistance / Modification: Max A    Six Click Score   How much help for putting on and taking off regular lower body clothing?: A Lot  How much help for Bathing?: A Lot  How much help for Toileting?: A Lot  How much help for putting on and taking off regular upper body clothing?: A Little  How much help for taking care of personal grooming?: A Little  How much help for eating meals?: A Little  AM-[facility] Inpatient Daily Activity Raw Score: 15  AM-[facility] Inpatient ADL T-Scale Score : 34.69  ADL Inpatient CMS 0-100% Score: 56.46    Plan:  Plan  Times per week: 1-3x  Plan weeks: length of acute stay  Current Treatment Recommendations: Strengthening, Functional Mobility Training, Patient/Caregiver Education & Training, Home Management Training, Self-Care / ADL, Safety Education & Training, Neuromuscular Re-education, Balance Training, Endurance Training, Equipment Evaluation, Education, & procurement, Cognitive/Perceptual Training    Goals:   Patient will:    - Improve functional endurance to tolerate/complete 60 mins of ADL's  - Be Supervision in UB ADLs   - Be Supervision in LB ADLs  - Be Supervision in ADL transfers without LOB  - Be Supervision in toileting tasks  - Improve B hand fine motor coordination to [facility] in order to manage clothing fasteners/self-care containers in a timely manner  - Improve B UE strength and endurance to [facility] in order to participate in self-care activities as projected. - Access appropriate D/C site with as few architectural barriers as possible.   - Sequence self-care tasks with no verbal cues    Patient Goal: Patient goals : \"to get stronger and go home\"     Discussed and agreed upon: Yes Comments:     Therapy Time:   OT Individual Minutes  Time In: 1140  Time Out: 1200  Minutes: 20    Eval: 20 minutes     Electronically signed by:    AISHA Dejesus  3/31/2021, 12:18 PM

## 2021-03-31 NOTE — PROGRESS NOTES
performs all of her own ADLS and ambulates without assitance. Informed patient that I was there to discuss her goals of care. Stated that she was tired of being in and out of the hospital and that she wanted to go to hospice. It is possible that patient is overwhelmed from her prolong illness and is requesting hospice out of frustration. I believe that it would be appropriate to revisit the goals of care discussion with patient when her symptoms improve. 3/31/21-  Patient observed laying in bed. She is alert and oriented x3. She reports that she is SOB and weak. Abdomen is large and soft to palpation. Her skin is dry and she is lethargic. Discussed her goals of care with her and her . They are both in agreement for hospice care and for the patient to be discharged to [facility]. Case Management and patient's attending notified of the patient's and her  choice for hospice care at [facility].           Review of Systems:       Review of Systems   Constitutional: Positive for fatigue. Negative for activity change, appetite change, chills, diaphoresis, fever and unexpected weight change. HENT: Negative for drooling, hearing loss, mouth sores, sore throat, trouble swallowing and voice change. Eyes: Negative for discharge and visual disturbance. Respiratory: Negative for apnea, cough, choking, chest tightness, shortness of breath, wheezing and stridor. Cardiovascular: Positive for leg swelling. Negative for chest pain and palpitations. Gastrointestinal: Positive for abdominal distention. Negative for abdominal pain, anal bleeding, blood in stool, constipation, diarrhea, nausea, rectal pain and vomiting. Genitourinary: Negative for difficulty urinating, dysuria, enuresis, frequency and hematuria. Musculoskeletal: Negative for arthralgias, back pain, gait problem, joint swelling and myalgias. Skin: Negative for color change, pallor, rash and wound. Allergic/Immunologic: Negative for food allergies and immunocompromised state. Neurological: Negative for dizziness, tremors, seizures, syncope, facial asymmetry, speech difficulty, weakness, light-headedness, numbness and headaches. Hematological: Negative for adenopathy. Does not bruise/bleed easily. Psychiatric/Behavioral: Negative for agitation, behavioral problems, confusion, decreased concentration, dysphoric mood, hallucinations, self-injury, sleep disturbance and suicidal ideas. The patient is not nervous/anxious and is not hyperactive. Physical Examination:       BP (!) 123/37   Pulse 61   Temp 97.5 °F (36.4 °C) (Oral)   Resp 16   Ht 5' 7\" (1.702 m)   Wt 154 lb 8.7 oz (70.1 kg)   SpO2 98%   BMI 24.20 kg/m²    Physical Exam  Constitutional:       General: She is not in acute distress. Appearance: She is well-developed. She is not diaphoretic. HENT:      Head: Normocephalic and atraumatic. Right Ear: External ear normal.      Left Ear: External ear normal.      Nose: Nose normal.      Mouth/Throat:      Pharynx: No oropharyngeal exudate. Eyes:      General: No scleral icterus. Right eye: No discharge. Left eye: No discharge. Conjunctiva/sclera: Conjunctivae normal.      Pupils: Pupils are equal, round, and reactive to light. Neck:      Musculoskeletal: Normal range of motion and neck supple. Thyroid: No thyromegaly. Vascular: No JVD. Trachea: No tracheal deviation. Cardiovascular:      Rate and Rhythm: Normal rate and regular rhythm. Heart sounds: Normal heart sounds. Pulmonary:      Effort: Pulmonary effort is normal. No respiratory distress. Breath sounds: Normal breath sounds. No stridor. No wheezing or rales. Chest:      Chest wall: No tenderness. Abdominal:      General: Bowel sounds are decreased. There is distension. Palpations: Abdomen is soft. There is no mass. Tenderness:  There is no abdominal tenderness. There is no guarding or rebound. Comments:     Musculoskeletal: Normal range of motion. General: No tenderness or deformity. Lymphadenopathy:      Cervical: No cervical adenopathy. Skin:     General: Skin is warm and dry. Findings: No erythema or rash. Neurological:      Mental Status: She is alert and oriented to person, place, and time. Psychiatric:         Behavior: Behavior normal.         Thought Content: Thought content normal.         Allergies:       Allergies   Allergen Reactions    Codeine        Medications:      Current Facility-Administered Medications   Medication Dose Route Frequency Provider Last Rate Last Admin    sodium zirconium cyclosilicate (LOKELMA) oral suspension 10 g  10 g Oral TID David Sesay MD   10 g at 03/31/21 1038    vancomycin (VANCOCIN) intermittent dosing (placeholder)   Other RX Placeholder Vj Bean DO        sodium bicarbonate 150 mEq in dextrose 5 % 1,000 mL infusion   Intravenous Continuous David Sesay  mL/hr at 03/30/21 1326 New Bag at 03/30/21 1326    sodium chloride flush 0.9 % injection 10 mL  10 mL Intravenous 2 times per day Bakari Mehta RN, NP   10 mL at 03/31/21 0859    sodium chloride flush 0.9 % injection 10 mL  10 mL Intravenous PRN Bakari Mehta RN, NP        0.9 % sodium chloride infusion  25 mL Intravenous PRN Bakari Mehta RN, NP        enoxaparin (LOVENOX) injection 30 mg  30 mg Subcutaneous Daily Bakari Mehta RN, NP   30 mg at 03/31/21 0839    promethazine (PHENERGAN) tablet 12.5 mg  12.5 mg Oral Q6H PRN Bakari Mehta RN, NP        Or    ondansetron (ZOFRAN) injection 4 mg  4 mg Intravenous Q6H PRN Bakari Mehta RN, NP        polyethylene glycol (GLYCOLAX) packet 17 g  17 g Oral Daily PRN Bakari Mehta RN, NP        acetaminophen (TYLENOL) tablet 650 mg  650 mg Oral Q6H PRN Bakari Mehta RN, NP        Or   [facility] acetaminophen (TYLENOL) suppository 650 mg  650 mg Rectal Q6H PRN Bakari Mehta, PERNELL, NP        glucose (GLUTOSE) 40 % oral gel 15 g  15 g Oral PRN Manoj Cooley MD        dextrose 50 % IV solution  12.5 g Intravenous PRN Manoj Cooley MD        glucagon (rDNA) injection 1 mg  1 mg Intramuscular PRN Manoj Cooley MD        dextrose 5 % solution  100 mL/hr Intravenous PRN Manoj Cooley MD        atorvastatin (LIPITOR) tablet 10 mg  10 mg Oral Daily Bakari Mehta, RN, NP   10 mg at 03/30/21 2120    metoprolol succinate (TOPROL XL) extended release tablet 12.5 mg  12.5 mg Oral BID Bakari Mehta RN, NP   12.5 mg at 03/31/21 0841    midodrine (PROAMATINE) tablet 10 mg  10 mg Oral TID WC Bakari Mehta RN, NP   10 mg at 03/31/21 0841    ranolazine (RANEXA) extended release tablet 500 mg  500 mg Oral BID Bakari Mehta RN, NP   500 mg at 03/31/21 0840    fludrocortisone (FLORINEF) tablet 0.1 mg  0.1 mg Oral Daily Abhinav Stuart MD   0.1 mg at 03/31/21 0840       History: PMHx:  Past Medical History:   Diagnosis Date    Arthritis     Ascites due to alcoholic cirrhosis ([address]     Cardiomyopathy ([facility]     mother history.     Chronic kidney disease     Cirrhosis ([address]     Encephalopathy     HLD (hyperlipidemia) 3/30/2021    Hypertension     Hypotension     Liver disease     Liver failure (HCC)     HARDEEP (obstructive sleep apnea)     Other disorders of kidney and ureter in diseases classified elsewhere     Pneumonia     Psychiatric problem     Renal failure     Severe malnutrition ([address] 3/30/2021    Shock ([address]     Sleep apnea, obstructive     Type II or unspecified type diabetes mellitus without mention of complication, not stated as uncontrolled        PSHx:  Past Surgical History:   Procedure Laterality Date    APPENDECTOMY      CHOLECYSTECTOMY      COLONOSCOPY  04/23/2013    CT BIOPSY RENAL  08/04/2020    CT BIOPSY RENAL 8/4/2020 [facility] CT SCAN    CT BIOPSY RENAL  09/04/2020    CT BIOPSY RENAL 9/4/2020 [facility] CT SCAN    FOOT FRACTURE SURGERY Bilateral     FRACTURE SURGERY      HYSTERECTOMY      IR TUNNELED INTRAPERITNL CATH W/IMAG  01/28/2021    IR TUNNELED INTRAPERITNL CATH W/IMAG 1/28/2021 [facility] SPECIAL PROCEDURE    OTHER SURGICAL HISTORY      removal of anal fistulotomy    PARACENTESIS Left 07/28/2020    9015 mls removed by Dr Merlene Conroy Left 09/04/2020    45278ln ascites removed by Dr Jessica Hernandez 50g albumin given    PARACENTESIS Left 09/15/2020    10,400cc removed by Dr Zaria Mc [phone]    PARACENTESIS Left 10/09/2020    7000cc removed by Dr. Merlene Conroy Left 10/13/2020    7000ml removed by Dr Zaria Mc [phone]    PARACENTESIS Left 10/26/2020    7010cc removed by Dr. Joseph Pratt Left 11/06/2020    7020 mls removed ny Dr Merlene Conroy Left 11/19/2020    7000 ml removed by Dr. Jessica Hernandez.  PARACENTESIS Left 11/30/2020    7000 ml removed by paracentesis by Dr. Merlene Conroy Left 12/08/2020    7100 ml removed    PARACENTESIS Left 01/08/2021    ultrasound guided paracentesis 7150 ml removed    PARACENTESIS Left 01/19/2021    7000ml removed by Dr Zaria Mc [phone]    PARACENTESIS Left 01/28/2021    by Dr. Jessica Hernandez 8100 ml removed    PARACENTESIS Left 03/23/2021    6420 ml removed by Dr. Jessica Hernandez [phone]    PARACENTESIS Left 03/26/2021    8210 ml removed by Dr. Jessica Hernandez.     US GUIDE PARACENTESIS Left 12/17/2020    7000ml removed by Dr Joann Samuel Hx:  Social History     Socioeconomic History    Marital status:      Spouse name: None    Number of children: None    Years of education: None    Highest education level: None   Occupational History    None   Social Needs    Financial resource strain: Not hard at all   Cristino-Sukhdeep insecurity     Worry: Never true     Inability: Never true   [employer] needs     Medical: None     Non-medical: None Tobacco Use    Smoking status: Never Smoker    Smokeless tobacco: Former User   Substance and Sexual Activity    Alcohol use: No    Drug use: No    Sexual activity: Yes   Lifestyle    Physical activity     Days per week: None     Minutes per session: None    Stress: None   Relationships    Social connections     Talks on phone: None     Gets together: None     Attends [employer] service: None     Active member of club or organization: None     Attends meetings of clubs or organizations: None     Relationship status: None    Intimate partner violence     Fear of current or ex partner: None     Emotionally abused: None     Physically abused: None     Forced sexual activity: None   Other Topics Concern    None   Social History Narrative    None       Family Hx:  Family History   Problem Relation Age of Onset    Arrhythmia Mother     Diabetes Mother     High Blood Pressure Mother     Arrhythmia Father     Breast Cancer Sister     Diabetes Brother     High Blood Pressure Brother        LABS: Reviewed     CBC:  Lab Results   Component Value Date    WBC 19.0 03/29/2021    RBC 3.64 03/29/2021    HGB 10.1 03/29/2021    HCT 31.2 03/29/2021    MCV 85.7 03/29/2021    MCH 27.8 03/29/2021    MCHC 32.5 03/29/2021    RDW 15.6 03/29/2021     03/29/2021    MPV 10.0 09/08/2015     CBC with Differential:   Lab Results   Component Value Date    WBC 19.0 03/29/2021    RBC 3.64 03/29/2021    HGB 10.1 03/29/2021    HCT 31.2 03/29/2021     03/29/2021    MCV 85.7 03/29/2021    MCH 27.8 03/29/2021    MCHC 32.5 03/29/2021    RDW 15.6 03/29/2021    NRBC 0.0 07/22/2020    LYMPHOPCT 3.8 03/29/2021    MONOPCT 6.6 03/29/2021    BASOPCT 0.4 03/29/2021    MONOSABS 1.3 03/29/2021    LYMPHSABS 0.7 03/29/2021    EOSABS 0.0 03/29/2021    BASOSABS 0.1 03/29/2021     CMP:    Lab Results   Component Value Date     03/31/2021    K 6.2 03/31/2021    K 6.2 03/31/2021    CL 86 03/31/2021    CO2 19 03/31/2021    BUN 88 03/31/2021    CREATININE 3.73 03/31/2021    GFRAA 14.4 03/31/2021    LABGLOM 11.9 03/31/2021    GLUCOSE 192 03/31/2021    GLUCOSE 84 12/04/2020    PROT 4.9 03/31/2021    LABALBU 3.0 03/31/2021    LABALBU 3.3 12/04/2020    CALCIUM 9.1 03/31/2021    BILITOT 1.4 03/31/2021    ALKPHOS 62 03/31/2021    AST 28 03/31/2021    ALT 20 03/31/2021     BMP:    Lab Results   Component Value Date     03/31/2021    K 6.2 03/31/2021    K 6.2 03/31/2021    CL 86 03/31/2021    CO2 19 03/31/2021    BUN 88 03/31/2021    LABALBU 3.0 03/31/2021    LABALBU 3.3 12/04/2020    CREATININE 3.73 03/31/2021    CALCIUM 9.1 03/31/2021    GFRAA 14.4 03/31/2021    LABGLOM 11.9 03/31/2021    GLUCOSE 192 03/31/2021    GLUCOSE 84 12/04/2020     TSH:   Lab Results   Component Value Date    TSH 3.620 03/07/2021     Vitamin B12 and Folate: No components found for: FOLIC,  X62  Urinalysis:   Lab Results   Component Value Date    NITRU Negative 03/08/2021    WBCUA 10-20 03/08/2021    BACTERIA Negative 03/08/2021    RBCUA 3-5 03/08/2021    BLOODU Negative 03/08/2021    SPECGRAV 1.018 03/08/2021    GLUCOSEU Negative 03/08/2021           FUNCTIONAL ADL´S:     Independent: [ x] Eating, [ x  ] Dressing, [x   ] Transferring, [ x ] Toileting, [  x ] Bathing, [  x ] Continence  Dependent   : [  ] Eating, [   ] Dressing, [   ] Transferring, [   ] Elvia Olp, [   ] Bathing, [   ] Continence  W. assistant : [  ] Eating, [   ] Dressing, [   ] Transferring, [   ] Elvia Olp, [   ] Reynaldo Bee, [   ] Continence    Radiology: Reviewed      Echocardiogram Limited    Result Date: 3/8/2021  Transthoracic Echocardiography Report (TTE)  Demographics  Patient Name    Rajwinder Trevino Gender               Female  Patient Number  [id number]    Race                                               Ethnicity  Visit Number    [id number]   Room Number          W170  Corporate ID                Date of Study        03/08/2021  Accession       [id number]  Referring Physician  Fahad Oliveira, Chas Thompson  Number  Date of Birth   1945  Sonographer          Regine Palacios LPN  Age             70 year(s)  Interpreting         [facility]) Cardiology                              Physician            Mateus Valdovinos Procedure Type of Study  TTE procedure:ECHOCARDIOGRAM LIMITED. Procedure Date: 03/08/2021 Start: 02:47 PM Study Location: Portable Technical Quality: Adequate visualization Indications:LVF. Patient Status: Routine Height: 67 inches Weight: 191 pounds BSA: 1.98 m^2 BMI: 29.91 kg/m^2 BP: 128/59 mmHg  Conclusions  Summary  Technically difficult examination. Left ventricular ejection fraction is visually estimated at 65%. Moderate concentric left ventricular hypertrophy with more pronounced  basal septal hypertrophy. severely Increased LVOT gradient of 107mmHg unchanged as compared to  previous echo on 6/2020, likely due to basal septal hypertrophy and KIKO of  anterior mitral valve leaflet  Normal right ventricle structure and function. Normal right ventricle systolic pressure. Recommend Cardiac MRI for further evaluation of LVOT/septal hypertrophy. Signature  ----------------------------------------------------------------  Electronically signed by Mateus Armstrong(Interpreting  physician) on 03/08/2021 04:49 PM  ----------------------------------------------------------------  Findings Left Ventricle Left ventricular ejection fraction is visually estimated at 65%. Normal left ventricular size and function. Moderate concentric left ventricular hypertrophy with more pronounced basal septal hypertophy. severely Increased LVOT gradient of 105mmHg unchanged as compared to previous echo on 6/2020, likely due to basal septal hypertrophy and KIKO of anterior mitral valve leaflet Right Ventricle Normal right ventricle structure and function. Normal right ventricle systolic pressure. Left Atrium The left atrium is Moderately dilated. Mitral Valve No evidence of mitral valve stenosis. Trace (+) mitral regurgitation is present. Aortic Valve Individual aortic valve leaflets are not clearly visualized. Pericardial Effusion No evidence of pericardial effusion. Pleural Effusion No evidence of pleural effusion. Doppler Measurements:  AV Peak Gradient: 105.67 mmHg                                               MV P1/2t: 46.7 msec                                               MVA by PHT4.71 cm^2 Valves  Mitral Valve  P1/2t: 46.7 msec  Area (PHT): 4.71 cm^2  Aortic Valve  Peak Velocity: 5.14 m/s  Peak Gradient: 105.67 mmHg    Xr Chest Portable    Result Date: 3/8/2021  EXAMINATION: XR CHEST PORTABLE CLINICAL HISTORY: HYPOTENSION COMPARISONS: CT CHEST, DECEMBER 29, 2020 FINDINGS: Osseous structures are intact. Cardiopericardial silhouette is normal. Pulmonary vasculature is normal. Lungs are clear. NO ACUTE CARDIOPULMONARY DISEASE. Assessment and plan:    -Advance Care Planning  Discussed goals of care with patient. Explained in extensive detail nuances between full code, DNR CCA and DNR CC. Patient has made the decision to be:    DNR CCA  NO MPOA or Living Will in place  Pallitive Performance Scale of 30%  MELD 30. 90 day Mortality rate 27-32%  May benefit from community based palliative care due to need for close monitoring as she is high risk for readmission, advanced disease and symptom burden. -Goals of Care Discussion:  Disease process and goals of treatment were discussed in basic terms. Elise's goal is to optimize available comfort care measures to decrease hospitalizations and maximize comfort. We discussed the palliative care philosophy in light of those goals. We discussed typical progression and prognosis of end stage cirrhosis, renal disease, and cardiomyopathy, she is not an organ transplant candidate due to her fragile state. We discussed all care options contingent on treatment response and QOL.  Much active listening, presence, and emotional support were given. -Patient is verbalizing that she wants to go to hospice. She also verbalized her frustration of being back in the hospital. It is possible that patient is frustrated from her prolonged illness. She has been hospitalized three times in the past 30 days. I believe that it would be appropriate to re-visit goals of care discussion once patient is more stable.    -Despite her metabolic encephalopathy she is oriented x 3. However, she gave me permission to discuss her care with her daughter because her  has severe hearing impairment.     -Considering her Liver cirrhosis with hepatic insufficiency, hepatic encephalopathy, FALLON on CKD rule out hepatic renal syndrome, hyponatremia, hypercoagulability state, paracentesis, 3 hospitalizations within the past 30 days, PPS of 30, MELD 30, and INR is 1.5 she is appropriate for hospice service.    -Phone call placed to patient's daughter, Fidel Cristina. I recapped my conversation with the patient. Her daughter was surprised at her request for hospice. She is in agreement to revisit the goals of care discussion when the patient medical condition has improved. -Will follow up with patient tomorrow.    -3/31/21: Discussed her goals of care with her and her . They are both in agreement for hospice care and for the patient to be discharged to [facility]. Case Management and patient's attending notified of the patient's and her  choice for hospice care at [facility]. While hospitalized she is being treated by other specialists for:  1. Severe hyperkalemia  2. Hypovolemic hyponatremia-in the setting of decompensated     cirrhosis, likely also due to poor p.o. intake  3. FALLON-likely prerenal but need to rule out hepatorenal also  4. Acute encephalopathy-likely multifactorial, due to hyponatremia versus hepatic, rule out infection, rule out adrenal insufficiency  5.  Decompensated liver cirrhosis with recurrent ascites status post catheter placement  6. Hyperkalemia  7. Moderate protein calorie malnutrition  8.  Diabetes    Electronically signed by TANNER Kelly CNP on 3/31/2021 at 11:50 AM

## 2021-03-31 NOTE — PROGRESS NOTES
Pharmacy Note  Vancomycin Consult    Jan June is a 70 y.o. female started on Vancomycin for possible sepsis; consult received from Dr. Amor Gonzales to manage therapy. Also receiving the following antibiotics: none at this time    Patient Active Problem List   Diagnosis    Type 2 diabetes mellitus without complication, without long-term current use of insulin (HCC)    Hypertension    Bell-rectal abscess    Bilateral carotid artery stenosis    Hypertrophic obstructive cardiomyopathy (HOCM) (HCC)    HARDEEP (obstructive sleep apnea)    Pseudophakia of both eyes    Regular astigmatism    Cardiomyopathy ([address]    Rectal pain    Abdominal distension    FALLON (acute kidney injury) ([address]    Cirrhosis of liver with ascites (HCC)    Renal mass, left    Alcoholic cirrhosis of liver with ascites (HCC)    Shock ([address]    Arterial hypotension    Subconjunctival hemorrhage of left eye    Right-sided carotid artery disease (HCC)    PVD (posterior vitreous detachment), both eyes    Presbyopia of both eyes    Dry eye syndrome of bilateral lacrimal glands    Other ascites    Hypotension    Cirrhosis, liver, 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 Intake/Output Summary (Last 24 hours) at 3/31/2021 1009  Last data filed at 3/31/2021 0517  Gross per 24 hour   Intake 3158.33 ml   Output 3300 ml   Net -141.67 ml     Culture Date      Source                       Results  Nathan Reid #[id number] ([id number]) (70 y.o. F) (Adm: 03/29/21)  [facility] [id number]  Results    Procedure Component Value Units Date/Time   Culture, Body Fluid [[id number]] (Abnormal) Collected: 03/30/21 1400   Order Status: Completed Specimen: Body Fluid from Ascitic Fluid Updated: 03/31/21 0901    Organism Staphylococcus coagulase-negativeAbnormal     Body Fluid Culture, Sterile --    Light growth   ID and sensitivity to follow    Narrative:     ORDER#: [id number]                          ORDERED BY: Manuel Burns   SOURCE: Ascites Body Fluid                 COLLECTED:  03/30/21 14:00   ANTIBIOTICS AT ABDULALHI.:                      RECEIVED :  03/30/21 15:14   CALL  [facility]  4 tel. [phone],   Body fluid called & read back by Karen Calle, 03/31/2021 09:01, by NORCAP LODJAZMIN   Concentrate (centrifuge) body fluid add SCNFL to order   Culture, Urine [[id number]] Collected: 03/30/21 1507   Order Status: Completed Specimen: Urine, clean catch Updated: 03/31/21 0744    Urine Culture, Routine No growth in <24 hours, culture reincubated   Narrative:     ORDER#: [id number]                          ORDERED BY: Manuel Burns   SOURCE: Urine Clean Catch                  COLLECTED:  03/30/21 15:07   ANTIBIOTICS AT ABDULLAHI.:                      RECEIVED :  03/30/21 15:07   Gram stain [[id number]] Collected: 03/30/21 1400   Order Status: Completed Specimen:  Body Fluid from Ascitic Fluid Updated: 03/30/21 1601    Gram Stain Result Few WBC's, No organisms seen   Narrative:     ORDER#: [id number]                          ORDERED BY: Manuel Burns   SOURCE: Ascites Body Fluid                 COLLECTED:  03/30/21 14:00   ANTIBIOTICS AT ABDULLAHI.:                      RECEIVED :  03/30/21 15:14   Culture, Blood 2 [[id number]] Collected: 03/30/21 1509   Order Status: Sent Specimen: Blood Updated: 03/30/21 1517   Culture, Blood 1 [[id number]] Collected: 03/30/21 1137   Order Status: Sent Specimen: Blood Updated: 03/30/21 1140   COVID-19, Rapid [[id number]] Collected: 03/29/21 6496 Order Status: Completed Updated: 03/30/21 0020    SARS-CoV-2, NAAT Not Detected    Comment: Rapid NAAT:   Negative results should be treated as presumptive and,   if inconsistent with clinical signs and symptoms or necessary for   patient management, should be tested with an alternative molecular   assay. Negative results do not preclude SARS-CoV-2 infection and   should not be used as the sole basis for patient management decisions. This test has been authorized by the FDA under an Emergency Use   Authorization (EUA) for use by authorized laboratories. Fact sheet for Healthcare Providers:   Mau.dirk   Fact sheet for Patients: Lovely     METHODOLOGY: Isothermal Nucleic Acid Amplification       Culture, Body Fluid [[id number]] Collected: 03/30/21 0000   Order Status: Canceled Specimen: Body Fluid from Ascitic Fluid        Ht Readings from Last 1 Encounters:   03/29/21 5' 7\" (1.702 m)        Wt Readings from Last 1 Encounters:   03/30/21 154 lb 8.7 oz (70.1 kg)       Body mass index is 24.2 kg/m². Estimated Creatinine Clearance: 13 mL/min (A) (based on SCr of 3.73 mg/dL (H)). Goal Trough Level: 15 mcg/mL    Assessment/Plan:  Will initiate Vancomycin with a one time loading dose of 1000 mg x1. Due to poor renal function it's undecided if pt will receive dialysis or hospice transfer. Willl order random vanco level in 48 hours to assess for further dosing. Thank you for the consult. Will continue to follow.    Tsering Roca [facility]  3/31/2021  10:17 AM

## 2021-03-31 NOTE — PROGRESS NOTES
Physician Progress Note    3/31/2021   3:37 PM    Name:  Sophia Metz  MRN:    [id number]     IP Day: 2     Admit Date: 3/29/2021  9:23 PM  PCP: Cecilia Roque MD    Code Status:  DNR-CCA      Assessment and Plan: Active Problems/ diagnosis:     Severe hyperkalemia  Hypovolemic hyponatremia-in the setting of decompensated cirrhosis, likely also due to poor p.o. intake  FALLON-likely prerenal but need to rule out hepatorenal also  ?dig toxicity   Acute encephalopathy-likely multifactorial, due to hyponatremia versus hepatic, rule out infection, rule out adrenal insufficiency  Decompensated liver cirrhosis with recurrent ascites status post catheter placement  Hyperkalemia  Moderate protein calorie malnutrition  Diabetes      Plan  3/30/2021  -Patient is critically ill, she is very lethargic this morning. Renal evaluated the patient on admission last night and no need for urgent dialysis. -Rule out adrenal insufficiency, cosyntropin test ordered. Cortisol level was 21 this morning. She is ordered for dexamethasone by intensivist.  Discussed with intensivist.  -Agree with sodium bicarb infusion, Kayexalate, monitor potassium level closely. Continue telemetry monitoring.  -Resume home midodrine 10 mg 3 times daily.  -Resume insulin, monitor on hypoglycemia protocol.  -We will reach out to family for goals of care discussion, meanwhile we will consult palliative care. I discussed goals of care with the patient last admission and she is DNR CCA. At last admission, patient declined home care and SNF. -Drained 2 L from the abdomen and drain, send sample to rule out SBP although less likely    3/31/2021  -Patient stabilized hemodynamically however remain overall ill. She was moved out of the ICU. Palliative care is discussing hospice with patient and family. Patient expressed her wishes to proceed with hospice this morning.   Patient will be discharged to SNF with hospice as per palliative team.  -Discussed possible digoxin toxicity with the nephrologist, will give DigiFab, dosing discussed with pharmacist.  -Resume current measures, appreciate nephrology and palliative care help.  -Monitor vitals  -Discussed with patient RN. DVT PPx     Subjective:     Oriented x3. Feels better than yesterday. She is interested in hospice. She wants to proceed with hospice care.     Physical Examination:      Vitals:  BP (!) 123/37   Pulse 61   Temp 97.5 °F (36.4 °C) (Oral)   Resp 16   Ht 5' 7\" (1.702 m)   Wt 154 lb 8.7 oz (70.1 kg)   SpO2 98%   BMI 24.20 kg/m²   Temp (24hrs), Av.1 °F (36.2 °C), Min:96.4 °F (35.8 °C), Max:97.5 °F (36.4 °C)      General appearance: Awake, no acute distress  Mental Status: Alert and oriented x3, no confusion  Lungs: clear to auscultation bilaterally, normal effort  Heart: regular rate   Abdomen: soft, no tenderness but distended, bowel sounds present, no masses  Extremities: no edema, redness, tenderness in the calves  Skin: no gross lesions, rashes  Neurologically: AOx3, nonfocal.    Data:     Labs:  Recent Labs     21  2145 21  1232   WBC 19.0* 11.3*   HGB 10.1* 8.4*    133     Recent Labs     21  0619 21  1232   * 117*   K 6.2*  6.2* 5.9*   CL 86* 82*   CO2 19* 20   BUN 88* 92*   CREATININE 3.73* 3.96*   GLUCOSE 192* 239*     Recent Labs     21  0517 21  0619   AST 24 28   ALT 19 20   BILITOT 1.3* 1.4*   ALKPHOS 59 62       Current Facility-Administered Medications   Medication Dose Route Frequency Provider Last Rate Last Admin    sodium zirconium cyclosilicate (LOKELMA) oral suspension 10 g  10 g Oral TID Latrice Coyle MD   10 g at 21 1432    vancomycin (VANCOCIN) intermittent dosing (placeholder)   Other RX Placeholder Rah Alvarenga DO        digoxin immune xander (DIGIFAB) 160 mg in sodium chloride 0.9 % 50 mL IVPB  160 mg Intravenous Once Rah Alvarenga, DO        sodium bicarbonate 150 mEq in dextrose 5 % 1,000 mL infusion Daily Eva Argueta MD   0.1 mg at 03/31/21 0840       Additional work up or/and treatment plan may be added today or then after based on clinical progression. I am managing a portion of pt care. Some medical issues are handled by other specialists. Additional work up and treatment should be done in out pt setting by pt PCP and other out pt providers. In addition to examining and evaluating pt, I spent additional time explaining care, normaland abnormal findings, and treatment plan. All of pt questions were answered. Counseling, diet and education were provided. Case will be discussed with nursing staff when appropriate. Family will be updated if and when appropriate.        Electronically signed by Rah Alvarenga DO on 3/31/2021 at 3:37 PM

## 2021-04-01 NOTE — PROGRESS NOTES
Palliative Care Progress Note  Patient: Supa Ngo  Gender: female  YOB: 1945  Unit/Bed: M170/N888-13  Code Status: DNR-CCA  Inpatient Treatment Team: Treatment Team: Attending Provider: Tono Conley DO; Consulting Physician: Ivette Bae DO; Utilization Reviewer: Mona Harvey RN; Registered Nurse: Gustabo Suarez RN; Nursing Student: Marco A Morgan; : Raven Ward RN; Patient Care Tech: [facility]; : Sarah Don [place]  Admit Date:  3/29/2021    Chief Complaint: shortness of breath, fatigue    History of Presenting Illness:      Supa Ngo is a 70 y.o. female on hospital day 3 with hyperkalemia. PMH is significant for HTN, HOCM, carotid stenosis s/p left CEA, CHF, decompensated nonalcoholic liver cirrhosis requiring frequent large volume paracentesis s/p peritoneal drain placement on 1/28, DM2, CKD, anxiety,and depression. Patient seen and examined at bedside for goals of care discussion. She presented to the ER with complaints of  increased shortness of breath and fatigue. This is her third hospitalization in the past 30 days. Labs in the ER showed potassium of 7.5, Creatinine 3.25, , BUN 81, chloride 81, CO2, 14, Albumin 3.0, total bilirubin 1.3, hemoglobin 10.1, and hematocrit 31.2. Ammonia is normal at 38. Rapid COVID 19 test is negative. Lactic acid 5.2. Procalcitonin 0.41. Last PT/INR on 3/17/21 was 18.5 and 1.5 respectively. Patient observed laying in bed. She is alert and oriented x3. She reports that she is SOB and weak. Abdomen is large and soft to palpation. Paracentesis done yesterday and 8210 mL of fluid aspirated. Her skin is dry and she is lethargic. She is having no pain. No cough or sputum prod uction. Negative abdominal pain or nausea. No GI or  complaints. Negative significant  emotional or sleep disturbance. States that she usually performs all of her own ADLS and ambulates without assitance.    Informed patient that I was there to discuss her goals of care. Stated that she was tired of being in and out of the hospital and that she wanted to go to hospice. It is possible that patient is overwhelmed from her prolong illness and is requesting hospice out of frustration. I believe that it would be appropriate to revisit the goals of care discussion with patient when her symptoms improve. 3/31/21-  Patient observed laying in bed. She is alert and oriented x3. She reports that she is SOB and weak. Abdomen is large and soft to palpation. Her skin is dry and she is lethargic. Discussed her goals of care with her and her . They are both in agreement for hospice care and for the patient to be discharged to [facility]. Case Management and patient's attending notified of the patient's and her  choice for hospice care at [facility]. 4/1/21-  Patient observed laying in bed. She is alert and oriented x3. She reports that she is SOB and weak. Abdomen is large and soft to palpation. Her skin is dry and she is lethargic. She has urinary retention. Bladder scan showed 793 mL of urine in her bladder. Nurse will start pierre catheter. Her appetite is poor. Patient and her  met with hospice nurse today and has agreed to hospice care. She is scheduled to be discharged under hospice services tomorrow to [facility]. Review of Systems:       Review of Systems   Constitutional: Positive for fatigue. Negative for activity change, appetite change, chills, diaphoresis, fever and unexpected weight change. HENT: Negative for drooling, hearing loss, mouth sores, sore throat, trouble swallowing and voice change. Eyes: Negative for discharge and visual disturbance. Respiratory: Negative for apnea, cough, choking, chest tightness, shortness of breath, wheezing and stridor. Cardiovascular: Positive for leg swelling. Negative for chest pain and palpitations.    Gastrointestinal: Positive for abdominal distention. Negative for abdominal pain, anal bleeding, blood in stool, constipation, diarrhea, nausea, rectal pain and vomiting. Genitourinary: Negative for difficulty urinating, dysuria, enuresis, frequency and hematuria. Musculoskeletal: Negative for arthralgias, back pain, gait problem, joint swelling and myalgias. Skin: Negative for color change, pallor, rash and wound. Allergic/Immunologic: Negative for food allergies and immunocompromised state. Neurological: Negative for dizziness, tremors, seizures, syncope, facial asymmetry, speech difficulty, weakness, light-headedness, numbness and headaches. Hematological: Negative for adenopathy. Does not bruise/bleed easily. Psychiatric/Behavioral: Negative for agitation, behavioral problems, confusion, decreased concentration, dysphoric mood, hallucinations, self-injury, sleep disturbance and suicidal ideas. The patient is not nervous/anxious and is not hyperactive. Physical Examination:       BP (!) 115/39   Pulse 61   Temp 97.2 °F (36.2 °C) (Axillary)   Resp 16   Ht 5' 7\" (1.702 m)   Wt 154 lb 8.7 oz (70.1 kg)   SpO2 100%   BMI 24.20 kg/m²    Physical Exam  Constitutional:       General: She is not in acute distress. Appearance: She is well-developed. She is not diaphoretic. HENT:      Head: Normocephalic and atraumatic. Right Ear: External ear normal.      Left Ear: External ear normal.      Nose: Nose normal.      Mouth/Throat:      Pharynx: No oropharyngeal exudate. Eyes:      General: No scleral icterus. Right eye: No discharge. Left eye: No discharge. Conjunctiva/sclera: Conjunctivae normal.      Pupils: Pupils are equal, round, and reactive to light. Neck:      Musculoskeletal: Normal range of motion and neck supple. Thyroid: No thyromegaly. Vascular: No JVD. Trachea: No tracheal deviation.    Cardiovascular:      Rate and Rhythm: Normal rate and regular rhythm. Heart sounds: Normal heart sounds. Pulmonary:      Effort: Pulmonary effort is normal. No respiratory distress. Breath sounds: Normal breath sounds. No stridor. No wheezing or rales. Chest:      Chest wall: No tenderness. Abdominal:      General: Bowel sounds are decreased. There is distension. Palpations: Abdomen is soft. There is no mass. Tenderness: There is no abdominal tenderness. There is no guarding or rebound. Comments:     Musculoskeletal: Normal range of motion. General: No tenderness or deformity. Lymphadenopathy:      Cervical: No cervical adenopathy. Skin:     General: Skin is warm and dry. Findings: No erythema or rash. Neurological:      Mental Status: She is alert and oriented to person, place, and time. Psychiatric:         Behavior: Behavior normal.         Thought Content: Thought content normal.         Allergies:       Allergies   Allergen Reactions    Codeine        Medications:      Current Facility-Administered Medications   Medication Dose Route Frequency Provider Last Rate Last Admin    vancomycin (VANCOCIN) intermittent dosing (placeholder)   Other RX Placeholder Jeanie Celestin,         sodium bicarbonate 150 mEq in dextrose 5 % 1,000 mL infusion   Intravenous Continuous Blade Watson MD   Stopped at 04/01/21 1043    sodium chloride flush 0.9 % injection 10 mL  10 mL Intravenous 2 times per day Bakari Mehta RN, NP   10 mL at 03/31/21 0859    sodium chloride flush 0.9 % injection 10 mL  10 mL Intravenous PRN Bakari Mehta RN, NP        0.9 % sodium chloride infusion  25 mL Intravenous PRN Bakari Mehta RN, NP        enoxaparin (LOVENOX) injection 30 mg  30 mg Subcutaneous Daily Bakari Mehta RN, NP   30 mg at 04/01/21 0833    promethazine (PHENERGAN) tablet 12.5 mg  12.5 mg Oral Q6H PRN Bakari Mehta RN, NP        Or    ondansetron (ZOFRAN) injection 4 mg  4 mg Intravenous Q6H PRN Bakari Mehta RN, NP        polyethylene glycol (GLYCOLAX) packet 17 g  17 g Oral Daily PRN Bakari Mehta RN, NP        acetaminophen (TYLENOL) tablet 650 mg  650 mg Oral Q6H PRN Bakari Mehta RN, NP   650 mg at 03/31/21 2249    Or    acetaminophen (TYLENOL) suppository 650 mg  650 mg Rectal Q6H PRN Bakari Mehta RN, NP        glucose (GLUTOSE) 40 % oral gel 15 g  15 g Oral PRN Ariana Wills MD        dextrose 50 % IV solution  12.5 g Intravenous PRN Ariana Wills MD        glucagon (rDNA) injection 1 mg  1 mg Intramuscular PRN Ariana Wills MD        dextrose 5 % solution  100 mL/hr Intravenous PRN Ariana Wills MD        atorvastatin (LIPITOR) tablet 10 mg  10 mg Oral Daily Bakari Mehta RN, NP   10 mg at 03/31/21 2123    metoprolol succinate (TOPROL XL) extended release tablet 12.5 mg  12.5 mg Oral BID Bakari Mehta RN, NP   Stopped at 04/01/21 0925    midodrine (PROAMATINE) tablet 10 mg  10 mg Oral TID WC Bakari Mehta RN, NP   10 mg at 04/01/21 0833    ranolazine (RANEXA) extended release tablet 500 mg  500 mg Oral BID Bakari Mehta RN, NP   500 mg at 04/01/21 0833    fludrocortisone (FLORINEF) tablet 0.1 mg  0.1 mg Oral Daily Geovanny Carrion MD   0.1 mg at 04/01/21 5235       History: PMHx:  Past Medical History:   Diagnosis Date    Arthritis     Ascites due to alcoholic cirrhosis ([address]     Cardiomyopathy ([address]     mother history.     Chronic kidney disease     Cirrhosis ([address]     Encephalopathy     HLD (hyperlipidemia) 3/30/2021    Hypertension     Hypotension     Liver disease     Liver failure (HCC)     HARDEEP (obstructive sleep apnea)     Other disorders of kidney and ureter in diseases classified elsewhere     Pneumonia     Psychiatric problem     Renal failure     Severe malnutrition ([address] 3/30/2021    Shock ([address]     Sleep apnea, obstructive     Type II or unspecified type diabetes mellitus without mention of complication, not stated as uncontrolled        PSHx:  Past Surgical History:   Procedure Laterality Date    APPENDECTOMY      CHOLECYSTECTOMY      COLONOSCOPY  04/23/2013    CT BIOPSY RENAL  08/04/2020    CT BIOPSY RENAL 8/4/2020 [facility] CT SCAN    CT BIOPSY RENAL  09/04/2020    CT BIOPSY RENAL 9/4/2020 [facility] CT SCAN    FOOT FRACTURE SURGERY Bilateral     FRACTURE SURGERY      HYSTERECTOMY      IR TUNNELED INTRAPERITNL CATH W/IMAG  01/28/2021    IR TUNNELED INTRAPERITNL CATH W/IMAG 1/28/2021 [facility] SPECIAL PROCEDURE    OTHER SURGICAL HISTORY      removal of anal fistulotomy    PARACENTESIS Left 07/28/2020    9015 mls removed by Dr Colby Salmeron Left 09/04/2020    32023zf ascites removed by Dr Lakeisha Black 50g albumin given    PARACENTESIS Left 09/15/2020    10,400cc removed by Dr Pankaj Cooper [phone]    PARACENTESIS Left 10/09/2020    7000cc removed by Dr. Colby Salmeron Left 10/13/2020    7000ml removed by Dr Panakj Cooper [phone]    PARACENTESIS Left 10/26/2020    7010cc removed by Dr. Sujey Baldwin Left 11/06/2020    7020 mls removed ny Dr Colby Salmeron Left 11/19/2020    7000 ml removed by Dr. Lakeisha Black.  PARACENTESIS Left 11/30/2020    7000 ml removed by paracentesis by Dr. Colby Salmeron Left 12/08/2020    7100 ml removed    PARACENTESIS Left 01/08/2021    ultrasound guided paracentesis 7150 ml removed    PARACENTESIS Left 01/19/2021    7000ml removed by Dr Pankaj Cooper [phone]    PARACENTESIS Left 01/28/2021    by Dr. Lakeisha Black 8100 ml removed    PARACENTESIS Left 03/23/2021    6420 ml removed by Dr. Lakeisha Black [phone]    PARACENTESIS Left 03/26/2021    8210 ml removed by Dr. Lakeisha Black.     US GUIDE PARACENTESIS Left 12/17/2020    7000ml removed by Dr Sancho Cervantes Hx:  Social History     Socioeconomic History    Marital status:      Spouse name: None    Number of children: None    Years of education: None    Highest education level: None   Occupational History    None   Social Needs    Financial resource strain: Not hard at all    Food insecurity     Worry: Never true     Inability: Never true   [email or web address] needs     Medical: None     Non-medical: None   Tobacco Use    Smoking status: Never Smoker    Smokeless tobacco: Former User   Substance and Sexual Activity    Alcohol use: No    Drug use: No    Sexual activity: Yes   Lifestyle    Physical activity     Days per week: None     Minutes per session: None    Stress: None   Relationships    Social connections     Talks on phone: None     Gets together: None     Attends Confucianism service: None     Active member of club or organization: None     Attends meetings of clubs or organizations: None     Relationship status: None    Intimate partner violence     Fear of current or ex partner: None     Emotionally abused: None     Physically abused: None     Forced sexual activity: None   Other Topics Concern    None   Social History Narrative    None       Family Hx:  Family History   Problem Relation Age of Onset    Arrhythmia Mother     Diabetes Mother     High Blood Pressure Mother     Arrhythmia Father     Breast Cancer Sister     Diabetes Brother     High Blood Pressure Brother        LABS: Reviewed     CBC:  Lab Results   Component Value Date    WBC 14.1 04/01/2021    RBC 3.25 04/01/2021    HGB 9.1 04/01/2021    HCT 27.6 04/01/2021    MCV 85.0 04/01/2021    MCH 27.9 04/01/2021    MCHC 32.8 04/01/2021    RDW 15.7 04/01/2021     04/01/2021    MPV 10.0 09/08/2015     CBC with Differential:   Lab Results   Component Value Date    WBC 14.1 04/01/2021    RBC 3.25 04/01/2021    HGB 9.1 04/01/2021    HCT 27.6 04/01/2021     04/01/2021    MCV 85.0 04/01/2021    MCH 27.9 04/01/2021    MCHC 32.8 04/01/2021    RDW 15.7 04/01/2021    NRBC 0.0 07/22/2020    LYMPHOPCT 3.8 04/01/2021    MONOPCT 6.1 04/01/2021    BASOPCT 0.1 04/01/2021    MONOSABS 0.9 04/01/2021    LYMPHSABS 0.5 04/01/2021    EOSABS 0.0 04/01/2021    BASOSABS 0.0 04/01/2021     CMP:    Lab Results   Component Value Date     04/01/2021    K 5.2 04/01/2021    K 5.2 04/01/2021    CL 78 04/01/2021    CO2 25 04/01/2021    BUN 91 04/01/2021    CREATININE 4.06 04/01/2021    GFRAA 13.0 04/01/2021    LABGLOM 10.8 04/01/2021    GLUCOSE 202 04/01/2021    GLUCOSE 84 12/04/2020    PROT 4.5 04/01/2021    LABALBU 3.0 04/01/2021    LABALBU 3.3 12/04/2020    CALCIUM 8.3 04/01/2021    BILITOT 1.2 04/01/2021    ALKPHOS 63 04/01/2021    AST 24 04/01/2021    ALT 19 04/01/2021     BMP:    Lab Results   Component Value Date     04/01/2021    K 5.2 04/01/2021    K 5.2 04/01/2021    CL 78 04/01/2021    CO2 25 04/01/2021    BUN 91 04/01/2021    LABALBU 3.0 04/01/2021    LABALBU 3.3 12/04/2020    CREATININE 4.06 04/01/2021    CALCIUM 8.3 04/01/2021    GFRAA 13.0 04/01/2021    LABGLOM 10.8 04/01/2021    GLUCOSE 202 04/01/2021    GLUCOSE 84 12/04/2020     TSH:   Lab Results   Component Value Date    TSH 3.620 03/07/2021     Vitamin B12 and Folate: No components found for: FOLIC,  X31  Urinalysis:   Lab Results   Component Value Date    NITRU Negative 03/08/2021    WBCUA 10-20 03/08/2021    BACTERIA Negative 03/08/2021    RBCUA 3-5 03/08/2021    BLOODU Negative 03/08/2021    SPECGRAV 1.018 03/08/2021    GLUCOSEU Negative 03/08/2021           FUNCTIONAL ADL´S:     Independent: [ x] Eating, [ x  ] Dressing, [x   ] Transferring, [ x ] Toileting, [  x ] Bathing, [  x ] Continence  Dependent   : [  ] Eating, [   ] Dressing, [   ] Transferring, [   ] Josias Mejia, [   ] Bathing, [   ] Continence  W. assistant : [  ] Eating, [   ] Dressing, [   ] Transferring, [   ] Josias Mejia, [   ] Ricki No, [   ] Continence    Radiology: Reviewed      Echocardiogram Limited    Result Date: 3/8/2021  Transthoracic Echocardiography Report (TTE)  Demographics  Patient Name    Marie Watson A Gender               Female Patient Number  [id number]    Race                                               Ethnicity  Visit Number    [id number]   Room Number          W170  Corporate ID                Date of Study        03/08/2021  Accession       [id number]  Referring Physician  Jovita Evans  Number  Date of Birth   1945  Sonographer          Gavin Crease,                                                   LPN  Age             70 year(s)  Interpreting         [facility]) Cardiology                              Physician            Mel Jackson Standard Procedure Type of Study  TTE procedure:ECHOCARDIOGRAM LIMITED. Procedure Date: 03/08/2021 Start: 02:47 PM Study Location: Portable Technical Quality: Adequate visualization Indications:LVF. Patient Status: Routine Height: 67 inches Weight: 191 pounds BSA: 1.98 m^2 BMI: 29.91 kg/m^2 BP: 128/59 mmHg  Conclusions  Summary  Technically difficult examination. Left ventricular ejection fraction is visually estimated at 65%. Moderate concentric left ventricular hypertrophy with more pronounced  basal septal hypertrophy. severely Increased LVOT gradient of 107mmHg unchanged as compared to  previous echo on 6/2020, likely due to basal septal hypertrophy and KIKO of  anterior mitral valve leaflet  Normal right ventricle structure and function. Normal right ventricle systolic pressure. Recommend Cardiac MRI for further evaluation of LVOT/septal hypertrophy. Signature  ----------------------------------------------------------------  Electronically signed by Mel Armstrong(Interpreting  physician) on 03/08/2021 04:49 PM  ----------------------------------------------------------------  Findings Left Ventricle Left ventricular ejection fraction is visually estimated at 65%. Normal left ventricular size and function. Moderate concentric left ventricular hypertrophy with more pronounced basal septal hypertophy.  severely Increased LVOT gradient of 105mmHg unchanged as compared to previous echo on 6/2020, likely due to basal septal hypertrophy and KIKO of anterior mitral valve leaflet Right Ventricle Normal right ventricle structure and function. Normal right ventricle systolic pressure. Left Atrium The left atrium is Moderately dilated. Mitral Valve No evidence of mitral valve stenosis. Trace (+) mitral regurgitation is present. Aortic Valve Individual aortic valve leaflets are not clearly visualized. Pericardial Effusion No evidence of pericardial effusion. Pleural Effusion No evidence of pleural effusion. Doppler Measurements:  AV Peak Gradient: 105.67 mmHg                                               MV P1/2t: 46.7 msec                                               MVA by PHT4.71 cm^2 Valves  Mitral Valve  P1/2t: 46.7 msec  Area (PHT): 4.71 cm^2  Aortic Valve  Peak Velocity: 5.14 m/s  Peak Gradient: 105.67 mmHg    Xr Chest Portable    Result Date: 3/8/2021  EXAMINATION: XR CHEST PORTABLE CLINICAL HISTORY: HYPOTENSION COMPARISONS: CT CHEST, DECEMBER 29, 2020 FINDINGS: Osseous structures are intact. Cardiopericardial silhouette is normal. Pulmonary vasculature is normal. Lungs are clear. NO ACUTE CARDIOPULMONARY DISEASE. Assessment and plan:    -Advance Care Planning  Discussed goals of care with patient. Explained in extensive detail nuances between full code, DNR CCA and DNR CC. Patient has made the decision to be:    DNR CCA  NO MPOA or Living Will in place  Pallitive Performance Scale of 30%  MELD 30. 90 day Mortality rate 27-32%  May benefit from community based palliative care due to need for close monitoring as she is high risk for readmission, advanced disease and symptom burden. -Goals of Care Discussion:  Disease process and goals of treatment were discussed in basic terms. Elise's goal is to optimize available comfort care measures to decrease hospitalizations and maximize comfort. We discussed the palliative care philosophy in light of those goals.      We due to poor p.o. intake  3. FALLON-likely prerenal but need to rule out hepatorenal also  4. Acute encephalopathy-likely multifactorial, due to hyponatremia versus hepatic, rule out infection, rule out adrenal insufficiency  5. Decompensated liver cirrhosis with recurrent ascites status post catheter placement  6. Hyperkalemia  7. Moderate protein calorie malnutrition  8.  Diabetes    Electronically signed by TANNER Vazquez CNP on 4/1/2021 at 11:32 AM

## 2021-04-01 NOTE — CARE COORDINATION
Initial referral faxed to [facility]. Hospice has been unable to reach spouse of pt. LSW met with pt who stated that he probably is not home and does not have a cell phone. Pt stated her daughter Michael Heart can also be contacted for DC planning. LSW spoke with [facility] from [facility] She did speak with patient's spouse and a meeting is scheduled for 9:30 am on 4/1/21.
Met with pt and spouse who confirmed they plan for pt to go to the hospice center. LSW notified that [facility] did notify that they can accept pt if indicated.
Per [facility] called and they can accommodate pt.today at [facility], they will be setting up transport for later today. [facility] will be contacting  per [place].  Electronically signed by Joe Jefferson RN on 4/1/2021 at 2:41 PM
CARE.  PT HAS A BSC, WALKER AND WC IS TO BE DELIVERED TOMORROW, ALL EQUIP. BOUGHT ONLINE. PER CHILDREN PT IS ABLE TO AMBULATE INDEPENDENTLY AT TIMES AND OTHER TIMES NEED ASSISTANCE BASED ON HOW SHE FEELS. [facility] WAS TO BE ORDERED BY PCP BUT PT WAS ASKED TO COME TO ER.  FAMILY WOULD LIKE [facility] AND PRIVATE HIRE LIST FOR DC.  [facility] WOULD ALSO LIKE TO BE CONTACTED FOR ANY TREATMENT OR DC NEEDS AS THEIR FATHER IS VERY [place], AND THEY ARE CARING FOR THEM. PRIVATE HIRE LIST ON CHART-[facility] AWARE.        The Patient and/or patient representative: PT/FAMILY was provided with choice of any post-acute providers for care and equipment and agrees with discharge plan  Yes    Electronically signed by Paulette Vizcarra RN on 3/30/2021 at 11:15 AM

## 2021-04-01 NOTE — PROGRESS NOTES
Perfect serve message to Dr. Vickie Srivastava to report critical   And BUN 84. No new orders at this time.

## 2021-04-01 NOTE — PROGRESS NOTES
Lab called with critical results. BUN 91 . These labs were given in hand off report to [facility] .

## 2021-04-01 NOTE — PROGRESS NOTES
Pt resting in bed. Turned and repositioned frequently. meds given per orders. medicated with tylenol for complaint of pain PRN. IVF infusing per orders. Fall precautions in place. Hourly rounds continue.

## 2021-04-01 NOTE — PROGRESS NOTES
Pt and  decided with hospice at [facility]. Anticipating pt to be discharged there today or tomorrow. Dr. Yesica Mcnally notified. Pt unable to void. Bladder scan showing 793. Dr. Yesica Mcnally notified.  Zheng catheter ordered      Electronically signed by Larraine Severs, RN on 4/1/2021 at 10:51 AM

## 2021-04-01 NOTE — PLAN OF CARE
Problem: Falls - Risk of:  Goal: Will remain free from falls  Description: Will remain free from falls  Outcome: Ongoing  Goal: Absence of physical injury  Description: Absence of physical injury  Outcome: Ongoing     Problem: Skin Integrity:  Goal: Will show no infection signs and symptoms  Description: Will show no infection signs and symptoms  Outcome: Ongoing  Goal: Absence of new skin breakdown  Description: Absence of new skin breakdown  Outcome: Ongoing     Problem: Pain:  Goal: Pain level will decrease  Description: Pain level will decrease  Outcome: Ongoing  Goal: Control of acute pain  Description: Control of acute pain  Outcome: Ongoing  Goal: Control of chronic pain  Description: Control of chronic pain  Outcome: Ongoing     Problem: Fluid Volume - Imbalance:  Goal: Absence of imbalanced fluid volume signs and symptoms  Description: Absence of imbalanced fluid volume signs and symptoms  Outcome: Ongoing     Problem: Nutrition  Goal: Optimal nutrition therapy  Outcome: Ongoing     Problem: IP BALANCE  Goal: LTG - patient will maintain standing balance to allow for completion of daily activities  Outcome: Ongoing

## 2021-04-01 NOTE — DISCHARGE SUMMARY
Hospital Medicine Discharge Summary    Nicole Yung  :  1945  MRN:  [id number]    Admit date:  3/29/2021  Discharge date:  2021    Admitting Physician: Riccardo Mendez MD  Primary Care Physician:  Elisa Pagan MD      Discharge Diagnoses:      Severe hyperkalemia  Hypovolemic hyponatremia-in the setting of decompensated cirrhosis, likely also due to poor p.o. intake  FALLON-likely prerenal but need to rule out hepatorenal also  ?dig toxicity   Acute encephalopathy-likely multifactorial, due to hyponatremia versus hepatic, rule out infection, rule out adrenal insufficiency  Decompensated liver cirrhosis with recurrent ascites status post catheter placement  Hyperkalemia  Moderate protein calorie malnutrition  Diabetes       Chief Complaint   Patient presents with    Abnormal Lab     was at the dr office today and potassium was 7 per squad     Hospital Course:     3/30/2021  -Patient is critically ill, she is very lethargic this morning. Renal evaluated the patient on admission last night and no need for urgent dialysis. -Rule out adrenal insufficiency, cosyntropin test ordered. Cortisol level was 21 this morning. She is ordered for dexamethasone by intensivist.  Discussed with intensivist.  -Agree with sodium bicarb infusion, Kayexalate, monitor potassium level closely. Continue telemetry monitoring.  -Resume home midodrine 10 mg 3 times daily.  -Resume insulin, monitor on hypoglycemia protocol.  -We will reach out to family for goals of care discussion, meanwhile we will consult palliative care. I discussed goals of care with the patient last admission and she is DNR CCA. At last admission, patient declined home care and SNF. -Drained 2 L from the abdomen and drain, send sample to rule out SBP although less likely     3/31/2021  -Patient stabilized hemodynamically however remain overall ill. She was moved out of the ICU. Palliative care is discussing hospice with patient and family.   Patient expressed her wishes to proceed with hospice this morning. Patient will be discharged to SNF with hospice as per palliative team.  -Discussed possible digoxin toxicity with the nephrologist, will give DigiFab, dosing discussed with pharmacist.  -Resume current measures, appreciate nephrology and palliative care help.  -Monitor vitals  -Discussed with patient RN.    4/1  -Patient and family decided to go with hospice. Patient was transferred to SNF with hospice. Exam on discharge:   BP (!) 115/39   Pulse 61   Temp 97.2 °F (36.2 °C) (Axillary)   Resp 16   Ht 5' 7\" (1.702 m)   Wt 154 lb 8.7 oz (70.1 kg)   SpO2 100%   BMI 24.20 kg/m²   General appearance: Awake, no acute distress  Mental Status: Alert and oriented x3, no confusion  Lungs: clear to auscultation bilaterally, normal effort  Heart: regular rate   Abdomen: soft, no tenderness but distended, bowel sounds present, no masses  Extremities: no edema, redness, tenderness in the calves  Skin: no gross lesions, rashes  Neurologically: AOx3, nonfocal.       Patient was seen by the following consultants while admitted to [facility]:   Consults:  IP CONSULT TO NEPHROLOGY  IP CONSULT TO CASE MANAGEMENT  IP CONSULT TO SOCIAL WORK  IP CONSULT TO PULMONOLOGY  IP CONSULT TO CRITICAL CARE  IP CONSULT TO PALLIATIVE CARE  IP CONSULT TO HOSPICE  PHARMACY TO DOSE VANCOMYCIN  IP CONSULT TO NEPHROLOGY    Significant Diagnostic Studies:    Refer to chart     Please refer to chart if no studies are shown here    Xr Chest Portable    Result Date: 3/30/2021  Exam: XR CHEST PORTABLE History:  weak Technique: AP portable view of the chest obtained. Comparison: none Chest x-ray portable Findings: The cardiomediastinal silhouette is within normal limits. There are no infiltrates, consolidations or effusions. Bones of the thorax appear intact. No radiographic evidence of acute intrathoracic process.        Discharge Medications:       Gris Guaman 26 Medication Instructions QEN:[id number]    Printed on:04/01/21 9104   Medication Information                      Adhesive Tape (PAPER TAPE 1\"X12YD) TAPE  1 each by Does not apply route 4 times daily as needed (SOILAGE)             atorvastatin (LIPITOR) 10 MG tablet  Take 1 tablet by mouth daily             Blood Pressure KIT  Check your blood pressure daily             Gauze Pads & Dressings ([employer] GAUZE PAD STERILE 4\"X4\") 4\"X4\" PADS  Change intraperitoneal dressing - apply daily and PRN soilage (EXTRA ABSORBENT PADS)             glucose blood VI test strips (ACCU-CHEK MELANIE) strip  Test 1-2 times a day. Dx: 250.00. Accu-check melanie strips             Lancet Device MISC  Test 1-2 times a day. Dx: 250.00. Lancets             metFORMIN (GLUCOPHAGE) 1000 MG tablet  TAKE 1 TABLET TWICE A DAY WITH MEALS             metoprolol succinate (TOPROL XL) 25 MG extended release tablet  Take 0.5 tablets by mouth 2 times daily             midodrine (PROAMATINE) 10 MG tablet  Take 1 tablet by mouth 3 times daily (with meals)             nitroGLYCERIN (NITROSTAT) 0.4 MG SL tablet  up to max of 3 total doses. If no relief after 1 dose, call 911. Nutritional Supplements (ENSURE HIGH PROTEIN) LIQD  Take 1 Can by mouth 3 times daily             raloxifene (EVISTA) 60 MG tablet  TAKE 1 TABLET DAILY             ranolazine (RANEXA) 500 MG extended release tablet  Take 1 tablet by mouth 2 times daily             Transparent Dressings (TEGADERM FIRST AID STYLE) MISC  One box 50. Change daily one tegaderm to intraperitoneal site and PRN soilage. traZODone (DESYREL) 50 MG tablet  Take 0.5 tablets by mouth nightly as needed for Sleep                 Disposition:     SNF with hospice    Condition at discharge: Terminal    Activity: activity as tolerated    Total time taken for discharging this patient: 40 minutes. Greater than 70% of time was spent focused exclusively on this patient.  Time was taken to review chart, discuss plans with consultants, reconciling medications, discussing plan answering questions with patient.      Evens Fill  4/1/2021, 5:03 PM  ----------------------------------------------------------------------------------------------------------------------    Kelechi Gan

## 2021-04-01 NOTE — PROGRESS NOTES
Report given to hospice nurse.  Transport set up for 430    Electronically signed by Ana María Stevens RN on 4/1/2021 at 3:07 PM

## 2021-04-01 NOTE — PLAN OF CARE
Problem: Falls - Risk of:  Goal: Will remain free from falls  Outcome: Ongoing  Goal: Absence of physical injury  Outcome: Ongoing     Problem: Skin Integrity:  Goal: Will show no infection signs and symptoms  Outcome: Ongoing  Goal: Absence of new skin breakdown  Outcome: Ongoing     Problem: Pain:  Description: Pain management should include both nonpharmacologic and pharmacologic interventions.   Goal: Pain level will decrease  Outcome: Ongoing  Goal: Control of acute pain  Outcome: Ongoing  Goal: Control of chronic pain  Outcome: Ongoing     Problem: Fluid Volume - Imbalance:  Goal: Absence of imbalanced fluid volume signs and symptoms  Outcome: Ongoing     Problem: Nutrition  Goal: Optimal nutrition therapy  Outcome: Ongoing

## 2021-04-02 NOTE — CARE COORDINATION
[facility] Update     2021    Patient: Troy Michel Patient : 1945   MRN: [id number]  Reason for Admission: 3/ ED [facility] Hypovolemic hyponatremia-in the setting of decompensated cirrhosis  Discharge Date: 21 RARS: Readmission Risk Score: 34  Care Transitions       Care Transitions Post Acute Facility Update    Care Transitions Interventions  Post Acute Facility: [address] [place] [address] Update

## 2022-02-06 NOTE — TELEPHONE ENCOUNTER
Per Dr Herbert Henriquez he doesn't feel she needs a sooner appt before April 5th and was also informed she was given an order for labs in hospital and needs they done before appt. Dr Herbert Henriquez saw her in hospital as well. no

## 2022-09-08 NOTE — PROGRESS NOTES
Nephrology Progress Note       Assessment:  69y.o. year old female with history s/f T2DM, HTN, HARDEEP, HOCM who presented for SOB, weight gain, nausea and abdominal fullness. Found to have FALLON     1. FALLON: etiology unclear, ? Prerenal in setting of lisinopril use vs. HRS vs. Increased abdominal pressure, nml renal function at baseline, Scr 0.4-0.5. UA bland making GN unlikely  2. Metabolic acidosis: 2/2 renal failure, improving  3. Cirrhosis c/b ascites, s/p paracentesis 07/29  4. LT renal mass: hem/onc onboard  5. Hypoalbuminemia  6. Anemia/thrombocytopenia  7. Hyponatremia: improving   8.  Hypoglycemia     Plan:  - agree w/ lasix but will likely need higher dose, will give 40 mg   - Dr. Kari Vail consulted for LT kidney mass     Patient Active Problem List:     Type 2 diabetes mellitus without complication, without long-term current use of insulin ([address]     Hypertension     Bell-rectal abscess     Bilateral carotid artery stenosis     HOCM (hypertrophic obstructive cardiomyopathy) (HCC)     HARDEEP (obstructive sleep apnea)     Pseudophakia of both eyes     Regular astigmatism     Cardiomyopathy ([address]     Rectal pain     Abdominal distension     FALLON (acute kidney injury) ([address]      Subjective:  Admit Date: 7/26/2020    Interval History: renal function improving, s/p paracentesis, 9L off    Medications:  Scheduled Meds:   albumin human  50 g Intravenous Once    insulin lispro  0-6 Units Subcutaneous TID WC    insulin lispro  0-3 Units Subcutaneous Nightly    spironolactone  25 mg Oral Daily    furosemide  20 mg Oral Daily    aspirin  81 mg Oral BID    atorvastatin  40 mg Oral Daily    sodium chloride flush  10 mL Intravenous 2 times per day    enoxaparin  30 mg Subcutaneous Daily    metoprolol tartrate  50 mg Oral BID    citalopram  10 mg Oral Daily     Continuous Infusions:   dextrose 100 mL/hr (07/28/20 0619)       CBC:   Recent Labs     07/27/20  0517 07/28/20  0517   WBC 6.0 4.6*   HGB 8.3* 8.4*   * 109* CMP:    Recent Labs     07/27/20  1932 07/28/20  0517 07/29/20  0526   * 132* 135   K 4.9 4.7 4.7   CL 98 100 102   CO2 17* 20 20   BUN 33* 32* 29*   CREATININE 3.49* 3.28* 2.49*   GLUCOSE 54* 45* 48*   CALCIUM 8.5 8.0* 8.2*   LABGLOM 12.9* 13.8* 19.0*     Troponin: No results for input(s): TROPONINI in the last 72 hours. BNP: No results for input(s): BNP in the last 72 hours. INR:   Recent Labs     07/26/20  1715   INR 1.3     Lipids:   Recent Labs     07/26/20 1715   LIPASE 96*     Liver:   Recent Labs     07/26/20 1715   AST 23   ALT 10   ALKPHOS 87   PROT 6.3   LABALBU 3.2*   BILITOT 0.7     Iron:  No results for input(s): IRONS, FERRITIN in the last 72 hours. Invalid input(s): LABIRONS  Urinalysis: No results for input(s): UA in the last 72 hours.     Objective:  Vitals: /63   Pulse 86   Temp 98.3 °F (36.8 °C) (Oral)   Resp 16   Ht 5' 3\" (1.6 m)   Wt 200 lb (90.7 kg)   SpO2 100%   BMI 35.43 kg/m²    Wt Readings from Last 3 Encounters:   07/26/20 200 lb (90.7 kg)   07/24/20 218 lb (98.9 kg)   07/10/20 209 lb (94.8 kg)      24HR INTAKE/OUTPUT:    No intake or output data in the 24 hours ending 07/29/20 1209  General: alert, in no apparent distress  HEENT: normocephalic, atraumatic, anicteric  Lungs: non-labored respirations, clear to auscultation bilaterally  Heart: regular rate and rhythm, no murmurs or rubs  Abdomen: soft, non-tender, non-distended  MSK: no joint swelling or tenderness  Ext: no cyanosis, 2+ BLE peripheral edema      Electronically signed by Jono Park MD no
